# Patient Record
Sex: FEMALE | Race: BLACK OR AFRICAN AMERICAN | NOT HISPANIC OR LATINO | Employment: UNEMPLOYED | URBAN - METROPOLITAN AREA
[De-identification: names, ages, dates, MRNs, and addresses within clinical notes are randomized per-mention and may not be internally consistent; named-entity substitution may affect disease eponyms.]

---

## 2023-03-20 LAB
ALBUMIN (G/DL) IN SER/PLAS: 4 G/DL (ref 3.4–5)
ALBUMIN (MG/L) IN URINE: 143.1 MG/L
ALBUMIN/CREATININE (UG/MG) IN URINE: 74.5 UG/MG CRT (ref 0–30)
ANION GAP IN SER/PLAS: 13 MMOL/L (ref 10–20)
APPEARANCE, URINE: CLEAR
BILIRUBIN, URINE: NEGATIVE
BLOOD, URINE: NEGATIVE
CALCIDIOL (25 OH VITAMIN D3) (NG/ML) IN SER/PLAS: 50 NG/ML
CALCIUM (MG/DL) IN SER/PLAS: 9.7 MG/DL (ref 8.6–10.6)
CARBON DIOXIDE, TOTAL (MMOL/L) IN SER/PLAS: 27 MMOL/L (ref 21–32)
CHLORIDE (MMOL/L) IN SER/PLAS: 109 MMOL/L (ref 98–107)
COLOR, URINE: YELLOW
CREATININE (MG/DL) IN SER/PLAS: 1.86 MG/DL (ref 0.5–1.05)
CREATININE (MG/DL) IN URINE: 192 MG/DL (ref 20–320)
CREATININE (MG/DL) IN URINE: 192 MG/DL (ref 20–320)
ERYTHROCYTE DISTRIBUTION WIDTH (RATIO) BY AUTOMATED COUNT: NORMAL
ERYTHROCYTE MEAN CORPUSCULAR HEMOGLOBIN CONCENTRATION (G/DL) BY AUTOMATED: NORMAL
ERYTHROCYTE MEAN CORPUSCULAR VOLUME (FL) BY AUTOMATED COUNT: NORMAL
ERYTHROCYTES (10*6/UL) IN BLOOD BY AUTOMATED COUNT: NORMAL
FINE GRANULAR CASTS, URINE: ABNORMAL /LPF
GFR FEMALE: 27 ML/MIN/1.73M2
GLUCOSE (MG/DL) IN SER/PLAS: 75 MG/DL (ref 74–99)
GLUCOSE, URINE: NEGATIVE MG/DL
HEMATOCRIT (%) IN BLOOD BY AUTOMATED COUNT: NORMAL
HEMOGLOBIN (G/DL) IN BLOOD: NORMAL
HYALINE CASTS, URINE: ABNORMAL /LPF
KETONES, URINE: ABNORMAL MG/DL
LEUKOCYTE ESTERASE, URINE: NEGATIVE
LEUKOCYTES (10*3/UL) IN BLOOD BY AUTOMATED COUNT: NORMAL
MUCUS, URINE: ABNORMAL /LPF
NITRITE, URINE: NEGATIVE
NRBC (PER 100 WBCS) BY AUTOMATED COUNT: NORMAL
PARATHYRIN INTACT (PG/ML) IN SER/PLAS: 118 PG/ML (ref 18.5–88)
PH, URINE: 5 (ref 5–8)
PHOSPHATE (MG/DL) IN SER/PLAS: 3.9 MG/DL (ref 2.5–4.9)
PLATELETS (10*3/UL) IN BLOOD AUTOMATED COUNT: NORMAL
POTASSIUM (MMOL/L) IN SER/PLAS: 5.2 MMOL/L (ref 3.5–5.3)
PROTEIN (MG/DL) IN URINE: 28 MG/DL (ref 5–24)
PROTEIN, URINE: ABNORMAL MG/DL
PROTEIN/CREATININE (MG/MG) IN URINE: 0.15 MG/MG CREAT (ref 0–0.17)
RBC, URINE: 2 /HPF (ref 0–5)
SODIUM (MMOL/L) IN SER/PLAS: 144 MMOL/L (ref 136–145)
SPECIFIC GRAVITY, URINE: 1.02 (ref 1–1.03)
SQUAMOUS EPITHELIAL CELLS, URINE: 1 /HPF
UREA NITROGEN (MG/DL) IN SER/PLAS: 23 MG/DL (ref 6–23)
UROBILINOGEN, URINE: 2 MG/DL (ref 0–1.9)
WBC, URINE: 2 /HPF (ref 0–5)

## 2023-03-29 PROBLEM — F41.8 DEPRESSION WITH ANXIETY: Status: ACTIVE | Noted: 2023-03-29

## 2023-03-29 PROBLEM — N18.30 STAGE III CHRONIC KIDNEY DISEASE (MULTI): Status: ACTIVE | Noted: 2023-03-29

## 2023-03-29 PROBLEM — M17.12 PRIMARY OSTEOARTHRITIS OF LEFT KNEE: Status: ACTIVE | Noted: 2023-03-29

## 2023-03-29 PROBLEM — R07.9 CHEST PAIN: Status: ACTIVE | Noted: 2023-03-29

## 2023-03-29 PROBLEM — R51.9 PERSISTENT HEADACHES: Status: ACTIVE | Noted: 2023-03-29

## 2023-03-29 PROBLEM — I67.9 CEREBROVASCULAR DISEASE: Status: ACTIVE | Noted: 2023-03-29

## 2023-03-29 PROBLEM — D12.6 TUBULAR ADENOMA OF COLON: Status: ACTIVE | Noted: 2023-03-29

## 2023-03-29 PROBLEM — N25.81 SECONDARY RENAL HYPERPARATHYROIDISM (MULTI): Status: ACTIVE | Noted: 2023-03-29

## 2023-03-29 PROBLEM — M25.562 LEFT KNEE PAIN: Status: ACTIVE | Noted: 2023-03-29

## 2023-03-29 PROBLEM — G31.84 AMNESTIC MCI (MILD COGNITIVE IMPAIRMENT WITH MEMORY LOSS): Status: ACTIVE | Noted: 2023-03-29

## 2023-03-29 PROBLEM — R79.89 LOW VITAMIN B12 LEVEL: Status: ACTIVE | Noted: 2023-03-29

## 2023-03-29 PROBLEM — H25.011 CORTICAL AGE-RELATED CATARACT OF RIGHT EYE: Status: ACTIVE | Noted: 2023-03-29

## 2023-03-29 PROBLEM — H25.12 AGE-RELATED NUCLEAR CATARACT OF LEFT EYE: Status: ACTIVE | Noted: 2023-03-29

## 2023-03-29 PROBLEM — H25.012 CORTICAL AGE-RELATED CATARACT OF LEFT EYE: Status: ACTIVE | Noted: 2023-03-29

## 2023-03-29 PROBLEM — R41.3 MEMORY LOSS: Status: ACTIVE | Noted: 2023-03-29

## 2023-03-29 PROBLEM — Z96.1 PSEUDOPHAKIA OF RIGHT EYE: Status: ACTIVE | Noted: 2023-03-29

## 2023-03-29 PROBLEM — R26.81 GAIT INSTABILITY: Status: ACTIVE | Noted: 2023-03-29

## 2023-03-29 PROBLEM — G47.00 INSOMNIA: Status: ACTIVE | Noted: 2023-03-29

## 2023-03-29 PROBLEM — M81.0 OSTEOPOROSIS: Status: ACTIVE | Noted: 2023-03-29

## 2023-03-29 PROBLEM — M21.619 BUNION: Status: ACTIVE | Noted: 2023-03-29

## 2023-03-29 PROBLEM — R79.82 ELEVATED C-REACTIVE PROTEIN (CRP): Status: ACTIVE | Noted: 2023-03-29

## 2023-03-29 PROBLEM — F41.1 GAD (GENERALIZED ANXIETY DISORDER): Status: ACTIVE | Noted: 2023-03-29

## 2023-03-29 PROBLEM — R80.9 PROTEINURIA: Status: ACTIVE | Noted: 2023-03-29

## 2023-03-29 PROBLEM — M18.11 ARTHRITIS OF CARPOMETACARPAL (CMC) JOINT OF RIGHT THUMB: Status: ACTIVE | Noted: 2023-03-29

## 2023-03-29 PROBLEM — B02.9 HERPES ZOSTER: Status: ACTIVE | Noted: 2023-03-29

## 2023-03-29 PROBLEM — E55.9 VITAMIN D DEFICIENCY: Status: ACTIVE | Noted: 2023-03-29

## 2023-03-29 PROBLEM — R76.8 POSITIVE ANA (ANTINUCLEAR ANTIBODY): Status: ACTIVE | Noted: 2023-03-29

## 2023-03-29 PROBLEM — H91.90 HEARING LOSS: Status: ACTIVE | Noted: 2023-03-29

## 2023-03-29 PROBLEM — I10 HYPERTENSION: Status: ACTIVE | Noted: 2023-03-29

## 2023-03-29 PROBLEM — K59.09 CHRONIC CONSTIPATION: Status: ACTIVE | Noted: 2023-03-29

## 2023-03-29 PROBLEM — M54.50 LOW BACK PAIN: Status: ACTIVE | Noted: 2023-03-29

## 2023-03-29 RX ORDER — AMLODIPINE BESYLATE 5 MG/1
1 TABLET ORAL DAILY
COMMUNITY
Start: 2017-02-09

## 2023-03-29 RX ORDER — BUPROPION HYDROCHLORIDE 300 MG/1
1 TABLET ORAL DAILY
COMMUNITY
Start: 2013-12-03 | End: 2023-06-16 | Stop reason: SDUPTHER

## 2023-03-29 RX ORDER — LISINOPRIL 20 MG/1
1 TABLET ORAL DAILY
COMMUNITY
Start: 2016-04-07 | End: 2024-01-30 | Stop reason: DRUGHIGH

## 2023-03-30 ENCOUNTER — OFFICE VISIT (OUTPATIENT)
Dept: PRIMARY CARE | Facility: CLINIC | Age: 83
End: 2023-03-30
Payer: MEDICARE

## 2023-03-30 VITALS
SYSTOLIC BLOOD PRESSURE: 146 MMHG | DIASTOLIC BLOOD PRESSURE: 88 MMHG | HEART RATE: 82 BPM | WEIGHT: 136.8 LBS | BODY MASS INDEX: 23.48 KG/M2
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
## 2023-03-30 DIAGNOSIS — R26.81 GAIT INSTABILITY: Primary | ICD-10-CM

## 2023-03-30 DIAGNOSIS — S49.91XA SHOULDER INJURY, RIGHT, INITIAL ENCOUNTER: ICD-10-CM

## 2023-03-30 PROBLEM — H25.12 AGE-RELATED NUCLEAR CATARACT OF LEFT EYE: Status: RESOLVED | Noted: 2023-03-29 | Resolved: 2023-03-30

## 2023-03-30 PROBLEM — R41.3 MEMORY LOSS: Status: RESOLVED | Noted: 2023-03-29 | Resolved: 2023-03-30

## 2023-03-30 PROBLEM — M25.562 LEFT KNEE PAIN: Status: RESOLVED | Noted: 2023-03-29 | Resolved: 2023-03-30

## 2023-03-30 PROBLEM — M54.50 LOW BACK PAIN: Status: RESOLVED | Noted: 2023-03-29 | Resolved: 2023-03-30

## 2023-03-30 PROBLEM — B02.9 HERPES ZOSTER: Status: RESOLVED | Noted: 2023-03-29 | Resolved: 2023-03-30

## 2023-03-30 PROBLEM — R07.9 CHEST PAIN: Status: RESOLVED | Noted: 2023-03-29 | Resolved: 2023-03-30

## 2023-03-30 PROCEDURE — 99214 OFFICE O/P EST MOD 30 MIN: CPT | Performed by: INTERNAL MEDICINE

## 2023-03-30 PROCEDURE — 1159F MED LIST DOCD IN RCRD: CPT | Performed by: INTERNAL MEDICINE

## 2023-03-30 PROCEDURE — 1036F TOBACCO NON-USER: CPT | Performed by: INTERNAL MEDICINE

## 2023-03-30 PROCEDURE — 3079F DIAST BP 80-89 MM HG: CPT | Performed by: INTERNAL MEDICINE

## 2023-03-30 PROCEDURE — 3077F SYST BP >= 140 MM HG: CPT | Performed by: INTERNAL MEDICINE

## 2023-03-30 PROCEDURE — 1160F RVW MEDS BY RX/DR IN RCRD: CPT | Performed by: INTERNAL MEDICINE

## 2023-03-30 ASSESSMENT — ENCOUNTER SYMPTOMS
MYALGIAS: 1
NECK PAIN: 1
WEAKNESS: 0
CHEST TIGHTNESS: 0
BACK PAIN: 1
HEADACHES: 0
PALPITATIONS: 0
NECK STIFFNESS: 1
NUMBNESS: 0
COUGH: 0
DIZZINESS: 0
ACTIVITY CHANGE: 0
ARTHRALGIAS: 1

## 2023-03-30 NOTE — PATIENT INSTRUCTIONS
At this time, we will start with x-rays of the shoulder and the collarbone.  I would recommend heat to the upper back as well as ice to the shoulder and anterior chest.  If you have any questions or worsening symptoms, please contact us.  Otherwise, we will be in touch once x-ray results are available.

## 2023-03-30 NOTE — PROGRESS NOTES
Kirstin Howe comes in today forright shoulder pain after a recent fall.      Ms. Howe comes in today after a fall that she took about 2 days ago.  She was in the shower.  She was trying to sit down on her shower chair, when she fell, falling over the edge of the tub and onto the ground.  She ended up laying on her back but finds that her right shoulder and anterior chest wall are quite painful.  This does not extend into her rib cage, just along the clavicle.  Range of motion is decreased.  She does have some upper back spasm associated with this.  She has not had any numbness nor tingling nor swelling in her right upper extremity.  There is no bruising present.  She did not hit her head nor have a syncopal spell.  She did not injure any other parts of her body.    Fall  Pertinent negatives include no headaches or numbness.       Review of Systems   Constitutional:  Negative for activity change.   Respiratory:  Negative for cough and chest tightness.    Cardiovascular:  Negative for chest pain and palpitations.   Musculoskeletal:  Positive for arthralgias, back pain, myalgias, neck pain and neck stiffness.   Neurological:  Negative for dizziness, syncope, weakness, numbness and headaches.       Objective   Physical Exam  Constitutional:       Appearance: Normal appearance.   Musculoskeletal:      Comments: No bruising or external injury noted, but pain on palpation over the acromioclavicular joint.  Range of motion limited at right shoulder to approximately 45 degrees passive abduction.  Moderate trapezius spasm noted on the right as well.   strength symmetrical.  Reflexes symmetrical.   Neurological:      Mental Status: She is alert.         Assessment/Plan   Accidental fall with right shoulder pain: Reasonable to proceed with x-rays of the shoulder and clavicle.  Recommend ice and Tylenol to the region.  Follow-up on x-ray results.  If x-rays normal, could consider simply symptomatic care.  If persistent  symptoms, would recommend physical therapy.  She voices understanding.  Does not appear to have had syncopal spell.  No indication for head CT.  Contact us if symptoms acutely change or worsen.  Problem List Items Addressed This Visit          Other    Gait instability - Primary     Other Visit Diagnoses       Accidental fall, initial encounter        Relevant Orders    XR shoulder right 2+ views    XR clavicle right    Shoulder injury, right, initial encounter        Relevant Orders    XR shoulder right 2+ views    XR clavicle right

## 2023-05-25 ENCOUNTER — OFFICE VISIT (OUTPATIENT)
Dept: PRIMARY CARE | Facility: CLINIC | Age: 83
End: 2023-05-25
Payer: MEDICARE

## 2023-05-25 VITALS
HEART RATE: 86 BPM | BODY MASS INDEX: 24.2 KG/M2 | SYSTOLIC BLOOD PRESSURE: 96 MMHG | WEIGHT: 141 LBS | DIASTOLIC BLOOD PRESSURE: 59 MMHG | OXYGEN SATURATION: 98 %

## 2023-05-25 DIAGNOSIS — R07.9 CHEST PAIN, UNSPECIFIED TYPE: Primary | ICD-10-CM

## 2023-05-25 PROCEDURE — 3074F SYST BP LT 130 MM HG: CPT | Performed by: INTERNAL MEDICINE

## 2023-05-25 PROCEDURE — 3078F DIAST BP <80 MM HG: CPT | Performed by: INTERNAL MEDICINE

## 2023-05-25 PROCEDURE — 1157F ADVNC CARE PLAN IN RCRD: CPT | Performed by: INTERNAL MEDICINE

## 2023-05-25 PROCEDURE — 99214 OFFICE O/P EST MOD 30 MIN: CPT | Performed by: INTERNAL MEDICINE

## 2023-05-25 PROCEDURE — 1036F TOBACCO NON-USER: CPT | Performed by: INTERNAL MEDICINE

## 2023-05-25 PROCEDURE — 1159F MED LIST DOCD IN RCRD: CPT | Performed by: INTERNAL MEDICINE

## 2023-05-25 PROCEDURE — 93000 ELECTROCARDIOGRAM COMPLETE: CPT | Performed by: INTERNAL MEDICINE

## 2023-05-25 PROCEDURE — 1160F RVW MEDS BY RX/DR IN RCRD: CPT | Performed by: INTERNAL MEDICINE

## 2023-05-28 PROBLEM — A60.00 HERPES SIMPLEX INFECTION OF GENITOURINARY SYSTEM: Status: RESOLVED | Noted: 2018-08-28 | Resolved: 2023-05-28

## 2023-05-28 PROBLEM — Z96.652 STATUS POST LEFT KNEE REPLACEMENT: Status: RESOLVED | Noted: 2022-09-02 | Resolved: 2023-05-28

## 2023-05-28 PROBLEM — S06.0XAA CONCUSSION: Status: RESOLVED | Noted: 2021-04-08 | Resolved: 2023-05-28

## 2023-05-28 PROBLEM — M21.619 BUNION: Status: RESOLVED | Noted: 2023-03-29 | Resolved: 2023-05-28

## 2023-05-28 ASSESSMENT — ENCOUNTER SYMPTOMS
CONFUSION: 1
NAUSEA: 0
DIZZINESS: 0
ACTIVITY CHANGE: 0
HEADACHES: 0
FATIGUE: 0
CONSTIPATION: 0
PALPITATIONS: 0
DYSPHORIC MOOD: 0
CHEST TIGHTNESS: 1
WHEEZING: 0
COUGH: 0
DIARRHEA: 0
LIGHT-HEADEDNESS: 0
SHORTNESS OF BREATH: 0
ABDOMINAL PAIN: 0
NERVOUS/ANXIOUS: 1

## 2023-05-28 NOTE — PROGRESS NOTES
Kirstin Howe comes in today for vague chest discomfort.      Ms. Howe comes in today with a sensation of vague chest discomfort.  Her history is somewhat tangential and vague, difficult to follow.  It sounds as though 4 days ago, she started having a nonspecific burning in her chest.  This is localized on the left side of her chest.  She did not seek evaluation for this.  It has not intensified, does not change throughout the day associated with exertion.  It does not awaken her at night.  She recently had a non specific respiratory syndrome and presented to the emergency room for this.  She had evaluation at that time for chest pain as well with negative troponins and normal chest x-ray.  It sounds as though she was having some similar symptoms at that time, but states now that these current symptoms started only 4 days ago.  Again, history is somewhat vague and difficult to follow today.  She denies any shortness of breath or cough at this time.  She denies any dizziness nor diaphoresis.  She has not changed her activity levels.        Review of Systems   Constitutional:  Negative for activity change and fatigue.   Respiratory:  Positive for chest tightness. Negative for cough, shortness of breath and wheezing.    Cardiovascular:  Positive for chest pain. Negative for palpitations and leg swelling.   Gastrointestinal:  Negative for abdominal pain, constipation, diarrhea and nausea.   Neurological:  Negative for dizziness, light-headedness and headaches.   Psychiatric/Behavioral:  Positive for confusion. Negative for dysphoric mood. The patient is nervous/anxious.        Objective   Physical Exam  Constitutional:       Appearance: Normal appearance.   Cardiovascular:      Rate and Rhythm: Normal rate and regular rhythm.      Heart sounds: Normal heart sounds. No murmur heard.     No gallop.   Pulmonary:      Effort: Pulmonary effort is normal. No respiratory distress.      Breath sounds: Normal breath sounds. No  wheezing, rhonchi or rales.   Chest:      Chest wall: No tenderness.   Abdominal:      General: There is no distension.      Tenderness: There is no guarding.   Musculoskeletal:      Right lower leg: No edema.      Left lower leg: No edema.   Neurological:      Mental Status: She is alert. Mental status is at baseline.         Assessment/Plan   Non specific chest pain: EKG looks reassuring though with non specific T wave flattening, not significantly changed from previous studies.  It has been about 4 years since she has had a stress test, reasonable to repeat based on symptoms.  Chest Xray from 2 weeks ago reviewed, unclear whether having similar symptoms at that time.  Pt does at end of visit state that she has been under a great deal of stress as one of her close friends is quite ill and she has been helping care for her.  She then admits that much of her symptoms may be due to anxiety.  Again, reasonable to proceed with stress test for further evaluation and if she has any acute worsening of symptoms, she needs to immediately proceed to the emergency department.  She voices agreement.  Problem List Items Addressed This Visit    None  Visit Diagnoses       Chest pain, unspecified type    -  Primary    Relevant Orders    ECG 12 lead (Clinic Performed) (Completed)

## 2023-05-28 NOTE — PATIENT INSTRUCTIONS
Your EKG and recent chest x-ray look quite reassuring.  We can proceed with a stress test to further evaluate for any heart disease.  If you have any acute worsening of symptoms, please proceed directly to the emergency department and please feel free to call with any questions or concerns.

## 2023-06-16 DIAGNOSIS — F41.8 DEPRESSION WITH ANXIETY: Primary | ICD-10-CM

## 2023-06-16 RX ORDER — BUPROPION HYDROCHLORIDE 300 MG/1
300 TABLET ORAL DAILY
Qty: 90 TABLET | Refills: 1 | Status: SHIPPED | OUTPATIENT
Start: 2023-06-16 | End: 2024-01-22 | Stop reason: SDUPTHER

## 2023-08-15 ENCOUNTER — TELEPHONE (OUTPATIENT)
Dept: PRIMARY CARE | Facility: CLINIC | Age: 83
End: 2023-08-15

## 2023-09-13 ENCOUNTER — LAB (OUTPATIENT)
Dept: LAB | Facility: LAB | Age: 83
End: 2023-09-13
Payer: MEDICARE

## 2023-09-13 LAB
ALBUMIN (G/DL) IN SER/PLAS: 3.6 G/DL (ref 3.4–5)
ALBUMIN (MG/L) IN URINE: 95.2 MG/L
ALBUMIN/CREATININE (UG/MG) IN URINE: 48.3 UG/MG CRT (ref 0–30)
ANION GAP IN SER/PLAS: 14 MMOL/L (ref 10–20)
APPEARANCE, URINE: CLEAR
BILIRUBIN, URINE: NEGATIVE
BLOOD, URINE: NEGATIVE
CALCIDIOL (25 OH VITAMIN D3) (NG/ML) IN SER/PLAS: 32 NG/ML
CALCIUM (MG/DL) IN SER/PLAS: 8.9 MG/DL (ref 8.6–10.6)
CARBON DIOXIDE, TOTAL (MMOL/L) IN SER/PLAS: 23 MMOL/L (ref 21–32)
CHLORIDE (MMOL/L) IN SER/PLAS: 110 MMOL/L (ref 98–107)
COLOR, URINE: YELLOW
CREATININE (MG/DL) IN SER/PLAS: 1.71 MG/DL (ref 0.5–1.05)
CREATININE (MG/DL) IN URINE: 197 MG/DL (ref 20–320)
CREATININE (MG/DL) IN URINE: 197 MG/DL (ref 20–320)
ERYTHROCYTE DISTRIBUTION WIDTH (RATIO) BY AUTOMATED COUNT: 15.3 % (ref 11.5–14.5)
ERYTHROCYTE MEAN CORPUSCULAR HEMOGLOBIN CONCENTRATION (G/DL) BY AUTOMATED: 30.9 G/DL (ref 32–36)
ERYTHROCYTE MEAN CORPUSCULAR VOLUME (FL) BY AUTOMATED COUNT: 85 FL (ref 80–100)
ERYTHROCYTES (10*6/UL) IN BLOOD BY AUTOMATED COUNT: 4.6 X10E12/L (ref 4–5.2)
GFR FEMALE: 29 ML/MIN/1.73M2
GLUCOSE (MG/DL) IN SER/PLAS: 118 MG/DL (ref 74–99)
GLUCOSE, URINE: NEGATIVE MG/DL
HEMATOCRIT (%) IN BLOOD BY AUTOMATED COUNT: 39.1 % (ref 36–46)
HEMOGLOBIN (G/DL) IN BLOOD: 12.1 G/DL (ref 12–16)
KETONES, URINE: NEGATIVE MG/DL
LEUKOCYTE ESTERASE, URINE: ABNORMAL
LEUKOCYTES (10*3/UL) IN BLOOD BY AUTOMATED COUNT: 5.5 X10E9/L (ref 4.4–11.3)
MUCUS, URINE: NORMAL /LPF
NITRITE, URINE: NEGATIVE
NRBC (PER 100 WBCS) BY AUTOMATED COUNT: 0 /100 WBC (ref 0–0)
PARATHYRIN INTACT (PG/ML) IN SER/PLAS: 126.2 PG/ML (ref 18.5–88)
PH, URINE: 5 (ref 5–8)
PHOSPHATE (MG/DL) IN SER/PLAS: 3.6 MG/DL (ref 2.5–4.9)
PLATELETS (10*3/UL) IN BLOOD AUTOMATED COUNT: 335 X10E9/L (ref 150–450)
POTASSIUM (MMOL/L) IN SER/PLAS: 4.1 MMOL/L (ref 3.5–5.3)
PROTEIN (MG/DL) IN URINE: 19 MG/DL (ref 5–24)
PROTEIN, URINE: ABNORMAL MG/DL
PROTEIN/CREATININE (MG/MG) IN URINE: 0.1 MG/MG CREAT (ref 0–0.17)
RBC, URINE: 1 /HPF (ref 0–5)
SODIUM (MMOL/L) IN SER/PLAS: 143 MMOL/L (ref 136–145)
SPECIFIC GRAVITY, URINE: 1.02 (ref 1–1.03)
SQUAMOUS EPITHELIAL CELLS, URINE: <1 /HPF
UREA NITROGEN (MG/DL) IN SER/PLAS: 17 MG/DL (ref 6–23)
UROBILINOGEN, URINE: <2 MG/DL (ref 0–1.9)
WBC, URINE: 2 /HPF (ref 0–5)

## 2023-10-09 ENCOUNTER — LAB (OUTPATIENT)
Dept: LAB | Facility: LAB | Age: 83
End: 2023-10-09
Payer: MEDICARE

## 2023-10-09 DIAGNOSIS — N18.30 CHRONIC KIDNEY DISEASE, STAGE 3 UNSPECIFIED (MULTI): Primary | ICD-10-CM

## 2023-10-09 LAB
ALBUMIN SERPL BCP-MCNC: 3.9 G/DL (ref 3.4–5)
ANION GAP SERPL CALC-SCNC: 14 MMOL/L (ref 10–20)
BUN SERPL-MCNC: 18 MG/DL (ref 6–23)
CALCIUM SERPL-MCNC: 9.4 MG/DL (ref 8.6–10.6)
CHLORIDE SERPL-SCNC: 107 MMOL/L (ref 98–107)
CO2 SERPL-SCNC: 26 MMOL/L (ref 21–32)
CREAT SERPL-MCNC: 1.85 MG/DL (ref 0.5–1.05)
GFR SERPL CREATININE-BSD FRML MDRD: 27 ML/MIN/1.73M*2
GLUCOSE SERPL-MCNC: 121 MG/DL (ref 74–99)
PHOSPHATE SERPL-MCNC: 3.1 MG/DL (ref 2.5–4.9)
POTASSIUM SERPL-SCNC: 4.3 MMOL/L (ref 3.5–5.3)
SODIUM SERPL-SCNC: 143 MMOL/L (ref 136–145)

## 2023-10-09 PROCEDURE — 36415 COLL VENOUS BLD VENIPUNCTURE: CPT

## 2023-10-09 PROCEDURE — 80069 RENAL FUNCTION PANEL: CPT

## 2024-01-22 DIAGNOSIS — F41.8 DEPRESSION WITH ANXIETY: Primary | ICD-10-CM

## 2024-01-23 RX ORDER — BUPROPION HYDROCHLORIDE 300 MG/1
300 TABLET ORAL DAILY
Qty: 30 TABLET | Refills: 0 | Status: SHIPPED | OUTPATIENT
Start: 2024-01-23 | End: 2024-02-23 | Stop reason: SDUPTHER

## 2024-01-30 ENCOUNTER — LAB (OUTPATIENT)
Dept: LAB | Facility: LAB | Age: 84
End: 2024-01-30
Payer: MEDICARE

## 2024-01-30 ENCOUNTER — OFFICE VISIT (OUTPATIENT)
Dept: PRIMARY CARE | Facility: CLINIC | Age: 84
End: 2024-01-30
Payer: MEDICARE

## 2024-01-30 VITALS
HEART RATE: 95 BPM | SYSTOLIC BLOOD PRESSURE: 153 MMHG | BODY MASS INDEX: 25.06 KG/M2 | WEIGHT: 146.8 LBS | HEIGHT: 64 IN | DIASTOLIC BLOOD PRESSURE: 76 MMHG

## 2024-01-30 DIAGNOSIS — N18.4 CHRONIC RENAL DISEASE, STAGE IV (MULTI): ICD-10-CM

## 2024-01-30 DIAGNOSIS — N18.32 STAGE 3B CHRONIC KIDNEY DISEASE (MULTI): ICD-10-CM

## 2024-01-30 DIAGNOSIS — R79.89 LOW VITAMIN B12 LEVEL: ICD-10-CM

## 2024-01-30 DIAGNOSIS — Z23 NEED FOR INFLUENZA VACCINATION: Primary | ICD-10-CM

## 2024-01-30 DIAGNOSIS — E55.9 VITAMIN D DEFICIENCY: ICD-10-CM

## 2024-01-30 DIAGNOSIS — Z12.31 ENCOUNTER FOR SCREENING MAMMOGRAM FOR MALIGNANT NEOPLASM OF BREAST: ICD-10-CM

## 2024-01-30 DIAGNOSIS — I10 PRIMARY HYPERTENSION: ICD-10-CM

## 2024-01-30 DIAGNOSIS — N25.81 SECONDARY RENAL HYPERPARATHYROIDISM (MULTI): ICD-10-CM

## 2024-01-30 DIAGNOSIS — Z78.0 POSTMENOPAUSAL ESTROGEN DEFICIENCY: ICD-10-CM

## 2024-01-30 DIAGNOSIS — M81.0 AGE-RELATED OSTEOPOROSIS WITHOUT CURRENT PATHOLOGICAL FRACTURE: ICD-10-CM

## 2024-01-30 DIAGNOSIS — Z00.00 ROUTINE GENERAL MEDICAL EXAMINATION AT HEALTH CARE FACILITY: ICD-10-CM

## 2024-01-30 DIAGNOSIS — G31.84 AMNESTIC MCI (MILD COGNITIVE IMPAIRMENT WITH MEMORY LOSS): ICD-10-CM

## 2024-01-30 DIAGNOSIS — F41.1 GAD (GENERALIZED ANXIETY DISORDER): ICD-10-CM

## 2024-01-30 LAB
APPEARANCE UR: CLEAR
BILIRUB UR STRIP.AUTO-MCNC: NEGATIVE MG/DL
COLOR UR: YELLOW
CREAT UR-MCNC: 174.9 MG/DL (ref 20–320)
GLUCOSE UR STRIP.AUTO-MCNC: NEGATIVE MG/DL
KETONES UR STRIP.AUTO-MCNC: NEGATIVE MG/DL
LEUKOCYTE ESTERASE UR QL STRIP.AUTO: ABNORMAL
MICROALBUMIN UR-MCNC: 215.4 MG/L
MICROALBUMIN/CREAT UR: 123.2 UG/MG CREAT
MUCOUS THREADS #/AREA URNS AUTO: NORMAL /LPF
NITRITE UR QL STRIP.AUTO: NEGATIVE
PH UR STRIP.AUTO: 5 [PH]
PROT UR STRIP.AUTO-MCNC: ABNORMAL MG/DL
RBC # UR STRIP.AUTO: NEGATIVE /UL
RBC #/AREA URNS AUTO: NORMAL /HPF
SP GR UR STRIP.AUTO: 1.02
UROBILINOGEN UR STRIP.AUTO-MCNC: <2 MG/DL
WBC #/AREA URNS AUTO: NORMAL /HPF

## 2024-01-30 PROCEDURE — 82607 VITAMIN B-12: CPT

## 2024-01-30 PROCEDURE — 84443 ASSAY THYROID STIM HORMONE: CPT

## 2024-01-30 PROCEDURE — 1170F FXNL STATUS ASSESSED: CPT | Performed by: INTERNAL MEDICINE

## 2024-01-30 PROCEDURE — 83540 ASSAY OF IRON: CPT

## 2024-01-30 PROCEDURE — 83550 IRON BINDING TEST: CPT

## 2024-01-30 PROCEDURE — 1126F AMNT PAIN NOTED NONE PRSNT: CPT | Performed by: INTERNAL MEDICINE

## 2024-01-30 PROCEDURE — 80053 COMPREHEN METABOLIC PANEL: CPT

## 2024-01-30 PROCEDURE — 1036F TOBACCO NON-USER: CPT | Performed by: INTERNAL MEDICINE

## 2024-01-30 PROCEDURE — 82306 VITAMIN D 25 HYDROXY: CPT

## 2024-01-30 PROCEDURE — 80061 LIPID PANEL: CPT

## 2024-01-30 PROCEDURE — 81001 URINALYSIS AUTO W/SCOPE: CPT

## 2024-01-30 PROCEDURE — 99397 PER PM REEVAL EST PAT 65+ YR: CPT | Performed by: INTERNAL MEDICINE

## 2024-01-30 PROCEDURE — 85025 COMPLETE CBC W/AUTO DIFF WBC: CPT

## 2024-01-30 PROCEDURE — 36415 COLL VENOUS BLD VENIPUNCTURE: CPT

## 2024-01-30 PROCEDURE — 3078F DIAST BP <80 MM HG: CPT | Performed by: INTERNAL MEDICINE

## 2024-01-30 PROCEDURE — 84550 ASSAY OF BLOOD/URIC ACID: CPT

## 2024-01-30 PROCEDURE — G0008 ADMIN INFLUENZA VIRUS VAC: HCPCS | Performed by: INTERNAL MEDICINE

## 2024-01-30 PROCEDURE — 1160F RVW MEDS BY RX/DR IN RCRD: CPT | Performed by: INTERNAL MEDICINE

## 2024-01-30 PROCEDURE — 3077F SYST BP >= 140 MM HG: CPT | Performed by: INTERNAL MEDICINE

## 2024-01-30 PROCEDURE — 1159F MED LIST DOCD IN RCRD: CPT | Performed by: INTERNAL MEDICINE

## 2024-01-30 PROCEDURE — 1157F ADVNC CARE PLAN IN RCRD: CPT | Performed by: INTERNAL MEDICINE

## 2024-01-30 PROCEDURE — G0439 PPPS, SUBSEQ VISIT: HCPCS | Performed by: INTERNAL MEDICINE

## 2024-01-30 PROCEDURE — 82043 UR ALBUMIN QUANTITATIVE: CPT

## 2024-01-30 PROCEDURE — 82570 ASSAY OF URINE CREATININE: CPT

## 2024-01-30 PROCEDURE — 99214 OFFICE O/P EST MOD 30 MIN: CPT | Performed by: INTERNAL MEDICINE

## 2024-01-30 PROCEDURE — 90662 IIV NO PRSV INCREASED AG IM: CPT | Performed by: INTERNAL MEDICINE

## 2024-01-30 RX ORDER — LISINOPRIL 40 MG/1
40 TABLET ORAL DAILY
COMMUNITY
Start: 2023-12-28

## 2024-01-30 ASSESSMENT — ENCOUNTER SYMPTOMS
DIARRHEA: 0
BRUISES/BLEEDS EASILY: 0
EYE ITCHING: 0
SEIZURES: 0
CONSTIPATION: 0
MYALGIAS: 1
WEAKNESS: 0
VOICE CHANGE: 0
COLOR CHANGE: 0
APPETITE CHANGE: 0
CONFUSION: 0
SLEEP DISTURBANCE: 0
LOSS OF SENSATION IN FEET: 0
HYPERACTIVE: 0
HEMATURIA: 0
PALPITATIONS: 0
NAUSEA: 0
VOMITING: 0
FATIGUE: 0
ADENOPATHY: 0
COUGH: 0
DIFFICULTY URINATING: 0
DEPRESSION: 0
FREQUENCY: 1
CHILLS: 0
RHINORRHEA: 0
ABDOMINAL DISTENTION: 0
SINUS PAIN: 0
DIZZINESS: 0
NERVOUS/ANXIOUS: 0
NECK PAIN: 0
ACTIVITY CHANGE: 0
HEADACHES: 0
SINUS PRESSURE: 0
FEVER: 0
WHEEZING: 0
NUMBNESS: 0
NECK STIFFNESS: 0
ABDOMINAL PAIN: 0
CHEST TIGHTNESS: 0
ARTHRALGIAS: 1
SORE THROAT: 0
DYSURIA: 0
EYE DISCHARGE: 0
HALLUCINATIONS: 0
OCCASIONAL FEELINGS OF UNSTEADINESS: 0
SHORTNESS OF BREATH: 0
LIGHT-HEADEDNESS: 0

## 2024-01-30 ASSESSMENT — ACTIVITIES OF DAILY LIVING (ADL)
MANAGING_FINANCES: INDEPENDENT
TAKING_MEDICATION: INDEPENDENT
BATHING: INDEPENDENT
DOING_HOUSEWORK: INDEPENDENT
DRESSING: INDEPENDENT
GROCERY_SHOPPING: INDEPENDENT

## 2024-01-30 ASSESSMENT — PATIENT HEALTH QUESTIONNAIRE - PHQ9
1. LITTLE INTEREST OR PLEASURE IN DOING THINGS: NOT AT ALL
SUM OF ALL RESPONSES TO PHQ9 QUESTIONS 1 AND 2: 0
2. FEELING DOWN, DEPRESSED OR HOPELESS: NOT AT ALL

## 2024-01-30 NOTE — PATIENT INSTRUCTIONS
We will follow up on all comprehensive blood work once results are available and make any recommendations based on these results as may be indicated.  Please consider scheduling a mammogram and a bone density scan at your convenience.  Thank you for receiving your annual flu shot.  I have placed a referral to a neurology specialist and I encourage you to continue following with your other routine specialists.  Please take your medication bottles with you to your next office visit so that we can ensure accuracy with our list.  If you have any questions, please contact us.  Otherwise, we will plan on seeing you back for brief follow-up in 6 months.

## 2024-01-30 NOTE — PROGRESS NOTES
Subjective   Reason for Visit: Kirstin Howe is an 83 y.o. female patient comes in today for comprehensive physical and follow-up of medical conditions in conjunction with annual wellness visit    Ms. Howe comes in today for comprehensive physical and follow-up of medical conditions in conjunction with annual wellness visit, dictated in a separate note.  Please refer to that note for details on healthcare maintenance and screening studies.    Ms. Howe comes in today for comprehensive physical as well as follow-up of medical conditions.  She is status one of her closest friends passed away recently.  She also raises some concern about her memory, she apparently sent a text to her granddaughter that she does not recall sending.  She herself does not feel that she has had memory problems otherwise, though this is something that has been noted here in the chart and with a diagnosis of mild cognitive impairment.  She states that occasionally she will take wrong turns when driving but she never particularly gets lost and does not follow a navigation system.  She does have family members in town, a son and daughter, who help care for her.  She follows with nephrology regularly.  There was apparently a mixup in medications recently, she does not know the doses that she is on but she does voice awareness of recent dose adjustments from her nephrologist.  She has an appointment upcoming reportedly next month.  She continues on her bupropion as well.  She denies any headaches, dizziness, chest pain or palpitations, shortness of breath nor cough, nausea, vomiting, nor changes in bowel nor bladder habits.        Patient Care Team:  Hansa West MD as PCP - General  Hansa West MD as PCP - [employer] PCP     Review of Systems   Constitutional:  Negative for activity change, appetite change, chills, fatigue and fever.   HENT:  Negative for congestion, ear pain, postnasal drip, rhinorrhea, sinus pressure, sinus pain,  "sneezing, sore throat, tinnitus and voice change.    Eyes:  Negative for discharge, itching and visual disturbance.   Respiratory:  Negative for cough, chest tightness, shortness of breath and wheezing.    Cardiovascular:  Negative for chest pain, palpitations and leg swelling.   Gastrointestinal:  Negative for abdominal distention, abdominal pain, constipation, diarrhea, nausea and vomiting.   Endocrine: Negative for cold intolerance, heat intolerance and polyuria.   Genitourinary:  Positive for frequency. Negative for difficulty urinating, dysuria, hematuria, urgency, vaginal bleeding and vaginal discharge.   Musculoskeletal:  Positive for arthralgias and myalgias. Negative for gait problem, neck pain and neck stiffness.   Skin:  Negative for color change, pallor and rash.   Allergic/Immunologic: Negative for environmental allergies, food allergies and immunocompromised state.   Neurological:  Negative for dizziness, seizures, syncope, weakness, light-headedness, numbness and headaches.   Hematological:  Negative for adenopathy. Does not bruise/bleed easily.   Psychiatric/Behavioral:  Negative for behavioral problems, confusion, hallucinations, self-injury and sleep disturbance. The patient is not nervous/anxious and is not hyperactive.        Objective   Vitals:  /76   Pulse 95   Ht 1.626 m (5' 4\")   Wt 66.6 kg (146 lb 12.8 oz)   BMI 25.20 kg/m²       Physical Exam  Constitutional:       General: She is not in acute distress.     Appearance: Normal appearance. She is well-developed. She is not ill-appearing or diaphoretic.   HENT:      Head: Normocephalic.      Right Ear: External ear normal. There is impacted cerumen.      Left Ear: External ear normal. There is impacted cerumen.      Nose: Nose normal.      Mouth/Throat:      Mouth: Mucous membranes are moist.      Pharynx: Oropharynx is clear. No oropharyngeal exudate or posterior oropharyngeal erythema.   Eyes:      General: Lids are normal. No " scleral icterus.     Extraocular Movements: Extraocular movements intact.      Conjunctiva/sclera: Conjunctivae normal.      Pupils: Pupils are equal, round, and reactive to light.   Neck:      Vascular: No carotid bruit.   Cardiovascular:      Rate and Rhythm: Normal rate and regular rhythm.      Pulses: Normal pulses.      Heart sounds: No murmur heard.     No gallop.   Pulmonary:      Effort: Pulmonary effort is normal. No respiratory distress.      Breath sounds: No wheezing, rhonchi or rales.   Chest:   Breasts:     Right: No mass.      Left: No mass.   Abdominal:      General: Bowel sounds are normal. There is no distension.      Palpations: Abdomen is soft. There is no mass.      Tenderness: There is no abdominal tenderness. There is no guarding or rebound.   Genitourinary:     Comments: Patient declines  Musculoskeletal:         General: No swelling or signs of injury. Normal range of motion.      Cervical back: Normal range of motion and neck supple. No tenderness.      Right lower leg: No edema.      Left lower leg: No edema.   Lymphadenopathy:      Cervical: No cervical adenopathy.      Upper Body:      Right upper body: No supraclavicular or axillary adenopathy.      Left upper body: No supraclavicular or axillary adenopathy.   Skin:     General: Skin is warm and dry.      Coloration: Skin is not pale.      Findings: No bruising or rash.   Neurological:      General: No focal deficit present.      Mental Status: She is alert and oriented to person, place, and time.      Cranial Nerves: No cranial nerve deficit.      Motor: No weakness.      Gait: Gait normal.      Deep Tendon Reflexes: Reflexes normal.   Psychiatric:         Mood and Affect: Mood normal.         Behavior: Behavior normal.         Judgment: Judgment normal.         Assessment/Plan   Full age-appropriate comprehensive physical exam and health care maintenance performed and discussed today.  Routine safety and preventative measures  discussed including self breast exam, seatbelt use, no drinking and driving, no texting and driving, abstinence or cessation of tobacco use, routine dental and vision exams, healthy diet and regular exercise.    We will update comprehensive labs and follow-up on results once available.  Due for mammogram and DEXA.  Both requisitions placed.  No indication for colon cancer screening at advanced age.  EKG up-to-date followed by cardiology.  Flu shot provided today.  Have counseled regarding COVID booster and RSV vaccine.  Has completed initial pneumonia vaccine series.  Have counseled regarding shingles vaccine recommendations.    In addition to the above-mentioned healthcare maintenance and screening studies, the following were discussed and assessed in detail today:  1.  Hypertension: Elevated today.  Unclear what medication she is taking, hopefully she has started on the higher dose of lisinopril in addition to her amlodipine.  Has follow-up with nephrology upcoming shortly and I have asked that she take her blood pressure medicines into that appointment with her.  2.  Osteoporosis: Due for DEXA and updated vitamin D level.  Would consider treatment if progressive.  3.  Secondary renal hyperparathyroidism: Follows closely with nephrology.  4.  Generalized anxiety disorder: Has been stable on bupropion for combination depression and anxiety.  Contact us if refills needed.  5.  Mild cognitive impairment: She does not recall seeing neurology a few years ago, but it does seem that it has been over 3 years so a new referral has been placed.  Update comprehensive labs as well.  She is specifically requesting a urinalysis to ensure that this is not UTI related.  6.  Stage IV kidney disease: Again, follows closely with nephrology.    We should plan on seeing her every 6 months for follow-up.  She is to contact us with any questions prior to that time.  Problem List Items Addressed This Visit    None  Visit Diagnoses        Routine general medical examination at health care facility    -  Primary

## 2024-01-31 LAB
25(OH)D3 SERPL-MCNC: 46 NG/ML (ref 30–100)
ALBUMIN SERPL BCP-MCNC: 3.8 G/DL (ref 3.4–5)
ALP SERPL-CCNC: 88 U/L (ref 33–136)
ALT SERPL W P-5'-P-CCNC: 9 U/L (ref 7–45)
ANION GAP SERPL CALC-SCNC: 13 MMOL/L (ref 10–20)
AST SERPL W P-5'-P-CCNC: 15 U/L (ref 9–39)
BASOPHILS # BLD AUTO: 0.04 X10*3/UL (ref 0–0.1)
BASOPHILS NFR BLD AUTO: 0.8 %
BILIRUB SERPL-MCNC: 0.4 MG/DL (ref 0–1.2)
BUN SERPL-MCNC: 16 MG/DL (ref 6–23)
CALCIUM SERPL-MCNC: 9.9 MG/DL (ref 8.6–10.6)
CHLORIDE SERPL-SCNC: 107 MMOL/L (ref 98–107)
CHOLEST SERPL-MCNC: 178 MG/DL (ref 0–199)
CHOLESTEROL/HDL RATIO: 3
CO2 SERPL-SCNC: 28 MMOL/L (ref 21–32)
CREAT SERPL-MCNC: 1.72 MG/DL (ref 0.5–1.05)
EGFRCR SERPLBLD CKD-EPI 2021: 29 ML/MIN/1.73M*2
EOSINOPHIL # BLD AUTO: 0.11 X10*3/UL (ref 0–0.4)
EOSINOPHIL NFR BLD AUTO: 2.1 %
ERYTHROCYTE [DISTWIDTH] IN BLOOD BY AUTOMATED COUNT: 14.2 % (ref 11.5–14.5)
GLUCOSE SERPL-MCNC: 74 MG/DL (ref 74–99)
HCT VFR BLD AUTO: 38.7 % (ref 36–46)
HDLC SERPL-MCNC: 59.5 MG/DL
HGB BLD-MCNC: 12.2 G/DL (ref 12–16)
IMM GRANULOCYTES # BLD AUTO: 0.01 X10*3/UL (ref 0–0.5)
IMM GRANULOCYTES NFR BLD AUTO: 0.2 % (ref 0–0.9)
IRON SATN MFR SERPL: 22 % (ref 25–45)
IRON SERPL-MCNC: 66 UG/DL (ref 35–150)
LDLC SERPL CALC-MCNC: 105 MG/DL
LYMPHOCYTES # BLD AUTO: 1.19 X10*3/UL (ref 0.8–3)
LYMPHOCYTES NFR BLD AUTO: 23.2 %
MCH RBC QN AUTO: 26.4 PG (ref 26–34)
MCHC RBC AUTO-ENTMCNC: 31.5 G/DL (ref 32–36)
MCV RBC AUTO: 84 FL (ref 80–100)
MONOCYTES # BLD AUTO: 0.41 X10*3/UL (ref 0.05–0.8)
MONOCYTES NFR BLD AUTO: 8 %
NEUTROPHILS # BLD AUTO: 3.37 X10*3/UL (ref 1.6–5.5)
NEUTROPHILS NFR BLD AUTO: 65.7 %
NON HDL CHOLESTEROL: 119 MG/DL (ref 0–149)
NRBC BLD-RTO: 0 /100 WBCS (ref 0–0)
PLATELET # BLD AUTO: 355 X10*3/UL (ref 150–450)
POTASSIUM SERPL-SCNC: 4.7 MMOL/L (ref 3.5–5.3)
PROT SERPL-MCNC: 6.1 G/DL (ref 6.4–8.2)
RBC # BLD AUTO: 4.62 X10*6/UL (ref 4–5.2)
SODIUM SERPL-SCNC: 143 MMOL/L (ref 136–145)
TIBC SERPL-MCNC: 302 UG/DL (ref 240–445)
TRIGL SERPL-MCNC: 66 MG/DL (ref 0–149)
TSH SERPL-ACNC: 1.8 MIU/L (ref 0.44–3.98)
UIBC SERPL-MCNC: 236 UG/DL (ref 110–370)
URATE SERPL-MCNC: 6.2 MG/DL (ref 2.3–6.7)
VIT B12 SERPL-MCNC: 295 PG/ML (ref 211–911)
VLDL: 13 MG/DL (ref 0–40)
WBC # BLD AUTO: 5.1 X10*3/UL (ref 4.4–11.3)

## 2024-02-05 ENCOUNTER — APPOINTMENT (OUTPATIENT)
Dept: LAB | Facility: LAB | Age: 84
End: 2024-02-05
Payer: MEDICARE

## 2024-02-05 ENCOUNTER — OFFICE VISIT (OUTPATIENT)
Dept: NEPHROLOGY | Facility: CLINIC | Age: 84
End: 2024-02-05
Payer: MEDICARE

## 2024-02-05 VITALS
SYSTOLIC BLOOD PRESSURE: 128 MMHG | HEIGHT: 64 IN | BODY MASS INDEX: 25.44 KG/M2 | DIASTOLIC BLOOD PRESSURE: 75 MMHG | HEART RATE: 80 BPM | WEIGHT: 149 LBS

## 2024-02-05 DIAGNOSIS — N25.81 SECONDARY HYPERPARATHYROIDISM OF RENAL ORIGIN (MULTI): Primary | ICD-10-CM

## 2024-02-05 DIAGNOSIS — N25.81 SECONDARY RENAL HYPERPARATHYROIDISM (MULTI): Primary | ICD-10-CM

## 2024-02-05 DIAGNOSIS — I10 ESSENTIAL (PRIMARY) HYPERTENSION: ICD-10-CM

## 2024-02-05 DIAGNOSIS — N18.4 CHRONIC KIDNEY DISEASE, STAGE 4 (SEVERE) (MULTI): ICD-10-CM

## 2024-02-05 DIAGNOSIS — N18.4 CHRONIC RENAL DISEASE, STAGE IV (MULTI): ICD-10-CM

## 2024-02-05 DIAGNOSIS — I10 PRIMARY HYPERTENSION: ICD-10-CM

## 2024-02-05 LAB
APPEARANCE UR: CLEAR
BILIRUB UR STRIP.AUTO-MCNC: NEGATIVE MG/DL
COLOR UR: YELLOW
CREAT UR-MCNC: 171.5 MG/DL (ref 20–320)
CREAT UR-MCNC: 171.5 MG/DL (ref 20–320)
ERYTHROCYTE [DISTWIDTH] IN BLOOD BY AUTOMATED COUNT: 14.6 % (ref 11.5–14.5)
GLUCOSE UR STRIP.AUTO-MCNC: NEGATIVE MG/DL
HCT VFR BLD AUTO: 40.6 % (ref 36–46)
HGB BLD-MCNC: 12.6 G/DL (ref 12–16)
KETONES UR STRIP.AUTO-MCNC: NEGATIVE MG/DL
LEUKOCYTE ESTERASE UR QL STRIP.AUTO: ABNORMAL
MCH RBC QN AUTO: 27 PG (ref 26–34)
MCHC RBC AUTO-ENTMCNC: 31 G/DL (ref 32–36)
MCV RBC AUTO: 87 FL (ref 80–100)
MICROALBUMIN UR-MCNC: 221.9 MG/L
MICROALBUMIN/CREAT UR: 129.4 UG/MG CREAT
MUCOUS THREADS #/AREA URNS AUTO: NORMAL /LPF
NITRITE UR QL STRIP.AUTO: NEGATIVE
NRBC BLD-RTO: 0 /100 WBCS (ref 0–0)
PH UR STRIP.AUTO: 5 [PH]
PLATELET # BLD AUTO: 241 X10*3/UL (ref 150–450)
PROT UR STRIP.AUTO-MCNC: ABNORMAL MG/DL
PROT UR-ACNC: 39 MG/DL (ref 5–24)
PROT/CREAT UR: 0.23 MG/MG CREAT (ref 0–0.17)
RBC # BLD AUTO: 4.66 X10*6/UL (ref 4–5.2)
RBC # UR STRIP.AUTO: NEGATIVE /UL
RBC #/AREA URNS AUTO: NORMAL /HPF
SP GR UR STRIP.AUTO: 1.02
SQUAMOUS #/AREA URNS AUTO: NORMAL /HPF
UROBILINOGEN UR STRIP.AUTO-MCNC: 2 MG/DL
WBC # BLD AUTO: 4.7 X10*3/UL (ref 4.4–11.3)
WBC #/AREA URNS AUTO: NORMAL /HPF

## 2024-02-05 PROCEDURE — 1160F RVW MEDS BY RX/DR IN RCRD: CPT | Performed by: INTERNAL MEDICINE

## 2024-02-05 PROCEDURE — 82570 ASSAY OF URINE CREATININE: CPT

## 2024-02-05 PROCEDURE — 3074F SYST BP LT 130 MM HG: CPT | Performed by: INTERNAL MEDICINE

## 2024-02-05 PROCEDURE — 3078F DIAST BP <80 MM HG: CPT | Performed by: INTERNAL MEDICINE

## 2024-02-05 PROCEDURE — 84156 ASSAY OF PROTEIN URINE: CPT

## 2024-02-05 PROCEDURE — 99213 OFFICE O/P EST LOW 20 MIN: CPT | Performed by: INTERNAL MEDICINE

## 2024-02-05 PROCEDURE — 85027 COMPLETE CBC AUTOMATED: CPT

## 2024-02-05 PROCEDURE — 1157F ADVNC CARE PLAN IN RCRD: CPT | Performed by: INTERNAL MEDICINE

## 2024-02-05 PROCEDURE — 1036F TOBACCO NON-USER: CPT | Performed by: INTERNAL MEDICINE

## 2024-02-05 PROCEDURE — 36415 COLL VENOUS BLD VENIPUNCTURE: CPT

## 2024-02-05 PROCEDURE — 1159F MED LIST DOCD IN RCRD: CPT | Performed by: INTERNAL MEDICINE

## 2024-02-05 PROCEDURE — 81001 URINALYSIS AUTO W/SCOPE: CPT

## 2024-02-05 PROCEDURE — 1126F AMNT PAIN NOTED NONE PRSNT: CPT | Performed by: INTERNAL MEDICINE

## 2024-02-05 PROCEDURE — 82043 UR ALBUMIN QUANTITATIVE: CPT

## 2024-02-05 NOTE — ASSESSMENT & PLAN NOTE
Well controlled today.   -Recommend monitoring BP at home closely. Pt does not check regularly  -continue current regimen

## 2024-02-05 NOTE — ASSESSMENT & PLAN NOTE
CKD 3b/4 stable with mild proteinuria  -Overall renal function remains stable  -Pt would benefit from sglt2i. Referral for [facility] pharmacy to discuss further

## 2024-02-05 NOTE — PROGRESS NOTES
"Subjective   Patient ID: Kirstin Howe is a 83 y.o. female who presents for Follow-up (Follow up visit ).  HPI  Pt doing well today. No changes since last visit. Does not check her BP at home regularly.       Review of Systems      /75 (BP Location: Right arm, Patient Position: Sitting, BP Cuff Size: Adult)   Pulse 80   Ht 1.626 m (5' 4\")   Wt 67.6 kg (149 lb)   BMI 25.58 kg/m²     Objective   Physical Exam  Constitutional:       Appearance: Normal appearance.   Musculoskeletal:         General: No swelling.   Skin:     General: Skin is warm and dry.   Neurological:      General: No focal deficit present.      Mental Status: She is alert and oriented to person, place, and time.   Psychiatric:         Mood and Affect: Mood normal.         Behavior: Behavior normal.         Assessment/Plan   Problem List Items Addressed This Visit          Cardiac and Vasculature    Hypertension     Well controlled today.   -Recommend monitoring BP at home closely. Pt does not check regularly  -continue current regimen            Endocrine/Metabolic    Secondary renal hyperparathyroidism (CMS/HCC) - Primary     PTH remains mildly elevated (126)  -Ca, phos wnl, Vit D 46  -continue to monitor             Genitourinary and Reproductive    Chronic renal disease, stage IV (CMS/HCC)     CKD 3b/4 stable with mild proteinuria  -Overall renal function remains stable  -Pt would benefit from sglt2i. Referral for [facility] pharmacy to discuss further                 Follow up in 3-4 months with labs prior.   "

## 2024-02-06 NOTE — PROGRESS NOTES
I saw and evaluated the patient. I personally obtained the key and critical portions of the history and physical exam or was physically present for key and critical portions performed by the resident/fellow. I reviewed the resident/fellow's documentation and discussed the patient with the resident/fellow. I agree with the resident/fellow's medical decision making as documented in the note.    Carolina Gómez MD

## 2024-02-06 NOTE — PROGRESS NOTES
Subjective   Patient ID: Kirtsin Howe is a 83 y.o. female who presents for Chronic Kidney Disease (SGLT2 discussion)    Referring Provider: Carolina MONTAÑO  HPI: CKD stage 4/a2. last EGFR 27 sCr 1.85    HTN: well controlled and monitored  /75  Medications  Lisinopril 40mg every day  Amlodipine 5mg every day    Glucose controlled and monitored  A1C 5.7    HLD    On statin? No    Medications reviewed for appropriate dosing in setting of CKD  Recommended changes:  -No adjustments necessary at this time.        Objective     There were no vitals taken for this visit.     Labs  Lab Results   Component Value Date    BILITOT 0.4 01/30/2024    CALCIUM 9.9 01/30/2024    CO2 28 01/30/2024     01/30/2024    CREATININE 1.72 (H) 01/30/2024    GLUCOSE 74 01/30/2024    ALKPHOS 88 01/30/2024    K 4.7 01/30/2024    PROT 6.1 (L) 01/30/2024     01/30/2024    AST 15 01/30/2024    ALT 9 01/30/2024    BUN 16 01/30/2024    ANIONGAP 13 01/30/2024    MG 2.02 05/27/2021    PHOS 3.1 10/09/2023    ALBUMIN 3.8 01/30/2024    GFRF 29 (A) 09/13/2023     Lab Results   Component Value Date    TRIG 66 01/30/2024    CHOL 178 01/30/2024    LDLCALC 105 (H) 01/30/2024    HDL 59.5 01/30/2024     Lab Results   Component Value Date    HGBA1C 5.7 (A) 01/06/2022       Current Outpatient Medications on File Prior to Visit   Medication Sig Dispense Refill    amLODIPine (Norvasc) 5 mg tablet Take 1 tablet (5 mg) by mouth once daily.      buPROPion XL (Wellbutrin XL) 300 mg 24 hr tablet Take 1 tablet (300 mg) by mouth once daily. 30 tablet 0    lisinopril 40 mg tablet Take 1 tablet (40 mg) by mouth once daily.       No current facility-administered medications on file prior to visit.        Assessment/Plan   PATIENT EDUCATION/DISCUSSION:  - Counseled patient on MOA, expectations, side effects, duration of therapy, contraindications, administration, and monitoring parameters  - Answered all patient questions and concerns  - $304 for  first fill $275 deductible farxiga 5mg  _______________________________________________________________________  PLAN  1. Pt will discuss with family and call back at which point we will send script    Teodoro Mo PharmD BCPS    Continue all meds under the continuation of care with the referring provider and clinical pharmacy team.

## 2024-02-14 ENCOUNTER — HOSPITAL ENCOUNTER (OUTPATIENT)
Dept: RADIOLOGY | Facility: HOSPITAL | Age: 84
Discharge: HOME | End: 2024-02-14
Payer: MEDICARE

## 2024-02-14 DIAGNOSIS — Z12.31 ENCOUNTER FOR SCREENING MAMMOGRAM FOR MALIGNANT NEOPLASM OF BREAST: ICD-10-CM

## 2024-02-14 PROCEDURE — 77067 SCR MAMMO BI INCL CAD: CPT

## 2024-02-14 PROCEDURE — 77063 BREAST TOMOSYNTHESIS BI: CPT | Performed by: RADIOLOGY

## 2024-02-14 PROCEDURE — 77067 SCR MAMMO BI INCL CAD: CPT | Performed by: RADIOLOGY

## 2024-02-15 ENCOUNTER — TELEMEDICINE (OUTPATIENT)
Dept: PHARMACY | Facility: HOSPITAL | Age: 84
End: 2024-02-15
Payer: MEDICARE

## 2024-02-15 DIAGNOSIS — I10 PRIMARY HYPERTENSION: ICD-10-CM

## 2024-02-15 DIAGNOSIS — N25.81 SECONDARY RENAL HYPERPARATHYROIDISM (MULTI): ICD-10-CM

## 2024-02-15 DIAGNOSIS — N18.4 CHRONIC RENAL DISEASE, STAGE IV (MULTI): ICD-10-CM

## 2024-02-23 DIAGNOSIS — F41.8 DEPRESSION WITH ANXIETY: Primary | ICD-10-CM

## 2024-02-23 RX ORDER — BUPROPION HYDROCHLORIDE 300 MG/1
300 TABLET ORAL DAILY
Qty: 90 TABLET | Refills: 3 | Status: SHIPPED | OUTPATIENT
Start: 2024-02-23

## 2024-05-09 ENCOUNTER — APPOINTMENT (OUTPATIENT)
Dept: NEUROLOGY | Facility: HOSPITAL | Age: 84
End: 2024-05-09
Payer: MEDICARE

## 2024-05-22 ENCOUNTER — APPOINTMENT (OUTPATIENT)
Dept: PRIMARY CARE | Facility: CLINIC | Age: 84
End: 2024-05-22
Payer: MEDICARE

## 2024-06-01 ENCOUNTER — APPOINTMENT (OUTPATIENT)
Dept: RADIOLOGY | Facility: HOSPITAL | Age: 84
End: 2024-06-01
Payer: MEDICARE

## 2024-06-01 ENCOUNTER — HOSPITAL ENCOUNTER (EMERGENCY)
Facility: HOSPITAL | Age: 84
Discharge: HOME | End: 2024-06-02
Attending: EMERGENCY MEDICINE
Payer: MEDICARE

## 2024-06-01 DIAGNOSIS — R06.02 SOB (SHORTNESS OF BREATH): ICD-10-CM

## 2024-06-01 DIAGNOSIS — R05.1 ACUTE COUGH: Primary | ICD-10-CM

## 2024-06-01 DIAGNOSIS — L03.115 CELLULITIS OF RIGHT FOOT: ICD-10-CM

## 2024-06-01 LAB
ALBUMIN SERPL BCP-MCNC: 3.7 G/DL (ref 3.4–5)
ALP SERPL-CCNC: 74 U/L (ref 33–136)
ALT SERPL W P-5'-P-CCNC: <3 U/L (ref 7–45)
ANION GAP SERPL CALC-SCNC: 14 MMOL/L (ref 10–20)
AST SERPL W P-5'-P-CCNC: 12 U/L (ref 9–39)
BASOPHILS # BLD AUTO: 0.02 X10*3/UL (ref 0–0.1)
BASOPHILS NFR BLD AUTO: 0.2 %
BILIRUB SERPL-MCNC: 0.3 MG/DL (ref 0–1.2)
BNP SERPL-MCNC: 42 PG/ML (ref 0–99)
BUN SERPL-MCNC: 21 MG/DL (ref 6–23)
CALCIUM SERPL-MCNC: 9.2 MG/DL (ref 8.6–10.6)
CARDIAC TROPONIN I PNL SERPL HS: 10 NG/L (ref 0–34)
CHLORIDE SERPL-SCNC: 108 MMOL/L (ref 98–107)
CO2 SERPL-SCNC: 24 MMOL/L (ref 21–32)
CREAT SERPL-MCNC: 1.89 MG/DL (ref 0.5–1.05)
D DIMER PPP FEU-MCNC: 942 NG/ML FEU
EGFRCR SERPLBLD CKD-EPI 2021: 26 ML/MIN/1.73M*2
EOSINOPHIL # BLD AUTO: 0.07 X10*3/UL (ref 0–0.4)
EOSINOPHIL NFR BLD AUTO: 0.8 %
ERYTHROCYTE [DISTWIDTH] IN BLOOD BY AUTOMATED COUNT: 14.3 % (ref 11.5–14.5)
GLUCOSE SERPL-MCNC: 98 MG/DL (ref 74–99)
HCT VFR BLD AUTO: 36.6 % (ref 36–46)
HGB BLD-MCNC: 12 G/DL (ref 12–16)
IMM GRANULOCYTES # BLD AUTO: 0.07 X10*3/UL (ref 0–0.5)
IMM GRANULOCYTES NFR BLD AUTO: 0.8 % (ref 0–0.9)
LYMPHOCYTES # BLD AUTO: 1.46 X10*3/UL (ref 0.8–3)
LYMPHOCYTES NFR BLD AUTO: 17.3 %
MCH RBC QN AUTO: 26.3 PG (ref 26–34)
MCHC RBC AUTO-ENTMCNC: 32.8 G/DL (ref 32–36)
MCV RBC AUTO: 80 FL (ref 80–100)
MONOCYTES # BLD AUTO: 0.68 X10*3/UL (ref 0.05–0.8)
MONOCYTES NFR BLD AUTO: 8.1 %
NEUTROPHILS # BLD AUTO: 6.13 X10*3/UL (ref 1.6–5.5)
NEUTROPHILS NFR BLD AUTO: 72.8 %
NRBC BLD-RTO: 0 /100 WBCS (ref 0–0)
PLATELET # BLD AUTO: 409 X10*3/UL (ref 150–450)
POTASSIUM SERPL-SCNC: 4.5 MMOL/L (ref 3.5–5.3)
PROT SERPL-MCNC: 6.5 G/DL (ref 6.4–8.2)
RBC # BLD AUTO: 4.57 X10*6/UL (ref 4–5.2)
SODIUM SERPL-SCNC: 141 MMOL/L (ref 136–145)
WBC # BLD AUTO: 8.4 X10*3/UL (ref 4.4–11.3)

## 2024-06-01 PROCEDURE — 99285 EMERGENCY DEPT VISIT HI MDM: CPT | Performed by: PHYSICIAN ASSISTANT

## 2024-06-01 PROCEDURE — 93971 EXTREMITY STUDY: CPT | Performed by: RADIOLOGY

## 2024-06-01 PROCEDURE — 93971 EXTREMITY STUDY: CPT

## 2024-06-01 PROCEDURE — 80053 COMPREHEN METABOLIC PANEL: CPT | Performed by: PHYSICIAN ASSISTANT

## 2024-06-01 PROCEDURE — 36415 COLL VENOUS BLD VENIPUNCTURE: CPT | Performed by: PHYSICIAN ASSISTANT

## 2024-06-01 PROCEDURE — 73610 X-RAY EXAM OF ANKLE: CPT | Mod: RIGHT SIDE | Performed by: RADIOLOGY

## 2024-06-01 PROCEDURE — 84484 ASSAY OF TROPONIN QUANT: CPT | Performed by: PHYSICIAN ASSISTANT

## 2024-06-01 PROCEDURE — 2500000002 HC RX 250 W HCPCS SELF ADMINISTERED DRUGS (ALT 637 FOR MEDICARE OP, ALT 636 FOR OP/ED): Performed by: STUDENT IN AN ORGANIZED HEALTH CARE EDUCATION/TRAINING PROGRAM

## 2024-06-01 PROCEDURE — 99284 EMERGENCY DEPT VISIT MOD MDM: CPT | Mod: 25

## 2024-06-01 PROCEDURE — 85025 COMPLETE CBC W/AUTO DIFF WBC: CPT | Performed by: PHYSICIAN ASSISTANT

## 2024-06-01 PROCEDURE — 85379 FIBRIN DEGRADATION QUANT: CPT | Performed by: STUDENT IN AN ORGANIZED HEALTH CARE EDUCATION/TRAINING PROGRAM

## 2024-06-01 PROCEDURE — 83880 ASSAY OF NATRIURETIC PEPTIDE: CPT | Performed by: PHYSICIAN ASSISTANT

## 2024-06-01 PROCEDURE — 71046 X-RAY EXAM CHEST 2 VIEWS: CPT

## 2024-06-01 PROCEDURE — 71046 X-RAY EXAM CHEST 2 VIEWS: CPT | Performed by: STUDENT IN AN ORGANIZED HEALTH CARE EDUCATION/TRAINING PROGRAM

## 2024-06-01 PROCEDURE — 73610 X-RAY EXAM OF ANKLE: CPT | Mod: RT

## 2024-06-01 PROCEDURE — 36415 COLL VENOUS BLD VENIPUNCTURE: CPT | Performed by: STUDENT IN AN ORGANIZED HEALTH CARE EDUCATION/TRAINING PROGRAM

## 2024-06-01 RX ORDER — DOXYCYCLINE HYCLATE 100 MG
100 TABLET ORAL ONCE
Status: DISCONTINUED | OUTPATIENT
Start: 2024-06-01 | End: 2024-06-02 | Stop reason: HOSPADM

## 2024-06-01 RX ORDER — SULFAMETHOXAZOLE AND TRIMETHOPRIM 800; 160 MG/1; MG/1
1 TABLET ORAL ONCE
Status: DISCONTINUED | OUTPATIENT
Start: 2024-06-01 | End: 2024-06-01

## 2024-06-01 RX ORDER — DOXYCYCLINE 100 MG/1
100 TABLET ORAL 2 TIMES DAILY
Qty: 14 TABLET | Refills: 0 | Status: SHIPPED | OUTPATIENT
Start: 2024-06-01 | End: 2024-06-02

## 2024-06-01 RX ADMIN — SULFAMETHOXAZOLE AND TRIMETHOPRIM 1 TABLET: 800; 160 TABLET ORAL at 23:02

## 2024-06-01 ASSESSMENT — COLUMBIA-SUICIDE SEVERITY RATING SCALE - C-SSRS
1. IN THE PAST MONTH, HAVE YOU WISHED YOU WERE DEAD OR WISHED YOU COULD GO TO SLEEP AND NOT WAKE UP?: NO
2. HAVE YOU ACTUALLY HAD ANY THOUGHTS OF KILLING YOURSELF?: NO

## 2024-06-01 ASSESSMENT — LIFESTYLE VARIABLES
TOTAL SCORE: 0
HAVE YOU EVER FELT YOU SHOULD CUT DOWN ON YOUR DRINKING: NO
EVER HAD A DRINK FIRST THING IN THE MORNING TO STEADY YOUR NERVES TO GET RID OF A HANGOVER: NO
EVER FELT BAD OR GUILTY ABOUT YOUR DRINKING: NO
HAVE PEOPLE ANNOYED YOU BY CRITICIZING YOUR DRINKING: NO

## 2024-06-01 ASSESSMENT — PAIN SCALES - GENERAL
PAINLEVEL_OUTOF10: 0 - NO PAIN
PAINLEVEL_OUTOF10: 0 - NO PAIN

## 2024-06-01 ASSESSMENT — PAIN - FUNCTIONAL ASSESSMENT: PAIN_FUNCTIONAL_ASSESSMENT: 0-10

## 2024-06-01 NOTE — ED PROVIDER NOTES
HPI   Chief Complaint   Patient presents with    Leg Swelling         Patient is an 83-year-old female with history of hyperparathyroidism, constipation, CVA, anxiety, hypertension, stage III CKD, tubular adenoma of the colon who presents today for evaluation of cough and dyspnea.  Patient states that she has recently been battling a sinus infection, states she is on Augmentin for it, states that despite this she still has a lot of green mucus that she is blowing out and also coughing up.  Patient states has been feeling a little bit short of breath, states that it feels like there is mucus that gets stuck in her throat and in her chest.  Patient states that she has a little bit of dyspnea with exertion.  She does not note any lower extremity swelling but states that they told her at urgent care that her feet look a little swollen.  Patient has no prior history of heart failure.  She does endorse some chest tightness but again reiterates that it feels like there is just mucus stuck in her chest.  Denies rhinorrhea, denies fevers or chills abdominal pain vomiting or diarrhea.  Patient was seen at urgent care earlier today and because of her age and the leg swelling, they wanted her to be evaluated.   No history of malignancy, no pleuritic chest pain, no hemoptysis, no unilateral leg pain or swelling, no history of blood clots.  No hormone use.                          [place] Coma Scale Score: 15                     Patient History   Past Medical History:   Diagnosis Date    Age-related nuclear cataract, right eye 03/31/2016    Age-related nuclear cataract of right eye    Anxiety disorder, unspecified 03/31/2014    Anxiety    Bunion of unspecified foot 11/26/2014    Bunion    Concussion 04/08/2021    Last Assessment & Plan: Formatting of this note might be different from the original. -concern for possible concussion on admission, but head/neck/throat pain more likely due to infectious etiology as noted above     Contusion of left forearm, initial encounter 08/16/2016    Contusion of left forearm    Contusion of left knee, initial encounter 08/09/2016    Contusion of knee, left    Cortical age-related cataract, right eye 03/31/2016    Cortical age-related cataract of right eye    Depression     Dermatochalasis of unspecified eye, unspecified eyelid 11/10/2017    Dermatochalasis    Dislocation of right shoulder joint     Essential (primary) hypertension 11/23/2022    Hypertension    Fracture of orbital floor, right side, initial encounter for open fracture (Multi) 06/27/2017    Open fracture of right orbital floor, initial encounter    Generalized anxiety disorder 11/26/2014    PIA (generalized anxiety disorder)    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    Herpes zoster 03/29/2023    Hypertensive urgency 03/25/2016    Hypertensive urgency    Insomnia, unspecified 11/26/2014    Insomnia    Maxillary fracture, unspecified side, initial encounter for closed fracture (Multi) 06/21/2017    Fracture of maxillary sinus, closed, initial encounter    Primary osteoarthritis, 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    Primary osteoarthritis of left wrist    Status post left knee replacement 09/02/2022     Past Surgical History:   Procedure Laterality Date    CATARACT EXTRACTION  06/07/2017    Cataract Surgery    MR HEAD ANGIO WO IV CONTRAST  4/9/2021    MR HEAD ANGIO WO IV CONTRAST 4/9/2021    TOTAL ABDOMINAL HYSTERECTOMY W/ BILATERAL SALPINGOOPHORECTOMY       Family History   Problem Relation Name Age of Onset    Dementia Mother      Breast cancer Mother  82    Hypertension Mother      Stomach cancer Father      Pancreatic cancer Brother      Breast cancer Daughter  58     Social History     Tobacco Use    Smoking status: Former     Types: Cigarettes    Smokeless tobacco: Never   Vaping Use    Vaping status: Never Used   Substance Use Topics    Alcohol use: Not Currently    Drug use: Not on file       Physical Exam   ED Triage  Vitals [06/01/24 1613]   Temperature Heart Rate Respirations BP   36.9 °C (98.4 °F) 68 18 155/83      Pulse Ox Temp Source Heart Rate Source Patient Position   99 % Temporal -- --      BP Location FiO2 (%)     -- --       Physical Exam  Vitals and nursing note reviewed.   Constitutional:       General: She is not in acute distress.     Appearance: Normal appearance. She is not toxic-appearing.   HENT:      Head: Normocephalic and atraumatic.      Nose: Nose normal.   Eyes:      Extraocular Movements: Extraocular movements intact.   Cardiovascular:      Rate and Rhythm: Normal rate and regular rhythm.   Pulmonary:      Effort: Pulmonary effort is normal.      Breath sounds: Normal breath sounds. No wheezing, rhonchi or rales.   Abdominal:      Palpations: Abdomen is soft.      Tenderness: There is no abdominal tenderness.   Musculoskeletal:         General: No swelling or tenderness. Normal range of motion.      Cervical back: Normal range of motion and neck supple.   Skin:     General: Skin is warm and dry.   Neurological:      General: No focal deficit present.      Mental Status: She is alert.   Psychiatric:         Mood and Affect: Mood normal.         Thought Content: Thought content normal.       XR chest 2 views    (Results Pending)     Labs Reviewed   CBC WITH AUTO DIFFERENTIAL   COMPREHENSIVE METABOLIC PANEL   TROPONIN I, HIGH SENSITIVITY   B-TYPE NATRIURETIC PEPTIDE         ED Course & MDM        Medical Decision Making  MDM: Patient is an 83-year-old female presents today for evaluation of cough chest tightness and shortness of breath, EKG was obtained and nonischemic, CBC CMP troponin proBNP and chest x-ray were obtained as well.  Patient will be signed out to incoming provider Rob Corley pending labs and imaging. Staffed with Dr Pepe.        Procedure  Procedures     Erin Villarreal PA-C  06/01/24 1112

## 2024-06-01 NOTE — ED TRIAGE NOTES
Patient presents to the Emergency department with a chief complaint of bilateral lower extremity edema. Patient states she has been having issue ambulating stairs and mild exertional dyspnea.

## 2024-06-02 VITALS
OXYGEN SATURATION: 95 % | HEIGHT: 64 IN | BODY MASS INDEX: 23.9 KG/M2 | RESPIRATION RATE: 16 BRPM | TEMPERATURE: 97.7 F | SYSTOLIC BLOOD PRESSURE: 135 MMHG | WEIGHT: 140 LBS | HEART RATE: 87 BPM | DIASTOLIC BLOOD PRESSURE: 79 MMHG

## 2024-06-02 RX ORDER — DOXYCYCLINE 100 MG/1
100 CAPSULE ORAL 2 TIMES DAILY
Qty: 14 CAPSULE | Refills: 0 | Status: SHIPPED | OUTPATIENT
Start: 2024-06-02 | End: 2024-06-09

## 2024-06-02 NOTE — DISCHARGE INSTRUCTIONS
You were seen for shortness of breath and leg swelling.  You had a chest x-ray that showed no pneumonia, your blood work showed no evidence of heart failure.  The ultrasound of your leg showed no blood clot but the ultrasound of your foot was somewhat concerning for an infection for which we did prescribe doxycycline.  I do recommend you take this twice daily for the next week.  Please also follow-up with your primary care provider within 1 to 2 weeks to ensure the pain and swelling is going down in that foot.

## 2024-06-02 NOTE — PROGRESS NOTES
Emergency Medicine Transition of Care Note.    I assumed care of Kirstin Howe at signout.  Please see the previous ED provider note for all HPI, PE and MDM prior to my arrival. This is in addition to the primary record.    In brief Kirstin Howe is an 83 y.o. female presenting for   Chief Complaint   Patient presents with    Leg Swelling     At the time of signout we were awaiting: Labs, imaging, disposition.    Blood work showed no evidence of heart failure or acute coronary syndrome.  Chest x-ray showed no consolidation or effusion.  On my exam, patient does have focal swelling of the right foot with overlying erythema and warmth to palpation concerning for infection versus DVT.  We did proceed with a D-dimer that was elevated for which bilateral DVT study was obtained and negative.  My point-of-care ultrasound of the right foot did show cobblestoning concerning for cellulitis for which patient received single dose of TMP-SMX here in the ED but will be plan to be discharged with home-going doxycycline given her age and history of CKD.  Wet read of the right ankle shows questionable age-indeterminate avulsion fracture of the right medial malleolus however on my exam there is no point tenderness here.  Patient was signed out in stable condition awaiting final read of venous duplex as well as x-ray of the right ankle.          Procedure  Procedures    Raz Corley, DO

## 2024-06-02 NOTE — PROGRESS NOTES
Emergency Medicine Transition of Care Note.    I received Kirstin Howe in signout from previous provider. Please see the previous ED provider note for all HPI, PE and MDM up to the time of sign-out. This is in addition to the primary record.    ED Course as of 06/01/24 2356   Sat Jun 01, 2024 1924 XR chest 2 views  I independently interpreted:   No consolidation or effusion [ROBERT]   1939 Creatinine(!): 1.89  Baseline 1.7-1.85 [ROBERT]   2039 D-Dimer, Quantitative VTE Exclusion(!): 942  Duplex ordered [ROBERT]   2208 Vascular US lower extremity venous duplex right  I independently interpreted:   No DVT [ROBERT]   2310 Patient was signed out to me at 11 PM.  Patient is an 83-year-old female who is presenting today with lower extremity swelling.  As well as that URI symptoms and sinusitis.  Patient was signed out to me pending final reads of DVT ultrasound as well as x-ray of the ankle.  Plan is to treat with Doxy and discharged home, if everything remains negative. [SILVESTRE]   2351 DVT ultrasound negative [SILVESTRE]      ED Course User Index  [SILVESTRE] Cheli Givens MD  [ROBERT] Raz Corley, DO         Diagnoses as of 06/01/24 2356   Acute cough   SOB (shortness of breath)   Cellulitis of right foot     Medical Decision Making    Final diagnoses:   [R05.1] Acute cough   [R06.02] SOB (shortness of breath)   [L03.115] Cellulitis of right foot     At the time of sign out, vital signs were as follows:  Vitals:    06/01/24 2308   BP: 157/81   Pulse:    Resp: 18   Temp:    SpO2: 94%     In brief Kirstin Howe is an 83 y.o. female presenting for lower extremity edema and sinusitis symptoms.  Patient was signed out to me pending final reads.  Final reads are negative.  Patient will be discharged with prescription for Doxy .      Dispo: discharge    Pt seen and discussed with ED attending    Cheli Givens MD  Emergency Medicine PGY-2

## 2024-06-11 ENCOUNTER — OFFICE VISIT (OUTPATIENT)
Dept: PRIMARY CARE | Facility: CLINIC | Age: 84
End: 2024-06-11
Payer: MEDICARE

## 2024-06-11 VITALS
WEIGHT: 140.4 LBS | BODY MASS INDEX: 24.1 KG/M2 | HEART RATE: 84 BPM | DIASTOLIC BLOOD PRESSURE: 83 MMHG | SYSTOLIC BLOOD PRESSURE: 136 MMHG

## 2024-06-11 DIAGNOSIS — G31.84 AMNESTIC MCI (MILD COGNITIVE IMPAIRMENT WITH MEMORY LOSS): ICD-10-CM

## 2024-06-11 DIAGNOSIS — F41.8 DEPRESSION WITH ANXIETY: ICD-10-CM

## 2024-06-11 DIAGNOSIS — N18.32 STAGE 3B CHRONIC KIDNEY DISEASE (MULTI): ICD-10-CM

## 2024-06-11 DIAGNOSIS — M79.89 RIGHT LEG SWELLING: ICD-10-CM

## 2024-06-11 DIAGNOSIS — J01.90 ACUTE SINUSITIS, RECURRENCE NOT SPECIFIED, UNSPECIFIED LOCATION: Primary | ICD-10-CM

## 2024-06-11 PROBLEM — Z86.59 HISTORY OF DEPRESSION: Status: RESOLVED | Noted: 2024-06-11 | Resolved: 2024-06-11

## 2024-06-11 PROBLEM — M19.032 OSTEOARTHRITIS OF LEFT WRIST: Status: ACTIVE | Noted: 2024-06-11

## 2024-06-11 PROBLEM — Z86.69 HISTORY OF CATARACT: Status: RESOLVED | Noted: 2024-06-11 | Resolved: 2024-06-11

## 2024-06-11 PROCEDURE — 1157F ADVNC CARE PLAN IN RCRD: CPT | Performed by: INTERNAL MEDICINE

## 2024-06-11 PROCEDURE — 3079F DIAST BP 80-89 MM HG: CPT | Performed by: INTERNAL MEDICINE

## 2024-06-11 PROCEDURE — 1159F MED LIST DOCD IN RCRD: CPT | Performed by: INTERNAL MEDICINE

## 2024-06-11 PROCEDURE — 1160F RVW MEDS BY RX/DR IN RCRD: CPT | Performed by: INTERNAL MEDICINE

## 2024-06-11 PROCEDURE — 99214 OFFICE O/P EST MOD 30 MIN: CPT | Performed by: INTERNAL MEDICINE

## 2024-06-11 PROCEDURE — 3075F SYST BP GE 130 - 139MM HG: CPT | Performed by: INTERNAL MEDICINE

## 2024-06-11 PROCEDURE — 1036F TOBACCO NON-USER: CPT | Performed by: INTERNAL MEDICINE

## 2024-06-11 ASSESSMENT — ENCOUNTER SYMPTOMS
SORE THROAT: 0
WHEEZING: 0
SHORTNESS OF BREATH: 0
SINUS PAIN: 0
CHEST TIGHTNESS: 0
ACTIVITY CHANGE: 0
COUGH: 1
CHILLS: 0
RHINORRHEA: 1
FREQUENCY: 0
FEVER: 0
FATIGUE: 0
DYSURIA: 0
SINUS PRESSURE: 0
PALPITATIONS: 0

## 2024-06-11 NOTE — PATIENT INSTRUCTIONS
I am glad to hear that you are starting to improve from your recent illness.  If you have any recurrence of symptoms or any new symptoms arise, please contact us.  Otherwise, I believe you are due for a routine follow-up sometime next month.

## 2024-06-11 NOTE — PROGRESS NOTES
Kirstin Howe comes in today for an ER follow up.      Ms. Howe comes in today for an ER follow up.  Her history unfortunately is somewhat tangential.  It sounds that she initially presented to an outside urgent treatment center about 1 month ago with sinus congestion.  It is unclear what they treated her with at that time but she felt that she was not getting better.  She went back to urgent care again and was sent to the emergency room because of shortness of breath, chest discomfort, and lower extremity swelling.  She was worked up fully, no cardiac event was noted.  She did have an elevated D-dimer.  Lower extremity ultrasound was performed because of the swelling which was thankfully negative and the swelling has improved.  A CT PE protocol was not done likely because of her moderate renal insufficiency and not overly high suspicion for PE.  She was treated with doxycycline for 7 days, just having finished this about 2 days ago.  She definitely is improving but now states that she wakes up with phlegm in her throat and an occasional cough, but definitely better than this was previously.  Again, history is somewhat vague and tangential.  She appears well here in the office, no evidence of coughing or shortness of breath nor any respiratory distress.    She does mention some depression associated with some situational stressors in her family.  This is something she is trying to work through as well, obviously more of a chronic issue than the initial reason for her visit which was the sinus congestion follow-up.  She likely will be due for her routine medication follow-up next month.        Review of Systems   Constitutional:  Negative for activity change, chills, fatigue and fever.   HENT:  Positive for congestion, postnasal drip and rhinorrhea. Negative for ear pain, sinus pressure, sinus pain and sore throat.    Respiratory:  Positive for cough. Negative for chest tightness, shortness of breath and wheezing.     Cardiovascular:  Positive for leg swelling (improved). Negative for chest pain and palpitations.   Genitourinary:  Negative for dysuria and frequency.       Objective   Physical Exam  Constitutional:       General: She is not in acute distress.     Appearance: She is normal weight. She is not diaphoretic.   HENT:      Head: Normocephalic and atraumatic.      Right Ear: Tympanic membrane and ear canal normal.      Left Ear: Tympanic membrane and ear canal normal.      Nose: Nose normal. No congestion.      Mouth/Throat:      Mouth: Mucous membranes are moist.      Pharynx: Oropharynx is clear. No oropharyngeal exudate.   Eyes:      General: No scleral icterus.     Conjunctiva/sclera: Conjunctivae normal.      Pupils: Pupils are equal, round, and reactive to light.   Cardiovascular:      Rate and Rhythm: Normal rate and regular rhythm.      Pulses: Normal pulses.      Heart sounds: Normal heart sounds.   Pulmonary:      Effort: Pulmonary effort is normal. No respiratory distress.      Breath sounds: No wheezing, rhonchi or rales.   Musculoskeletal:      Right lower leg: No edema.      Left lower leg: No edema.   Neurological:      Mental Status: She is alert.         Assessment/Plan   1.  Acute sinusitis: Seemingly improving.  No evidence of bacterial infection residual at this time.  Contact us if symptoms worsen.  2.  Right leg swelling: No evidence of swelling at this time.  D-dimer was elevated, venous duplexes were negative.  Again, need to consider VQ scan or CT angio if clinically suspicion of PE is high, but at this time, essentially asymptomatic and with negative duplex, so lower suspicion.  If she has any recurrence or worsening of symptoms she needs to contact us or present to the emergency room immediately.  3.  Stage IIIb chronic kidney disease: Again, limits our ability to proceed with contrasted CT scan.  Encourage hydrating fluids.  4.  Depression with anxiety: Continues on bupropion.  May consider  titration of dosing if symptomatically worsening.  5.  MCI: Evident today based on tangential history, but seemingly stable.  We will reassess with her routine visits.    Happy to see her back in about 6 weeks for her routine follow-up.  If she has any persistence or worsening symptoms before that time, she is to contact us promptly.  Problem List Items Addressed This Visit    None

## 2024-06-24 ENCOUNTER — APPOINTMENT (OUTPATIENT)
Dept: NEPHROLOGY | Facility: CLINIC | Age: 84
End: 2024-06-24
Payer: MEDICARE

## 2024-06-24 VITALS
BODY MASS INDEX: 24.24 KG/M2 | HEART RATE: 79 BPM | SYSTOLIC BLOOD PRESSURE: 161 MMHG | DIASTOLIC BLOOD PRESSURE: 87 MMHG | WEIGHT: 142 LBS | HEIGHT: 64 IN

## 2024-06-24 DIAGNOSIS — N18.4 CHRONIC RENAL DISEASE, STAGE IV (MULTI): Primary | ICD-10-CM

## 2024-06-24 DIAGNOSIS — I10 PRIMARY HYPERTENSION: ICD-10-CM

## 2024-06-24 PROCEDURE — 1157F ADVNC CARE PLAN IN RCRD: CPT | Performed by: INTERNAL MEDICINE

## 2024-06-24 PROCEDURE — 1036F TOBACCO NON-USER: CPT | Performed by: INTERNAL MEDICINE

## 2024-06-24 PROCEDURE — 3079F DIAST BP 80-89 MM HG: CPT | Performed by: INTERNAL MEDICINE

## 2024-06-24 PROCEDURE — 99213 OFFICE O/P EST LOW 20 MIN: CPT | Performed by: INTERNAL MEDICINE

## 2024-06-24 PROCEDURE — 3077F SYST BP >= 140 MM HG: CPT | Performed by: INTERNAL MEDICINE

## 2024-06-24 PROCEDURE — 1159F MED LIST DOCD IN RCRD: CPT | Performed by: INTERNAL MEDICINE

## 2024-06-24 NOTE — ASSESSMENT & PLAN NOTE
Elevated in office today however well controlled on prior visits  Pt does not check BP at home so will have her start monitoring BP closely and discuss results with pharmacy  Discussed importance of low salt diet   Continue current regimen at this time

## 2024-06-24 NOTE — PROGRESS NOTES
Subjective   Patient ID: Kirstin Howe is a 83 y.o. female who presents for Follow-up (Secondary renal hyperparathyroidism.).  HPI  Pt with pmhx HTN, CKD IV who presents today for follow up.     Discussed starting sglt2i at last visit however patient did not want to start at that time. BP well controlled at home, compliant with medications.     Review of Systems      There were no vitals taken for this visit.    Objective   Physical Exam  Constitutional:       Appearance: Normal appearance.   Cardiovascular:      Rate and Rhythm: Normal rate and regular rhythm.   Pulmonary:      Effort: Pulmonary effort is normal.      Breath sounds: Normal breath sounds.   Skin:     General: Skin is warm and dry.   Neurological:      Mental Status: She is alert and oriented to person, place, and time.         Assessment/Plan   Problem List Items Addressed This Visit          Cardiac and Vasculature    Hypertension     Elevated in office today however well controlled on prior visits  Pt does not check BP at home so will have her start monitoring BP closely and discuss results with pharmacy  Discussed importance of low salt diet   Continue current regimen at this time          Relevant Orders    Referral to Clinical Pharmacy    Albumin , Urine Random    CBC    Creatinine, Urine Random    Protein, Urine Random    Renal Function Panel    Urinalysis with Reflex Microscopic    Parathyroid Hormone, Intact    Vitamin D 25-Hydroxy,Total (for eval of Vitamin D levels)       Genitourinary and Reproductive    Chronic renal disease, stage IV (Multi) - Primary     Stable renal function with moderate proteinuria  Continue Lisinopril   Agreeable to start SLGT2i so will have pharmacy reach out again to start  Recommend completing advanced directives. Will discuss further with family and recommend completing at next PCP appt         Relevant Orders    Referral to Clinical Pharmacy    Albumin , Urine Random    CBC    Creatinine, Urine Random     Protein, Urine Random    Renal Function Panel    Urinalysis with Reflex Microscopic    Parathyroid Hormone, Intact    Vitamin D 25-Hydroxy,Total (for eval of Vitamin D levels)     Follow up in 3 months.

## 2024-06-24 NOTE — ASSESSMENT & PLAN NOTE
Stable renal function with moderate proteinuria  Continue Lisinopril   Agreeable to start SLGT2i so will have pharmacy reach out again to start  Recommend completing advanced directives. Will discuss further with family and recommend completing at next PCP appt

## 2024-06-27 ENCOUNTER — HOSPITAL ENCOUNTER (OUTPATIENT)
Dept: RADIOLOGY | Facility: HOSPITAL | Age: 84
Discharge: HOME | End: 2024-06-27
Payer: MEDICARE

## 2024-06-27 DIAGNOSIS — Z78.0 POSTMENOPAUSAL ESTROGEN DEFICIENCY: ICD-10-CM

## 2024-06-27 PROCEDURE — 77080 DXA BONE DENSITY AXIAL: CPT | Performed by: RADIOLOGY

## 2024-06-27 PROCEDURE — 77080 DXA BONE DENSITY AXIAL: CPT

## 2024-06-28 ENCOUNTER — LAB (OUTPATIENT)
Dept: LAB | Facility: LAB | Age: 84
End: 2024-06-28
Payer: MEDICARE

## 2024-06-28 DIAGNOSIS — I10 PRIMARY HYPERTENSION: ICD-10-CM

## 2024-06-28 DIAGNOSIS — N18.4 CHRONIC RENAL DISEASE, STAGE IV (MULTI): ICD-10-CM

## 2024-06-28 LAB
25(OH)D3 SERPL-MCNC: 33 NG/ML (ref 30–100)
PTH-INTACT SERPL-MCNC: 95.1 PG/ML (ref 18.5–88)

## 2024-06-28 PROCEDURE — 83970 ASSAY OF PARATHORMONE: CPT

## 2024-06-28 PROCEDURE — 36415 COLL VENOUS BLD VENIPUNCTURE: CPT

## 2024-06-28 PROCEDURE — 82306 VITAMIN D 25 HYDROXY: CPT

## 2024-07-05 ENCOUNTER — APPOINTMENT (OUTPATIENT)
Dept: PHARMACY | Facility: HOSPITAL | Age: 84
End: 2024-07-05
Payer: MEDICARE

## 2024-07-12 ENCOUNTER — TELEMEDICINE (OUTPATIENT)
Dept: PHARMACY | Facility: HOSPITAL | Age: 84
End: 2024-07-12
Payer: MEDICARE

## 2024-07-12 DIAGNOSIS — N18.4 CHRONIC RENAL DISEASE, STAGE IV (MULTI): ICD-10-CM

## 2024-07-12 DIAGNOSIS — I10 PRIMARY HYPERTENSION: ICD-10-CM

## 2024-07-12 PROCEDURE — RXMED WILLOW AMBULATORY MEDICATION CHARGE

## 2024-07-12 RX ORDER — DAPAGLIFLOZIN 5 MG/1
5 TABLET, FILM COATED ORAL DAILY
Qty: 30 TABLET | Refills: 0 | Status: SHIPPED | OUTPATIENT
Start: 2024-07-12 | End: 2024-08-12

## 2024-07-15 ENCOUNTER — PHARMACY VISIT (OUTPATIENT)
Dept: PHARMACY | Facility: CLINIC | Age: 84
End: 2024-07-15
Payer: MEDICARE

## 2024-08-07 DIAGNOSIS — N18.4 CHRONIC RENAL DISEASE, STAGE IV (MULTI): ICD-10-CM

## 2024-08-07 RX ORDER — DAPAGLIFLOZIN 5 MG/1
5 TABLET, FILM COATED ORAL DAILY
Qty: 30 TABLET | Refills: 0 | OUTPATIENT
Start: 2024-08-07 | End: 2024-09-06

## 2024-08-09 ENCOUNTER — APPOINTMENT (OUTPATIENT)
Dept: PHARMACY | Facility: HOSPITAL | Age: 84
End: 2024-08-09
Payer: MEDICARE

## 2024-08-09 DIAGNOSIS — N18.30 STAGE 3 CHRONIC KIDNEY DISEASE, UNSPECIFIED WHETHER STAGE 3A OR 3B CKD (MULTI): Primary | ICD-10-CM

## 2024-08-09 PROCEDURE — RXMED WILLOW AMBULATORY MEDICATION CHARGE

## 2024-08-09 RX ORDER — DAPAGLIFLOZIN 10 MG/1
10 TABLET, FILM COATED ORAL DAILY
Qty: 90 TABLET | Refills: 3 | Status: SHIPPED | OUTPATIENT
Start: 2024-08-09 | End: 2025-08-04

## 2024-08-14 ENCOUNTER — PHARMACY VISIT (OUTPATIENT)
Dept: PHARMACY | Facility: CLINIC | Age: 84
End: 2024-08-14
Payer: MEDICARE

## 2024-09-13 ENCOUNTER — APPOINTMENT (OUTPATIENT)
Dept: PHARMACY | Facility: HOSPITAL | Age: 84
End: 2024-09-13
Payer: MEDICARE

## 2024-09-13 DIAGNOSIS — N18.30 STAGE 3 CHRONIC KIDNEY DISEASE, UNSPECIFIED WHETHER STAGE 3A OR 3B CKD (MULTI): Primary | ICD-10-CM

## 2024-09-13 NOTE — PROGRESS NOTES
Subjective   Patient ID: Kirstin Howe is a 83 y.o. female who presents for Chronic Kidney Disease (SGLT2 discussion)    Referring Provider: Carolina Santos    HPI  HPI: CKD stage 4/a2. last EGFR 27 sCr 1.85  Now 1 month on farxiga 10mg tolerating well. No issues with painful/difficult urination. No issues with dizziness/lightheadedness.   HTN:   /87 at last ov  Reports home bps are better controlled will get follow up in office reading with dr santos.  Medications  Lisinopril 40mg every day  Amlodipine 5mg every day    Glucose controlled and monitored  A1C 5.7    HLD    On statin? No    Medications reviewed for appropriate dosing in setting of CKD  Recommended changes:  -No adjustments necessary at this time.        Objective     There were no vitals taken for this visit.     Labs  Lab Results   Component Value Date    BILITOT 0.3 06/01/2024    CALCIUM 9.2 06/01/2024    CO2 24 06/01/2024     (H) 06/01/2024    CREATININE 1.89 (H) 06/01/2024    GLUCOSE 98 06/01/2024    ALKPHOS 74 06/01/2024    K 4.5 06/01/2024    PROT 6.5 06/01/2024     06/01/2024    AST 12 06/01/2024    ALT <3 (L) 06/01/2024    BUN 21 06/01/2024    ANIONGAP 14 06/01/2024    MG 2.02 05/27/2021    PHOS 3.1 10/09/2023    ALBUMIN 3.7 06/01/2024    GFRF 29 (A) 09/13/2023     Lab Results   Component Value Date    TRIG 66 01/30/2024    CHOL 178 01/30/2024    LDLCALC 105 (H) 01/30/2024    HDL 59.5 01/30/2024     Lab Results   Component Value Date    HGBA1C 5.7 (A) 01/06/2022       Current Outpatient Medications on File Prior to Visit   Medication Sig Dispense Refill    amLODIPine (Norvasc) 5 mg tablet Take 1 tablet (5 mg) by mouth once daily.      buPROPion XL (Wellbutrin XL) 300 mg 24 hr tablet Take 1 tablet (300 mg) by mouth once daily. 90 tablet 3    Farxiga 10 mg Take 1 tablet (10 mg) by mouth once daily. TAKE ONE TABLET BY MOUTH ONCE DAILY IN THE MORNING 90 tablet 3    lisinopril 40 mg tablet Take 1 tablet (40 mg) by mouth once  daily.       No current facility-administered medications on file prior to visit.        Assessment/Plan   PATIENT EDUCATION/DISCUSSION:  - Counseled patient on MOA, expectations, side effects, duration of therapy, contraindications, administration, and monitoring parameters  - Answered all patient questions and concerns  - deductible met farxiga  $0  _______________________________________________________________________  PLAN  1. CONTINUE farxiga to 10mg 90 days sent to [facility]. Follow up 11/8@330p    Teodoro Mo PharmD BCPS    Continue all meds under the continuation of care with the referring provider and clinical pharmacy team.

## 2024-09-26 ENCOUNTER — OFFICE VISIT (OUTPATIENT)
Dept: NEUROLOGY | Facility: HOSPITAL | Age: 84
End: 2024-09-26
Payer: MEDICARE

## 2024-09-26 VITALS
DIASTOLIC BLOOD PRESSURE: 85 MMHG | SYSTOLIC BLOOD PRESSURE: 154 MMHG | HEART RATE: 80 BPM | WEIGHT: 141 LBS | RESPIRATION RATE: 18 BRPM | BODY MASS INDEX: 24.98 KG/M2 | HEIGHT: 63 IN | TEMPERATURE: 97.3 F

## 2024-09-26 DIAGNOSIS — G31.84 MILD COGNITIVE IMPAIRMENT: Primary | ICD-10-CM

## 2024-09-26 PROCEDURE — 99417 PROLNG OP E/M EACH 15 MIN: CPT | Performed by: PSYCHIATRY & NEUROLOGY

## 2024-09-26 PROCEDURE — 3077F SYST BP >= 140 MM HG: CPT | Performed by: PSYCHIATRY & NEUROLOGY

## 2024-09-26 PROCEDURE — 1157F ADVNC CARE PLAN IN RCRD: CPT | Performed by: PSYCHIATRY & NEUROLOGY

## 2024-09-26 PROCEDURE — 99215 OFFICE O/P EST HI 40 MIN: CPT | Mod: GC | Performed by: PSYCHIATRY & NEUROLOGY

## 2024-09-26 PROCEDURE — 3079F DIAST BP 80-89 MM HG: CPT | Performed by: PSYCHIATRY & NEUROLOGY

## 2024-09-26 PROCEDURE — 1126F AMNT PAIN NOTED NONE PRSNT: CPT | Performed by: PSYCHIATRY & NEUROLOGY

## 2024-09-26 PROCEDURE — 99205 OFFICE O/P NEW HI 60 MIN: CPT | Performed by: PSYCHIATRY & NEUROLOGY

## 2024-09-26 PROCEDURE — 1159F MED LIST DOCD IN RCRD: CPT | Performed by: PSYCHIATRY & NEUROLOGY

## 2024-09-26 PROCEDURE — 1036F TOBACCO NON-USER: CPT | Performed by: PSYCHIATRY & NEUROLOGY

## 2024-09-26 RX ORDER — DONEPEZIL HYDROCHLORIDE 5 MG/1
5 TABLET, FILM COATED ORAL NIGHTLY
Qty: 30 TABLET | Refills: 4 | Status: SHIPPED | OUTPATIENT
Start: 2024-09-26 | End: 2025-02-23

## 2024-09-26 ASSESSMENT — PAIN SCALES - GENERAL: PAINLEVEL: 0-NO PAIN

## 2024-09-26 NOTE — PATIENT INSTRUCTIONS
Dear Ms. Howe,    It was a pleasure to see you in clinic.    We tested your memory today and there was a slight decrease in your memory compared to your last test in 2020. The amount of decrease in your memory over the last two years is within the expected level of decrease.     Your memory changes are most consistent with a mild cognitive impairment, since you are still independent in your daily tasks. There is also a chance this could be the early stages of a mild dementia. Therefore, we suggest starting a medication to slow down the progression of additional memory loss.     We recommend starting aricept (donepezil) 5 mg once a day. This medication will slow down the progression of your memory loss. If this medication causes vivid dreams, you can take it early in the morning. Other potential side effects are nausea (likely to improve after 2-3 weeks of taking) and low blood pressure/low heart rate/lightheadedness.     Following a DASH or mediterranean diet rich in fruits, vegetables, whole grains, fish, and olive oil can also help maintain your overall brain health.    We also recommend getting another MRI of your brain since your last MRI was several years ago. We will call you if there are any abnormal findings.     We will see you back in clinic in 4 months with Dr. Giles.
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
## 2024-10-06 ENCOUNTER — OFFICE VISIT (OUTPATIENT)
Dept: URGENT CARE | Age: 84
End: 2024-10-06
Payer: MEDICARE

## 2024-10-06 VITALS
RESPIRATION RATE: 16 BRPM | OXYGEN SATURATION: 96 % | HEART RATE: 68 BPM | WEIGHT: 138 LBS | BODY MASS INDEX: 24.45 KG/M2 | DIASTOLIC BLOOD PRESSURE: 84 MMHG | SYSTOLIC BLOOD PRESSURE: 194 MMHG | TEMPERATURE: 97.8 F

## 2024-10-06 DIAGNOSIS — T30.0 THERMAL BURN: Primary | ICD-10-CM

## 2024-10-06 DIAGNOSIS — R52 PAIN: ICD-10-CM

## 2024-10-06 PROCEDURE — 1125F AMNT PAIN NOTED PAIN PRSNT: CPT | Performed by: FAMILY MEDICINE

## 2024-10-06 PROCEDURE — 1157F ADVNC CARE PLAN IN RCRD: CPT | Performed by: FAMILY MEDICINE

## 2024-10-06 PROCEDURE — 99203 OFFICE O/P NEW LOW 30 MIN: CPT | Performed by: FAMILY MEDICINE

## 2024-10-06 PROCEDURE — 1160F RVW MEDS BY RX/DR IN RCRD: CPT | Performed by: FAMILY MEDICINE

## 2024-10-06 PROCEDURE — 3079F DIAST BP 80-89 MM HG: CPT | Performed by: FAMILY MEDICINE

## 2024-10-06 PROCEDURE — 3077F SYST BP >= 140 MM HG: CPT | Performed by: FAMILY MEDICINE

## 2024-10-06 PROCEDURE — 1159F MED LIST DOCD IN RCRD: CPT | Performed by: FAMILY MEDICINE

## 2024-10-06 RX ORDER — SILVER SULFADIAZINE 10 G/1000G
CREAM TOPICAL DAILY
Status: SHIPPED | OUTPATIENT
Start: 2024-10-06

## 2024-10-06 RX ORDER — ACETAMINOPHEN 500 MG
500 TABLET ORAL ONCE
Status: COMPLETED | OUTPATIENT
Start: 2024-10-06 | End: 2024-10-06

## 2024-10-06 RX ADMIN — Medication 500 MG: at 16:44

## 2024-10-06 ASSESSMENT — PAIN SCALES - GENERAL: PAINLEVEL: 8

## 2024-10-06 NOTE — PATIENT INSTRUCTIONS
Based on clinical patient history, clinical exam, promote burning, improved with topical Silvadene application.  She may return for reapplication on Tuesday I will continue the same daily.  Tylenol has been administered and advised to continue the same 3 times a day.  Topical ice may be helpful.  New or worsening symptoms return for evaluation or go to the ER.    With patients verbal permission and request I discussed patients current illness with her other 2 daughters, who were out of town.  Doctors describe burns based on how many layers of skin you burned:  Superficial burns only involve the top layer of your skin.  Partial thickness burns involve the top 2 layers of your skin and you might have blisters over the burn area. The burn may leave a scar.  Full thickness burns involve all the layers of your skin. They most often need treatment in the hospital and may need surgery to treat them.  How quickly your burn will heal depends on how deep the burn is and how large of an area it covers.  What care is needed at home?   Call your regular doctor to let them know you were in the ED. Make a follow-up appointment if you were told to.  Keep your burn covered with a clean dressing. Some dressings include a piece of gauze that prevents it from sticking to the wound. The doctor may want you to use a thin layer of a special cream to help your burn heal.  Change the dressing once a day as you were shown. Also, change it if the dressing is wet or dirty.  Always wash your hands before and after touching the burn or dressing.  Each time you change the dressing, look closely at the burn to be sure it is healing the right way. The wound may have a little yellowish or clear discharge, and this is normal.  Avoid picking the scab or scratching the site, as this may cause more irritation. This can also cause bleeding or infection.  Do not soak the burn in water or swim with an open wound.  Prop your burned area on pillows, keeping  it above the level of your heart. This may help lessen pain and swelling.  You may want to take medicines like ibuprofen, naproxen, or acetaminophen to help with pain. You might also have gotten a prescription for stronger pain medicines to take for a short time. If so, be sure to follow the instructions for taking them.  When do I need to get emergency help?   Return to the ED if:   You have very bad pain that is not helped by pain medicine.  Your burn has signs of infection, such as:  Wound starts to drain thick yellow or green discharge.  Wound area becomes swollen or redness around wound increases.  When do I need to call the doctor?   You have a fever of 100.4°F (38°C) or higher or chills.  You have new or worsening symptoms.
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
## 2024-10-07 ASSESSMENT — ENCOUNTER SYMPTOMS
MYALGIAS: 0
WHEEZING: 0
BACK PAIN: 1
CONFUSION: 0
BRUISES/BLEEDS EASILY: 0
WEAKNESS: 0

## 2024-10-10 ENCOUNTER — HOSPITAL ENCOUNTER (OUTPATIENT)
Facility: HOSPITAL | Age: 84
Setting detail: OBSERVATION
Discharge: HOME | End: 2024-10-12
Attending: EMERGENCY MEDICINE | Admitting: NURSE PRACTITIONER
Payer: MEDICARE

## 2024-10-10 ENCOUNTER — APPOINTMENT (OUTPATIENT)
Dept: RADIOLOGY | Facility: HOSPITAL | Age: 84
End: 2024-10-10
Payer: MEDICARE

## 2024-10-10 ENCOUNTER — CLINICAL SUPPORT (OUTPATIENT)
Dept: EMERGENCY MEDICINE | Facility: HOSPITAL | Age: 84
End: 2024-10-10
Payer: MEDICARE

## 2024-10-10 DIAGNOSIS — I16.0 HYPERTENSIVE URGENCY: ICD-10-CM

## 2024-10-10 DIAGNOSIS — T30.0 THERMAL BURN: ICD-10-CM

## 2024-10-10 DIAGNOSIS — G30.8 MILD ALZHEIMER'S DEMENTIA OF OTHER ONSET, WITHOUT BEHAVIORAL DISTURBANCE, PSYCHOTIC DISTURBANCE, MOOD DISTURBANCE, OR ANXIETY: ICD-10-CM

## 2024-10-10 DIAGNOSIS — F02.A0 MILD ALZHEIMER'S DEMENTIA OF OTHER ONSET, WITHOUT BEHAVIORAL DISTURBANCE, PSYCHOTIC DISTURBANCE, MOOD DISTURBANCE, OR ANXIETY: ICD-10-CM

## 2024-10-10 DIAGNOSIS — I10 PRIMARY HYPERTENSION: Primary | ICD-10-CM

## 2024-10-10 DIAGNOSIS — G44.209 ACUTE NON INTRACTABLE TENSION-TYPE HEADACHE: ICD-10-CM

## 2024-10-10 LAB
ALBUMIN SERPL BCP-MCNC: 4.1 G/DL (ref 3.4–5)
ALP SERPL-CCNC: 97 U/L (ref 33–136)
ALT SERPL W P-5'-P-CCNC: 9 U/L (ref 7–45)
ANION GAP SERPL CALC-SCNC: 14 MMOL/L (ref 10–20)
APPEARANCE UR: CLEAR
AST SERPL W P-5'-P-CCNC: 21 U/L (ref 9–39)
ATRIAL RATE: 72 BPM
BASOPHILS # BLD AUTO: 0.04 X10*3/UL (ref 0–0.1)
BASOPHILS NFR BLD AUTO: 0.6 %
BILIRUB SERPL-MCNC: 0.3 MG/DL (ref 0–1.2)
BILIRUB UR STRIP.AUTO-MCNC: NEGATIVE MG/DL
BUN SERPL-MCNC: 16 MG/DL (ref 6–23)
CALCIUM SERPL-MCNC: 9.3 MG/DL (ref 8.6–10.6)
CARDIAC TROPONIN I PNL SERPL HS: 16 NG/L (ref 0–34)
CHLORIDE SERPL-SCNC: 108 MMOL/L (ref 98–107)
CO2 SERPL-SCNC: 24 MMOL/L (ref 21–32)
COLOR UR: YELLOW
CREAT SERPL-MCNC: 1.72 MG/DL (ref 0.5–1.05)
EGFRCR SERPLBLD CKD-EPI 2021: 29 ML/MIN/1.73M*2
EOSINOPHIL # BLD AUTO: 0.2 X10*3/UL (ref 0–0.4)
EOSINOPHIL NFR BLD AUTO: 3.1 %
ERYTHROCYTE [DISTWIDTH] IN BLOOD BY AUTOMATED COUNT: 14.9 % (ref 11.5–14.5)
GLUCOSE BLD MANUAL STRIP-MCNC: 83 MG/DL (ref 74–99)
GLUCOSE BLD MANUAL STRIP-MCNC: 87 MG/DL (ref 74–99)
GLUCOSE SERPL-MCNC: 69 MG/DL (ref 74–99)
GLUCOSE UR STRIP.AUTO-MCNC: ABNORMAL MG/DL
HCT VFR BLD AUTO: 44.5 % (ref 36–46)
HGB BLD-MCNC: 13 G/DL (ref 12–16)
IMM GRANULOCYTES # BLD AUTO: 0.01 X10*3/UL (ref 0–0.5)
IMM GRANULOCYTES NFR BLD AUTO: 0.2 % (ref 0–0.9)
KETONES UR STRIP.AUTO-MCNC: ABNORMAL MG/DL
LEUKOCYTE ESTERASE UR QL STRIP.AUTO: ABNORMAL
LYMPHOCYTES # BLD AUTO: 2 X10*3/UL (ref 0.8–3)
LYMPHOCYTES NFR BLD AUTO: 30.5 %
MAGNESIUM SERPL-MCNC: 2.19 MG/DL (ref 1.6–2.4)
MCH RBC QN AUTO: 26.2 PG (ref 26–34)
MCHC RBC AUTO-ENTMCNC: 29.2 G/DL (ref 32–36)
MCV RBC AUTO: 90 FL (ref 80–100)
MONOCYTES # BLD AUTO: 0.55 X10*3/UL (ref 0.05–0.8)
MONOCYTES NFR BLD AUTO: 8.4 %
MUCOUS THREADS #/AREA URNS AUTO: NORMAL /LPF
NEUTROPHILS # BLD AUTO: 3.75 X10*3/UL (ref 1.6–5.5)
NEUTROPHILS NFR BLD AUTO: 57.2 %
NITRITE UR QL STRIP.AUTO: NEGATIVE
NRBC BLD-RTO: 0 /100 WBCS (ref 0–0)
P AXIS: 35 DEGREES
P OFFSET: 196 MS
P ONSET: 139 MS
PH UR STRIP.AUTO: 6 [PH]
PLATELET # BLD AUTO: 297 X10*3/UL (ref 150–450)
POTASSIUM SERPL-SCNC: 4.4 MMOL/L (ref 3.5–5.3)
PR INTERVAL: 160 MS
PROT SERPL-MCNC: 6.4 G/DL (ref 6.4–8.2)
PROT UR STRIP.AUTO-MCNC: NEGATIVE MG/DL
Q ONSET: 219 MS
QRS COUNT: 12 BEATS
QRS DURATION: 82 MS
QT INTERVAL: 412 MS
QTC CALCULATION(BAZETT): 451 MS
QTC FREDERICIA: 438 MS
R AXIS: 13 DEGREES
RBC # BLD AUTO: 4.96 X10*6/UL (ref 4–5.2)
RBC # UR STRIP.AUTO: NEGATIVE /UL
RBC #/AREA URNS AUTO: NORMAL /HPF
SODIUM SERPL-SCNC: 142 MMOL/L (ref 136–145)
SP GR UR STRIP.AUTO: 1.01
T AXIS: 13 DEGREES
T OFFSET: 425 MS
UROBILINOGEN UR STRIP.AUTO-MCNC: ABNORMAL MG/DL
VENTRICULAR RATE: 72 BPM
WBC # BLD AUTO: 6.6 X10*3/UL (ref 4.4–11.3)
WBC #/AREA URNS AUTO: NORMAL /HPF

## 2024-10-10 PROCEDURE — 96376 TX/PRO/DX INJ SAME DRUG ADON: CPT

## 2024-10-10 PROCEDURE — 85025 COMPLETE CBC W/AUTO DIFF WBC: CPT | Performed by: STUDENT IN AN ORGANIZED HEALTH CARE EDUCATION/TRAINING PROGRAM

## 2024-10-10 PROCEDURE — 81001 URINALYSIS AUTO W/SCOPE: CPT

## 2024-10-10 PROCEDURE — 70450 CT HEAD/BRAIN W/O DYE: CPT

## 2024-10-10 PROCEDURE — 93005 ELECTROCARDIOGRAM TRACING: CPT

## 2024-10-10 PROCEDURE — 82947 ASSAY GLUCOSE BLOOD QUANT: CPT | Mod: 59

## 2024-10-10 PROCEDURE — 83735 ASSAY OF MAGNESIUM: CPT | Performed by: EMERGENCY MEDICINE

## 2024-10-10 PROCEDURE — 71046 X-RAY EXAM CHEST 2 VIEWS: CPT | Mod: FOREIGN READ | Performed by: RADIOLOGY

## 2024-10-10 PROCEDURE — 99285 EMERGENCY DEPT VISIT HI MDM: CPT

## 2024-10-10 PROCEDURE — 74176 CT ABD & PELVIS W/O CONTRAST: CPT | Mod: FOREIGN READ | Performed by: RADIOLOGY

## 2024-10-10 PROCEDURE — 84484 ASSAY OF TROPONIN QUANT: CPT | Performed by: EMERGENCY MEDICINE

## 2024-10-10 PROCEDURE — 83036 HEMOGLOBIN GLYCOSYLATED A1C: CPT | Performed by: NURSE PRACTITIONER

## 2024-10-10 PROCEDURE — 71046 X-RAY EXAM CHEST 2 VIEWS: CPT

## 2024-10-10 PROCEDURE — 36415 COLL VENOUS BLD VENIPUNCTURE: CPT | Performed by: STUDENT IN AN ORGANIZED HEALTH CARE EDUCATION/TRAINING PROGRAM

## 2024-10-10 PROCEDURE — 99285 EMERGENCY DEPT VISIT HI MDM: CPT | Performed by: EMERGENCY MEDICINE

## 2024-10-10 PROCEDURE — 2500000001 HC RX 250 WO HCPCS SELF ADMINISTERED DRUGS (ALT 637 FOR MEDICARE OP)

## 2024-10-10 PROCEDURE — 2500000004 HC RX 250 GENERAL PHARMACY W/ HCPCS (ALT 636 FOR OP/ED)

## 2024-10-10 PROCEDURE — 80053 COMPREHEN METABOLIC PANEL: CPT | Performed by: STUDENT IN AN ORGANIZED HEALTH CARE EDUCATION/TRAINING PROGRAM

## 2024-10-10 PROCEDURE — 96365 THER/PROPH/DIAG IV INF INIT: CPT

## 2024-10-10 PROCEDURE — 2500000005 HC RX 250 GENERAL PHARMACY W/O HCPCS

## 2024-10-10 PROCEDURE — 96375 TX/PRO/DX INJ NEW DRUG ADDON: CPT

## 2024-10-10 PROCEDURE — 70450 CT HEAD/BRAIN W/O DYE: CPT | Performed by: RADIOLOGY

## 2024-10-10 PROCEDURE — 87086 URINE CULTURE/COLONY COUNT: CPT

## 2024-10-10 PROCEDURE — 74176 CT ABD & PELVIS W/O CONTRAST: CPT

## 2024-10-10 PROCEDURE — 93010 ELECTROCARDIOGRAM REPORT: CPT | Performed by: EMERGENCY MEDICINE

## 2024-10-10 RX ORDER — HYDRALAZINE HYDROCHLORIDE 20 MG/ML
5 INJECTION INTRAMUSCULAR; INTRAVENOUS ONCE
Status: COMPLETED | OUTPATIENT
Start: 2024-10-10 | End: 2024-10-10

## 2024-10-10 RX ORDER — AMLODIPINE BESYLATE 5 MG/1
5 TABLET ORAL ONCE
Status: COMPLETED | OUTPATIENT
Start: 2024-10-10 | End: 2024-10-10

## 2024-10-10 RX ORDER — SILVER SULFADIAZINE 10 G/1000G
CREAM TOPICAL ONCE
Status: COMPLETED | OUTPATIENT
Start: 2024-10-10 | End: 2024-10-10

## 2024-10-10 RX ORDER — DICYCLOMINE HYDROCHLORIDE 10 MG/1
20 CAPSULE ORAL ONCE
Status: COMPLETED | OUTPATIENT
Start: 2024-10-10 | End: 2024-10-10

## 2024-10-10 RX ORDER — HYDRALAZINE HYDROCHLORIDE 20 MG/ML
10 INJECTION INTRAMUSCULAR; INTRAVENOUS ONCE
Status: DISCONTINUED | OUTPATIENT
Start: 2024-10-10 | End: 2024-10-10

## 2024-10-10 RX ORDER — NICARDIPINE HYDROCHLORIDE 0.2 MG/ML
2.5-15 INJECTION INTRAVENOUS CONTINUOUS
Status: DISCONTINUED | OUTPATIENT
Start: 2024-10-10 | End: 2024-10-11

## 2024-10-10 RX ORDER — METOCLOPRAMIDE HYDROCHLORIDE 5 MG/ML
10 INJECTION INTRAMUSCULAR; INTRAVENOUS ONCE
Status: COMPLETED | OUTPATIENT
Start: 2024-10-10 | End: 2024-10-11

## 2024-10-10 RX ORDER — ONDANSETRON 4 MG/1
4 TABLET, ORALLY DISINTEGRATING ORAL ONCE
Status: COMPLETED | OUTPATIENT
Start: 2024-10-10 | End: 2024-10-10

## 2024-10-10 RX ORDER — LISINOPRIL 20 MG/1
40 TABLET ORAL ONCE
Status: COMPLETED | OUTPATIENT
Start: 2024-10-10 | End: 2024-10-10

## 2024-10-10 RX ORDER — ACETAMINOPHEN 325 MG/1
975 TABLET ORAL ONCE
Status: COMPLETED | OUTPATIENT
Start: 2024-10-10 | End: 2024-10-10

## 2024-10-10 ASSESSMENT — PAIN - FUNCTIONAL ASSESSMENT
PAIN_FUNCTIONAL_ASSESSMENT: 0-10
PAIN_FUNCTIONAL_ASSESSMENT: 0-10

## 2024-10-10 ASSESSMENT — COLUMBIA-SUICIDE SEVERITY RATING SCALE - C-SSRS
1. IN THE PAST MONTH, HAVE YOU WISHED YOU WERE DEAD OR WISHED YOU COULD GO TO SLEEP AND NOT WAKE UP?: NO
2. HAVE YOU ACTUALLY HAD ANY THOUGHTS OF KILLING YOURSELF?: NO
6. HAVE YOU EVER DONE ANYTHING, STARTED TO DO ANYTHING, OR PREPARED TO DO ANYTHING TO END YOUR LIFE?: NO

## 2024-10-10 ASSESSMENT — LIFESTYLE VARIABLES
HAVE YOU EVER FELT YOU SHOULD CUT DOWN ON YOUR DRINKING: NO
EVER HAD A DRINK FIRST THING IN THE MORNING TO STEADY YOUR NERVES TO GET RID OF A HANGOVER: NO
TOTAL SCORE: 0
HAVE PEOPLE ANNOYED YOU BY CRITICIZING YOUR DRINKING: NO
EVER FELT BAD OR GUILTY ABOUT YOUR DRINKING: NO

## 2024-10-10 ASSESSMENT — PAIN SCALES - GENERAL
PAINLEVEL_OUTOF10: 0 - NO PAIN
PAINLEVEL_OUTOF10: 0 - NO PAIN

## 2024-10-10 ASSESSMENT — PAIN DESCRIPTION - PROGRESSION: CLINICAL_PROGRESSION: RESOLVED
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
## 2024-10-10 NOTE — ED PROVIDER NOTES
CC: Burn, Hypertension, and Dizziness     HPI:  Patient is an 83-year-old female with a past medical history of hyperparathyroidism, constipation, CVA without deficits, anxiety, hypertension, CKD 4, and tubular adenoma of the colon not actively being treated who presented to the ED for burn, hypertension, and dizziness.  Patient noted that 2 days before ED presentation she was noted to be taking a shower and noted water to be significantly hot.  She notes that her hot water tank was exchanged 10 days ago.  Patient noted that she was seen at urgent care 4 days ago and her burn was treated with silver sulfadiazine and Tylenol.  She was noted have elevated blood pressure at 190/84 at that time.  Patient noted that she developed dizziness which she notes is a room spinning sensation as well as lightheadedness the day of ED presentation.  She feels generalized weakness.  Patient is also noting left lower quadrant abdominal pain and nausea.  Patient has noted compliance with her home antihypertensives including lisinopril and amlodipine.  Patient noted having a normal bowel movement today.  Denies any recent changes in bowel movements.  Denied fevers, chills, headache, vision, changes in hearing, trauma, falls, chest pain, difficulty breathing, dysuria, neck pain, and back pain.    Limitations to history: None  Independent historian(s): Patient  Records Reviewed: Recent available ED and inpatient notes reviewed in EMR.    PMHx/PSHx:  Per HPI.   - has a past medical history of Age-related nuclear cataract, right eye (03/31/2016), Anxiety disorder, unspecified (03/31/2014), Bunion of unspecified foot (11/26/2014), Concussion (04/08/2021), Contusion of left forearm, initial encounter (08/16/2016), Contusion of left knee, initial encounter (08/09/2016), Cortical age-related cataract, right eye (03/31/2016), Depression, Dermatochalasis of unspecified eye, unspecified eyelid (11/10/2017), Dislocation of right shoulder joint,  Essential (primary) hypertension (11/23/2022), Fracture of orbital floor, right side, initial encounter for open fracture (Multi) (06/27/2017), Generalized anxiety disorder (11/26/2014), Herpes simplex infection of genitourinary system (08/28/2018), Herpes zoster (03/29/2023), History of cataract (06/11/2024), History of depression (06/11/2024), Hypertensive urgency (03/25/2016), Insomnia, unspecified (11/26/2014), Maxillary fracture, unspecified side, initial encounter for closed fracture (Multi) (06/21/2017), Primary osteoarthritis, left wrist (02/09/2017), and Status post left knee replacement (09/02/2022).  - has a past surgical history that includes Cataract extraction (06/07/2017); Total abdominal hysterectomy w/ bilateral salpingoophorectomy; and MR angio head wo IV contrast (4/9/2021).    Medications:  Reviewed in EMR. See EMR for complete list of medications and doses.    Allergies:  Iodine    Social History:  - Tobacco:  reports that she has quit smoking. Her smoking use included cigarettes. She has never used smokeless tobacco.   - Alcohol:  reports that she does not currently use alcohol.   - Illicit Drugs:  has no history on file for drug use.     ROS:  Per HPI.       ???????????????????????????????????????????????????????????????  Triage Vitals:  T 36.4 °C (97.6 °F)  HR 67  BP (!) 208/115  RR 18  O2 97 %      Physical Exam  Vitals and nursing note reviewed.   Constitutional:       General: She is not in acute distress.  HENT:      Head: Normocephalic and atraumatic.      Nose: Nose normal.      Mouth/Throat:      Mouth: Mucous membranes are moist.   Eyes:      Conjunctiva/sclera: Conjunctivae normal.   Cardiovascular:      Rate and Rhythm: Normal rate and regular rhythm.      Pulses: Normal pulses.   Pulmonary:      Effort: Pulmonary effort is normal. No respiratory distress.      Breath sounds: Normal breath sounds.   Abdominal:      Comments: Left lower quadrant TTP.   Skin:     Comments:  Erythema of the left lower back.  No blistering or vesicles noted.   Neurological:      Mental Status: She is alert.      Sensory: No sensory deficit.      Motor: No weakness.      Gait: Gait normal.      Comments: A&O X3.  Cranial nerves II through XII intact.  Normal gait.  No ataxia or dysmetria.         ???????????????????????????????????????????????????????????????  Labs:   Labs Reviewed   COMPREHENSIVE METABOLIC PANEL   CBC WITH AUTO DIFFERENTIAL   TROPONIN I, HIGH SENSITIVITY   URINALYSIS WITH REFLEX CULTURE AND MICROSCOPIC    Narrative:     The following orders were created for panel order Urinalysis with Reflex Culture and Microscopic.  Procedure                               Abnormality         Status                     ---------                               -----------         ------                     Urinalysis with Reflex C...[[id number]]                                                 Extra Urine Gray Tube[[id number]]                                                         Please view results for these tests on the individual orders.   MAGNESIUM   URINALYSIS WITH REFLEX CULTURE AND MICROSCOPIC   EXTRA URINE GRAY TUBE        Imaging:   XR chest 2 views    (Results Pending)   CT head wo IV contrast    (Results Pending)   CT abdomen pelvis w IV contrast    (Results Pending)        EKG:  Rate is 72, sinus rhythm, normal axis, no interval prolongation, new T wave inversion in lead III, no st elevation or depression.  When compared to EKG on 6/1/24 review of EKG does not show any signs of STEMI, complete heart block, asystole, V-fib.    MDM:  Patient is an 83-year-old female with a past medical history of hyperparathyroidism, constipation, CVA without deficits, anxiety, hypertension, CKD 4, and tubular adenoma of the colon not actively being treated who presented to the ED for burn, hypertension, and dizziness.  Patient noted to be hypertensive at 201/115 remainder vitals WNL.  Physical exam findings  significant for erythema of the left lateral back concern for first-degree burn and left lower quadrant TTP.  In regards to the patient's burn, low clinical concern for SJS, SSS, TEN, and necrotizing infection.  In regards to the patient's left lower quadrant pain, low clinical concern for SBO, mesenteric ischemia, sepsis, and acute aortic process.  Patient's lightheadedness and dizziness may be secondary to her hypertension will be further worked up with  CT head and cardiac workup.  Low clinical concern for ACS with unremarkable troponin and no chest pain.  Patient is noted to have new T wave inversion.  Patient administered IV hydralazine, Bentyl, Tylenol, Zofran, and silver sulfadiazine.  Patient's home antihypertensives were ordered.  Metabolic panel was unremarkable including baseline renal function and normal hepatic labs.  Patient was noted have mild hypoglycemia.  Patient tolerated p.o. juice and had a normal glucose.  CBC did not show leukocytosis or anemia.  CXR did not display an acute cardiopulmonary process.  CT head did not display an acute intracranial process.  CTAP did not display an acute intra-abdominal process.  Patient was noted to have moderate stool burden.  Patient likely has constipation of the etiology of her left lower quadrant pain.  Patient was noted to be hypertensive to 200s/80s on reevaluation.  Patient administered an additional dose of 5 mg IV hydralazine.  Patient's hypertension was noted to increase to 220s/100s with a headache.  Patient initiated on Cardene drip.  Repeat CT head ordered.    ED Course:  ED Course as of 10/11/24 1236   Thu Oct 10, 2024   [id number] ATTENDING ATTESTATION  Patient with medical history including hypertension presenting to the emergency department with 2 complaints  Patient was in the shower 2 days ago recently had a new shower installed, the water became suddenly hot and she sustained what visually appears to be a very mild first-degree or possibly early  second-degree burn to her lower lateral back without evidence of blistering, no denudation of the skin no other injury, no evidence of zoster eruption and the timing seems to be related to the shower we will apply topical medication and assess response to therapy.  Patient endorses some dizziness, negative posterior circulation testing at the bedside, no headache or vision disturbance but was found to have critically high blood pressure states compliance with her antihypertensive medications at home no chest pain or urinary symptoms.  EKG with new T wave inversions in the far lateral leads compared with EKG from a couple of months ago, no chest pain.  Concern for accelerated hypertension precipitating the patient's symptoms no concern for primary posterior circulation insult, we will obtain CT of the head, electrolytes blood counts, troponin.  I anticipate the patient's disposition would be admission given the concern for accelerated hypertension, we have goal reduction in mean arterial pressure by 25%.  Patient does state that her daughter is currently sick in [place] and she would not like to stay in the hospital, but we will have a more detailed conversation with her once we fully elucidated any potential lab and imaging abnormality.    EKG reviewed independently by me and agree with interpretation above.    Marvin De Leon DO  [facility]   [RH]      ED Course User Index  [RH] Marvin De Leon DO         Diagnoses as of 10/11/24 1236   Primary hypertension   Acute non intractable tension-type headache       Social Determinants Limiting Care:  None identified    Disposition:  Signed out to Dr. Quiroga in stable condition pending CT head and reevaluation.    Delmar Triana MD   Emergency Medicine PGY-3  [facility]    Comment: Please note this report has been produced using speech recognition software and may contain  errors related to that system including errors in grammar, punctuation, and spelling as well as words and phrases that may be inappropriate.  If there are any questions or concerns please feel free to contact the dictating provider for clarification.    Procedures ? SmartLinks last updated 10/10/2024 4:56 PM        Delmar Triana MD  Resident  10/11/24 7510

## 2024-10-11 ENCOUNTER — APPOINTMENT (OUTPATIENT)
Dept: RADIOLOGY | Facility: HOSPITAL | Age: 84
End: 2024-10-11
Payer: MEDICARE

## 2024-10-11 PROBLEM — I10 PRIMARY HYPERTENSION: Status: ACTIVE | Noted: 2024-10-11

## 2024-10-11 PROBLEM — I16.0 HYPERTENSIVE URGENCY: Status: ACTIVE | Noted: 2024-10-11

## 2024-10-11 PROBLEM — I10 PRIMARY HYPERTENSION: Status: RESOLVED | Noted: 2024-10-11 | Resolved: 2024-10-11

## 2024-10-11 LAB
ALBUMIN SERPL BCP-MCNC: 3.7 G/DL (ref 3.4–5)
ANION GAP SERPL CALC-SCNC: 16 MMOL/L (ref 10–20)
BUN SERPL-MCNC: 17 MG/DL (ref 6–23)
CALCIUM SERPL-MCNC: 9.1 MG/DL (ref 8.6–10.6)
CHLORIDE SERPL-SCNC: 107 MMOL/L (ref 98–107)
CO2 SERPL-SCNC: 22 MMOL/L (ref 21–32)
CREAT SERPL-MCNC: 1.77 MG/DL (ref 0.5–1.05)
EGFRCR SERPLBLD CKD-EPI 2021: 28 ML/MIN/1.73M*2
ERYTHROCYTE [DISTWIDTH] IN BLOOD BY AUTOMATED COUNT: 14.8 % (ref 11.5–14.5)
EST. AVERAGE GLUCOSE BLD GHB EST-MCNC: 105 MG/DL
GLUCOSE BLD MANUAL STRIP-MCNC: 108 MG/DL (ref 74–99)
GLUCOSE BLD MANUAL STRIP-MCNC: 110 MG/DL (ref 74–99)
GLUCOSE BLD MANUAL STRIP-MCNC: 152 MG/DL (ref 74–99)
GLUCOSE BLD MANUAL STRIP-MCNC: 62 MG/DL (ref 74–99)
GLUCOSE BLD MANUAL STRIP-MCNC: 84 MG/DL (ref 74–99)
GLUCOSE SERPL-MCNC: 79 MG/DL (ref 74–99)
HBA1C MFR BLD: 5.3 %
HCT VFR BLD AUTO: 41.4 % (ref 36–46)
HGB BLD-MCNC: 12.8 G/DL (ref 12–16)
HOLD SPECIMEN: NORMAL
MAGNESIUM SERPL-MCNC: 2 MG/DL (ref 1.6–2.4)
MCH RBC QN AUTO: 26.4 PG (ref 26–34)
MCHC RBC AUTO-ENTMCNC: 30.9 G/DL (ref 32–36)
MCV RBC AUTO: 86 FL (ref 80–100)
NRBC BLD-RTO: 0 /100 WBCS (ref 0–0)
PHOSPHATE SERPL-MCNC: 3.7 MG/DL (ref 2.5–4.9)
PLATELET # BLD AUTO: 319 X10*3/UL (ref 150–450)
POTASSIUM SERPL-SCNC: 4.3 MMOL/L (ref 3.5–5.3)
RBC # BLD AUTO: 4.84 X10*6/UL (ref 4–5.2)
SODIUM SERPL-SCNC: 141 MMOL/L (ref 136–145)
WBC # BLD AUTO: 6.8 X10*3/UL (ref 4.4–11.3)

## 2024-10-11 PROCEDURE — 82947 ASSAY GLUCOSE BLOOD QUANT: CPT | Mod: 59

## 2024-10-11 PROCEDURE — G0378 HOSPITAL OBSERVATION PER HR: HCPCS

## 2024-10-11 PROCEDURE — 36415 COLL VENOUS BLD VENIPUNCTURE: CPT | Performed by: NURSE PRACTITIONER

## 2024-10-11 PROCEDURE — 80069 RENAL FUNCTION PANEL: CPT | Performed by: NURSE PRACTITIONER

## 2024-10-11 PROCEDURE — 2500000004 HC RX 250 GENERAL PHARMACY W/ HCPCS (ALT 636 FOR OP/ED): Performed by: STUDENT IN AN ORGANIZED HEALTH CARE EDUCATION/TRAINING PROGRAM

## 2024-10-11 PROCEDURE — 70551 MRI BRAIN STEM W/O DYE: CPT | Performed by: RADIOLOGY

## 2024-10-11 PROCEDURE — 2500000004 HC RX 250 GENERAL PHARMACY W/ HCPCS (ALT 636 FOR OP/ED)

## 2024-10-11 PROCEDURE — 2500000001 HC RX 250 WO HCPCS SELF ADMINISTERED DRUGS (ALT 637 FOR MEDICARE OP): Performed by: NURSE PRACTITIONER

## 2024-10-11 PROCEDURE — 96372 THER/PROPH/DIAG INJ SC/IM: CPT | Performed by: NURSE PRACTITIONER

## 2024-10-11 PROCEDURE — 96375 TX/PRO/DX INJ NEW DRUG ADDON: CPT

## 2024-10-11 PROCEDURE — 99222 1ST HOSP IP/OBS MODERATE 55: CPT | Performed by: NURSE PRACTITIONER

## 2024-10-11 PROCEDURE — 85027 COMPLETE CBC AUTOMATED: CPT | Performed by: NURSE PRACTITIONER

## 2024-10-11 PROCEDURE — 70551 MRI BRAIN STEM W/O DYE: CPT

## 2024-10-11 PROCEDURE — 96361 HYDRATE IV INFUSION ADD-ON: CPT

## 2024-10-11 PROCEDURE — 83735 ASSAY OF MAGNESIUM: CPT | Performed by: NURSE PRACTITIONER

## 2024-10-11 PROCEDURE — 2500000004 HC RX 250 GENERAL PHARMACY W/ HCPCS (ALT 636 FOR OP/ED): Performed by: NURSE PRACTITIONER

## 2024-10-11 RX ORDER — HEPARIN SODIUM 5000 [USP'U]/ML
5000 INJECTION, SOLUTION INTRAVENOUS; SUBCUTANEOUS EVERY 8 HOURS
Status: DISCONTINUED | OUTPATIENT
Start: 2024-10-11 | End: 2024-10-12 | Stop reason: HOSPADM

## 2024-10-11 RX ORDER — AMLODIPINE BESYLATE 5 MG/1
5 TABLET ORAL DAILY
Status: DISCONTINUED | OUTPATIENT
Start: 2024-10-11 | End: 2024-10-12

## 2024-10-11 RX ORDER — LISINOPRIL 20 MG/1
40 TABLET ORAL DAILY
Status: DISCONTINUED | OUTPATIENT
Start: 2024-10-11 | End: 2024-10-11

## 2024-10-11 RX ORDER — ACETAMINOPHEN 160 MG/5ML
650 SOLUTION ORAL EVERY 6 HOURS PRN
Status: DISCONTINUED | OUTPATIENT
Start: 2024-10-11 | End: 2024-10-11

## 2024-10-11 RX ORDER — AMLODIPINE BESYLATE 5 MG/1
5 TABLET ORAL DAILY
Status: DISCONTINUED | OUTPATIENT
Start: 2024-10-11 | End: 2024-10-11

## 2024-10-11 RX ORDER — SILVER SULFADIAZINE 10 G/1000G
CREAM TOPICAL DAILY
Status: DISCONTINUED | OUTPATIENT
Start: 2024-10-11 | End: 2024-10-12 | Stop reason: HOSPADM

## 2024-10-11 RX ORDER — HYDRALAZINE HYDROCHLORIDE 10 MG/1
10 TABLET, FILM COATED ORAL 3 TIMES DAILY PRN
Status: DISCONTINUED | OUTPATIENT
Start: 2024-10-11 | End: 2024-10-12 | Stop reason: HOSPADM

## 2024-10-11 RX ORDER — LORAZEPAM 2 MG/ML
0.5 INJECTION INTRAMUSCULAR ONCE
Status: COMPLETED | OUTPATIENT
Start: 2024-10-11 | End: 2024-10-11

## 2024-10-11 RX ORDER — LISINOPRIL 20 MG/1
40 TABLET ORAL DAILY
Status: DISCONTINUED | OUTPATIENT
Start: 2024-10-11 | End: 2024-10-12 | Stop reason: HOSPADM

## 2024-10-11 RX ORDER — DAPAGLIFLOZIN 10 MG/1
10 TABLET, FILM COATED ORAL DAILY
Status: DISCONTINUED | OUTPATIENT
Start: 2024-10-11 | End: 2024-10-12 | Stop reason: HOSPADM

## 2024-10-11 RX ORDER — DEXTROSE 50 % IN WATER (D50W) INTRAVENOUS SYRINGE
12.5
Status: DISCONTINUED | OUTPATIENT
Start: 2024-10-11 | End: 2024-10-12 | Stop reason: HOSPADM

## 2024-10-11 RX ORDER — AMOXICILLIN 250 MG
2 CAPSULE ORAL 2 TIMES DAILY
Status: DISCONTINUED | OUTPATIENT
Start: 2024-10-11 | End: 2024-10-12 | Stop reason: HOSPADM

## 2024-10-11 RX ORDER — BUPROPION HYDROCHLORIDE 150 MG/1
300 TABLET ORAL DAILY
Status: DISCONTINUED | OUTPATIENT
Start: 2024-10-11 | End: 2024-10-12 | Stop reason: HOSPADM

## 2024-10-11 RX ORDER — DONEPEZIL HYDROCHLORIDE 10 MG/1
5 TABLET, FILM COATED ORAL NIGHTLY
Status: DISCONTINUED | OUTPATIENT
Start: 2024-10-11 | End: 2024-10-12 | Stop reason: HOSPADM

## 2024-10-11 RX ORDER — ACETAMINOPHEN 500 MG
5 TABLET ORAL NIGHTLY PRN
Status: DISCONTINUED | OUTPATIENT
Start: 2024-10-11 | End: 2024-10-12 | Stop reason: HOSPADM

## 2024-10-11 RX ORDER — DEXTROSE 50 % IN WATER (D50W) INTRAVENOUS SYRINGE
25
Status: DISCONTINUED | OUTPATIENT
Start: 2024-10-11 | End: 2024-10-12 | Stop reason: HOSPADM

## 2024-10-11 RX ORDER — ACETAMINOPHEN 325 MG/1
650 TABLET ORAL EVERY 6 HOURS PRN
Status: DISCONTINUED | OUTPATIENT
Start: 2024-10-11 | End: 2024-10-12 | Stop reason: HOSPADM

## 2024-10-11 RX ORDER — ACETAMINOPHEN 160 MG/5ML
650 SOLUTION ORAL EVERY 6 HOURS PRN
Status: DISCONTINUED | OUTPATIENT
Start: 2024-10-11 | End: 2024-10-12 | Stop reason: HOSPADM

## 2024-10-11 RX ORDER — ACETAMINOPHEN 325 MG/1
650 TABLET ORAL EVERY 6 HOURS PRN
Status: DISCONTINUED | OUTPATIENT
Start: 2024-10-11 | End: 2024-10-11

## 2024-10-11 ASSESSMENT — ENCOUNTER SYMPTOMS
COUGH: 0
VOMITING: 0
SHORTNESS OF BREATH: 0
CONFUSION: 1
WEAKNESS: 0
ABDOMINAL PAIN: 0
DIZZINESS: 1
CHILLS: 0
HEADACHES: 1
FEVER: 0
WOUND: 1
NAUSEA: 0
BACK PAIN: 1
DIARRHEA: 0
DIFFICULTY URINATING: 0

## 2024-10-11 ASSESSMENT — PAIN SCALES - GENERAL: PAINLEVEL_OUTOF10: 0 - NO PAIN

## 2024-10-11 NOTE — SIGNIFICANT EVENT
Updated Assessment & Plan  Ms. Howe is an 84 y/o woman w/ PMH of hypertension, CKD 4, anxiety, and new diagnosis of dementia who presented with hypertensive urgency, 's/110's on arrival. BP now normalized with resumption of home PO meds - overall, suspect that worsening cognitive impairment has affected Ms. Howe's ability to take her medications independently.    Dispo pending PT/OT evaluation and further discussion with her children.    #Hypertensive urgency (resolved)  - Blood pressure on arrival to ED: 208/115; last /72  - s/p Cardene gtt, Norvasc 5 mg PO x1 dose, Hydralazine 5 mg IV push x3 doses, and Lisinopril 40 mg PO x1 dose  - Most likely elevated due to pain and noncompliance  - Home medications include: Lisinopril 40 mg PO every day and Norvasc 5 mg PO every day  -BP WNL (120-140's/80's) with resumption of home Amlodipine/Lisinopril    #dizziness  - orthostatic BP/P qshift x2 to r/o orthostatic hypotension  [ ] Follow-up PT/OT recs     #thermal burn on back  - continue Silver sulfadiazine cream daily  - tylenol 650 mg PO q6h PRN pain  -Wound Nurse consulted, agrees with silver sulfadiazine cream daily     #CKD stage IV (stable)  - Cr 1.72 GFR 29 on admit, Cr 1.89 GFR 26 on 6/1  - continue to monitor renal function   - RFP ordered for AM  - continue Farxiga 10 mg PO QD     #cognitive impairment  - last seen by neurology for evaluation on 9/26 and started on Aricept 5 mg PO every day  - per documentation from neuro: MoCA has decreased gradually over the years (26/30 in 2018, 21/30 in 2020, and 18/30 in 2024). Plan to repeat MRI brain noncontrast. Return to clinic in 4 months.  -MRI Brain (10/11) with moderate volume loss, no acute findings  - continue Aricept 5 mg PO every day     #anxiety  - continue home dose of Wellbutrin  mg PO every day     Dispo: Discussed with daughters Antonia and Kalyani ([phone]), would consider SNF placement if recommended but goal for Home with  HC    Mikaela Mejía MD  [facility] Hospitalist

## 2024-10-11 NOTE — PROGRESS NOTES
Emergency Department Transition of Care Note       Signout   I received Kirstin Howe in signout from Dr. Triana.  Please see the ED Provider Note for all HPI, PE and MDM up to the time of signout at 11 PM.  This is in addition to the primary record.    In brief Kirstin Howe is an 83 y.o. female presenting for hypertension and headache    At the time of signout we were awaiting:  Blood pressure control and disposition    ED Course & Medical Decision Making   Medical Decision Making:  Under my care, patient had an episode of an acute headache and her blood pressure at that time was noted to be systolic of 196.  Patient was started on Cardene by the previous team.  I provided patient with IV Reglan and patient was reevaluated with resolution of headache.  Patient's blood pressure lowered significantly with repeat systolics at 135 so patient was weaned off of Cardene and converted to her long-acting medications.  Review of patient's workup is otherwise largely unremarkable.  Engaged with the medicine team with plan for admission for observation and cardiac restratification as well as blood pressure control.  Patient admitted in stable condition for further management.    ED Course:  ED Course as of 10/11/24 0143   u Oct 10, 2024   [id number] ATTENDING ATTESTATION  Patient with medical history including hypertension presenting to the emergency department with 2 complaints  Patient was in the shower 2 days ago recently had a new shower installed, the water became suddenly hot and she sustained what visually appears to be a very mild first-degree or possibly early second-degree burn to her lower lateral back without evidence of blistering, no denudation of the skin no other injury, no evidence of zoster eruption and the timing seems to be related to the shower we will apply topical medication and assess response to therapy.  Patient endorses some dizziness, negative posterior circulation testing at the bedside, no headache or  vision disturbance but was found to have critically high blood pressure states compliance with her antihypertensive medications at home no chest pain or urinary symptoms.  EKG with new T wave inversions in the far lateral leads compared with EKG from a couple of months ago, no chest pain.  Concern for accelerated hypertension precipitating the patient's symptoms no concern for primary posterior circulation insult, we will obtain CT of the head, electrolytes blood counts, troponin.  I anticipate the patient's disposition would be admission given the concern for accelerated hypertension, we have goal reduction in mean arterial pressure by 25%.  Patient does state that her daughter is currently sick in [place] and she would not like to stay in the hospital, but we will have a more detailed conversation with her once we fully elucidated any potential lab and imaging abnormality.    EKG reviewed independently by me and agree with interpretation above.    Marvin De Leon DO  [facility]   [RH]      ED Course User Index  [RH] Marvin De Leon DO         Diagnoses as of 10/11/24 0143   Primary hypertension   Acute non intractable tension-type headache       Disposition   As a result of their workup, the patient will require admission to the hospital.  The patient was informed of her diagnosis.  The patient was given the opportunity to ask questions and I answered them. The patient agreed to be admitted to the hospital.    Procedures   Procedures    Patient seen and discussed with ED attending physician.    Loretta Quiroga DO  Emergency Medicine

## 2024-10-11 NOTE — H&P
History Of Present Illness  Kirstin Howe is a 83 y.o. 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 Pt presented to ED for further evaluation of dizziness, headache, burn or back, and dizziness. Pt reports that she was taking a shower at home when the water in her shower got too hot and caused a burn on her back. Pt reports that her hot water tank was exchanged ten days ago. Pt was seen at  Urgent care for treatment of thermal burn on 10/6/24 and prescribed Silver Sulfadiazine cream along with Tylenol to help with pain. Pt was instructed by provider at urgent care to present to ED for further evaluation for any new or worsening symptoms. Upon arrival to ED pt had BP of 208/115 and pt was placed on Cardene gtt which was discontinued after BP improved. Labs obtained on admit notable for hypoglycemia, hyperchloremia, and impaired renal function. No acute findings seen on imaging obtained while in the ED.  PT/OT consulted. Decision made to admit pt to medicine service for further treatment and management of hypertensive urgency, dizziness, and thermal burn.      Past Medical History  Past Medical History:   Diagnosis Date   • Age-related nuclear cataract, right eye 03/31/2016    Age-related nuclear cataract of right eye   • Anxiety disorder, unspecified 03/31/2014    Anxiety   • Bunion of unspecified foot 11/26/2014    Bunion   • Concussion 04/08/2021    Last Assessment & Plan: Formatting of this note might be different from the original. -concern for possible concussion on admission, but head/neck/throat pain more likely due to infectious etiology as noted above   • Contusion of left forearm, initial encounter 08/16/2016    Contusion of left forearm   • Contusion of left knee, initial encounter 08/09/2016    Contusion of knee, left   • Cortical age-related cataract, right eye 03/31/2016     Cortical age-related cataract of right eye   • Depression    • Dermatochalasis of unspecified eye, unspecified eyelid 11/10/2017    Dermatochalasis   • Dislocation of right shoulder joint    • Essential (primary) hypertension 11/23/2022    Hypertension   • Fracture of orbital floor, right side, initial encounter for open fracture (Multi) 06/27/2017    Open fracture of right orbital floor, initial encounter   • Generalized anxiety disorder 11/26/2014    PIA (generalized anxiety disorder)   • 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   • Herpes zoster 03/29/2023   • History of cataract 06/11/2024   • History of depression 06/11/2024   • Hypertensive urgency 03/25/2016    Hypertensive urgency   • Insomnia, unspecified 11/26/2014    Insomnia   • Maxillary fracture, unspecified side, initial encounter for closed fracture (Multi) 06/21/2017    Fracture of maxillary sinus, closed, initial encounter   • Primary osteoarthritis, 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    Primary osteoarthritis of left wrist   • Status post left knee replacement 09/02/2022       Surgical History  Past Surgical History:   Procedure Laterality Date   • CATARACT EXTRACTION  06/07/2017    Cataract Surgery   • MR HEAD ANGIO WO IV CONTRAST  4/9/2021    MR HEAD ANGIO WO IV CONTRAST 4/9/2021   • TOTAL ABDOMINAL HYSTERECTOMY W/ BILATERAL SALPINGOOPHORECTOMY          Social History  She reports that she has quit smoking. Her smoking use included cigarettes. She has never used smokeless tobacco. She reports that she does not currently use alcohol. No history on file for drug use.    Family History  Family History   Problem Relation Name Age of Onset   • Dementia Mother     • Breast cancer Mother  82   • Hypertension Mother     • Stomach cancer Father     • Pancreatic cancer Brother     • Breast cancer Daughter  58        Allergies  Iodine    Review of Systems   Constitutional:  Negative for chills and fever.   HENT:  Negative for congestion.   "  Respiratory:  Negative for cough and shortness of breath.    Cardiovascular:  Negative for chest pain.   Gastrointestinal:  Negative for abdominal pain, diarrhea, nausea and vomiting.   Genitourinary:  Negative for difficulty urinating.   Musculoskeletal:  Positive for back pain.   Skin:  Positive for wound.   Neurological:  Positive for dizziness and headaches. Negative for weakness.   Psychiatric/Behavioral:  Positive for confusion.       Physical Exam  Vitals reviewed.   Constitutional:       General: She is not in acute distress.  HENT:      Head: Normocephalic.      Nose: Nose normal.      Mouth/Throat:      Mouth: Mucous membranes are dry.   Eyes:      Extraocular Movements: Extraocular movements intact.   Cardiovascular:      Rate and Rhythm: Normal rate.      Pulses: Normal pulses.      Heart sounds: Normal heart sounds.   Pulmonary:      Effort: Pulmonary effort is normal. No respiratory distress.      Breath sounds: Normal breath sounds.   Abdominal:      General: Bowel sounds are normal.      Palpations: Abdomen is soft.   Musculoskeletal:         General: Normal range of motion.      Cervical back: Normal range of motion.   Skin:     General: Skin is warm.      Comments: Burn on back   Neurological:      Mental Status: She is alert and oriented to person, place, and time.      Comments: forgetful      Last Recorded Vitals  Blood pressure 135/72, pulse 80, temperature 36.4 °C (97.6 °F), temperature source Temporal, resp. rate 15, height 1.626 m (5' 4\"), weight 62.6 kg (138 lb), SpO2 97%.    Relevant Results  ECG 12 lead    Result Date: 10/10/2024  Normal sinus rhythm Nonspecific ST abnormality Abnormal ECG When compared with ECG of 01-JUN-2024 16:17, Nonspecific T wave abnormality, improved in Inferior leads See ED provider note for full interpretation and clinical correlation Confirmed by Millicent Figueredo ([id number]) on 10/10/2024 11:53:17 PM    CT abdomen pelvis wo IV contrast    Result Date: " 10/10/2024  STUDY: CT Abdomen and Pelvis without IV Contrast; 10/10/2024 7:56 PM. INDICATION: Left lower quadrant pain. COMPARISON: None. ACCESSION NUMBER(S): [id number] ORDERING CLINICIAN: MK SUE TECHNIQUE: CT of the abdomen and pelvis was performed.  Contiguous axial images were obtained at 3 mm slice thickness through the abdomen and pelvis. Coronal and sagittal reconstructions at 3 mm slice thickness were performed. No intravenous contrast was administered.  Automated mA/kV exposure control was utilized and patient examination was performed in strict accordance with principles of ALARA. FINDINGS: Please note that the evaluation of vessels, lymph nodes and organs is limited without intravenous contrast.  LOWER CHEST: No cardiomegaly.  Coronary calcification present.  No pericardial effusion.  Lung bases are clear.  Calcified granulomas demonstrated in the left upper lobe.  ABDOMEN:  LIVER: No hepatomegaly.  Smooth surface contour.  Normal attenuation.  BILE DUCTS: No intrahepatic or extrahepatic biliary ductal dilatation.  GALLBLADDER: The gallbladder is normal. STOMACH: No abnormalities identified.  PANCREAS: No masses or ductal dilatation.  SPLEEN: No splenomegaly or focal splenic lesion.  ADRENAL GLANDS: No thickening or nodules.  KIDNEYS AND URETERS: There is moderate atrophy of the right kidney.  No renal or ureteral calculi.  PELVIS:  BLADDER: No abnormalities identified.  REPRODUCTIVE ORGANS: Status post hysterectomy  BOWEL: No acute abnormalities identified.  There is diverticulosis of the sigmoid with no evidence of inflammation.  VESSELS: No abnormalities identified.  Abdominal aorta is normal in caliber.  PERITONEUM/RETROPERITONEUM/LYMPH NODES: No free fluid.  No pneumoperitoneum. No lymphadenopathy.  ABDOMINAL WALL: No abnormalities identified. SOFT TISSUES: No abnormalities identified.  BONES: No acute fracture or aggressive osseous lesion.    No focal acute pathology demonstrated in the  abdomen and pelvis. Signed by Marvin Mitchell MD    CT head wo IV contrast    Result Date: 10/10/2024  Interpreted By:  Melissa Valladares, STUDY: CT HEAD WO IV CONTRAST  10/10/2024 7:56 pm   INDICATION: Signs/Symptoms:Lightheaded, feeling weak   COMPARISON: CT head 06/03/2017   ACCESSION NUMBER(S): [id number]   ORDERING CLINICIAN: MARVIN SUE   TECHNIQUE: Serial, axial CT images of the brain were obtained without IV contrast. Coronal and sagittal reformatted images were performed.   FINDINGS: The ventricles, cisterns and sulci are prominent, consistent with diffuse volume loss.  There are areas of nonspecific white matter hypodensity, which are probably age-related or microvascular in nature. The gray-white matter differentiation is intact and there is no evidence of acute territorial infarct.  No mass effect or midline shift is seen.  There is no hemorrhage.  No extraaxial fluid collection. No air-fluid levels at the visualized paranasal sinuses. The mastoid air cells are clear. No depressed calvarial fracture.       1.  No acute intracranial hemorrhage or acute territorial infarct. 2.  Diffuse parenchymal volume loss. Chronic microvascular ischemic changes.     MACRO: None.   Signed by: Melissa Valladares 10/10/2024 8:11 PM Dictation workstation:   [id number]    XR chest 2 views    Result Date: 10/10/2024  STUDY: Chest Radiographs;  10/10/2024 5:30 PM INDICATION: Dyspnea. COMPARISON: None available. ACCESSION NUMBER(S): [id number] ORDERING CLINICIAN: MARVIN SUE TECHNIQUE:  Frontal and lateral chest. FINDINGS: CARDIOMEDIASTINAL SILHOUETTE: Cardiomediastinal silhouette is normal in size and configuration.  LUNGS: There are calcified granulomas in the lingula. There is no focal consolidation. There is no pleural effusion.  ABDOMEN: No remarkable upper abdominal findings.  BONES: No acute osseous changes.    No acute cardiopulmonary findings. Signed by Arnold Espino MD      Results for orders placed or  performed during the hospital encounter of 10/10/24 (from the past 24 hour(s))   Comprehensive metabolic panel   Result Value Ref Range    Glucose 69 (L) 74 - 99 mg/dL    Sodium 142 136 - 145 mmol/L    Potassium 4.4 3.5 - 5.3 mmol/L    Chloride 108 (H) 98 - 107 mmol/L    Bicarbonate 24 21 - 32 mmol/L    Anion Gap 14 10 - 20 mmol/L    Urea Nitrogen 16 6 - 23 mg/dL    Creatinine 1.72 (H) 0.50 - 1.05 mg/dL    eGFR 29 (L) >60 mL/min/1.73m*2    Calcium 9.3 8.6 - 10.6 mg/dL    Albumin 4.1 3.4 - 5.0 g/dL    Alkaline Phosphatase 97 33 - 136 U/L    Total Protein 6.4 6.4 - 8.2 g/dL    AST 21 9 - 39 U/L    Bilirubin, Total 0.3 0.0 - 1.2 mg/dL    ALT 9 7 - 45 U/L   CBC and Auto Differential   Result Value Ref Range    WBC 6.6 4.4 - 11.3 x10*3/uL    nRBC 0.0 0.0 - 0.0 /100 WBCs    RBC 4.96 4.00 - 5.20 x10*6/uL    Hemoglobin 13.0 12.0 - 16.0 g/dL    Hematocrit 44.5 36.0 - 46.0 %    MCV 90 80 - 100 fL    MCH 26.2 26.0 - 34.0 pg    MCHC 29.2 (L) 32.0 - 36.0 g/dL    RDW 14.9 (H) 11.5 - 14.5 %    Platelets 297 150 - 450 x10*3/uL    Neutrophils % 57.2 40.0 - 80.0 %    Immature Granulocytes %, Automated 0.2 0.0 - 0.9 %    Lymphocytes % 30.5 13.0 - 44.0 %    Monocytes % 8.4 2.0 - 10.0 %    Eosinophils % 3.1 0.0 - 6.0 %    Basophils % 0.6 0.0 - 2.0 %    Neutrophils Absolute 3.75 1.60 - 5.50 x10*3/uL    Immature Granulocytes Absolute, Automated 0.01 0.00 - 0.50 x10*3/uL    Lymphocytes Absolute 2.00 0.80 - 3.00 x10*3/uL    Monocytes Absolute 0.55 0.05 - 0.80 x10*3/uL    Eosinophils Absolute 0.20 0.00 - 0.40 x10*3/uL    Basophils Absolute 0.04 0.00 - 0.10 x10*3/uL   Troponin I, High Sensitivity   Result Value Ref Range    Troponin I, High Sensitivity (CMC) 16 0 - 34 ng/L   Magnesium   Result Value Ref Range    Magnesium 2.19 1.60 - 2.40 mg/dL   ECG 12 lead   Result Value Ref Range    Ventricular Rate 72 BPM    Atrial Rate 72 BPM    AL Interval 160 ms    QRS Duration 82 ms    QT Interval 412 ms    QTC Calculation(Bazett) 451 ms    P Axis 35  degrees    R Axis 13 degrees    T Axis 13 degrees    QRS Count 12 beats    Q Onset 219 ms    P Onset 139 ms    P Offset 196 ms    T Offset 425 ms    QTC Fredericia 438 ms   Urinalysis with Reflex Culture and Microscopic   Result Value Ref Range    Color, Urine Yellow Straw, Yellow    Appearance, Urine Clear Clear    Specific Gravity, Urine 1.015 1.005 - 1.035    pH, Urine 6.0 5.0, 5.5, 6.0, 6.5, 7.0, 7.5, 8.0    Protein, Urine NEGATIVE NEGATIVE, 10 (TRACE) mg/dL    Glucose, Urine 1000 (4+) (A) NEGATIVE mg/dL    Blood, Urine NEGATIVE NEGATIVE    Ketones, Urine 10 (1+) (A) NEGATIVE mg/dL    Bilirubin, Urine NEGATIVE NEGATIVE    Urobilinogen, Urine NORM NORM mg/dL    Nitrite, Urine NEGATIVE NEGATIVE    Leukocyte Esterase, Urine 75 Nichol/µL (A) NEGATIVE   Microscopic Only, Urine   Result Value Ref Range    WBC, Urine 1-5 1-5, NONE /HPF    RBC, Urine 1-2 NONE, 1-2, 3-5 /HPF    Mucus, Urine FEW Reference range not established. /LPF   POCT GLUCOSE   Result Value Ref Range    POCT Glucose 87 74 - 99 mg/dL   POCT GLUCOSE   Result Value Ref Range    POCT Glucose 83 74 - 99 mg/dL      ED Medication Administration from 10/10/2024 1605 to 10/11/2024 0146         Date/Time Order Dose Route Action Action by     10/10/2024 1701 EDT dicyclomine (Bentyl) capsule 20 mg 20 mg oral Given Fraga, S     10/10/2024 1701 EDT hydrALAZINE (Apresoline) injection 5 mg 5 mg intravenous Given Fraga, S     10/10/2024 1702 EDT acetaminophen (Tylenol) tablet 975 mg 975 mg oral Given Fraga, S     10/10/2024 1702 EDT ondansetron ODT (Zofran-ODT) disintegrating tablet 4 mg 4 mg oral Given Fraga, S     10/10/2024 1712 EDT hydrALAZINE (Apresoline) injection 10 mg 10 mg intravenous Not Given Fraga, S     10/10/2024 2102 EDT amLODIPine (Norvasc) tablet 5 mg 5 mg oral Given JUVENCIO Harmon     10/10/2024 2102 EDT lisinopril tablet 40 mg 40 mg oral Given JUVENCIO Harmon     10/10/2024 2102 EDT silver sulfADIAZINE (Silvadene) 1 % cream -- Topical Given JUVENCIO Harmon     10/10/2024  2231 EDT hydrALAZINE (Apresoline) injection 5 mg 5 mg [facility]     10/10/2024 2335 EDT hydrALAZINE (Apresoline) injection 5 mg 5 mg intravenous [facility]     10/10/2024 2344 EDT niCARdipine (Cardene) 40 mg in sodium chloride 200 mL (0.2 mg/mL) infusion (premix) 5 mg/hr intravenous [place], N     10/10/2024 2354 EDT niCARdipine (Cardene) 40 mg in sodium chloride 200 mL (0.2 mg/mL) infusion (premix) 2.5 mg/hr intravenous Rate/Dose Change [place], N     10/11/2024 0036 EDT niCARdipine (Cardene) 40 mg in sodium chloride 200 mL (0.2 mg/mL) infusion (premix) 0 mg/hr intravenous Stopped [place], N           Scheduled medications  amLODIPine, 5 mg, oral, Daily  buPROPion XL, 300 mg, oral, Daily  dapagliflozin propanediol, 10 mg, oral, Daily  donepezil, 5 mg, oral, Nightly  heparin (porcine), 5,000 Units, subcutaneous, q8h  lisinopril, 40 mg, oral, Daily  metoclopramide, 10 mg, intravenous, Once  sennosides-docusate sodium, 2 tablet, oral, BID  silver sulfADIAZINE, , Topical, Daily  silver sulfADIAZINE, , Topical, Daily      Continuous medications     PRN medications  PRN medications: acetaminophen **OR** acetaminophen, melatonin       Assessment/Plan     #hypertensive urgency (resolved)  - Blood pressure on arrival to ED: 208/115; last /72  - s/p Cardene gtt, Norvasc 5 mg PO x1 dose, Hydralazine 5 mg IV push x3 doses, and Lisinopril 40 mg PO x1 dose  - Most likely elevated due to pain and noncompliance  - Home medications include: Lisinopril 40 mg PO every day and Norvasc 5 mg PO every day  - resume home dose of Norvasc and Lisinopril PO every day  - Hydralazine 10 mg PO TID PRN SBP>170 or DBP>100  - continue to monitor BP  - Goal to decrease the mean arterial pressure by ~10-20% in 1st hour then gradually over next 23 hours until final pressure is reduced by approximately 25% compared with baseline.     #dizziness  - orthostatic BP/P qshift x2 to r/o orthostatic hypotension  - PT/OT  consulted    #thermal burn on back  - continue Silver sulfadiazine cream daily  - tylenol 650 mg PO q6h PRN pain  - wound nurse consulted    #CKD stage IV (stable)  - Cr 1.72 GFR 29 on admit, Cr 1.89 GFR 26 on 6/1  - continue to monitor renal function   - RFP ordered for AM  - continue Farxiga 10 mg PO QD    #cognitive impairment  - last seen by neurology for evaluation on 9/26 and started on Aricept 5 mg PO every day  - per documentation from neuro: MoCA has decreased gradually over the years (26/30 in 2018, 21/30 in 2020, and 18/30 in 2024). Plan to repeat MRI brain noncontrast. Return to clinic in 4 months.  - continue Aricept 5 mg PO every day    #anxiety  - continue home dose of Wellbutrin  mg PO every day    Dispo: admit to RNF. Plan per above.           I spent 60 minutes in the professional and overall care of this patient.      Renee Urbina, APRN-CNP

## 2024-10-11 NOTE — CONSULTS
Kirstin Howe  83 year old female  : 1940  MRN: [id number]     Reason for consult: Patient had a burn to her back while showering from a malfunctioning hot water tank . This occurred a week or two ago.     Wound history:   Pt presents with a c/o burns to her back from her shower, this happened a few days ago, pt was seen at , was treated and released, pt states she is still uncomfortable, unable to reach PCP.          Wound assessment:  Patient  on bed in ED. Awake, alert and oriented. States she had just purchased a new hot water heater. Since buying it, she has experienced 2 episodes of the water getting scalding hot. She was in the shower when the water became extremely hot. She went to an urgent care and was ordered silver Sulfadiazine.  She has been using  the silver Sulfadiazine since then.  Her back has a very light redness to her mid back. No open skin, no drainage.  Patient states pian is minimal. Wound team applied  the silver Sulfadiazine  to her lower back and left NABILA.   Recommendations:  - Mid, lower back,  Clean with soap and water. Apply Silver Sulfadiazine to red area on mid lower back and leave NABILA    Wound team plan:  Wound team will not continue to follow. Please reconsult if needed.     Suzanne Gerhardt RN, BSN, CWOCN  10/11/24  2:21 PM

## 2024-10-11 NOTE — PROGRESS NOTES
"Pharmacy Medication History Review    Kirstin Howe is a 83 y.o. female admitted for Hypertensive urgency. Pharmacy reviewed the patient's [id number] medications and allergies for accuracy.    Medications ADDED:  None  Medications CHANGED:  None  Medications REMOVED:   None     The list below reflects the updated PTA list.   Prior to Admission Medications   Prescriptions Last Dose Informant   Farxiga 10 mg 10/10/2024 Self   Sig: Take 1 tablet (10 mg) by mouth once daily. TAKE ONE TABLET BY MOUTH ONCE DAILY IN THE MORNING   amLODIPine (Norvasc) 5 mg tablet 10/10/2024 Self   Sig: Take 1 tablet (5 mg) by mouth once daily.   buPROPion XL (Wellbutrin XL) 300 mg 24 hr tablet 10/10/2024 Self   Sig: Take 1 tablet (300 mg) by mouth once daily.   donepezil (Aricept) 5 mg tablet 10/10/2024 Self   Sig: Take 1 tablet (5 mg) by mouth once daily at bedtime.   lisinopril 40 mg tablet 10/10/2024 Self   Sig: Take 1 tablet (40 mg) by mouth once daily.      Facility-Administered Medications Last Administration Doses Remaining   silver sulfADIAZINE (Silvadene) 1 % cream None recorded            The list below reflects the updated allergy list. Please review each documented allergy for additional clarification and justification.  Allergies  Reviewed by Renee Urbina APRN-MIKE on 10/11/2024        Severity Reactions Comments    Iodine Not Specified Unknown             Patient accepts M2B at discharge.     Sources:   Patient interview (moderate historian, daughter present)  [facility]  Care Everywhere  Medication fill history  Neurology Progress Note 9/26/24  Pharmacy Note 9/13/24  Primary Care Progress Note 6/11/24    Additional Comments:  None to note      Natasha Andrade [facility]  Transitions of Care Pharmacist  10/11/24     Secure Chat preferred   If no response call [phone] or [employer] \"Med Rec\"    "

## 2024-10-12 ENCOUNTER — PHARMACY VISIT (OUTPATIENT)
Dept: PHARMACY | Facility: CLINIC | Age: 84
End: 2024-10-12
Payer: MEDICARE

## 2024-10-12 VITALS
SYSTOLIC BLOOD PRESSURE: 171 MMHG | DIASTOLIC BLOOD PRESSURE: 81 MMHG | OXYGEN SATURATION: 97 % | RESPIRATION RATE: 6 BRPM | TEMPERATURE: 97.6 F | BODY MASS INDEX: 23.56 KG/M2 | HEIGHT: 64 IN | HEART RATE: 77 BPM | WEIGHT: 138 LBS

## 2024-10-12 LAB
BACTERIA UR CULT: NORMAL
GLUCOSE BLD MANUAL STRIP-MCNC: 75 MG/DL (ref 74–99)
GLUCOSE BLD MANUAL STRIP-MCNC: 80 MG/DL (ref 74–99)

## 2024-10-12 PROCEDURE — 99239 HOSP IP/OBS DSCHRG MGMT >30: CPT | Performed by: STUDENT IN AN ORGANIZED HEALTH CARE EDUCATION/TRAINING PROGRAM

## 2024-10-12 PROCEDURE — 2500000004 HC RX 250 GENERAL PHARMACY W/ HCPCS (ALT 636 FOR OP/ED): Performed by: NURSE PRACTITIONER

## 2024-10-12 PROCEDURE — 82947 ASSAY GLUCOSE BLOOD QUANT: CPT

## 2024-10-12 PROCEDURE — 2500000001 HC RX 250 WO HCPCS SELF ADMINISTERED DRUGS (ALT 637 FOR MEDICARE OP): Performed by: NURSE PRACTITIONER

## 2024-10-12 PROCEDURE — 96372 THER/PROPH/DIAG INJ SC/IM: CPT | Performed by: NURSE PRACTITIONER

## 2024-10-12 PROCEDURE — 36415 COLL VENOUS BLD VENIPUNCTURE: CPT | Performed by: STUDENT IN AN ORGANIZED HEALTH CARE EDUCATION/TRAINING PROGRAM

## 2024-10-12 PROCEDURE — G0378 HOSPITAL OBSERVATION PER HR: HCPCS

## 2024-10-12 PROCEDURE — RXMED WILLOW AMBULATORY MEDICATION CHARGE

## 2024-10-12 RX ORDER — LISINOPRIL 40 MG/1
40 TABLET ORAL DAILY
Qty: 30 TABLET | Refills: 1 | Status: SHIPPED | OUTPATIENT
Start: 2024-10-12

## 2024-10-12 RX ORDER — AMLODIPINE BESYLATE 5 MG/1
10 TABLET ORAL DAILY
Status: DISCONTINUED | OUTPATIENT
Start: 2024-10-13 | End: 2024-10-12 | Stop reason: HOSPADM

## 2024-10-12 RX ORDER — AMLODIPINE BESYLATE 5 MG/1
5 TABLET ORAL DAILY
Qty: 30 TABLET | Refills: 1 | Status: SHIPPED | OUTPATIENT
Start: 2024-10-12 | End: 2024-10-12

## 2024-10-12 RX ORDER — AMLODIPINE BESYLATE 5 MG/1
10 TABLET ORAL DAILY
Qty: 60 TABLET | Refills: 1 | Status: SHIPPED | OUTPATIENT
Start: 2024-10-12

## 2024-10-12 ASSESSMENT — PAIN - FUNCTIONAL ASSESSMENT: PAIN_FUNCTIONAL_ASSESSMENT: 0-10

## 2024-10-12 ASSESSMENT — PAIN SCALES - GENERAL: PAINLEVEL_OUTOF10: 0 - NO PAIN

## 2024-10-12 NOTE — DISCHARGE INSTRUCTIONS
Dear Ms. Howe,    It was a pleasure to take care of you! You were admitted with dizziness and found to have an extremely high blood pressure. Your blood pressure improved back into a more normal range with restarting your home blood pressure medicine. It is very important that you take your blood pressure medicines (Amlodipine and Lisinopril) every day, and that your family checks on you to make sure that you were able to take your medications okay.    Here are our recommendations for you after you go home:  - You should take Amlodipine 10mg (two tabs instead of one) and Lisinopril 40mg once a day every day  - You should check your blood pressure every day at a time when you are calm and still  - You should follow up with your Primary Care Doctor to continue making adjustments to your blood pressure regimen    You are at a time when it is okay for your family to help you more with supporting your memory and helping you to stay in your home safely! I have provided some recommendations above of how best to support your memory so that you can continue to age gracefully!    Please take care & We wish you all the best,  Your Team at

## 2024-10-12 NOTE — DISCHARGE SUMMARY
Discharge Diagnosis  Hypertensive urgency  Mild dementia    Issues Requiring Follow-Up  [ ] Hypertensive urgency: BP much improved (into 150's-160's/80's) on Amlodipine 5mg daily, Lisinopril 40mg daily. Increased Amlodipine to 10mg daily/Lisinopril 40mg daily for home-going.    [ ] Mild dementia: Referred to home care services (RN, PT, OT, HHA) and recommended that family supervise and closely monitor Ms. Howe's medication regimen    Discharge Meds     Medication List      CHANGE how you take these medications     amLODIPine 5 mg tablet; Commonly known as: Norvasc; Take 2 tablets (10   mg) by mouth once daily.; What changed: how much to take     CONTINUE taking these medications     buPROPion  mg 24 hr tablet; Commonly known as: Wellbutrin XL; Take   1 tablet (300 mg) by mouth once daily.   donepezil 5 mg tablet; Commonly known as: Aricept; Take 1 tablet (5 mg)   by mouth once daily at bedtime.   lisinopril 40 mg tablet; Take 1 tablet (40 mg) by mouth once daily.     STOP taking these medications     Farxiga 10 mg; Generic drug: dapagliflozin propanediol       Test Results Pending At Discharge  Pending Labs       No current pending labs.          Hospital Course   Ms. Howe is an 82 y/o woman w/ PMH of hypertension, CKD 4, anxiety, and new diagnosis of dementia who presented with dizziness and hypertensive urgency, 's/110's on arrival.     BP significantly improved with re-initiation of home regimen (Amlodipine, Lisinopril), Amlodipine dose increased to 10mg daily. Given rapid correction with home medication regimen, suspect that new diagnosis of mild dementia has led to some confusion and missed medication diagnoses at home. Discussed with Ms. Howe and her children (daughters Kalyani and Antonia), who all agree that they strongly prioritize Ms. Howe remaining in her home (lives independently) and will work to support her in her home.     Discussed with ED Care Transition team and pt appropriate for  ED to Home program,  Home Care referral placed with plan for start of care on 10/17.     Pertinent Physical Exam At Time of Discharge  Constitutional:       General: She is not in acute distress. Resting comfortably in hospital bed, in good spirits. Pleasant and conversational on interview.  HENT:      Head: Normocephalic.      Nose: Nose normal.      Mouth/Throat:      Mouth: Mucous membranes are moist.   Eyes:      Extraocular Movements: Extraocular movements intact.   Cardiovascular:      Rate and Rhythm: Normal rate.      Pulses: Normal pulses.      Heart sounds: Normal heart sounds.   Pulmonary:      Effort: Pulmonary effort is normal. No respiratory distress.      Breath sounds: Normal breath sounds.   Abdominal:      General: Bowel sounds are normal.      Palpations: Abdomen is soft.   Musculoskeletal:         General: Normal range of motion.      Cervical back: Normal range of motion.   Neurological:      Mental Status: Engaged and interactive; Mood and affect appropriate for situation and context. Moving all extremities symmetrically. Sensation intact throughout.    Outpatient Follow-Up  Future Appointments   Date Time Provider Department Center   10/28/2024  2:00 PM Carolina Gómez MD [id number] Academic   11/8/2024  3:30 PM Teodoro Mo PharmD [id number] Academic   3/7/2025  1:00 PM Miguel Giles MD [id number] Academic       Mikaela Mejía MD

## 2024-10-12 NOTE — PROGRESS NOTES
"Kirstin Howe is a 83 y.o. female on day 0 of admission presenting with Hypertensive urgency.     10/12/24 1448   Discharge Planning   Living Arrangements Alone   Support Systems Children;Friends/neighbors   Assistance Needed Home Care / ED to Home Program   Type of Residence Private residence  (Owns home)   Number of Stairs to Enter Residence 9  (Cement stairs, to enter front door)   Number of Stairs Within Residence 24  (3 sets of stairs inside; 4, 10, 10)   Do you have animals or pets at home? Yes  (2)   Type of Animals or Pets Cats - \"Sonja and Tammy\"   Who is requesting discharge planning? Provider   Home or Post Acute Services In home services  ( [facility], [facility] -  PCP: Dr. West)   Type of Home Care Services Home nursing visits;Home PT;Home OT  (+ ED to Home Program)   Expected Discharge Disposition Home   Does the patient need discharge transport arranged? Yes  (If family does not want to transport)   RoundTrip coordination needed? Yes   Has discharge transport been arranged? No   What day is the transport expected? 10/12/24     MATT met with daughterAntonia, and son, Oneil at bedside with patient. SW discussed ED to Home program, and home care details. SW printed handouts and provided for both; emailed them to Kalyani (daughter) as well.  SW corrected/added demographic information in patient's chart.  SW discuss transportation with family, if family would prefer, SW can schedule transport. Patient is medically ready at this time, ready to go home.    Home Care will contact patient within 3 days for start of care in-home.   [id number] [facility] [id number]     "

## 2024-10-13 ENCOUNTER — DOCUMENTATION (OUTPATIENT)
Dept: HOME HEALTH SERVICES | Facility: HOME HEALTH | Age: 84
End: 2024-10-13
Payer: MEDICARE

## 2024-10-13 ENCOUNTER — HOME HEALTH ADMISSION (OUTPATIENT)
Dept: HOME HEALTH SERVICES | Facility: HOME HEALTH | Age: 84
End: 2024-10-13
Payer: MEDICARE

## 2024-10-13 NOTE — HH CARE COORDINATION
Home Care received a Referral for Nursing, Physical Therapy, Occupational Therapy, and Home Health Aide. We have processed the referral for a Start of Care on 24-96 HOURS OK WITH REFERRING PROVIDER .     If you have any questions or concerns, please feel free to contact us at [phone]. Follow the prompts, enter your five digit zip code, and you will be directed to your care team on CENTL 3.

## 2024-10-14 ENCOUNTER — PATIENT OUTREACH (OUTPATIENT)
Dept: PRIMARY CARE | Facility: CLINIC | Age: 84
End: 2024-10-14
Payer: MEDICARE

## 2024-10-14 NOTE — PROGRESS NOTES
Discharge Facility: [facility]  Discharge Diagnosis: Hypertensive urgency, Mild dementia  Admission Date: 10/10/2024  Discharge Date: 10/12/2024    PCP Appointment Date: Message sent to office to schedule  Specialist Appointment Date: 10/28/2024 2:00 Dr. Carolina Gómez, Nephrology,  11/8/2024 3:30 PM Virtual Pharmacy  Hospital Encounter and Summary Linked: Yes  See discharge assessment below for further details    Medications  Medications reviewed with patient/caregiver?: Yes (Patient) (10/15/2024  9:41 AM)  Is the patient having any side effects they believe may be caused by any medication additions or changes?: No (10/15/2024  9:41 AM)  Does the patient have all medications ordered at discharge?: Yes (10/15/2024  9:41 AM)  Care Management Interventions: No intervention needed (10/15/2024  9:41 AM)  Prescription Comments: CHANGED: amlodipine increased to 10 mg daily (10/15/2024  9:41 AM)  Is the patient taking all medications as directed (includes completed medication regime)?: Yes (10/15/2024  9:41 AM)  Care Management Interventions: Provided patient education (10/15/2024  9:41 AM)  Medication Comments: Verbalized understanding of medication changes. (10/15/2024  9:41 AM)    Appointments  Does the patient have a primary care provider?: Yes (10/15/2024  9:41 AM)  Care Management Interventions: -- (Message to office to schedule appt.) (10/15/2024  9:41 AM)  Has the patient kept scheduled appointments due by today?: Not applicable (10/15/2024  9:41 AM)    Self Management  What is the home health agency?: [facility] (10/15/2024  9:41 AM)  Has home health visited the patient within 72 hours of discharge?: Call prior to 72 hours (Advised patient [facility] trying to reach her to schedule. Provided contact number for agency and advised the patient to call to schedule.) (10/15/2024  9:41 AM)  What Durable Medical Equipment (DME) was ordered?: N/A (10/15/2024  9:41 AM)    Patient Teaching  Does the patient have  access to their discharge instructions?: Yes (10/15/2024  9:41 AM)  Care Management Interventions: Reviewed instructions with patient (10/15/2024  9:41 AM)  What is the patient's perception of their health status since discharge?: Worsening (Stated does not feel well. Stated burn on back is reddned only, but painful. Denied fever. Has a new BP machine, but unable to get it to work at this time. No error code available. Stated she will ask her son to come by later to assist with taking BP.) (10/15/2024  9:41 AM)  Is the patient/caregiver able to teach back the hierarchy of who to call/visit for symptoms/problems? PCP, Specialist, Home Health nurse, Urgent Care, ED, 911: Yes (10/15/2024  9:41 AM)  Patient/Caregiver Education Comments: Patient verbalized understanding of discharge instructions. Provided contact information for nonurgent questions or concerns. (10/15/2024  9:41 AM)    Wrap Up  Wrap Up Additional Comments: 83yoF with PMHx of hypertension, CKD 4, anxiety, and new diagnosis of dementia who presented with dizziness and hypertensive urgency, 's/110's on arrival. BP significantly improved with re-initiation of home regimen (Amlodipine, Lisinopril), Amlodipine dose increased to 10mg daily. Suspect new dx of dementia may have contributed to missed dosages of home meds. Patient also recently treated in urgent care for a burn on her back she received while showering after her hot water tank was changed. Patient was treated with silvadene cream and tylenol for burn. Patient discharged to home with family support and [facility] to follow. Amlodipine increased to 10 mg daily. (10/15/2024  9:41 AM)

## 2024-10-17 ENCOUNTER — HOME CARE VISIT (OUTPATIENT)
Dept: HOME HEALTH SERVICES | Facility: HOME HEALTH | Age: 84
End: 2024-10-17

## 2024-10-21 ENCOUNTER — APPOINTMENT (OUTPATIENT)
Dept: PRIMARY CARE | Facility: CLINIC | Age: 84
End: 2024-10-21
Payer: MEDICARE

## 2024-10-21 VITALS — DIASTOLIC BLOOD PRESSURE: 84 MMHG | SYSTOLIC BLOOD PRESSURE: 156 MMHG

## 2024-10-21 DIAGNOSIS — G31.84 AMNESTIC MCI (MILD COGNITIVE IMPAIRMENT WITH MEMORY LOSS): ICD-10-CM

## 2024-10-21 DIAGNOSIS — I10 PRIMARY HYPERTENSION: Primary | ICD-10-CM

## 2024-10-21 PROCEDURE — 1157F ADVNC CARE PLAN IN RCRD: CPT | Performed by: INTERNAL MEDICINE

## 2024-10-21 PROCEDURE — 3077F SYST BP >= 140 MM HG: CPT | Performed by: INTERNAL MEDICINE

## 2024-10-21 PROCEDURE — 3079F DIAST BP 80-89 MM HG: CPT | Performed by: INTERNAL MEDICINE

## 2024-10-21 PROCEDURE — 99213 OFFICE O/P EST LOW 20 MIN: CPT | Performed by: INTERNAL MEDICINE

## 2024-10-23 ENCOUNTER — PATIENT OUTREACH (OUTPATIENT)
Dept: PRIMARY CARE | Facility: CLINIC | Age: 84
End: 2024-10-23
Payer: MEDICARE

## 2024-10-23 ENCOUNTER — HOME CARE VISIT (OUTPATIENT)
Dept: HOME HEALTH SERVICES | Facility: HOME HEALTH | Age: 84
End: 2024-10-23
Payer: MEDICARE

## 2024-10-23 VITALS
BODY MASS INDEX: 23.56 KG/M2 | TEMPERATURE: 98.4 F | SYSTOLIC BLOOD PRESSURE: 147 MMHG | DIASTOLIC BLOOD PRESSURE: 89 MMHG | RESPIRATION RATE: 18 BRPM | HEART RATE: 86 BPM | WEIGHT: 138 LBS | HEIGHT: 64 IN | OXYGEN SATURATION: 99 %

## 2024-10-23 PROCEDURE — G0299 HHS/HOSPICE OF RN EA 15 MIN: HCPCS | Mod: HHH

## 2024-10-23 PROCEDURE — 169592 NO-PAY CLAIM PROCEDURE

## 2024-10-23 ASSESSMENT — ENCOUNTER SYMPTOMS
CHANGE IN APPETITE: DECREASED
APPETITE LEVEL: FAIR
DENIES PAIN: 1

## 2024-10-23 ASSESSMENT — ACTIVITIES OF DAILY LIVING (ADL)
ENTERING_EXITING_HOME: MODERATE ASSIST
OASIS_M1830: 04

## 2024-10-23 NOTE — PROGRESS NOTES
Unable to reach patient for call back after recent hospitalization. LVM with call back number for patient to call if needed.

## 2024-10-24 ENCOUNTER — HOME CARE VISIT (OUTPATIENT)
Dept: HOME HEALTH SERVICES | Facility: HOME HEALTH | Age: 84
End: 2024-10-24
Payer: MEDICARE

## 2024-10-24 VITALS
RESPIRATION RATE: 18 BRPM | SYSTOLIC BLOOD PRESSURE: 159 MMHG | TEMPERATURE: 97.2 F | HEART RATE: 72 BPM | DIASTOLIC BLOOD PRESSURE: 100 MMHG | OXYGEN SATURATION: 99 %

## 2024-10-24 PROCEDURE — G0151 HHCP-SERV OF PT,EA 15 MIN: HCPCS | Mod: HHH

## 2024-10-24 SDOH — HEALTH STABILITY: PHYSICAL HEALTH: PHYSICAL EXERCISE: 10

## 2024-10-24 SDOH — HEALTH STABILITY: PHYSICAL HEALTH: EXERCISE ACTIVITY: BALANCE TRAINING

## 2024-10-24 SDOH — HEALTH STABILITY: PHYSICAL HEALTH: EXERCISE TYPE: SKILLED THERAPEUTIC EXERCISES

## 2024-10-24 SDOH — HEALTH STABILITY: PHYSICAL HEALTH: EXERCISE ACTIVITY: GAIT TRAINING

## 2024-10-24 SDOH — HEALTH STABILITY: PHYSICAL HEALTH: EXERCISE ACTIVITY: SKILLED THERAPEUTIC EXERCISES

## 2024-10-24 SDOH — HEALTH STABILITY: PHYSICAL HEALTH: RESISTANCE: AS TOLERATED

## 2024-10-24 SDOH — HEALTH STABILITY: PHYSICAL HEALTH: EXERCISE ACTIVITIES SETS: 2

## 2024-10-24 SDOH — HEALTH STABILITY: PHYSICAL HEALTH: PHYSICAL EXERCISE: SITTING, STANDING

## 2024-10-24 ASSESSMENT — GAIT ASSESSMENTS
STEP CONTINUITY: 1 - STEPS APPEAR CONTINUOUS
WALKING STANCE: 0 - HEELS APART
PATH: 2 - STRAIGHT WITHOUT WALKING AID
STEP SYMMETRY: 1 - RIGHT AND LEFT STEP LENGTH APPEAR EQUAL
PATH SCORE: 2
TRUNK SCORE: 1
TRUNK: 1 - NO SWAY BUT FLEXION OF KNEES OR BACK OR SPREADS ARMS WHILE WALKING
BALANCE AND GAIT SCORE: 18
GAIT SCORE: 10
INITIATION OF GAIT IMMEDIATELY AFTER GO: 1 - NO HESITANCY

## 2024-10-24 ASSESSMENT — BALANCE ASSESSMENTS
BALANCE SCORE: 8
TURNING 360 DEGREES STEPS: 0 - DISCONTINUOUS STEPS
SITTING DOWN: 1 - USES ARMS OR NOT SMOOTH MOTION
NUDGED: 0 - BEGINS TO FALL
ATTEMPTS TO ARISE: 2 - ABLE TO RISE, ONE ATTEMPT
NUDGED SCORE: 0
ARISES: 1 - ABLE, USES ARMS TO HELP
SITTING BALANCE: 1 - STEADY, SAFE
STANDING BALANCE: 1 - STEADY BUT WIDE STANCE AND USES CANE OR OTHER SUPPORT
IMMEDIATE STANDING BALANCE FIRST 5 SECONDS: 2 - STEADY WITHOUT WALKER OR OTHER SUPPORT
ARISING SCORE: 1
EYES CLOSED AT MAXIMUM POSITION NUDGED: 0 - UNSTEADY

## 2024-10-24 ASSESSMENT — ACTIVITIES OF DAILY LIVING (ADL)
CURRENT_FUNCTION: MODERATE ASSIST
PHYSICAL TRANSFERS ASSESSED: 1
AMBULATION ASSISTANCE ON FLAT SURFACES: 1
AMBULATION_DISTANCE/DURATION_TOLERATED: 50 FT
AMBULATION ASSISTANCE: 1
AMBULATION ASSISTANCE: STAND BY ASSIST
PHYSICAL_TRANSFERS_DEVICES: BODY SUPPORT

## 2024-10-24 ASSESSMENT — ENCOUNTER SYMPTOMS
PERSON REPORTING PAIN: PATIENT
ARTHRALGIAS: 1
MUSCLE WEAKNESS: 1
DENIES PAIN: 1

## 2024-10-24 NOTE — Clinical Note
Patient seen for home health PT evaluation today. Plan to progress with 1 x 1, 2 x 2, 1 x 1 week of therapy.

## 2024-10-25 NOTE — HOME HEALTH
GOALS: PATIENT WILL DEMONSTRATE IMPROVED STRENGTH, ENDURANCE, AND MOBILITY.          SUBJECTIVE: THE PATIENT REPORTS NO PAIN, BUT DIFFICULTY WITH AMBULATION.          LIVING CONDITION: PATIENT IS CURRENTLY LIVING IN A ONE-STORY BUILDING WITH STAIRS. PATIENT IS CURRENTLY LIVING ALONE.          OBJECTIVE: MANUAL MUSCLE TESTING WAS PERFORMED TO DETERMINE BOTH CORE AND LE STRENGTH. PATIENT DEMONSTRATED 2-/5 MUSCLE STRENGTH TO CORE AND ELIE LE'S. BALANCE TESTING WAS PERFORMED WHICH SHOWED PATIENT TO BE A HIGH FALL RISK. PATIENT WAS ABLE TO WALK 50 FEET WITH NO ASSISTIVE DEVICE, BUT DEMONSTRATED DIFFICULTY WITH TRANSFERS FROM STS AND GAIT ABNORMALITIES ALONG WITH BALANCE IMPAIRMENT.          THERAPEUTIC ACTIVITIES: MMT, TINETTI, MOBILITY, AND GAIT ASSESSMENT COMPLETED.          ASSESSMENT: DEMONSTRATED GROSSLY REDUCED STRENGTH AND ENDURANCE SECONDARY TO ASSOCIATED COMORBID CONDITIONS. PATIENT, PRESENTLY, IS A FALL RISK, CURRENTLY AMBULATING WITH NO AD. FALL PRECAUTIONS DISCUSSED.          PLAN: CONSIDER COMORBID FACTORS WHEN IMPLEMENTING POC. CONTINUE WITH GENERAL ENDURANCE AND STRENGTH TRAINING UE'S AND LE'S, GAIT TRAINING ACTIVITIES, BALANCE AND PROPRIOCEPTIVE TRAINING, AND FALL PREVENTION STRATEGIES AS PER PT POC. PROGRESS AS TOLERATED.

## 2024-10-28 ENCOUNTER — APPOINTMENT (OUTPATIENT)
Dept: NEPHROLOGY | Facility: CLINIC | Age: 84
End: 2024-10-28
Payer: MEDICARE

## 2024-10-28 VITALS
HEIGHT: 64 IN | DIASTOLIC BLOOD PRESSURE: 82 MMHG | BODY MASS INDEX: 22.88 KG/M2 | HEART RATE: 81 BPM | SYSTOLIC BLOOD PRESSURE: 121 MMHG | WEIGHT: 134 LBS

## 2024-10-28 DIAGNOSIS — N18.4 CHRONIC RENAL DISEASE, STAGE IV (MULTI): Primary | ICD-10-CM

## 2024-10-28 PROCEDURE — 3074F SYST BP LT 130 MM HG: CPT | Performed by: INTERNAL MEDICINE

## 2024-10-28 PROCEDURE — 1126F AMNT PAIN NOTED NONE PRSNT: CPT | Performed by: INTERNAL MEDICINE

## 2024-10-28 PROCEDURE — 1160F RVW MEDS BY RX/DR IN RCRD: CPT | Performed by: INTERNAL MEDICINE

## 2024-10-28 PROCEDURE — 1036F TOBACCO NON-USER: CPT | Performed by: INTERNAL MEDICINE

## 2024-10-28 PROCEDURE — 1157F ADVNC CARE PLAN IN RCRD: CPT | Performed by: INTERNAL MEDICINE

## 2024-10-28 PROCEDURE — 1159F MED LIST DOCD IN RCRD: CPT | Performed by: INTERNAL MEDICINE

## 2024-10-28 PROCEDURE — 3079F DIAST BP 80-89 MM HG: CPT | Performed by: INTERNAL MEDICINE

## 2024-10-28 PROCEDURE — 99213 OFFICE O/P EST LOW 20 MIN: CPT | Performed by: INTERNAL MEDICINE

## 2024-10-28 RX ORDER — NEBULIZER AND COMPRESSOR
1 EACH MISCELLANEOUS ONCE
Qty: 1 EACH | Refills: 0 | Status: SHIPPED | OUTPATIENT
Start: 2024-10-28 | End: 2024-10-28

## 2024-10-28 ASSESSMENT — PAIN SCALES - GENERAL: PAINLEVEL_OUTOF10: 0-NO PAIN

## 2024-10-29 ENCOUNTER — HOME CARE VISIT (OUTPATIENT)
Dept: HOME HEALTH SERVICES | Facility: HOME HEALTH | Age: 84
End: 2024-10-29
Payer: MEDICARE

## 2024-10-29 PROCEDURE — G0152 HHCP-SERV OF OT,EA 15 MIN: HCPCS | Mod: HHH

## 2024-10-30 ENCOUNTER — HOME CARE VISIT (OUTPATIENT)
Dept: HOME HEALTH SERVICES | Facility: HOME HEALTH | Age: 84
End: 2024-10-30
Payer: MEDICARE

## 2024-10-30 VITALS
DIASTOLIC BLOOD PRESSURE: 72 MMHG | RESPIRATION RATE: 18 BRPM | HEART RATE: 76 BPM | TEMPERATURE: 98.1 F | OXYGEN SATURATION: 98 % | SYSTOLIC BLOOD PRESSURE: 120 MMHG

## 2024-10-30 VITALS — DIASTOLIC BLOOD PRESSURE: 72 MMHG | RESPIRATION RATE: 18 BRPM | SYSTOLIC BLOOD PRESSURE: 120 MMHG | HEART RATE: 91 BPM

## 2024-10-30 VITALS — DIASTOLIC BLOOD PRESSURE: 95 MMHG | SYSTOLIC BLOOD PRESSURE: 135 MMHG

## 2024-10-30 PROCEDURE — G0299 HHS/HOSPICE OF RN EA 15 MIN: HCPCS | Mod: HHH

## 2024-10-30 PROCEDURE — G0151 HHCP-SERV OF PT,EA 15 MIN: HCPCS | Mod: HHH

## 2024-10-30 SDOH — ECONOMIC STABILITY: HOUSING INSECURITY
HOME SAFETY: PATIENT HAS FLIGHT OF STAIRS LEADING UP TO FRONT DOOR, FLIGHT OF STAIRS UPON ENTERING HOME TO REACH MAIN FLOOR, FLIGHT OF STAIRS DOWN TO BASEMENT AND FLIGHT OF STAIRS TO REACH SECOND FLOOR.

## 2024-10-30 ASSESSMENT — ACTIVITIES OF DAILY LIVING (ADL)
PREPARING MEALS: NEEDS ASSISTANCE
CURRENT_FUNCTION: INDEPENDENT
TOILETING: 1
DRESSING_LB_CURRENT_FUNCTION: SUPERVISION
BATHING ASSESSED: 1
TOILETING: INDEPENDENT
PHYSICAL TRANSFERS ASSESSED: 1
BATHING_CURRENT_FUNCTION: SUPERVISION

## 2024-10-30 ASSESSMENT — ENCOUNTER SYMPTOMS
PERSON REPORTING PAIN: PATIENT
DENIES PAIN: 1

## 2024-10-31 SDOH — HEALTH STABILITY: PHYSICAL HEALTH: EXERCISE ACTIVITIES SETS: 2

## 2024-10-31 SDOH — HEALTH STABILITY: PHYSICAL HEALTH: PHYSICAL EXERCISE: 14

## 2024-10-31 SDOH — HEALTH STABILITY: PHYSICAL HEALTH: EXERCISE ACTIVITY: STAIR TRAINING

## 2024-10-31 SDOH — HEALTH STABILITY: PHYSICAL HEALTH: RESISTANCE: AS TOLERATED

## 2024-10-31 SDOH — HEALTH STABILITY: PHYSICAL HEALTH: EXERCISE ACTIVITY: GAIT TRAINING

## 2024-10-31 SDOH — HEALTH STABILITY: PHYSICAL HEALTH: EXERCISE TYPE: SKILLED THERAPEUTIC EXERCISES

## 2024-10-31 SDOH — HEALTH STABILITY: PHYSICAL HEALTH

## 2024-10-31 SDOH — HEALTH STABILITY: PHYSICAL HEALTH: PHYSICAL EXERCISE: SITTING, STANDINGQ

## 2024-10-31 SDOH — HEALTH STABILITY: PHYSICAL HEALTH: EXERCISE ACTIVITY: SKILLED THERAPEUTIC EXERCISES

## 2024-10-31 SDOH — HEALTH STABILITY: PHYSICAL HEALTH: PHYSICAL EXERCISE: 10

## 2024-10-31 ASSESSMENT — ACTIVITIES OF DAILY LIVING (ADL)
AMBULATION ASSISTANCE ON FLAT SURFACES: 1
AMBULATION_DISTANCE/DURATION_TOLERATED: 50 FT

## 2024-10-31 ASSESSMENT — ENCOUNTER SYMPTOMS
PERSON REPORTING PAIN: PATIENT
DENIES PAIN: 1

## 2024-11-01 ENCOUNTER — HOME CARE VISIT (OUTPATIENT)
Dept: HOME HEALTH SERVICES | Facility: HOME HEALTH | Age: 84
End: 2024-11-01
Payer: MEDICARE

## 2024-11-01 VITALS
HEART RATE: 79 BPM | RESPIRATION RATE: 18 BRPM | SYSTOLIC BLOOD PRESSURE: 139 MMHG | OXYGEN SATURATION: 100 % | DIASTOLIC BLOOD PRESSURE: 77 MMHG | TEMPERATURE: 98.7 F

## 2024-11-01 VITALS
RESPIRATION RATE: 18 BRPM | SYSTOLIC BLOOD PRESSURE: 139 MMHG | OXYGEN SATURATION: 100 % | DIASTOLIC BLOOD PRESSURE: 77 MMHG | HEART RATE: 79 BPM

## 2024-11-01 PROCEDURE — G0299 HHS/HOSPICE OF RN EA 15 MIN: HCPCS | Mod: HHH

## 2024-11-01 PROCEDURE — G0151 HHCP-SERV OF PT,EA 15 MIN: HCPCS | Mod: HHH

## 2024-11-01 SDOH — HEALTH STABILITY: PHYSICAL HEALTH

## 2024-11-01 SDOH — HEALTH STABILITY: PHYSICAL HEALTH: EXERCISE TYPE: SKILLED THERAPEUTIC EXERCISES

## 2024-11-01 SDOH — HEALTH STABILITY: PHYSICAL HEALTH: EXERCISE ACTIVITIES SETS: 2

## 2024-11-01 SDOH — HEALTH STABILITY: PHYSICAL HEALTH: PHYSICAL EXERCISE: 20

## 2024-11-01 SDOH — HEALTH STABILITY: PHYSICAL HEALTH: EXERCISE ACTIVITY: GAIT TRAINING

## 2024-11-01 SDOH — HEALTH STABILITY: PHYSICAL HEALTH: EXERCISE ACTIVITY: STAIR TRAINING

## 2024-11-01 SDOH — HEALTH STABILITY: PHYSICAL HEALTH: PHYSICAL EXERCISE: SITTING, STANDING

## 2024-11-01 SDOH — HEALTH STABILITY: PHYSICAL HEALTH: RESISTANCE: AS TOLERATED

## 2024-11-01 SDOH — HEALTH STABILITY: PHYSICAL HEALTH: PHYSICAL EXERCISE: 10

## 2024-11-01 SDOH — HEALTH STABILITY: PHYSICAL HEALTH: EXERCISE ACTIVITY: SKILLED THERAPEUTIC EXERCISES

## 2024-11-01 ASSESSMENT — ENCOUNTER SYMPTOMS
DENIES PAIN: 1
PERSON REPORTING PAIN: PATIENT

## 2024-11-01 ASSESSMENT — ACTIVITIES OF DAILY LIVING (ADL)
AMBULATION_DISTANCE/DURATION_TOLERATED: 50 FT
AMBULATION ASSISTANCE ON FLAT SURFACES: 1

## 2024-11-02 ASSESSMENT — ENCOUNTER SYMPTOMS
APPETITE LEVEL: GOOD
MUSCLE WEAKNESS: 1
CHANGE IN APPETITE: UNCHANGED
DENIES PAIN: 1
CHANGE IN APPETITE: UNCHANGED
DENIES PAIN: 1
APPETITE LEVEL: GOOD

## 2024-11-04 ENCOUNTER — HOME CARE VISIT (OUTPATIENT)
Dept: HOME HEALTH SERVICES | Facility: HOME HEALTH | Age: 84
End: 2024-11-04
Payer: MEDICARE

## 2024-11-04 VITALS — HEART RATE: 83 BPM | SYSTOLIC BLOOD PRESSURE: 133 MMHG | DIASTOLIC BLOOD PRESSURE: 80 MMHG

## 2024-11-04 PROCEDURE — G0152 HHCP-SERV OF OT,EA 15 MIN: HCPCS | Mod: HHH

## 2024-11-04 SDOH — ECONOMIC STABILITY: HOUSING INSECURITY: HOME SAFETY: MULTIPLE SETS OF STAIRS IN HOME

## 2024-11-04 ASSESSMENT — ENCOUNTER SYMPTOMS
PERSON REPORTING PAIN: PATIENT
DENIES PAIN: 1

## 2024-11-06 ENCOUNTER — HOME CARE VISIT (OUTPATIENT)
Dept: HOME HEALTH SERVICES | Facility: HOME HEALTH | Age: 84
End: 2024-11-06
Payer: MEDICARE

## 2024-11-06 PROCEDURE — G0151 HHCP-SERV OF PT,EA 15 MIN: HCPCS | Mod: HHH

## 2024-11-06 SDOH — HEALTH STABILITY: PHYSICAL HEALTH

## 2024-11-06 SDOH — HEALTH STABILITY: PHYSICAL HEALTH: EXERCISE TYPE: SKILLED THERAPEUTIC EXERCISES

## 2024-11-06 SDOH — HEALTH STABILITY: PHYSICAL HEALTH: EXERCISE ACTIVITY: GAIT TRAINING

## 2024-11-06 SDOH — HEALTH STABILITY: PHYSICAL HEALTH: RESISTANCE: AS TOLERATED

## 2024-11-06 SDOH — HEALTH STABILITY: PHYSICAL HEALTH: PHYSICAL EXERCISE: SITTING, STANDING

## 2024-11-06 SDOH — HEALTH STABILITY: PHYSICAL HEALTH: EXERCISE ACTIVITIES SETS: 2

## 2024-11-06 SDOH — HEALTH STABILITY: PHYSICAL HEALTH: PHYSICAL EXERCISE: 10

## 2024-11-06 SDOH — HEALTH STABILITY: PHYSICAL HEALTH: PHYSICAL EXERCISE: 14

## 2024-11-06 SDOH — HEALTH STABILITY: PHYSICAL HEALTH: EXERCISE ACTIVITY: SKILLED THERAPEUTIC EXERCISES

## 2024-11-06 SDOH — HEALTH STABILITY: PHYSICAL HEALTH: EXERCISE ACTIVITY: STAIR TRAINING

## 2024-11-06 ASSESSMENT — ENCOUNTER SYMPTOMS
DENIES PAIN: 1
PERSON REPORTING PAIN: PATIENT

## 2024-11-06 ASSESSMENT — ACTIVITIES OF DAILY LIVING (ADL)
AMBULATION ASSISTANCE ON FLAT SURFACES: 1
AMBULATION_DISTANCE/DURATION_TOLERATED: 60 FT

## 2024-11-07 ENCOUNTER — HOME CARE VISIT (OUTPATIENT)
Dept: HOME HEALTH SERVICES | Facility: HOME HEALTH | Age: 84
End: 2024-11-07
Payer: MEDICARE

## 2024-11-07 PROCEDURE — G0321 HH/HSPC MSW PHONE VISIT: HCPCS | Mod: HHH

## 2024-11-07 PROCEDURE — G0152 HHCP-SERV OF OT,EA 15 MIN: HCPCS | Mod: HHH

## 2024-11-08 ENCOUNTER — APPOINTMENT (OUTPATIENT)
Dept: PHARMACY | Facility: HOSPITAL | Age: 84
End: 2024-11-08
Payer: MEDICARE

## 2024-11-08 ENCOUNTER — HOME CARE VISIT (OUTPATIENT)
Dept: HOME HEALTH SERVICES | Facility: HOME HEALTH | Age: 84
End: 2024-11-08
Payer: MEDICARE

## 2024-11-08 VITALS — DIASTOLIC BLOOD PRESSURE: 66 MMHG | SYSTOLIC BLOOD PRESSURE: 118 MMHG

## 2024-11-08 VITALS
TEMPERATURE: 97.6 F | RESPIRATION RATE: 18 BRPM | OXYGEN SATURATION: 98 % | DIASTOLIC BLOOD PRESSURE: 86 MMHG | HEART RATE: 74 BPM | SYSTOLIC BLOOD PRESSURE: 120 MMHG

## 2024-11-08 PROCEDURE — G0157 HHC PT ASSISTANT EA 15: HCPCS | Mod: CQ,HHH

## 2024-11-08 PROCEDURE — G0300 HHS/HOSPICE OF LPN EA 15 MIN: HCPCS | Mod: HHH

## 2024-11-08 SDOH — HEALTH STABILITY: PHYSICAL HEALTH: EXERCISE COMMENTS: PATIENT DEMONSTRATED INDEPENDENCE IN HEP. PATIENT HAS NO QUESTIONS AT THIS TIME.

## 2024-11-08 SDOH — ECONOMIC STABILITY: HOUSING INSECURITY: HOME SAFETY: PATIENT HAS MULTIPLE FLIGHTS OF STEPS IN TOWNHOUSE

## 2024-11-08 ASSESSMENT — ACTIVITIES OF DAILY LIVING (ADL)
AMBULATION ASSISTANCE ON FLAT SURFACES: 1
AMBULATION_DISTANCE/DURATION_TOLERATED: 100 FEET X 2

## 2024-11-08 ASSESSMENT — PAIN SCALES - PAIN ASSESSMENT IN ADVANCED DEMENTIA (PAINAD)
NEGVOCALIZATION: 0 - NONE.
TOTALSCORE: 0
BREATHING: 0
CONSOLABILITY: 0 - NO NEED TO CONSOLE.
BODYLANGUAGE: 0
NEGVOCALIZATION: 0
FACIALEXPRESSION: 0 - SMILING OR INEXPRESSIVE.
BODYLANGUAGE: 0 - RELAXED.
FACIALEXPRESSION: 0
CONSOLABILITY: 0

## 2024-11-08 ASSESSMENT — ENCOUNTER SYMPTOMS
PERSON REPORTING PAIN: PATIENT
DENIES PAIN: 1
DENIES PAIN: 1
APPETITE LEVEL: GOOD
LAST BOWEL MOVEMENT: 67151
PERSON REPORTING PAIN: PATIENT
DENIES PAIN: 1

## 2024-11-12 DIAGNOSIS — I10 PRIMARY HYPERTENSION: ICD-10-CM

## 2024-11-12 DIAGNOSIS — N18.4 CHRONIC RENAL DISEASE, STAGE IV (MULTI): ICD-10-CM

## 2024-11-12 DIAGNOSIS — N18.30 STAGE 3 CHRONIC KIDNEY DISEASE, UNSPECIFIED WHETHER STAGE 3A OR 3B CKD (MULTI): Primary | ICD-10-CM

## 2024-11-13 ENCOUNTER — PATIENT OUTREACH (OUTPATIENT)
Dept: PRIMARY CARE | Facility: CLINIC | Age: 84
End: 2024-11-13
Payer: MEDICARE

## 2024-11-14 ENCOUNTER — HOME CARE VISIT (OUTPATIENT)
Dept: HOME HEALTH SERVICES | Facility: HOME HEALTH | Age: 84
End: 2024-11-14
Payer: MEDICARE

## 2024-11-14 ENCOUNTER — APPOINTMENT (OUTPATIENT)
Dept: HOME HEALTH SERVICES | Facility: HOME HEALTH | Age: 84
End: 2024-11-14
Payer: MEDICARE

## 2024-11-14 PROCEDURE — G0158 HHC OT ASSISTANT EA 15: HCPCS | Mod: CO,HHH

## 2024-11-14 PROCEDURE — G0151 HHCP-SERV OF PT,EA 15 MIN: HCPCS | Mod: HHH

## 2024-11-15 ENCOUNTER — HOME CARE VISIT (OUTPATIENT)
Dept: HOME HEALTH SERVICES | Facility: HOME HEALTH | Age: 84
End: 2024-11-15
Payer: MEDICARE

## 2024-11-15 VITALS
OXYGEN SATURATION: 98 % | TEMPERATURE: 97.7 F | DIASTOLIC BLOOD PRESSURE: 72 MMHG | HEART RATE: 72 BPM | RESPIRATION RATE: 18 BRPM | SYSTOLIC BLOOD PRESSURE: 108 MMHG

## 2024-11-15 VITALS
RESPIRATION RATE: 17 BRPM | HEART RATE: 76 BPM | SYSTOLIC BLOOD PRESSURE: 149 MMHG | DIASTOLIC BLOOD PRESSURE: 86 MMHG | OXYGEN SATURATION: 99 % | TEMPERATURE: 97.6 F

## 2024-11-15 PROCEDURE — G0300 HHS/HOSPICE OF LPN EA 15 MIN: HCPCS | Mod: HHH

## 2024-11-15 SDOH — HEALTH STABILITY: PHYSICAL HEALTH: EXERCISE TYPE: HEP

## 2024-11-15 ASSESSMENT — BALANCE ASSESSMENTS
STANDING BALANCE: 2 - NARROW STANCE WITHOUT SUPPORT
NUDGED SCORE: 1
EYES CLOSED AT MAXIMUM POSITION NUDGED: 0 - UNSTEADY
SITTING BALANCE: 1 - STEADY, SAFE
BALANCE SCORE: 14
ATTEMPTS TO ARISE: 2 - ABLE TO RISE, ONE ATTEMPT
TURNING 360 DEGREES STEPS: 1 - CONTINUOUS STEPS
ARISES: 2 - ABLE WITHOUT USING ARMS
IMMEDIATE STANDING BALANCE FIRST 5 SECONDS: 2 - STEADY WITHOUT WALKER OR OTHER SUPPORT
ARISING SCORE: 2
SITTING DOWN: 2 - SAFE, SMOOTH MOTION
NUDGED: 1 - STAGGERS, GRABS, CATCHES SELF

## 2024-11-15 ASSESSMENT — ENCOUNTER SYMPTOMS
LIMITED RANGE OF MOTION: 1
ARTHRALGIAS: 1
LAST BOWEL MOVEMENT: 67158
DENIES PAIN: 1
PERSON REPORTING PAIN: PATIENT
CHANGE IN APPETITE: UNCHANGED
DESCRIPTION OF MEMORY LOSS: SHORT TERM
MUSCLE WEAKNESS: 1
DENIES PAIN: 1
PERSON REPORTING PAIN: PATIENT
APPETITE LEVEL: GOOD

## 2024-11-15 ASSESSMENT — PAIN SCALES - PAIN ASSESSMENT IN ADVANCED DEMENTIA (PAINAD)
CONSOLABILITY: 0 - NO NEED TO CONSOLE.
CONSOLABILITY: 0
TOTALSCORE: 0
BODYLANGUAGE: 0 - RELAXED.
FACIALEXPRESSION: 0 - SMILING OR INEXPRESSIVE.
NEGVOCALIZATION: 0
NEGVOCALIZATION: 0 - NONE.
BODYLANGUAGE: 0
BREATHING: 0
FACIALEXPRESSION: 0

## 2024-11-15 ASSESSMENT — GAIT ASSESSMENTS
BALANCE AND GAIT SCORE: 26
PATH SCORE: 2
TRUNK SCORE: 2
TRUNK: 2 - NO SWAY, NO FLEXION, NO USE OF ARMS, NO WALKING AID
STEP CONTINUITY: 1 - STEPS APPEAR CONTINUOUS
GAIT SCORE: 12
INITIATION OF GAIT IMMEDIATELY AFTER GO: 1 - NO HESITANCY
PATH: 2 - STRAIGHT WITHOUT WALKING AID
STEP SYMMETRY: 1 - RIGHT AND LEFT STEP LENGTH APPEAR EQUAL
WALKING STANCE: 1 - HEELS ALMOST TOUCHING WHILE WALKING

## 2024-11-15 ASSESSMENT — ACTIVITIES OF DAILY LIVING (ADL)
AMBULATION_DISTANCE/DURATION_TOLERATED: 60 FT
AMBULATION ASSISTANCE: STAND BY ASSIST
AMBULATION ASSISTANCE ON FLAT SURFACES: 1
CURRENT_FUNCTION: STAND BY ASSIST

## 2024-11-18 ENCOUNTER — APPOINTMENT (OUTPATIENT)
Dept: PRIMARY CARE | Facility: CLINIC | Age: 84
End: 2024-11-18
Payer: MEDICARE

## 2024-11-18 VITALS
WEIGHT: 134 LBS | HEART RATE: 88 BPM | SYSTOLIC BLOOD PRESSURE: 155 MMHG | BODY MASS INDEX: 23 KG/M2 | DIASTOLIC BLOOD PRESSURE: 92 MMHG

## 2024-11-18 DIAGNOSIS — I10 PRIMARY HYPERTENSION: Primary | ICD-10-CM

## 2024-11-18 DIAGNOSIS — G31.84 AMNESTIC MCI (MILD COGNITIVE IMPAIRMENT WITH MEMORY LOSS): ICD-10-CM

## 2024-11-18 DIAGNOSIS — N18.4 CHRONIC RENAL DISEASE, STAGE IV (MULTI): ICD-10-CM

## 2024-11-18 PROBLEM — N18.30 STAGE III CHRONIC KIDNEY DISEASE (MULTI): Status: RESOLVED | Noted: 2023-03-29 | Resolved: 2024-11-18

## 2024-11-18 PROCEDURE — 1160F RVW MEDS BY RX/DR IN RCRD: CPT | Performed by: INTERNAL MEDICINE

## 2024-11-18 PROCEDURE — 1159F MED LIST DOCD IN RCRD: CPT | Performed by: INTERNAL MEDICINE

## 2024-11-18 PROCEDURE — 99214 OFFICE O/P EST MOD 30 MIN: CPT | Performed by: INTERNAL MEDICINE

## 2024-11-18 PROCEDURE — 1036F TOBACCO NON-USER: CPT | Performed by: INTERNAL MEDICINE

## 2024-11-18 PROCEDURE — 3077F SYST BP >= 140 MM HG: CPT | Performed by: INTERNAL MEDICINE

## 2024-11-18 PROCEDURE — 1157F ADVNC CARE PLAN IN RCRD: CPT | Performed by: INTERNAL MEDICINE

## 2024-11-18 PROCEDURE — 3080F DIAST BP >= 90 MM HG: CPT | Performed by: INTERNAL MEDICINE

## 2024-11-18 ASSESSMENT — ENCOUNTER SYMPTOMS
CONFUSION: 1
PALPITATIONS: 0
ACTIVITY CHANGE: 0
DIZZINESS: 0
HEADACHES: 0
NERVOUS/ANXIOUS: 0
HYPERACTIVE: 0
FATIGUE: 0

## 2024-11-18 NOTE — PROGRESS NOTES
Subjective   Patient ID: Kirstin Howe is a 83 y.o. female who presents for comprehensive follow up.    Ms. Howe comes in today for apparently comprehensive follow-up.  Her history is quite tangential.  She was actually seen about 1 month ago after a hospitalization for follow-up.  She does not know her medications that she is currently taking but she does recall that her Farxiga was discontinued.  Blood pressure at her nephrology appointment recently was excellent, it is slightly elevated here, but again unclear how consistently she is taking her medications.  She did see neurology a few months ago for cognitive decline was started on donepezil.  Again, unclear how regularly she is taking this.  Her dispense report does not show that she has been taking any of these medicines consistently.  She did have home health come to the house, she states that they have discharged her at this stage as they felt all was okay.  She does have family living in town.  Unfortunately she is still driving, although she comes with a neighbor today which is reassuring.  Again she does not have a list of her medications.  It would be very helpful for her family to assist her with medication management possibly using a pillbox for more consistency.  She follows with nephrology and neurology regularly.        Review of Systems   Constitutional:  Negative for activity change and fatigue.   Cardiovascular:  Negative for chest pain and palpitations.   Neurological:  Negative for dizziness and headaches.   Psychiatric/Behavioral:  Positive for confusion. The patient is not nervous/anxious and is not hyperactive.        Objective   Physical Exam  Cardiovascular:      Rate and Rhythm: Normal rate and regular rhythm.      Pulses: Normal pulses.      Heart sounds: Normal heart sounds. No murmur heard.     No gallop.   Pulmonary:      Effort: Pulmonary effort is normal. No respiratory distress.   Neurological:      Mental Status: She is alert.    Psychiatric:         Mood and Affect: Mood is not anxious or depressed.         Speech: Speech is tangential. Speech is not rapid and pressured.         Behavior: Behavior is not agitated, slowed, aggressive or withdrawn. Behavior is cooperative.         Thought Content: Thought content is not paranoid.         Cognition and Memory: She exhibits impaired recent memory.         Assessment/Plan        1.  Hypertension: Elevated today but this was running quite well at recent nephrology appointment.  I suspect that blood pressure variation is more related to inconsistency of taking medications and I have again stressed the importance of keeping a list of medicines and being consistent with this ideally with family help and support.  She does have home health assisting as well, unclear if they have finished or not.  2.  Chronic kidney disease stage IV: Following with nephrology.  Farxiga apparently discontinued recently.  Orders in place for repeat labs through nephrology.  3.  Cognitive decline: Again, donepezil ordered through neurology.  Unclear whether she has continued to take this.  Encourage family involvement.  Discouraged driving, neighbor with her today.  Encouraged continued follow-up with home health and neurology.    Challenging situation.  Will again be seeing her back in about 2 months for her wellness visit.  She is to call with any questions.    Hansa West MD 11/18/24 9:43 AM

## 2024-11-18 NOTE — PATIENT INSTRUCTIONS
I encourage you to be consistent with taking your medications and I would asked that your family, home health aide, or neighbors assist you with setting up your medications for the week just to ensure that these are being taken regularly.  Please keep follow-up with your kidney specialist as well as your neurologist.  Please reach out if you have any questions.  Otherwise, we will see you back at the start of the year for your wellness visit.

## 2024-11-19 ENCOUNTER — HOME CARE VISIT (OUTPATIENT)
Dept: HOME HEALTH SERVICES | Facility: HOME HEALTH | Age: 84
End: 2024-11-19
Payer: MEDICARE

## 2024-11-19 VITALS
OXYGEN SATURATION: 99 % | SYSTOLIC BLOOD PRESSURE: 133 MMHG | TEMPERATURE: 98.7 F | DIASTOLIC BLOOD PRESSURE: 74 MMHG | HEART RATE: 80 BPM | RESPIRATION RATE: 18 BRPM

## 2024-11-19 VITALS — HEART RATE: 86 BPM | DIASTOLIC BLOOD PRESSURE: 69 MMHG | SYSTOLIC BLOOD PRESSURE: 122 MMHG

## 2024-11-19 PROCEDURE — G0152 HHCP-SERV OF OT,EA 15 MIN: HCPCS | Mod: HHH

## 2024-11-19 PROCEDURE — G0299 HHS/HOSPICE OF RN EA 15 MIN: HCPCS | Mod: HHH

## 2024-11-19 SDOH — ECONOMIC STABILITY: HOUSING INSECURITY: HOME SAFETY: MULTIPLE FLIGHTS OF STEPS

## 2024-11-19 ASSESSMENT — ENCOUNTER SYMPTOMS
DENIES PAIN: 1
PERSON REPORTING PAIN: PATIENT

## 2024-11-20 PROCEDURE — RXMED WILLOW AMBULATORY MEDICATION CHARGE

## 2024-11-21 ENCOUNTER — PHARMACY VISIT (OUTPATIENT)
Dept: PHARMACY | Facility: CLINIC | Age: 84
End: 2024-11-21
Payer: MEDICARE

## 2024-11-22 ENCOUNTER — APPOINTMENT (OUTPATIENT)
Dept: PHARMACY | Facility: HOSPITAL | Age: 84
End: 2024-11-22
Payer: MEDICARE

## 2024-11-24 ENCOUNTER — HOSPITAL ENCOUNTER (OUTPATIENT)
Dept: RADIOLOGY | Facility: CLINIC | Age: 84
Discharge: HOME | End: 2024-11-24
Payer: MEDICARE

## 2024-11-24 ENCOUNTER — OFFICE VISIT (OUTPATIENT)
Dept: URGENT CARE | Age: 84
End: 2024-11-24
Payer: MEDICARE

## 2024-11-24 VITALS
WEIGHT: 135 LBS | SYSTOLIC BLOOD PRESSURE: 144 MMHG | HEART RATE: 75 BPM | DIASTOLIC BLOOD PRESSURE: 83 MMHG | TEMPERATURE: 97.6 F | OXYGEN SATURATION: 97 % | BODY MASS INDEX: 23.17 KG/M2

## 2024-11-24 DIAGNOSIS — S99.929A INJURY OF FOOT, UNSPECIFIED LATERALITY, INITIAL ENCOUNTER: ICD-10-CM

## 2024-11-24 DIAGNOSIS — S99.929A INJURY OF FOOT, UNSPECIFIED LATERALITY, INITIAL ENCOUNTER: Primary | ICD-10-CM

## 2024-11-24 PROCEDURE — 73630 X-RAY EXAM OF FOOT: CPT | Mod: 50

## 2024-11-24 PROCEDURE — 73630 X-RAY EXAM OF FOOT: CPT | Mod: BILATERAL PROCEDURE | Performed by: RADIOLOGY

## 2024-11-24 ASSESSMENT — ENCOUNTER SYMPTOMS
CARDIOVASCULAR NEGATIVE: 1
CONSTITUTIONAL NEGATIVE: 1
ALLERGIC/IMMUNOLOGIC NEGATIVE: 1
ENDOCRINE NEGATIVE: 1
WEAKNESS: 0
COLOR CHANGE: 0
EYES NEGATIVE: 1
HEMATOLOGIC/LYMPHATIC NEGATIVE: 1
RESPIRATORY NEGATIVE: 1
GASTROINTESTINAL NEGATIVE: 1
NUMBNESS: 0
WOUND: 0
DENIES PAIN: 1
ARTHRALGIAS: 1
PSYCHIATRIC NEGATIVE: 1

## 2024-11-24 ASSESSMENT — ACTIVITIES OF DAILY LIVING (ADL)
HOME_HEALTH_OASIS: 00
OASIS_M1830: 00

## 2024-11-24 NOTE — PROGRESS NOTES
Subjective   Patient ID: Kirstin Howe is a 83 y.o. female. They present today with a chief complaint of Injury (Day 3- Right and  Left Injury Pt stated that she slipped and fell, ).    History of Present Illness    History provided by:  Patient and relative   used: No    Injury    This is a 83 yr old female here for bilateral foot pain x 2 days. Pt slipped in her socks while walking and twisted both feet, left > right. No fall to the floor. Swelling of the left foot. Wt bearing, but with pain. No parasthesias or weakness. No skin erythema or open wound.    Past Medical History  Allergies as of 11/24/2024 - Reviewed 11/24/2024   Allergen Reaction Noted    Iodine Unknown 03/29/2023       (Not in a hospital admission)       Past Medical History:   Diagnosis Date    Age-related nuclear cataract, right eye 03/31/2016    Age-related nuclear cataract of right eye    Anxiety disorder, unspecified 03/31/2014    Anxiety    Bunion of unspecified foot 11/26/2014    Bunion    Concussion 04/08/2021    Last Assessment & Plan: Formatting of this note might be different from the original. -concern for possible concussion on admission, but head/neck/throat pain more likely due to infectious etiology as noted above    Contusion of left forearm, initial encounter 08/16/2016    Contusion of left forearm    Contusion of left knee, initial encounter 08/09/2016    Contusion of knee, left    Cortical age-related cataract, right eye 03/31/2016    Cortical age-related cataract of right eye    Depression     Dermatochalasis of unspecified eye, unspecified eyelid 11/10/2017    Dermatochalasis    Dislocation of right shoulder joint     Essential (primary) hypertension 11/23/2022    Hypertension    Fracture of orbital floor, right side, initial encounter for open fracture (Multi) 06/27/2017    Open fracture of right orbital floor, initial encounter    Generalized anxiety disorder 11/26/2014    PIA (generalized anxiety  disorder)    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    Herpes zoster 03/29/2023    History of cataract 06/11/2024    History of depression 06/11/2024    Hypertensive urgency 03/25/2016    Hypertensive urgency    Insomnia, unspecified 11/26/2014    Insomnia    Maxillary fracture, unspecified side, initial encounter for closed fracture (Multi) 06/21/2017    Fracture of maxillary sinus, closed, initial encounter    Primary osteoarthritis, 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 Her smoking use included cigarettes. She has never used smokeless tobacco. She reports that she does not currently use alcohol. She reports that she does not use drugs.    Review of Systems  Review of Systems   Constitutional: Negative.    HENT: Negative.     Eyes: Negative.    Respiratory: Negative.     Cardiovascular: Negative.    Gastrointestinal: Negative.    Endocrine: Negative.    Genitourinary: Negative.    Musculoskeletal:  Positive for arthralgias.   Skin:  Negative for color change and wound.   Allergic/Immunologic: Negative.    Neurological:  Negative for weakness and numbness.   Hematological: Negative.    Psychiatric/Behavioral: Negative.     All other systems reviewed and are negative.      Objective    Vitals:    11/24/24 1218   BP: 144/83   Pulse: 75   Temp: 36.4 °C (97.6 °F)   SpO2: 97%   Weight: 61.2 kg (135 lb)     No LMP recorded. Patient is postmenopausal.    Physical Exam  Vitals and nursing note reviewed.   Constitutional:       Appearance: Normal appearance.   HENT:      Head:  Normocephalic and atraumatic.   Cardiovascular:      Rate and Rhythm: Normal rate and regular rhythm.   Pulmonary:      Effort: Pulmonary effort is normal.      Breath sounds: Normal breath sounds.   Musculoskeletal:      Comments: Bilateral feet-dorsal pain with palpation, soft tissue swelling of left foot, no overlying skin erythema or open wound, distal n-v intact. No ankle pain, achilles tendon intact bilaterally   Skin:     General: Skin is warm and dry.      Findings: No erythema, lesion or rash.   Neurological:      General: No focal deficit present.      Mental Status: She is alert and oriented to person, place, and time.   Psychiatric:         Mood and Affect: Mood normal.         Behavior: Behavior normal.       Procedures    Point of Care Test & Imaging Results from this visit  No results found for this visit on 11/24/24.   XR foot 3+ views bilateral    Result Date: 11/24/2024  Interpreted By:  Schoenberger, Joseph, STUDY: XR FOOT 3+ VIEWS BILATERAL; ;  11/24/2024 2:18 pm   INDICATION: Signs/Symptoms:bilateral feet pain after twisting injury.   ,S99.929A Unspecified injury of unspecified foot, initial encounter   COMPARISON: None.   ACCESSION NUMBER(S): [id number]   ORDERING CLINICIAN: NIDA MALAVE   FINDINGS: Left foot: Bones demineralized. No fracture or dislocation identified. Relatively mild degenerative changes are seen.   Right foot: There is considerable degenerative change in the 1st metatarsophalangeal articulation. The other joints are better maintained. There is flattening of the plantar arch. There is no fracture or dislocation.       No acute findings     MACRO: None   Signed by: Joseph Schoenberger 11/24/2024 2:26 PM Dictation workstation:   [id number]     Diagnostic study results (if any) were reviewed by Nida Malave PA-C.    Assessment/Plan   Allergies, medications, history, and pertinent labs/EKGs/Imaging reviewed by Nida Malave PA-C.     Orders and  Diagnoses  Diagnoses and all orders for this visit:  Injury of foot, unspecified laterality, initial encounter  -     XR foot 3+ views bilateral; Future    Plan:  Xrays done at [facility], no xray here at [facility]  Tylenol as directed for pain and swelling  Ice and elevate feet 2-3 times a day for pain or swelling  Follow up per xray results  ER visit anytime 24/7 for acute worsening or changing condition    Patient disposition: Home    Electronically signed by Nida Green PA-C  5:23 PM

## 2024-12-16 DIAGNOSIS — I16.0 HYPERTENSIVE URGENCY: ICD-10-CM

## 2024-12-19 RX ORDER — LISINOPRIL 40 MG/1
40 TABLET ORAL DAILY
Qty: 30 TABLET | Refills: 0 | Status: SHIPPED | OUTPATIENT
Start: 2024-12-19

## 2024-12-19 RX ORDER — AMLODIPINE BESYLATE 5 MG/1
10 TABLET ORAL DAILY
Qty: 60 TABLET | Refills: 0 | Status: SHIPPED | OUTPATIENT
Start: 2024-12-19

## 2024-12-23 PROCEDURE — RXMED WILLOW AMBULATORY MEDICATION CHARGE

## 2024-12-30 ENCOUNTER — PHARMACY VISIT (OUTPATIENT)
Dept: PHARMACY | Facility: CLINIC | Age: 84
End: 2024-12-30
Payer: MEDICARE

## 2025-01-10 ENCOUNTER — PATIENT OUTREACH (OUTPATIENT)
Dept: PRIMARY CARE | Facility: CLINIC | Age: 85
End: 2025-01-10
Payer: MEDICARE

## 2025-01-10 NOTE — PROGRESS NOTES
Unable to reach patient for wrap up of Transitional Care Management (TCM) program. LVM with patient to return call for questions or concerns. The patient has met the target of no readmission for (90) days post hospital discharge and is graduated from the TCM program at this time.

## 2025-01-27 ENCOUNTER — APPOINTMENT (OUTPATIENT)
Dept: RADIOLOGY | Facility: HOSPITAL | Age: 85
End: 2025-01-27
Payer: MEDICARE

## 2025-01-27 DIAGNOSIS — I16.0 HYPERTENSIVE URGENCY: ICD-10-CM

## 2025-01-27 PROCEDURE — RXMED WILLOW AMBULATORY MEDICATION CHARGE

## 2025-01-27 RX ORDER — LISINOPRIL 40 MG/1
40 TABLET ORAL DAILY
Qty: 30 TABLET | Refills: 0 | Status: SHIPPED | OUTPATIENT
Start: 2025-01-27

## 2025-01-27 RX ORDER — AMLODIPINE BESYLATE 5 MG/1
10 TABLET ORAL DAILY
Qty: 60 TABLET | Refills: 0 | Status: SHIPPED | OUTPATIENT
Start: 2025-01-27

## 2025-01-29 ENCOUNTER — PHARMACY VISIT (OUTPATIENT)
Dept: PHARMACY | Facility: CLINIC | Age: 85
End: 2025-01-29
Payer: MEDICARE

## 2025-01-31 ENCOUNTER — APPOINTMENT (OUTPATIENT)
Dept: PRIMARY CARE | Facility: CLINIC | Age: 85
End: 2025-01-31
Payer: MEDICARE

## 2025-02-06 ENCOUNTER — HOSPITAL ENCOUNTER (OUTPATIENT)
Dept: RADIOLOGY | Facility: HOSPITAL | Age: 85
Discharge: HOME | End: 2025-02-06
Payer: MEDICARE

## 2025-02-06 DIAGNOSIS — G31.84 MILD COGNITIVE IMPAIRMENT: ICD-10-CM

## 2025-02-06 PROCEDURE — 70551 MRI BRAIN STEM W/O DYE: CPT

## 2025-02-16 ENCOUNTER — APPOINTMENT (OUTPATIENT)
Dept: RADIOLOGY | Facility: HOSPITAL | Age: 85
End: 2025-02-16
Payer: MEDICARE

## 2025-02-16 ENCOUNTER — CLINICAL SUPPORT (OUTPATIENT)
Dept: EMERGENCY MEDICINE | Facility: HOSPITAL | Age: 85
End: 2025-02-16
Payer: MEDICARE

## 2025-02-16 ENCOUNTER — HOSPITAL ENCOUNTER (OUTPATIENT)
Facility: HOSPITAL | Age: 85
Setting detail: OBSERVATION
Discharge: NURSING, REHAB OR LONG-TERM CARE | End: 2025-02-20
Attending: EMERGENCY MEDICINE | Admitting: NURSE PRACTITIONER
Payer: MEDICARE

## 2025-02-16 DIAGNOSIS — K59.00 CONSTIPATION, UNSPECIFIED CONSTIPATION TYPE: ICD-10-CM

## 2025-02-16 DIAGNOSIS — E53.8 B12 DEFICIENCY: ICD-10-CM

## 2025-02-16 DIAGNOSIS — G47.00 INSOMNIA, UNSPECIFIED TYPE: ICD-10-CM

## 2025-02-16 DIAGNOSIS — R40.4 TRANSIENT ALTERATION OF AWARENESS: Primary | ICD-10-CM

## 2025-02-16 DIAGNOSIS — R53.1 GENERALIZED WEAKNESS: ICD-10-CM

## 2025-02-16 PROBLEM — R41.82 AMS (ALTERED MENTAL STATUS): Status: ACTIVE | Noted: 2025-02-16

## 2025-02-16 LAB
ALBUMIN SERPL BCP-MCNC: 4.1 G/DL (ref 3.4–5)
ALP SERPL-CCNC: 85 U/L (ref 33–136)
ALT SERPL W P-5'-P-CCNC: 5 U/L (ref 7–45)
ANION GAP BLDV CALCULATED.4IONS-SCNC: 7 MMOL/L (ref 10–25)
ANION GAP SERPL CALC-SCNC: 13 MMOL/L (ref 10–20)
APPEARANCE UR: CLEAR
APTT PPP: 26 SECONDS (ref 27–38)
AST SERPL W P-5'-P-CCNC: 17 U/L (ref 9–39)
ATRIAL RATE: 74 BPM
BASE EXCESS BLDV CALC-SCNC: 2.3 MMOL/L (ref -2–3)
BASOPHILS # BLD AUTO: 0.03 X10*3/UL (ref 0–0.1)
BASOPHILS NFR BLD AUTO: 0.5 %
BILIRUB SERPL-MCNC: 0.4 MG/DL (ref 0–1.2)
BILIRUB UR STRIP.AUTO-MCNC: NEGATIVE MG/DL
BODY TEMPERATURE: 37 DEGREES CELSIUS
BUN SERPL-MCNC: 14 MG/DL (ref 6–23)
CA-I BLDV-SCNC: 1.25 MMOL/L (ref 1.1–1.33)
CALCIUM SERPL-MCNC: 9.5 MG/DL (ref 8.6–10.6)
CARDIAC TROPONIN I PNL SERPL HS: 10 NG/L (ref 0–34)
CARDIAC TROPONIN I PNL SERPL HS: 12 NG/L (ref 0–34)
CHLORIDE BLDV-SCNC: 108 MMOL/L (ref 98–107)
CHLORIDE SERPL-SCNC: 106 MMOL/L (ref 98–107)
CO2 SERPL-SCNC: 26 MMOL/L (ref 21–32)
COLOR UR: YELLOW
CREAT SERPL-MCNC: 1.42 MG/DL (ref 0.5–1.05)
EGFRCR SERPLBLD CKD-EPI 2021: 36 ML/MIN/1.73M*2
EOSINOPHIL # BLD AUTO: 0.02 X10*3/UL (ref 0–0.4)
EOSINOPHIL NFR BLD AUTO: 0.4 %
ERYTHROCYTE [DISTWIDTH] IN BLOOD BY AUTOMATED COUNT: 15.7 % (ref 11.5–14.5)
FLUAV RNA RESP QL NAA+PROBE: NOT DETECTED
FLUBV RNA RESP QL NAA+PROBE: NOT DETECTED
GLUCOSE BLD MANUAL STRIP-MCNC: 93 MG/DL (ref 74–99)
GLUCOSE BLDV-MCNC: 104 MG/DL (ref 74–99)
GLUCOSE SERPL-MCNC: 100 MG/DL (ref 74–99)
GLUCOSE UR STRIP.AUTO-MCNC: NEGATIVE MG/DL
HCO3 BLDV-SCNC: 28.3 MMOL/L (ref 22–26)
HCT VFR BLD AUTO: 36.7 % (ref 36–46)
HCT VFR BLD EST: 37 % (ref 36–46)
HGB BLD-MCNC: 12 G/DL (ref 12–16)
HGB BLDV-MCNC: 12.4 G/DL (ref 12–16)
IMM GRANULOCYTES # BLD AUTO: 0.02 X10*3/UL (ref 0–0.5)
IMM GRANULOCYTES NFR BLD AUTO: 0.4 % (ref 0–0.9)
INR PPP: 1 (ref 0.9–1.1)
KETONES UR STRIP.AUTO-MCNC: NEGATIVE MG/DL
LACTATE BLDV-SCNC: 1.5 MMOL/L (ref 0.4–2)
LEUKOCYTE ESTERASE UR QL STRIP.AUTO: NEGATIVE
LYMPHOCYTES # BLD AUTO: 0.69 X10*3/UL (ref 0.8–3)
LYMPHOCYTES NFR BLD AUTO: 12.6 %
MAGNESIUM SERPL-MCNC: 2 MG/DL (ref 1.6–2.4)
MCH RBC QN AUTO: 26.8 PG (ref 26–34)
MCHC RBC AUTO-ENTMCNC: 32.7 G/DL (ref 32–36)
MCV RBC AUTO: 82 FL (ref 80–100)
MONOCYTES # BLD AUTO: 0.24 X10*3/UL (ref 0.05–0.8)
MONOCYTES NFR BLD AUTO: 4.4 %
NEUTROPHILS # BLD AUTO: 4.49 X10*3/UL (ref 1.6–5.5)
NEUTROPHILS NFR BLD AUTO: 81.7 %
NITRITE UR QL STRIP.AUTO: NEGATIVE
NRBC BLD-RTO: 0 /100 WBCS (ref 0–0)
OXYHGB MFR BLDV: 34.4 % (ref 45–75)
P AXIS: 42 DEGREES
P OFFSET: 199 MS
P ONSET: 142 MS
PCO2 BLDV: 49 MM HG (ref 41–51)
PH BLDV: 7.37 PH (ref 7.33–7.43)
PH UR STRIP.AUTO: 6.5 [PH]
PLATELET # BLD AUTO: 289 X10*3/UL (ref 150–450)
PO2 BLDV: 27 MM HG (ref 35–45)
POTASSIUM BLDV-SCNC: 5.3 MMOL/L (ref 3.5–5.3)
POTASSIUM SERPL-SCNC: 5 MMOL/L (ref 3.5–5.3)
PR INTERVAL: 154 MS
PROT SERPL-MCNC: 6.7 G/DL (ref 6.4–8.2)
PROT UR STRIP.AUTO-MCNC: NORMAL MG/DL
PROTHROMBIN TIME: 11.8 SECONDS (ref 9.8–12.8)
Q ONSET: 219 MS
QRS COUNT: 12 BEATS
QRS DURATION: 84 MS
QT INTERVAL: 416 MS
QTC CALCULATION(BAZETT): 461 MS
QTC FREDERICIA: 446 MS
R AXIS: 37 DEGREES
RBC # BLD AUTO: 4.48 X10*6/UL (ref 4–5.2)
RBC # UR STRIP.AUTO: NEGATIVE MG/DL
RBC #/AREA URNS AUTO: NORMAL /HPF
SAO2 % BLDV: 35 % (ref 45–75)
SARS-COV-2 RNA RESP QL NAA+PROBE: NOT DETECTED
SODIUM BLDV-SCNC: 138 MMOL/L (ref 136–145)
SODIUM SERPL-SCNC: 140 MMOL/L (ref 136–145)
SP GR UR STRIP.AUTO: 1.01
T AXIS: 26 DEGREES
T OFFSET: 427 MS
TSH SERPL-ACNC: 1.99 MIU/L (ref 0.44–3.98)
UROBILINOGEN UR STRIP.AUTO-MCNC: NORMAL MG/DL
VENTRICULAR RATE: 74 BPM
WBC # BLD AUTO: 5.5 X10*3/UL (ref 4.4–11.3)
WBC #/AREA URNS AUTO: NORMAL /HPF

## 2025-02-16 PROCEDURE — 70544 MR ANGIOGRAPHY HEAD W/O DYE: CPT | Performed by: STUDENT IN AN ORGANIZED HEALTH CARE EDUCATION/TRAINING PROGRAM

## 2025-02-16 PROCEDURE — 87636 SARSCOV2 & INF A&B AMP PRB: CPT

## 2025-02-16 PROCEDURE — 71045 X-RAY EXAM CHEST 1 VIEW: CPT

## 2025-02-16 PROCEDURE — 82947 ASSAY GLUCOSE BLOOD QUANT: CPT | Mod: 59

## 2025-02-16 PROCEDURE — 70547 MR ANGIOGRAPHY NECK W/O DYE: CPT | Performed by: STUDENT IN AN ORGANIZED HEALTH CARE EDUCATION/TRAINING PROGRAM

## 2025-02-16 PROCEDURE — 70547 MR ANGIOGRAPHY NECK W/O DYE: CPT

## 2025-02-16 PROCEDURE — G0378 HOSPITAL OBSERVATION PER HR: HCPCS

## 2025-02-16 PROCEDURE — 84484 ASSAY OF TROPONIN QUANT: CPT

## 2025-02-16 PROCEDURE — 99285 EMERGENCY DEPT VISIT HI MDM: CPT | Mod: 25 | Performed by: EMERGENCY MEDICINE

## 2025-02-16 PROCEDURE — 85025 COMPLETE CBC W/AUTO DIFF WBC: CPT

## 2025-02-16 PROCEDURE — 93005 ELECTROCARDIOGRAM TRACING: CPT

## 2025-02-16 PROCEDURE — 36415 COLL VENOUS BLD VENIPUNCTURE: CPT | Performed by: EMERGENCY MEDICINE

## 2025-02-16 PROCEDURE — 83735 ASSAY OF MAGNESIUM: CPT

## 2025-02-16 PROCEDURE — 70450 CT HEAD/BRAIN W/O DYE: CPT

## 2025-02-16 PROCEDURE — 85610 PROTHROMBIN TIME: CPT | Performed by: EMERGENCY MEDICINE

## 2025-02-16 PROCEDURE — 84443 ASSAY THYROID STIM HORMONE: CPT

## 2025-02-16 PROCEDURE — 70551 MRI BRAIN STEM W/O DYE: CPT

## 2025-02-16 PROCEDURE — 82435 ASSAY OF BLOOD CHLORIDE: CPT

## 2025-02-16 PROCEDURE — 71045 X-RAY EXAM CHEST 1 VIEW: CPT | Performed by: STUDENT IN AN ORGANIZED HEALTH CARE EDUCATION/TRAINING PROGRAM

## 2025-02-16 PROCEDURE — 2500000001 HC RX 250 WO HCPCS SELF ADMINISTERED DRUGS (ALT 637 FOR MEDICARE OP): Performed by: NURSE PRACTITIONER

## 2025-02-16 PROCEDURE — 70450 CT HEAD/BRAIN W/O DYE: CPT | Performed by: RADIOLOGY

## 2025-02-16 PROCEDURE — 81001 URINALYSIS AUTO W/SCOPE: CPT

## 2025-02-16 PROCEDURE — 2500000004 HC RX 250 GENERAL PHARMACY W/ HCPCS (ALT 636 FOR OP/ED)

## 2025-02-16 PROCEDURE — 99285 EMERGENCY DEPT VISIT HI MDM: CPT | Performed by: EMERGENCY MEDICINE

## 2025-02-16 PROCEDURE — 93010 ELECTROCARDIOGRAM REPORT: CPT | Performed by: EMERGENCY MEDICINE

## 2025-02-16 PROCEDURE — 2500000001 HC RX 250 WO HCPCS SELF ADMINISTERED DRUGS (ALT 637 FOR MEDICARE OP)

## 2025-02-16 PROCEDURE — 96374 THER/PROPH/DIAG INJ IV PUSH: CPT

## 2025-02-16 PROCEDURE — 99222 1ST HOSP IP/OBS MODERATE 55: CPT | Performed by: NURSE PRACTITIONER

## 2025-02-16 PROCEDURE — 70551 MRI BRAIN STEM W/O DYE: CPT | Performed by: STUDENT IN AN ORGANIZED HEALTH CARE EDUCATION/TRAINING PROGRAM

## 2025-02-16 PROCEDURE — 82947 ASSAY GLUCOSE BLOOD QUANT: CPT

## 2025-02-16 PROCEDURE — 70544 MR ANGIOGRAPHY HEAD W/O DYE: CPT

## 2025-02-16 RX ORDER — BUPROPION HYDROCHLORIDE 300 MG/1
300 TABLET ORAL DAILY
Status: DISCONTINUED | OUTPATIENT
Start: 2025-02-17 | End: 2025-02-20 | Stop reason: HOSPADM

## 2025-02-16 RX ORDER — AMLODIPINE BESYLATE 5 MG/1
5 TABLET ORAL DAILY
Status: DISCONTINUED | OUTPATIENT
Start: 2025-02-16 | End: 2025-02-20 | Stop reason: HOSPADM

## 2025-02-16 RX ORDER — HYDRALAZINE HYDROCHLORIDE 20 MG/ML
5 INJECTION INTRAMUSCULAR; INTRAVENOUS ONCE
Status: COMPLETED | OUTPATIENT
Start: 2025-02-16 | End: 2025-02-16

## 2025-02-16 RX ORDER — HEPARIN SODIUM 5000 [USP'U]/ML
5000 INJECTION, SOLUTION INTRAVENOUS; SUBCUTANEOUS EVERY 8 HOURS
Status: DISCONTINUED | OUTPATIENT
Start: 2025-02-17 | End: 2025-02-20 | Stop reason: HOSPADM

## 2025-02-16 RX ORDER — LISINOPRIL 40 MG/1
40 TABLET ORAL DAILY
Status: DISCONTINUED | OUTPATIENT
Start: 2025-02-16 | End: 2025-02-20 | Stop reason: HOSPADM

## 2025-02-16 RX ORDER — ACETAMINOPHEN 500 MG
5 TABLET ORAL NIGHTLY PRN
Status: DISCONTINUED | OUTPATIENT
Start: 2025-02-16 | End: 2025-02-20 | Stop reason: HOSPADM

## 2025-02-16 RX ORDER — ACETAMINOPHEN 325 MG/1
650 TABLET ORAL EVERY 6 HOURS PRN
Status: DISCONTINUED | OUTPATIENT
Start: 2025-02-16 | End: 2025-02-20 | Stop reason: HOSPADM

## 2025-02-16 RX ORDER — AMOXICILLIN 250 MG
1 CAPSULE ORAL 2 TIMES DAILY
Status: DISCONTINUED | OUTPATIENT
Start: 2025-02-17 | End: 2025-02-20 | Stop reason: HOSPADM

## 2025-02-16 RX ORDER — DONEPEZIL HYDROCHLORIDE 5 MG/1
5 TABLET, FILM COATED ORAL NIGHTLY
Status: DISCONTINUED | OUTPATIENT
Start: 2025-02-16 | End: 2025-02-20 | Stop reason: HOSPADM

## 2025-02-16 RX ADMIN — HYDRALAZINE HYDROCHLORIDE 5 MG: 20 INJECTION INTRAMUSCULAR; INTRAVENOUS at 22:13

## 2025-02-16 RX ADMIN — AMLODIPINE BESYLATE 5 MG: 5 TABLET ORAL at 17:38

## 2025-02-16 RX ADMIN — LISINOPRIL 40 MG: 20 TABLET ORAL at 17:38

## 2025-02-16 RX ADMIN — DONEPEZIL HYDROCHLORIDE 5 MG: 5 TABLET ORAL at 23:45

## 2025-02-16 ASSESSMENT — PAIN - FUNCTIONAL ASSESSMENT: PAIN_FUNCTIONAL_ASSESSMENT: 0-10

## 2025-02-16 ASSESSMENT — PAIN SCALES - GENERAL: PAINLEVEL_OUTOF10: 0 - NO PAIN

## 2025-02-16 ASSESSMENT — COLUMBIA-SUICIDE SEVERITY RATING SCALE - C-SSRS
6. HAVE YOU EVER DONE ANYTHING, STARTED TO DO ANYTHING, OR PREPARED TO DO ANYTHING TO END YOUR LIFE?: NO
2. HAVE YOU ACTUALLY HAD ANY THOUGHTS OF KILLING YOURSELF?: NO
1. IN THE PAST MONTH, HAVE YOU WISHED YOU WERE DEAD OR WISHED YOU COULD GO TO SLEEP AND NOT WAKE UP?: NO

## 2025-02-16 NOTE — ED PROVIDER NOTES
History of Present Illness     History provided by: Patient and Family Member  Limitations to History: None  External Records Reviewed with Brief Summary: None    HPI:  Kirstin Howe is a 84 y.o. female with past medical history of HTN, CKD, and prior CVA presenting the ED for family concern of altered mental status and slurred speech. Patient's daughter states that she was talking to the patient on the phone around 9 AM this morning and said the patient sounded normal. Then when she was talking to the patient at 11 AM, daughter noted the patient seemed more confused and was talking about relatives that were not in the room and slurring her speech. Daughter also reports that when she went to go check on the patient, the patient had unsteady gait and needed assistance with walking which is unusual for the patient. At baseline, the patient lives at home by herself. Daughter notes that patient did have a MRI outpatient to assess for dementia but they were not aware of the results yet. Patient is not complaining of any pain anywhere. She denies any headache, dizziness, vision changes, neck pain, back pain, chest pain, shortness of breath, nausea, vomiting, abdominal pain, diarrhea, or any other symptoms at this time. Patient denies any trauma, falls, or injuries. No passing out. No sick contacts or recent travels.      Physical Exam   Triage vitals:  T 36.6 °C (97.9 °F)  HR 80  BP (!) 187/89  RR 16  O2 97 % None (Room air)    General: Awake, alert, oriented, elderly female, resting calmly, in no acute distress  Eyes: Gaze conjugate. EOMI. No scleral icterus or injection. PERRLA.   HENT: Normo-cephalic, atraumatic. No stridor. Mucous membranes moist   Neck: supple, full ROM intact, no midline tenderness, no step-offs or deformities, no meningeal signs.   CV: Regular rate, regular rhythm. Radial pulses 2+ bilaterally. DP pulses 2+ bilaterally. No murmurs.  Resp: Breathing non-labored, speaking in full sentences.  Clear  to auscultation bilaterally. No wheezing, rales, rhonchi.   GI: Soft, non-distended, non-tender. No rebound or guarding. No peritoneal signs.   MSK/Extremities: No gross bony deformities. Moving all extremities spontaneously. No edema. Pulses are intact.   Skin: Warm. Appropriate color. Intact.   Neuro: Alert and oriented x3. Decreased sensation on left side of forehead, left arm and left lower extremity. Five out of five motor strength in bilateral upper and lower extremities. Finger to nose normal bilaterally. Mild left-sided facial droop with testing of cranial nerve seven. No pronator drift.  Psych: Appropriate mood and affect      Medical Decision Making & ED Course   Medical Decision Makin y.o. female with history of hypertension, CKD, and prior CVA now presenting to the ED for episode of altered mental status and slurred speech. On evaluation, patient is hypertensive but otherwise hemodynamically stable. She is afebrile. The patient is in no respiratory distress, satting well on room air. On physical exam, there was noted left-sided sensory deficits and abnormal cranial nerve seven testing. Given patient's symptoms and reported last known well, a BAT was called and the patient was taken to CT for CT head. Patient is VAN negative and has an iodine allergy so CT angio was not performed. The CT head did not reveal any acute intracranial findings. Neurology stroke team evaluated the patient in the ED and called off the BAT as they were not concerned for ischemic stroke. Therefore will workup metabolic or infectious reasons for the patient's presentation. Differential diagnosis is broad and includes but not limited to dehydration, electrolyte abnormalities, urinary tract infection, infection, thyroid abnormalities, cardiac causes, dementia, or deconditioning. Will obtain broad workup with labs including CBC, CMP, TSH, troponin. Will also obtain UA to assess for any possible infection as well as chest  x-ray.    Please see ED course remainder of patient's care.      ED Course:  ED Course as of 02/18/25 0007   Sun Feb 16, 2025 1656 CT brain attack head wo IV contrast  IMPRESSION:  No acute findings or intracranial mass.      Supratentorial white matter hypodensities most likely represent  chronic small vessel ischemic change.   [AC]   1656 XR chest 1 view  IMPRESSION:  1.  No evidence of acute cardiopulmonary process.   [AC]   1734 CBC and Auto Differential(!)  No leukocytosis. No anemia. [AC]   1737 Comprehensive metabolic panel(!)  BUN within normal limits. Creatinine is mildly decreased at 1.42 from four months ago when it was 1.77.  Electrolytes otherwise within normal limits. [AC]   1738 Thyroid Stimulating Hormone: 1.99  WNL.  [AC]   1738 BP(!): 198/89  Patient forgot to take her home hypertension medications this morning. Will give her her home dose his medications and reassess her blood pressure. She is not reporting any pain or other symptoms currently. [AC]   1830 Urinalysis with Reflex Culture and Microscopic  UA was unremarkable. [AC]   1830 I discussed the patient's lab results with the patient and the family. Her labs were largely unremarkable and did not indicate a possible cause for episode of AMS and slurred speech. I discussed that this could be secondary to dementia that waxes and wanes. We did offer to obtain a MRI to further assess the brain and admission for further evaluation. Patient and family were amenable to this. Patient is disposition pending MRI, but likely to be admitted. [AC]   2205 MR brain wo IV contrast [JL]      ED Course User Index  [AC] Abiel Fernandez DO  [JL] Sandra Joshua MD         Diagnoses as of 02/18/25 0007   Transient alteration of awareness   Generalized weakness       EKG Independent Interpretation:  EKG obtained at 15:31 independently interpreted by me. It demonstrates normal sinus rhythm with rate of 74. Normal axis. CT interval 154, QRS 84, . No ST  elevations. When compared to EKG obtained on 10/10/24, no significant changes were seen.      The patient was discussed with the following consultants/services: Neurology regarding concern for stroke. A bat was activated due to patient's sensory deficits and dysphagia per family. CT was obtained that did not reveal any acute intracranial findings. It was cancelled at this time. Will re-engage is needed.   ----  Scoring Tools Utilized:      Scored for sensory deficits and dysphagia.       Independent Result Review and Interpretation: Relevant laboratory and radiographic results were reviewed and independently interpreted by myself.  As necessary, they are commented on in the ED Course.    Chronic conditions affecting the patient's care: As documented above in MDM    Care Considerations: As documented above in MDM      Social Determinants of Health which Significantly Impact Care: Problems related to living alone The following actions were taken to address these social determinants: discussed with patient's daughter concerns of patient living alone given her    Disposition   Patient was signed out to Dr. Hogan at 19:00 pending completion of their work-up.  Please see the next provider's transition of care note for the remainder of the patient's care.     Procedures   Procedures        Abiel Fernandez DO  Emergency Medicine PGY1     Abiel Fernandez DO  Resident  02/18/25 0007       Sandra Joshua MD  02/18/25 1148

## 2025-02-16 NOTE — ED TRIAGE NOTES
"Pt brought to ED by family with c/o confusion and slurred speech. Family reports difficulty walking, stating her balance is off, pt was \"fine on yesterday\" but symptoms were noted at 1030/1100 today. BS 93. Daughter states mother has been seeing her kids that are not around, thinking they are here.  Pt awake and alert, oriented x4 in triage with clear and appropriate speech. [place]  Stroke scale negative in triage  "

## 2025-02-17 LAB
ALBUMIN SERPL BCP-MCNC: 3.5 G/DL (ref 3.4–5)
ANION GAP SERPL CALC-SCNC: 13 MMOL/L (ref 10–20)
BUN SERPL-MCNC: 11 MG/DL (ref 6–23)
CALCIUM SERPL-MCNC: 9.1 MG/DL (ref 8.6–10.6)
CHLORIDE SERPL-SCNC: 106 MMOL/L (ref 98–107)
CO2 SERPL-SCNC: 26 MMOL/L (ref 21–32)
CREAT SERPL-MCNC: 1.24 MG/DL (ref 0.5–1.05)
EGFRCR SERPLBLD CKD-EPI 2021: 43 ML/MIN/1.73M*2
ERYTHROCYTE [DISTWIDTH] IN BLOOD BY AUTOMATED COUNT: 15.5 % (ref 11.5–14.5)
FOLATE SERPL-MCNC: 13.1 NG/ML
GLUCOSE SERPL-MCNC: 92 MG/DL (ref 74–99)
HCT VFR BLD AUTO: 36.8 % (ref 36–46)
HGB BLD-MCNC: 12.1 G/DL (ref 12–16)
MAGNESIUM SERPL-MCNC: 1.87 MG/DL (ref 1.6–2.4)
MCH RBC QN AUTO: 26.8 PG (ref 26–34)
MCHC RBC AUTO-ENTMCNC: 32.9 G/DL (ref 32–36)
MCV RBC AUTO: 82 FL (ref 80–100)
NRBC BLD-RTO: 0 /100 WBCS (ref 0–0)
PHOSPHATE SERPL-MCNC: 3.1 MG/DL (ref 2.5–4.9)
PLATELET # BLD AUTO: 295 X10*3/UL (ref 150–450)
POTASSIUM SERPL-SCNC: 4 MMOL/L (ref 3.5–5.3)
RBC # BLD AUTO: 4.51 X10*6/UL (ref 4–5.2)
SODIUM SERPL-SCNC: 141 MMOL/L (ref 136–145)
VIT B12 SERPL-MCNC: 208 PG/ML (ref 211–911)
WBC # BLD AUTO: 6.4 X10*3/UL (ref 4.4–11.3)

## 2025-02-17 PROCEDURE — 83735 ASSAY OF MAGNESIUM: CPT | Performed by: NURSE PRACTITIONER

## 2025-02-17 PROCEDURE — G0378 HOSPITAL OBSERVATION PER HR: HCPCS

## 2025-02-17 PROCEDURE — 36415 COLL VENOUS BLD VENIPUNCTURE: CPT | Performed by: NURSE PRACTITIONER

## 2025-02-17 PROCEDURE — 85027 COMPLETE CBC AUTOMATED: CPT | Performed by: NURSE PRACTITIONER

## 2025-02-17 PROCEDURE — 97530 THERAPEUTIC ACTIVITIES: CPT | Mod: GP | Performed by: PHYSICAL THERAPIST

## 2025-02-17 PROCEDURE — 96372 THER/PROPH/DIAG INJ SC/IM: CPT | Performed by: NURSE PRACTITIONER

## 2025-02-17 PROCEDURE — 2500000001 HC RX 250 WO HCPCS SELF ADMINISTERED DRUGS (ALT 637 FOR MEDICARE OP): Performed by: STUDENT IN AN ORGANIZED HEALTH CARE EDUCATION/TRAINING PROGRAM

## 2025-02-17 PROCEDURE — 2500000001 HC RX 250 WO HCPCS SELF ADMINISTERED DRUGS (ALT 637 FOR MEDICARE OP): Performed by: NURSE PRACTITIONER

## 2025-02-17 PROCEDURE — 82947 ASSAY GLUCOSE BLOOD QUANT: CPT | Performed by: NURSE PRACTITIONER

## 2025-02-17 PROCEDURE — 82746 ASSAY OF FOLIC ACID SERUM: CPT | Performed by: STUDENT IN AN ORGANIZED HEALTH CARE EDUCATION/TRAINING PROGRAM

## 2025-02-17 PROCEDURE — 2500000004 HC RX 250 GENERAL PHARMACY W/ HCPCS (ALT 636 FOR OP/ED): Performed by: NURSE PRACTITIONER

## 2025-02-17 PROCEDURE — 82607 VITAMIN B-12: CPT | Performed by: NURSE PRACTITIONER

## 2025-02-17 PROCEDURE — 99232 SBSQ HOSP IP/OBS MODERATE 35: CPT | Performed by: STUDENT IN AN ORGANIZED HEALTH CARE EDUCATION/TRAINING PROGRAM

## 2025-02-17 PROCEDURE — 97161 PT EVAL LOW COMPLEX 20 MIN: CPT | Mod: GP | Performed by: PHYSICAL THERAPIST

## 2025-02-17 RX ORDER — LANOLIN ALCOHOL/MO/W.PET/CERES
1000 CREAM (GRAM) TOPICAL DAILY
Status: DISCONTINUED | OUTPATIENT
Start: 2025-02-17 | End: 2025-02-20 | Stop reason: HOSPADM

## 2025-02-17 RX ADMIN — DONEPEZIL HYDROCHLORIDE 5 MG: 5 TABLET ORAL at 21:15

## 2025-02-17 RX ADMIN — SENNOSIDES AND DOCUSATE SODIUM 1 TABLET: 50; 8.6 TABLET ORAL at 08:49

## 2025-02-17 RX ADMIN — HEPARIN SODIUM 5000 UNITS: 5000 INJECTION, SOLUTION INTRAVENOUS; SUBCUTANEOUS at 06:10

## 2025-02-17 RX ADMIN — AMLODIPINE BESYLATE 5 MG: 5 TABLET ORAL at 08:50

## 2025-02-17 RX ADMIN — LISINOPRIL 40 MG: 20 TABLET ORAL at 08:49

## 2025-02-17 RX ADMIN — BUPROPION HYDROCHLORIDE 300 MG: 300 TABLET, EXTENDED RELEASE ORAL at 08:49

## 2025-02-17 RX ADMIN — HEPARIN SODIUM 5000 UNITS: 5000 INJECTION, SOLUTION INTRAVENOUS; SUBCUTANEOUS at 21:16

## 2025-02-17 RX ADMIN — ACETAMINOPHEN 650 MG: 325 TABLET ORAL at 04:24

## 2025-02-17 RX ADMIN — HEPARIN SODIUM 5000 UNITS: 5000 INJECTION, SOLUTION INTRAVENOUS; SUBCUTANEOUS at 14:00

## 2025-02-17 RX ADMIN — CYANOCOBALAMIN TAB 1000 MCG 1000 MCG: 1000 TAB at 18:11

## 2025-02-17 ASSESSMENT — ENCOUNTER SYMPTOMS
FEVER: 0
TROUBLE SWALLOWING: 0
SHORTNESS OF BREATH: 0
WEAKNESS: 1
DIFFICULTY URINATING: 0
COUGH: 0
HEADACHES: 0
ABDOMINAL PAIN: 0
CHILLS: 0
DIZZINESS: 0
CONFUSION: 1

## 2025-02-17 ASSESSMENT — PAIN SCALES - GENERAL
PAINLEVEL_OUTOF10: 0 - NO PAIN
PAINLEVEL_OUTOF10: 0 - NO PAIN
PAINLEVEL_OUTOF10: 6
PAINLEVEL_OUTOF10: 0 - NO PAIN
PAINLEVEL_OUTOF10: 0 - NO PAIN

## 2025-02-17 ASSESSMENT — COGNITIVE AND FUNCTIONAL STATUS - GENERAL
MOBILITY SCORE: 24
CLIMB 3 TO 5 STEPS WITH RAILING: TOTAL
MOBILITY SCORE: 16
MOVING TO AND FROM BED TO CHAIR: A LITTLE
DAILY ACTIVITIY SCORE: 24
MOVING FROM LYING ON BACK TO SITTING ON SIDE OF FLAT BED WITH BEDRAILS: A LITTLE
WALKING IN HOSPITAL ROOM: A LITTLE
STANDING UP FROM CHAIR USING ARMS: A LITTLE
TURNING FROM BACK TO SIDE WHILE IN FLAT BAD: A LITTLE

## 2025-02-17 ASSESSMENT — LIFESTYLE VARIABLES
EVER HAD A DRINK FIRST THING IN THE MORNING TO STEADY YOUR NERVES TO GET RID OF A HANGOVER: NO
EVER FELT BAD OR GUILTY ABOUT YOUR DRINKING: NO
HAVE YOU EVER FELT YOU SHOULD CUT DOWN ON YOUR DRINKING: NO
HAVE PEOPLE ANNOYED YOU BY CRITICIZING YOUR DRINKING: NO
TOTAL SCORE: 0

## 2025-02-17 ASSESSMENT — ACTIVITIES OF DAILY LIVING (ADL): ADL_ASSISTANCE: INDEPENDENT

## 2025-02-17 ASSESSMENT — PAIN DESCRIPTION - LOCATION: LOCATION: HEAD

## 2025-02-17 ASSESSMENT — PAIN - FUNCTIONAL ASSESSMENT: PAIN_FUNCTIONAL_ASSESSMENT: 0-10

## 2025-02-17 NOTE — PROGRESS NOTES
Assessment/Plan   Kirstin Howe is a 84 y.o. 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 Pt presented to ED as a BAT for reported dysarthria and gait instability. NIH was 1, subjective sensory deficit. Family also reports pt with hallucinations. CTH w/o contrast showed no acute abnormalities. MRI and MRA ruled out stroke, findings concerning for dementia.  Labs showed corrine. Pt with hypertensive urgency, resmed on home bp meds, reports longstanding uncontrolled hypertension. After admission, pt's mentation has improved.  Pt reporting URI infection that is now just improved, possible contributing cause. Awaiting pt/ot, monitoring for stability..    #acute on chronic moderate cognitive impairment - acute change resolved.   #dysathria, difficulty walking likely related acute metabolic encephalopathy/delirium. Now resolved.   - AMS and hallucinations noted in setting of recent URI sx reported by patient. She reported her uri sx are now improved. Pt also with uncontroleld bp, possible htn encephalopathy.   - ua negative. Covid, flu neg. Acs ruled out. Tsh wnl.   - MRI brain done on admit: Unchanged disproportionate hippocampal volume loss bilaterally which may reflect Alzheimer's dementia.   - followed by neuro, she was dx [facility] omd cognitive impairment, last MoCA 18/30 in 2024, has been progressing, started on donepezil to slow progression.   - BAT cancelled. Symptoms have now improved. NIH was 1 on admit. MRI ruled out stroke  - c/w home dose of Aricept 5 mg PO at bedtime    #B12 def  - start b12 supplementation  - check folate     #Generalized weakness  - PT/OT consulted, pending     #uncontrolled HTN (improving)  - likely 2/2 medication noncompliance  - s/p Hydralazine 5 mg IV x1 dose, Norvasc 5 mg PO every day, and Lisinopril 40 mg PO every day  - c/w home dose of Norvasc 5 mg and  "Lisinopril 40 mg PO every day  - continue to monitor BP     #CKD stage IV (stable) - per record  - Cr 1.42 GFR 36 on admit--> Cr 1.2.  - this is lower than baseline, her baseline appears to be 1.7 to 1.8 per record and has not been this low.    - continue to monitor renal function  - renally dose meds as needed     #anxiety  - c/w home dose of Wellbutrin  mg PO every day    Pending clinical stability and therapy eval     Scheduled outpatient appointments in system:   Future Appointments   Date Time Provider Department Center   3/7/2025  1:00 PM Miguel Giles MD [id number] [facility]   3/11/2025 11:45 AM Hansa West MD [id number] [facility]   5/19/2025  2:00 PM Carolina Gómez MD [id number] [facility]     ---------------------------------------------------------------------------------------------------  Subjective   No events. Pt reports she was experience uri sx. She reports she was feeling off but does not remember what happened except she was told she was confused. Currently she is feeling fell, no issues speaching, is appropriate in his meidcal history she provided. She denies headache, sob, cp, abd pain, luts.      ---------------------------------------------------------------------------------------------------  Objective   Last Recorded Vitals  Blood pressure 147/77, pulse 68, temperature 36.6 °C (97.9 °F), temperature source Temporal, resp. rate 16, height 1.575 m (5' 2\"), weight 54.4 kg (120 lb), SpO2 100%.  Intake/Output last 3 Shifts:  No intake/output data recorded.    Physical Exam  Vitals and nursing note reviewed.   Constitutional:       General: She is not in acute distress.     Appearance: Normal appearance. She is ill-appearing. She is not toxic-appearing.   HENT:      Head: Normocephalic and atraumatic.      Mouth/Throat:      Mouth: Mucous membranes are moist.   Eyes:      General: No scleral icterus.     Extraocular Movements: Extraocular movements intact.      Conjunctiva/sclera: " Conjunctivae normal.   Cardiovascular:      Rate and Rhythm: Normal rate and regular rhythm.      Heart sounds: S1 normal and S2 normal. No murmur heard.  Pulmonary:      Effort: Pulmonary effort is normal. No respiratory distress.      Breath sounds: No wheezing, rhonchi or rales.   Abdominal:      General: Bowel sounds are normal. There is no distension.      Palpations: Abdomen is soft.      Tenderness: There is no abdominal tenderness. There is no guarding or rebound.   Musculoskeletal:         General: No swelling or deformity.      Cervical back: Neck supple.   Skin:     General: Skin is warm and dry.      Findings: No rash.   Neurological:      General: No focal deficit present.      Mental Status: She is alert. Mental status is at baseline.      Sensory: No sensory deficit.      Motor: No weakness.      Comments: [place] to person, place.    Psychiatric:         Mood and Affect: Mood normal.         Behavior: Behavior normal.      Comments: Denies further hallucinations         Relevant Results  Lab Results   Component Value Date    WBC 6.4 02/17/2025    HGB 12.1 02/17/2025    HCT 36.8 02/17/2025    MCV 82 02/17/2025     02/17/2025      Lab Results   Component Value Date    GLUCOSE 92 02/17/2025    CALCIUM 9.1 02/17/2025     02/17/2025    K 4.0 02/17/2025    CO2 26 02/17/2025     02/17/2025    BUN 11 02/17/2025    CREATININE 1.24 (H) 02/17/2025     Scheduled medications  amLODIPine, 5 mg, oral, Daily  buPROPion XL, 300 mg, oral, Daily  donepezil, 5 mg, oral, Nightly  heparin (porcine), 5,000 Units, subcutaneous, q8h  lisinopril, 40 mg, oral, Daily  sennosides-docusate sodium, 1 tablet, oral, BID      Continuous medications     PRN medications  PRN medications: acetaminophen, melatonin    Jose Millan MD

## 2025-02-17 NOTE — PROGRESS NOTES
Physical Therapy    Physical Therapy Evaluation & Treatment    Patient Name: Kirstin Howe  MRN: [id number]  Department: [facility]  Room: [place] [address]/[id number]  Today's Date: 2/17/2025   Time Calculation  Start Time: 1331  Stop Time: 1359  Time Calculation (min): 28 min    Assessment/Plan   PT Assessment  PT Assessment Results: Decreased strength, Decreased endurance, Impaired balance, Decreased mobility, Decreased safety awareness  Rehab Prognosis: Good  Barriers to Discharge Home: Caregiver assistance, Physical needs  Caregiver Assistance: Patient lives alone and/or does not have reliable caregiver assistance  Physical Needs: Stair navigation into home limited by function/safety, Ambulating household distances limited by function/safety, Stair navigation to access bed limited by function/safety, Stair navigation to access bath limited by function/safety, High falls risk due to function or environment, 24hr mobility assistance needed  Strengths: Attitude of self, Support of Caregivers  Barriers to Participation: Comorbidities  End of Session Communication: Bedside nurse  Assessment Comment: Pt presents with decreased strength, endurance, balance, mobility and decreased safety awareness. Pt lives alone and was independent with all mobility. Pt would benefit from continued PT to increase safety and independence ith all mobility  End of Session Patient Position: Bed, 2 rail up, Alarm off, caregiver present   IP OR SWING BED PT PLAN  Inpatient or Swing Bed: Inpatient  PT Plan  Treatment/Interventions: Bed mobility, Transfer training, Gait training, Stair training, Balance training, Strengthening, Endurance training, Therapeutic exercise, Therapeutic activity, Home exercise program  PT Plan: Ongoing PT  PT Frequency: 3 times per week  PT Discharge Recommendations: Moderate intensity level of continued care (If pt going home, pt would benefit from Low Intensity PT and needs 24/7 for safety)  Equipment Recommended upon Discharge:  Wheeled walker  PT Recommended Transfer Status: Assist x1, Assistive device  PT - OK to Discharge: Yes      Subjective     General Visit Information:  General  Reason for Referral: Presenting for altered mental status with slurred speech and left-sided sensation deficits.  A BAT was called however head CT was negative.  Past Medical History Relevant to Rehab: PMH of cognitive impairment, hyperparathyroidism, constipation, CVA without deficits, anxiety, HTN, CKD 4, and tubular adenoma of the colon (no active treatment).  Family/Caregiver Present: Yes (Dtr present and was supportive, communicated with Dtr and Son during session)  Prior to Session Communication: Bedside nurse  Patient Position Received: Bed, 2 rail up, Alarm off, caregiver present  Preferred Learning Style: verbal, visual  General Comment: Pt supine in bed upon arrival, pleasant and willing to participate in PT session. Mild confusion noted occasionally.  Home Living:  Home Living  Type of Home: House (town home)  Lives With: Alone  Home Adaptive Equipment: Cane  Home Layout: Multi-level, Bed/bath upstairs, 1/2 bath on main level  Alternate Level Stairs-Number of Steps: 3 steps to kitchen/family room and 10 steps down to family room and laundry and 13 to second floor bed//bath  Home Access: Stairs to enter with rails  Entrance Stairs-Number of Steps: 6 (1 handrail)  Bathroom Shower/Tub: Tub/shower unit  Prior Level of Function:  Prior Function Per Pt/Caregiver Report  Level of [place]: Independent with ADLs and functional transfers  Receives Help From: Family (Dtr completes cleaning)  ADL Assistance: Independent  Ambulatory Assistance: Independent  Hand Dominance: Right  Prior Function Comments: -ve drives, denies falls, 2 cats  Precautions:  Precautions  Medical Precautions: Fall precautions      Objective   Pain:  Pain Assessment  Pain Assessment: 0-10  0-10 (Numeric) Pain Score: 0 - No pain  Cognition:  Cognition  Arousal/Alertness: Appropriate  responses to stimuli  Orientation Level: Oriented X4    General Assessments:         Sensation  Light Touch: No apparent deficits    Strength  Strength Comments: BLE's grossly 4/5         Static Sitting Balance  Static Sitting-Level of Assistance: Close supervision  Dynamic Sitting Balance  Dynamic Sitting-Level of Assistance: Close supervision    Static Standing Balance  Static Standing-Level of Assistance: Contact guard  Dynamic Standing Balance  Dynamic Standing-Level of Assistance: Minimum assistance  Functional Assessments:  Bed Mobility  Bed Mobility: Yes  Bed Mobility 1  Bed Mobility 1: Supine to sitting, Sitting to supine  Level of Assistance 1: Contact guard, Minimal verbal cues  Bed Mobility Comments 1: HOB elevated    Transfers  Transfer: Yes  Transfer 1  Transfer From 1: Sit to, Stand to  Transfer to 1: Sit, Stand  Technique 1: Sit to stand, Stand to sit  Transfer Device 1: Walker  Transfer Level of Assistance 1: Contact guard, Minimal verbal cues (x3 from EOOB)  Trials/Comments 1: cues for safe hand placement, sequencing and walker safety    Ambulation/Gait Training  Ambulation/Gait Training Performed: Yes  Ambulation/Gait Training 1  Surface 1: Level tile  Device 1: Rolling walker, No device  Assistance 1: Contact guard, Minimum assistance  Quality of Gait 1: Decreased step length, Inconsistent stride length, Diminished heel strike  Comments/Distance (ft) 1: 20 ft with FWW and 12 ft with no AD, cues for safety and safe walker placement with ambulation, More distance limited by fatigue (LOB x2 with ambulation and required Bro for balance)    Stairs  Stairs: No    Treatments:       Therapeutic Activity  Therapeutic Activity Performed: Yes  Therapeutic Activity 1: Pt/familiy educated on PT POC, D/C planning, use of walker and safety with mobility      Bed Mobility  Bed Mobility: Yes  Bed Mobility 1  Bed Mobility 1: Supine to sitting, Sitting to supine  Level of Assistance 1: Contact guard, Minimal verbal  cues  Bed Mobility Comments 1: HOB elevated    Ambulation/Gait Training  Ambulation/Gait Training Performed: Yes  Ambulation/Gait Training 1  Surface 1: Level tile  Device 1: Rolling walker, No device  Assistance 1: Contact guard, Minimum assistance  Quality of Gait 1: Decreased step length, Inconsistent stride length, Diminished heel strike  Comments/Distance (ft) 1: 20 ft with FWW and 12 ft with no AD, cues for safety and safe walker placement with ambulation, More distance limited by fatigue (LOB x2 with ambulation and required Bro for balance)  Transfers  Transfer: Yes  Transfer 1  Transfer From 1: Sit to, Stand to  Transfer to 1: Sit, Stand  Technique 1: Sit to stand, Stand to sit  Transfer Device 1: Walker  Transfer Level of Assistance 1: Contact guard, Minimal verbal cues (x3 from EOOB)  Trials/Comments 1: cues for safe hand placement, sequencing and walker safety    Stairs  Stairs: No  Outcome Measures:  [facility] Basic Mobility  Turning from your back to your side while in a flat bed without using bedrails: A little  Moving from lying on your back to sitting on the side of a flat bed without using bedrails: A little  Moving to and from bed to chair (including a wheelchair): A little  Standing up from a chair using your arms (e.g. wheelchair or bedside chair): A little  To walk in hospital room: A little  Climbing 3-5 steps with railing: Total  Basic Mobility - Total Score: 16    [place]  Sitting Balance: Steady, safe  Arises: Able, uses arms to help  Attempts to Arise: Able to arise, one attempt  Immediate Standing Balance (First 5 Seconds): Unsteady (Swaggers, moves feet, trunk sway)  Standing Balance: Steady but wide stance, uses cane or other support  Nudged: Staggers, grabs, catches self  Eyes Closed: Steady  Turned 360 Degrees: Steadiness: Unsteady (Grabs, staggers)  Turned 360 Degrees: Continuity of Steps: Discontinuous steps  Sitting Down: Uses arms or not a smooth motion  Balance Score: 8  Initiation  of Gait: No hesitancy  Step Height: R Swing Foot: Right foot complete clears floor  Step Length: R Swing Foot: Passes left stance foot  Step Height: L Swing Foot: Left foot complete clears floor  Step Length: L Swing Foot: Passes right stance foot  Step Symmetry: Right and left step length not equal (Estimate)  Step Continuity: Stopping or discontinuity between steps  Path: Mild/moderate deviation or uses walking aid  Trunk: No sway but flexion of knees or back or spreads arms out while walking  Walking Time: Heels almost touching while walking  Gait Score: 8  Total Score: 16    Encounter Problems       Encounter Problems (Active)       Balance       Pt will score >/=24/28 on [place]etti to demonstrate decreased falls risk (Progressing)       Start:  02/17/25    Expected End:  03/03/25               Mobility       Pt will be Tere ambulation 100 ft with LRAD (Progressing)       Start:  02/17/25    Expected End:  03/03/25            Pt will be supervision to ascend/descend 13 steps with 1 handrail (Progressing)       Start:  02/17/25    Expected End:  03/03/25               PT Transfers       Pt will be Tere with sit to stand and bed to chair transfers with LRAD (Progressing)       Start:  02/17/25    Expected End:  03/03/25            Pt will be Tere with bed mobility (Progressing)       Start:  02/17/25    Expected End:  03/03/25                   Education Documentation  Precautions, taught by Ambika Uriostegui PT at 2/17/2025  2:28 PM.  Learner: Patient  Readiness: Acceptance  Method: Explanation, Demonstration  Response: Verbalizes Understanding, Needs Reinforcement    Mobility Training, taught by Ambika Uriostegui PT at 2/17/2025  2:28 PM.  Learner: Patient  Readiness: Acceptance  Method: Explanation, Demonstration  Response: Verbalizes Understanding, Needs Reinforcement    Education Comments  No comments found.

## 2025-02-17 NOTE — PROGRESS NOTES
Emergency Department Transition of Care Note       Signout   I received Kirstin Howe in signout from Dr. Fernandez.  Please see the ED Provider Note for all HPI, PE and MDM up to the time of signout at 1900.  This is in addition to the primary record.    In brief Kirstin Howe is an 84 y.o. female with a history of HTN, CKD3, depression/anxiety, osteoarthritis presenting for altered mental status with slurred speech and left-sided sensory changes  A BAT was called after initial patient evaluation. Head CT was negative for any acute processes. Neurology team had evaluated the patient in the ED and cancelled the BAT as they were not concerned for stroke.  Infectious workup has also been negative with normal UA, no leukocytosis, normal CXR and negative swabs for COVID and influenza. Labs were also notable for a normal troponin, normal TSH, no anemia, and stable kidney function with normal electrolytes. The patient was hypertensive to 190s systolic and her home amlodipine and lisinopril were administered, as she had not taken them today. MRI brain and MRA head and neck were pending to evaluate for any  possible stroke.    At the time of signout we were awaiting:  MRI brain/MRA head and neck    ED Course & Medical Decision Making   Medical Decision Making:  Under my care, MRI brain and MRA head and neck did not reveal any acute findings. No acute infarct, acute intracranial hemorrhage, or mass effect. No major vessel cut off or significant stenosis in the head or neck. Per radiology read, there was no significant interval change in disproportionate dilation of the  supratentorial ventricles which can be seen in the setting of normal pressure hydrocephalus or central atrophy. There was unchanged disproportionate hippocampal volume loss bilaterally  which may reflect Alzheimer's dementia. Patient and her daughter were informed of the results. The patient continued to experience intermittent sensory changes on the left side  and daughter at bedside reported that the patient's mental status was not back to her previous level.  The patient remained disoriented to time, and discharge was not appropriate given that the patient normally lives alone.  Patient and daughter were amenable to admission to the hospital for PT/OT evaluation for discharge planning and possible placement. The patient was discussed with Admissions Coordinators and accepted for admission to the Medicine service. She remains stable at this time.     ED Course:  ED Course as of 02/16/25 2239   Sun Feb 16, 2025 1656 CT brain attack head wo IV contrast  IMPRESSION:  No acute findings or intracranial mass.      Supratentorial white matter hypodensities most likely represent  chronic small vessel ischemic change.   [AC]   1656 XR chest 1 view  IMPRESSION:  1.  No evidence of acute cardiopulmonary process.   [AC]   1734 CBC and Auto Differential(!)  No leukocytosis. No anemia. [AC]   1737 Comprehensive metabolic panel(!)  BUN within normal limits. Creatinine is mildly decreased at 1.42 from four months ago when it was 1.77.  Electrolytes otherwise within normal limits. [AC]   1738 Thyroid Stimulating Hormone: 1.99  WNL.  [AC]   1738 BP(!): 198/89  Patient forgot to take her home hypertension medications this morning. Will give her her home dose his medications and reassess her blood pressure. She is not reporting any pain or other symptoms currently. [AC]   2205 MR brain wo IV contrast [JL]      ED Course User Index  [AC] Abiel Fernandez DO  [JL] Sandra Joshua MD         Diagnoses as of 02/16/25 2239   Transient alteration of awareness   Generalized weakness       Disposition   As a result of their workup, the patient will require admission to the hospital.  The patient was informed of her diagnosis.  The patient was given the opportunity to ask questions and I answered them. The patient agreed to be admitted to the hospital.    Patient seen and discussed with ED  attending physician.    Melly Hogan MD  Emergency Medicine

## 2025-02-17 NOTE — H&P
History Of Present Illness  Kirstin Howe is a 84 y.o. 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 Pt presented to ED as a BAT for reported dysarthria and gait instability. Per report of pt's daughter, pt was in her normal state of health yesterday. Pt's daughter noticed that around 0830 pt had difficulty walking and her balance was off. She reports that her mother has also been hallucinating and thinking that she sees her kids who are not here. Pt was seen by neurology upon arrival to ED. Per documentation from neurology: NIH 1 (subjective L-sided sensory complaints). CTH w/o contrast showed no acute abnormalities. Given mild symptoms, suspect iso hypertension. No c/f ischemic stroke given reassuring exam and normal CTH. Discussed with ED who agreed with cancellation of BAT. MRI brain and MRA head/neck negative for stroke but imaging did show findings suggestive of Alzheimer's dementia. Labs obtained as part of ED workup reviewed and notable for renal insufficiency. BP on arrival to ED was 187/89 with highest documented BP of 198/89. BP improved after pt received home dose of Norvasc and Lisinopril in addition to 5 mg of Hydralazine IV x1 dose. Last /72. Per chart review, pt was last seen by PCP on 11/18/24 and her BP was elevated during this visit. Pt was told to consistently take her antihypertensive medications and ask family for assistance with medications. Pt currently lives alone. PT/OT consulted. Pt admitted to medicine for PT/OT evaluation and possible SNF placement.    ED interventions: Norvasc 5 mg x1 dose, Lisinopril 40 mg x1 dose, and Hydralazine 5 mg IV x1 dose     Past Medical History  Past Medical History:   Diagnosis Date    Age-related nuclear cataract, right eye 03/31/2016    Age-related nuclear cataract of right eye    Anxiety disorder, unspecified 03/31/2014     Anxiety    Bunion of unspecified foot 11/26/2014    Bunion    Concussion 04/08/2021    Last Assessment & Plan: Formatting of this note might be different from the original. -concern for possible concussion on admission, but head/neck/throat pain more likely due to infectious etiology as noted above    Contusion of left forearm, initial encounter 08/16/2016    Contusion of left forearm    Contusion of left knee, initial encounter 08/09/2016    Contusion of knee, left    Cortical age-related cataract, right eye 03/31/2016    Cortical age-related cataract of right eye    Depression     Dermatochalasis of unspecified eye, unspecified eyelid 11/10/2017    Dermatochalasis    Dislocation of right shoulder joint     Essential (primary) hypertension 11/23/2022    Hypertension    Fracture of orbital floor, right side, initial encounter for open fracture (Multi) 06/27/2017    Open fracture of right orbital floor, initial encounter    Generalized anxiety disorder 11/26/2014    PIA (generalized anxiety disorder)    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    Herpes zoster 03/29/2023    History of cataract 06/11/2024    History of depression 06/11/2024    Hypertensive urgency 03/25/2016    Hypertensive urgency    Insomnia, unspecified 11/26/2014    Insomnia    Maxillary fracture, unspecified side, initial encounter for closed fracture (Multi) 06/21/2017    Fracture of maxillary sinus, closed, initial encounter    Primary osteoarthritis, 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    Primary osteoarthritis of left wrist    Stage III chronic kidney disease (Multi) 03/29/2023    Status post left knee replacement 09/02/2022       Surgical History  Past Surgical History:   Procedure Laterality Date    CATARACT EXTRACTION  06/07/2017    Cataract Surgery    MR HEAD ANGIO WO IV CONTRAST  4/9/2021    MR HEAD ANGIO WO IV CONTRAST 4/9/2021    TOTAL ABDOMINAL HYSTERECTOMY W/ BILATERAL SALPINGOOPHORECTOMY          Social History  She reports  "that she has quit smoking. Her smoking use included cigarettes. She has never used smokeless tobacco. She reports that she does not currently use alcohol. She reports that she does not use drugs.    Family History  Family History   Problem Relation Name Age of Onset    Dementia Mother      Breast cancer Mother  82    Hypertension Mother      Stomach cancer Father      Pancreatic cancer Brother      Breast cancer Daughter  58        Allergies  Iodine    Review of Systems   Constitutional:  Negative for chills and fever.   HENT:  Negative for trouble swallowing.    Respiratory:  Negative for cough and shortness of breath.    Gastrointestinal:  Negative for abdominal pain.   Genitourinary:  Negative for difficulty urinating.   Musculoskeletal:  Positive for gait problem.   Neurological:  Positive for weakness. Negative for dizziness and headaches.   Psychiatric/Behavioral:  Positive for confusion.         Physical Exam  Constitutional:       General: She is not in acute distress.  HENT:      Head: Normocephalic.      Mouth/Throat:      Mouth: Mucous membranes are dry.   Cardiovascular:      Rate and Rhythm: Normal rate.      Pulses: Normal pulses.      Heart sounds: Normal heart sounds.   Pulmonary:      Effort: Pulmonary effort is normal. No respiratory distress.      Breath sounds: Normal breath sounds.   Abdominal:      Palpations: Abdomen is soft.   Musculoskeletal:         General: Normal range of motion.   Skin:     General: Skin is warm and dry.   Neurological:      Mental Status: She is alert.      Comments: Oriented to self and place          Last Recorded Vitals  Blood pressure 162/72, pulse 70, temperature 36.6 °C (97.9 °F), temperature source Temporal, resp. rate 18, height 1.575 m (5' 2\"), weight 54.4 kg (120 lb), SpO2 98%.    Relevant Results  MR brain wo IV contrast    Result Date: 2/16/2025  Interpreted By:  Diego Beach  and Mely Valladares STUDY: MR BRAIN WO IV CONTRAST; MR ANGIO HEAD WO IV " CONTRAST; MR ANGIO NECK WO IV CONTRAST;  2/16/2025 8:32 pm   INDICATION: Signs/Symptoms:speech difficulties, left sided sensory changes. Stroke protocol.   Per EMR patient is a 84-year-old female who presented the emergency department for dysarthria and gait instability. CT head showed no acute abnormality.   COMPARISON: Same day CT head MRI brain 02/06/2025 and 10/11/2024   ACCESSION NUMBER(S): [id number]; [id number]; [id number]   ORDERING CLINICIAN: LAURA LUGO   TECHNIQUE: Axial T2, FLAIR, DWI, gradient echo T2 and  sagittal and coronal T1 weighted images of brain were acquired.   Time-of-flight MRA of the head  and neck was performed. The images were reviewed as source images and maximum intensity projections.   The estimate of the degree of stenosis included in this report is based on the NASCET method for calculating stenosis, using the internal carotid artery distal to the stenosis as the reference point.   FINDINGS: MRI BRAIN:   No diffusion restriction abnormality to suggest acute infarct. No mass effect or midline shift. No acute intracranial hemorrhage. Previously described susceptibility artifact on gradient echo sequences in nonspecific distribution again favored to represent chronic cerebral microhemorrhages.   Mild-to-moderate nonspecific patchy and confluent T2/FLAIR hyperintense signal within the periventricular and subcortical white matter, likely sequela of chronic microangiopathy unchanged from prior.   Dilatation of the supratentorial ventricles, most pronounced in the temporal horns to disproportionate medial temporal lobe atrophy. No extra-axial fluid collection. Mineralization is noted of the anterior interhemispheric falx cerebri.   Opacification of the right-greater-than-left mastoid air cells, similar to prior. The visualized paranasal sinuses are essentially clear.   Status post right lens replacement.       MRA OF HEAD:   Anterior circulation:    There is expected flow signal in  bilateral intracranial internal carotid arteries, bilateral carotid terminals, bilateral proximal anterior and middle cerebral arteries.   Posterior circulation:  Minimal narrowing of the bilateral P2 PCA segment. Bilateral intracranial vertebral arteries, vertebrobasilar junction, basilar artery and proximal posterior cerebral arteries demonstrate expected flow signal.   MRA OF NECK:   The source images are mildly degraded by artifact.   Right carotid vessels:  There is expected flow signal in the visualized portion of the common carotid artery.  There is mild attenuation of flow signal at the carotid bifurcation which may be secondary to flow related artifact. The internal carotid artery in the neck demonstrates expected flow signal.  There is 0% stenosis per NASCET criteria.   Left carotid vessels:   There is expected flow signal in the visualized portion of the common carotid artery.  There is mild attenuation of flow signal at the carotid bifurcation which may be secondary to flow related artifact. The internal carotid artery in the neck demonstrates expected flow signal.  There is 0% stenosis per NASCET criteria.   Vertebral vessels:   The visualized segments of the cervical vertebral arteries demonstrate expected flow signal.       1. No acute infarct, acute intracranial hemorrhage, or mass effect. No major vessel cut off or significant stenosis in the head or neck. 2. No significant interval change in disproportionate dilation of the supratentorial ventricles which can be seen in the setting of normal pressure hydrocephalus or central atrophy. 3. Unchanged disproportionate hippocampal volume loss bilaterally which may reflect Alzheimer's dementia. 4. Similar mild-to-moderate nonspecific white matter changes which can be seen in the setting of chronic microangiopathy.   I personally reviewed the images/study and I agree with Jacquelyn Boone DO's (radiology resident) findings as stated. This study was  interpreted at [facility], [place], [place].   MACRO: None   Signed by: Diego Beach 2/16/2025 10:03 PM Dictation workstation:   [id number]    MR angio head wo IV contrast    Result Date: 2/16/2025  Interpreted By:  Diego Beach,  Emily Valladares STUDY: MR BRAIN WO IV CONTRAST; MR ANGIO HEAD WO IV CONTRAST; MR ANGIO NECK WO IV CONTRAST;  2/16/2025 8:32 pm   INDICATION: Signs/Symptoms:speech difficulties, left sided sensory changes. Stroke protocol.   Per EMR patient is a 84-year-old female who presented the emergency department for dysarthria and gait instability. CT head showed no acute abnormality.   COMPARISON: Same day CT head MRI brain 02/06/2025 and 10/11/2024   ACCESSION NUMBER(S): [id number]; [id number]; [id number]   ORDERING CLINICIAN: LAURA LUGO   TECHNIQUE: Axial T2, FLAIR, DWI, gradient echo T2 and  sagittal and coronal T1 weighted images of brain were acquired.   Time-of-flight MRA of the head  and neck was performed. The images were reviewed as source images and maximum intensity projections.   The estimate of the degree of stenosis included in this report is based on the NASCET method for calculating stenosis, using the internal carotid artery distal to the stenosis as the reference point.   FINDINGS: MRI BRAIN:   No diffusion restriction abnormality to suggest acute infarct. No mass effect or midline shift. No acute intracranial hemorrhage. Previously described susceptibility artifact on gradient echo sequences in nonspecific distribution again favored to represent chronic cerebral microhemorrhages.   Mild-to-moderate nonspecific patchy and confluent T2/FLAIR hyperintense signal within the periventricular and subcortical white matter, likely sequela of chronic microangiopathy unchanged from prior.   Dilatation of the supratentorial ventricles, most pronounced in the temporal horns to disproportionate medial temporal lobe atrophy. No  extra-axial fluid collection. Mineralization is noted of the anterior interhemispheric falx cerebri.   Opacification of the right-greater-than-left mastoid air cells, similar to prior. The visualized paranasal sinuses are essentially clear.   Status post right lens replacement.       MRA OF HEAD:   Anterior circulation:    There is expected flow signal in bilateral intracranial internal carotid arteries, bilateral carotid terminals, bilateral proximal anterior and middle cerebral arteries.   Posterior circulation:  Minimal narrowing of the bilateral P2 PCA segment. Bilateral intracranial vertebral arteries, vertebrobasilar junction, basilar artery and proximal posterior cerebral arteries demonstrate expected flow signal.   MRA OF NECK:   The source images are mildly degraded by artifact.   Right carotid vessels:  There is expected flow signal in the visualized portion of the common carotid artery.  There is mild attenuation of flow signal at the carotid bifurcation which may be secondary to flow related artifact. The internal carotid artery in the neck demonstrates expected flow signal.  There is 0% stenosis per NASCET criteria.   Left carotid vessels:   There is expected flow signal in the visualized portion of the common carotid artery.  There is mild attenuation of flow signal at the carotid bifurcation which may be secondary to flow related artifact. The internal carotid artery in the neck demonstrates expected flow signal.  There is 0% stenosis per NASCET criteria.   Vertebral vessels:   The visualized segments of the cervical vertebral arteries demonstrate expected flow signal.       1. No acute infarct, acute intracranial hemorrhage, or mass effect. No major vessel cut off or significant stenosis in the head or neck. 2. No significant interval change in disproportionate dilation of the supratentorial ventricles which can be seen in the setting of normal pressure hydrocephalus or central atrophy. 3. Unchanged  disproportionate hippocampal volume loss bilaterally which may reflect Alzheimer's dementia. 4. Similar mild-to-moderate nonspecific white matter changes which can be seen in the setting of chronic microangiopathy.   I personally reviewed the images/study and I agree with Jacquelyn Boone DO's (radiology resident) findings as stated. This study was interpreted at [facility], [place], [place].   MACRO: None   Signed by: Diego Beach 2/16/2025 10:03 PM Dictation workstation:   [id number]    MR angio neck wo IV contrast    Result Date: 2/16/2025  Interpreted By:  Diego Beach  and Mely Valladares STUDY: MR BRAIN WO IV CONTRAST; MR ANGIO HEAD WO IV CONTRAST; MR ANGIO NECK WO IV CONTRAST;  2/16/2025 8:32 pm   INDICATION: Signs/Symptoms:speech difficulties, left sided sensory changes. Stroke protocol.   Per EMR patient is a 84-year-old female who presented the emergency department for dysarthria and gait instability. CT head showed no acute abnormality.   COMPARISON: Same day CT head MRI brain 02/06/2025 and 10/11/2024   ACCESSION NUMBER(S): [id number]; [id number]; [id number]   ORDERING CLINICIAN: LAURA LUGO   TECHNIQUE: Axial T2, FLAIR, DWI, gradient echo T2 and  sagittal and coronal T1 weighted images of brain were acquired.   Time-of-flight MRA of the head  and neck was performed. The images were reviewed as source images and maximum intensity projections.   The estimate of the degree of stenosis included in this report is based on the NASCET method for calculating stenosis, using the internal carotid artery distal to the stenosis as the reference point.   FINDINGS: MRI BRAIN:   No diffusion restriction abnormality to suggest acute infarct. No mass effect or midline shift. No acute intracranial hemorrhage. Previously described susceptibility artifact on gradient echo sequences in nonspecific distribution again favored to represent chronic cerebral  microhemorrhages.   Mild-to-moderate nonspecific patchy and confluent T2/FLAIR hyperintense signal within the periventricular and subcortical white matter, likely sequela of chronic microangiopathy unchanged from prior.   Dilatation of the supratentorial ventricles, most pronounced in the temporal horns to disproportionate medial temporal lobe atrophy. No extra-axial fluid collection. Mineralization is noted of the anterior interhemispheric falx cerebri.   Opacification of the right-greater-than-left mastoid air cells, similar to prior. The visualized paranasal sinuses are essentially clear.   Status post right lens replacement.       MRA OF HEAD:   Anterior circulation:    There is expected flow signal in bilateral intracranial internal carotid arteries, bilateral carotid terminals, bilateral proximal anterior and middle cerebral arteries.   Posterior circulation:  Minimal narrowing of the bilateral P2 PCA segment. Bilateral intracranial vertebral arteries, vertebrobasilar junction, basilar artery and proximal posterior cerebral arteries demonstrate expected flow signal.   MRA OF NECK:   The source images are mildly degraded by artifact.   Right carotid vessels:  There is expected flow signal in the visualized portion of the common carotid artery.  There is mild attenuation of flow signal at the carotid bifurcation which may be secondary to flow related artifact. The internal carotid artery in the neck demonstrates expected flow signal.  There is 0% stenosis per NASCET criteria.   Left carotid vessels:   There is expected flow signal in the visualized portion of the common carotid artery.  There is mild attenuation of flow signal at the carotid bifurcation which may be secondary to flow related artifact. The internal carotid artery in the neck demonstrates expected flow signal.  There is 0% stenosis per NASCET criteria.   Vertebral vessels:   The visualized segments of the cervical vertebral arteries demonstrate  expected flow signal.       1. No acute infarct, acute intracranial hemorrhage, or mass effect. No major vessel cut off or significant stenosis in the head or neck. 2. No significant interval change in disproportionate dilation of the supratentorial ventricles which can be seen in the setting of normal pressure hydrocephalus or central atrophy. 3. Unchanged disproportionate hippocampal volume loss bilaterally which may reflect Alzheimer's dementia. 4. Similar mild-to-moderate nonspecific white matter changes which can be seen in the setting of chronic microangiopathy.   I personally reviewed the images/study and I agree with Jacquelyn Boone DO's (radiology resident) findings as stated. This study was interpreted at [place], [place].   MACRO: None   Signed by: Diego Beach 2/16/2025 10:03 PM Dictation workstation:   [id number]    XR chest 1 view    Result Date: 2/16/2025  Interpreted By:  Mike Suarez and Ohs Zachary STUDY: XR CHEST 1 VIEW;  2/16/2025 4:33 pm   INDICATION: Signs/Symptoms:ams, weakness.     COMPARISON: Chest radiograph 10/10/2024, CT abdomen 10/10/2024.   ACCESSION NUMBER(S): [id number]   ORDERING CLINICIAN: LAURA LUGO   FINDINGS: AP radiograph of the chest was provided.   MEDICAL DEVICES: None.   CARDIOMEDIASTINAL SILHOUETTE: Cardiomediastinal silhouette is normal in size and configuration.   LUNGS: No pneumothorax, pleural effusion or focal consolidation. Left midlung calcified granuloma.   ABDOMEN: No remarkable upper abdominal findings.   BONES: No acute osseous changes.       1.  No evidence of acute cardiopulmonary process.   I personally reviewed the images/study and I agree with the findings as stated by Dr. Kojo Willett. This study was interpreted at [place], [place].   MACRO: None   Signed by: Mike Suarez 2/16/2025 5:35 PM Dictation workstation:   [id number]    CT brain attack  head wo IV contrast    Result Date: 2/16/2025  Interpreted By:  Jeramy Matthews, STUDY: CT BRAIN ATTACK HEAD WO IV CONTRAST;  2/16/2025 4:20 pm   INDICATION: Signs/Symptoms:Stroke Evaluation.     COMPARISON: None.   ACCESSION NUMBER(S): [id number]   ORDERING CLINICIAN: LAURA LUGO   TECHNIQUE: Noncontrast axial CT scan of head was performed. Angled reformats in brain and bone windows were generated. The images were reviewed in bone, brain, blood and soft tissue windows.   FINDINGS: No acute hemorrhage, intracranial mass, or evidence of large evolving cortical infarction. Osseous structures intact.   Supratentorial white matter hypodensities most likely represent chronic small vessel ischemic change.   Ventricles are dilated commensurate with overall degree of brain parenchymal volume loss that is present. Brain parenchymal atrophy is mild.           No acute findings or intracranial mass.   Supratentorial white matter hypodensities most likely represent chronic small vessel ischemic change.   MACRO: Dr. Kojo Willett discussed the significance and urgency of this critical finding by telephone with  Kai Candelario on 2/16/2025 at 4:15 pm.  (**-RCF-**) Findings:  See findings.     Signed by: Jeramy Matthews 2/16/2025 4:26 PM Dictation workstation:   [id number]    Results for orders placed or performed during the hospital encounter of 02/16/25 (from the past 24 hours)   POCT GLUCOSE   Result Value Ref Range    POCT Glucose 93 74 - 99 mg/dL   Sars-CoV-2 and Influenza A/B PCR   Result Value Ref Range    Flu A Result Not Detected Not Detected    Flu B Result Not Detected Not Detected    Coronavirus 2019, PCR Not Detected Not Detected   CBC and Auto Differential   Result Value Ref Range    WBC 5.5 4.4 - 11.3 x10*3/uL    nRBC 0.0 0.0 - 0.0 /100 WBCs    RBC 4.48 4.00 - 5.20 x10*6/uL    Hemoglobin 12.0 12.0 - 16.0 g/dL    Hematocrit 36.7 36.0 - 46.0 %    MCV 82 80 - 100 fL    MCH 26.8 26.0 - 34.0 pg    MCHC 32.7 32.0 - 36.0  g/dL    RDW 15.7 (H) 11.5 - 14.5 %    Platelets 289 150 - 450 x10*3/uL    Neutrophils % 81.7 40.0 - 80.0 %    Immature Granulocytes %, Automated 0.4 0.0 - 0.9 %    Lymphocytes % 12.6 13.0 - 44.0 %    Monocytes % 4.4 2.0 - 10.0 %    Eosinophils % 0.4 0.0 - 6.0 %    Basophils % 0.5 0.0 - 2.0 %    Neutrophils Absolute 4.49 1.60 - 5.50 x10*3/uL    Immature Granulocytes Absolute, Automated 0.02 0.00 - 0.50 x10*3/uL    Lymphocytes Absolute 0.69 (L) 0.80 - 3.00 x10*3/uL    Monocytes Absolute 0.24 0.05 - 0.80 x10*3/uL    Eosinophils Absolute 0.02 0.00 - 0.40 x10*3/uL    Basophils Absolute 0.03 0.00 - 0.10 x10*3/uL   Comprehensive metabolic panel   Result Value Ref Range    Glucose 100 (H) 74 - 99 mg/dL    Sodium 140 136 - 145 mmol/L    Potassium 5.0 3.5 - 5.3 mmol/L    Chloride 106 98 - 107 mmol/L    Bicarbonate 26 21 - 32 mmol/L    Anion Gap 13 10 - 20 mmol/L    Urea Nitrogen 14 6 - 23 mg/dL    Creatinine 1.42 (H) 0.50 - 1.05 mg/dL    eGFR 36 (L) >60 mL/min/1.73m*2    Calcium 9.5 8.6 - 10.6 mg/dL    Albumin 4.1 3.4 - 5.0 g/dL    Alkaline Phosphatase 85 33 - 136 U/L    Total Protein 6.7 6.4 - 8.2 g/dL    AST 17 9 - 39 U/L    Bilirubin, Total 0.4 0.0 - 1.2 mg/dL    ALT 5 (L) 7 - 45 U/L   TSH with reflex to Free T4 if abnormal   Result Value Ref Range    Thyroid Stimulating Hormone 1.99 0.44 - 3.98 mIU/L   Magnesium   Result Value Ref Range    Magnesium 2.00 1.60 - 2.40 mg/dL   Troponin I, High Sensitivity, Initial   Result Value Ref Range    Troponin I, High Sensitivity (CMC) 12 0 - 34 ng/L   Coagulation Screen   Result Value Ref Range    Protime 11.8 9.8 - 12.8 seconds    INR 1.0 0.9 - 1.1    aPTT 26 (L) 27 - 38 seconds   Troponin I, High Sensitivity   Result Value Ref Range    Troponin I, High Sensitivity (CMC) 10 0 - 34 ng/L   Urinalysis with Reflex Culture and Microscopic   Result Value Ref Range    Color, Urine Yellow Straw, Yellow    Appearance, Urine Clear Clear    Specific Gravity, Urine 1.015 1.005 - 1.035    pH,  Urine 6.5 5.0, 5.5, 6.0, 6.5, 7.0, 7.5, 8.0    Protein, Urine 10 (TRACE) NEGATIVE, 10 (TRACE) mg/dL    Glucose, Urine NEGATIVE NEGATIVE mg/dL    Blood, Urine NEGATIVE NEGATIVE mg/dL    Ketones, Urine NEGATIVE NEGATIVE mg/dL    Bilirubin, Urine NEGATIVE NEGATIVE mg/dL    Urobilinogen, Urine NORM NORM mg/dL    Nitrite, Urine NEGATIVE NEGATIVE    Leukocyte Esterase, Urine NEGATIVE NEGATIVE   Urinalysis Microscopic   Result Value Ref Range    WBC, Urine 1-5 1-5, NONE /HPF    RBC, Urine 1-2 NONE, 1-2, 3-5 /HPF      Scheduled medications  amLODIPine, 5 mg, oral, Daily  [START ON 2/17/2025] buPROPion XL, 300 mg, oral, Daily  donepezil, 5 mg, oral, Nightly  [START ON 2/17/2025] heparin (porcine), 5,000 Units, subcutaneous, q8h  lisinopril, 40 mg, oral, Daily  [START ON 2/17/2025] sennosides-docusate sodium, 1 tablet, oral, BID      Continuous medications     PRN medications  PRN medications: acetaminophen, melatonin         Assessment/Plan   Kirstin Howe is a 84 year old female with a PMH of cognitive impairment, hyperparathyroidism, constipation, CVA without deficits, anxiety, HTN, CKD 4, and tubular adenoma of the colon (no active treatment). Pt presented to ED as a BAT for reported dysarthria and gait instability.Per documentation from neurology: NIH 1 (subjective L-sided sensory complaints). CTH w/o contrast showed no acute abnormalities. Given mild symptoms, suspect iso hypertension. No c/f ischemic stroke given reassuring exam and normal CTH. Discussed with ED who agreed with cancellation of BAT. MRI brain and MRA head/neck negative for stroke but imaging did show findings suggestive of Alzheimer's dementia. Labs obtained as part of ED workup reviewed and notable for renal insufficiency. BP on arrival to ED was 187/89 with highest documented BP of 198/89. BP improved after pt received home dose of Norvasc and Lisinopril in addition to 5 mg of Hydralazine IV x1 dose. Last /72. Per chart review, pt was last seen  by PCP on 11/18/24 and her BP was elevated during this visit. Pt was told to consistently take her antihypertensive medications and ask family for assistance with medications. Pt currently lives alone. PT/OT consulted. Pt admitted to medicine for PT/OT evaluation and possible SNF placement.    #AMS  #MCI  - MRI brain done on admit:Unchanged disproportionate hippocampal volume loss bilaterally which may reflect Alzheimer's dementia.   - last seen by neurology (Dr. Magana) on 9/26/24; next appt with neuro (Dr. Giles) on 3/7/24  - Per documentation done by neuro on 9/26/24: MoCA has decreased gradually over the years (26/30 in 2018, 21/30 in 2020, and 18/30 in 2024). TSH and B12 in January 2024 were wnl. [place] in ADLs/iADLs is consistent with MCI. Would recommend starting donepezil daily to slow progression of cognitive decline. While neurological exam is nonfocal would also recommend obtaining repeat MRI brain wo contrast given that prior MRI brain was done several years ago at an OSH so images are not available for review.  - pt seen by neurology while and ED and BAT cancelled dysarthria attributed to elevated BP on admit, NIH score 1 on admit  - falls precautions  - c/w home dose of Aricept 5 mg PO at bedtime    #Generalized weakness  - PT/OT consulted    #uncontrolled HTN (improving)  - likely 2/2 medication noncompliance  - -189 while in ED  - s/p Hydralazine 5 mg IV x1 dose, Norvasc 5 mg PO every day, and Lisinopril 40 mg PO every day  - c/w home dose of Norvasc 5 mg and Lisinopril 40 mg PO every day  - continue to monitor BP    #CKD stage IV (stable)  - Cr 1.42 GFR 36 on admit--> Cr 1.77 GFR 28 on 10/11/24  - continue to monitor renal function  - RFP ordered for AM  - renally dose meds as needed    #anxiety  - c/w home dose of Wellbutrin  mg PO every day    Dispo: pending PT/OT recs and continued improvement with BP. Estimated LOS: 2 days. Admit to [facility].          I spent 60 minutes in the  professional and overall care of this patient.      Renee Urbina, APRN-CNP

## 2025-02-17 NOTE — SIGNIFICANT EVENT
CANCELLED BRAIN ATTACK/STROKE ALERT DOCUMENTATION:        Kirstin Howe is a 84 y.o. with a history of HTN, CKD3, depression/anxiety, osteoarthritis, who presents to [facility] on 2025 as a BAT for reported dysarthria and gait instability.      Last Known Well Time: 830,   Time BAT was called: 1608  Resident arrival time: 1610  CT/CTA reviewed time: As it was performed  TNK exclusion: Likely not a stroke    Initial /89, BG 93. NIH 1 (subjective L-sided sensory complaints). Obtained CTH w/o that showed no acute abnormalities. Given mild symptoms, suspect iso hypertension. No c/f ischemic stroke given reassuring exam and normal CTH. Discussed with ED who agreed with cancellation of BAT.      ------------------------------------------------------------------------------------------------------------------------------------------     Physical Exam:    Vitals:    25 1818   BP: 170/84   Pulse: 74   Resp: 15   Temp:    SpO2: 99%       NIHSS:     1a  Level of consciousness: 0=alert; keenly responsive   1b. LOC questions:  0=Performs both tasks correctly   1c. LOC commands: 0=Performs both tasks correctly   2.  Best Gaze: 0=normal   3. Visual: 0=No visual loss   4. Facial Palsy: 0=Normal symmetric movement   5a. Motor left arm: 0=No drift, limb holds 90 (or 45) degrees for full 10 seconds   5b.  Motor right arm: 0=No drift, limb holds 90 (or 45) degrees for full 10 seconds   6a. motor left le=No drift, limb holds 90 (or 45) degrees for full 10 seconds   6b  Motor right le=No drift, limb holds 90 (or 45) degrees for full 10 seconds   7. Limb Ataxia: 0=Absent   8.  Sensory: 1=Mild to moderate sensory loss; patient feels pinprick is less sharp or is dull on the affected side; there is a loss of superficial pain with pinprick but patient is aware She is being touched   9. Best Language:  0=No aphasia, normal   10. Dysarthria: 0=Normal   11. Extinction and Inattention: 0=No abnormality          Total:    1        ------------------------------------------------------------------------------------------------------------------------------------------     Medical Decision Making:   Not a stroke     IV Thrombolysis IV Thrombolysis Checklist  IV Thrombolysis Given: No; Thrombolysis contraindication reason: Working diagnosis is NOT a suspected ischemic stroke and Neurologic signs are mild and judged to be non-disabling      Assessment   Kirstin Howe is a 84 y.o. with a history of HTN, CKD3, depression/anxiety, osteoarthritis, who presents to [facility] on 2/16/2025 as a BAT for reported dysarthria and gait instability. Initial /89, BG 93. NIH 1 (subjective L-sided sensory complaints). Obtained CTH w/o that showed no acute abnormalities. Given mild symptoms, suspect iso hypertension. No c/f ischemic stroke given reassuring exam and normal CTH. Discussed with ED who agreed with cancellation of BAT.     Recommendations   - Discussed with ED who agree with cancellation of BAT  - [place] [phone] with any further questions       Kai Candelario MD  PGY-3, Neurology  Stroke neurology: [id number]

## 2025-02-17 NOTE — HOSPITAL COURSE
Kirstin Howe is a 84 y.o. 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 Pt presented to ED as a BAT for reported dysarthria and gait instability. NIH was 1, subjective sensory deficit. Family also reports pt with hallucinations. CTH w/o contrast showed no acute abnormalities. MRI and MRA ruled out stroke, findings concerning for dementia.  Labs showed corrine. Pt with hypertensive urgency, resmed on home bp meds, reports longstanding uncontrolled hypertension. After admission, pt's mentation has improved.  Pt reporting URI infection that is now just improved, possible contributing cause. Awaiting pt/ot, monitoring for stability.

## 2025-02-17 NOTE — PROGRESS NOTES
Kirstin Howe is a 84 y.o. female on day 0 of admission presenting with AMS (altered mental status).    PT requested to escalate; has since seen patient and recommended for SNF. SW has spoken with this family during prior encounters; will assist with SNF placement if desired at this time. MATT outreached [facility] ED to Home team to confirm connection on 10/28/24 encounter, where this SW placed referrals.    0548 PM:  SNF referral placed for facilities closest to family; choice list sent to son Oneil [phone] who lives in [facility] and daughter Kalyani [phone] via text ([place] of [place]).   SW called Kalyani and Oneil, no answer, LVM and awaiting return calls.    MATT team following for safe dispo plan.    NICOLÁS DE

## 2025-02-17 NOTE — PROGRESS NOTES
"Pharmacy Medication History Review    Kirstin Howe is a 84 y.o. female admitted for AMS (altered mental status). Pharmacy reviewed the patient's hghoc-[id number] medications and allergies for accuracy.    The list below reflects the updated PTA list.   Prior to Admission Medications   Prescriptions Last Dose Informant Patient Reported? Taking?   amLODIPine (Norvasc) 5 mg tablet 2/16/2025 at  5:38 PM Self No Yes   Sig: Take 2 tablets (10 mg) by mouth once daily.   buPROPion XL (Wellbutrin XL) 300 mg 24 hr tablet 2/16/2025 Self No Yes   Sig: Take 1 tablet (300 mg) by mouth once daily.   donepezil (Aricept) 5 mg tablet 2/16/2025 at 11:45 PM Self No Yes   Sig: Take 1 tablet (5 mg) by mouth once daily at bedtime.   lisinopril 40 mg tablet 2/16/2025 at  5:38 PM Self No Yes   Sig: Take 1 tablet (40 mg) by mouth once daily.      Facility-Administered Medications: None        The list below reflects the updated allergy list. Please review each documented allergy for additional clarification and justification.  Allergies  Reviewed by Renee Urbina, APRN-CNP on 2/16/2025        Severity Reactions Comments    Iodine Not Specified Unknown             Patient declines M2B at discharge.     Sources:   Patient Interview - good historian, able to independently list some medications, and confirm the rest with name prompting  Admission MedRec Grid  OARRS - none   EPIC medication dispense report    Medications ADDED:  None  Medications CHANGED:  None  Medications REMOVED/MARKED NOT TAKING:   None      Additional Comments:  Confirms Farxiga is discontinued    Glenna Ellis, PharmD  Transitions of Care Pharmacist  02/17/25     Secure Chat preferred   If no response call [phone] or Vocera \"Med Rec\"    "

## 2025-02-18 ENCOUNTER — APPOINTMENT (OUTPATIENT)
Dept: PRIMARY CARE | Facility: CLINIC | Age: 85
End: 2025-02-18
Payer: MEDICARE

## 2025-02-18 LAB
ALBUMIN SERPL BCP-MCNC: 3.5 G/DL (ref 3.4–5)
ANION GAP SERPL CALC-SCNC: 15 MMOL/L (ref 10–20)
BUN SERPL-MCNC: 14 MG/DL (ref 6–23)
CALCIUM SERPL-MCNC: 9.2 MG/DL (ref 8.6–10.6)
CHLORIDE SERPL-SCNC: 106 MMOL/L (ref 98–107)
CO2 SERPL-SCNC: 25 MMOL/L (ref 21–32)
CREAT SERPL-MCNC: 1.63 MG/DL (ref 0.5–1.05)
EGFRCR SERPLBLD CKD-EPI 2021: 31 ML/MIN/1.73M*2
ERYTHROCYTE [DISTWIDTH] IN BLOOD BY AUTOMATED COUNT: 16 % (ref 11.5–14.5)
GLUCOSE SERPL-MCNC: 88 MG/DL (ref 74–99)
HCT VFR BLD AUTO: 39.2 % (ref 36–46)
HGB BLD-MCNC: 12 G/DL (ref 12–16)
MAGNESIUM SERPL-MCNC: 1.98 MG/DL (ref 1.6–2.4)
MCH RBC QN AUTO: 26.6 PG (ref 26–34)
MCHC RBC AUTO-ENTMCNC: 30.6 G/DL (ref 32–36)
MCV RBC AUTO: 87 FL (ref 80–100)
NRBC BLD-RTO: 0 /100 WBCS (ref 0–0)
PHOSPHATE SERPL-MCNC: 4.2 MG/DL (ref 2.5–4.9)
PLATELET # BLD AUTO: 331 X10*3/UL (ref 150–450)
POTASSIUM SERPL-SCNC: 4.3 MMOL/L (ref 3.5–5.3)
RBC # BLD AUTO: 4.51 X10*6/UL (ref 4–5.2)
SODIUM SERPL-SCNC: 142 MMOL/L (ref 136–145)
WBC # BLD AUTO: 5.3 X10*3/UL (ref 4.4–11.3)

## 2025-02-18 PROCEDURE — 85027 COMPLETE CBC AUTOMATED: CPT | Performed by: STUDENT IN AN ORGANIZED HEALTH CARE EDUCATION/TRAINING PROGRAM

## 2025-02-18 PROCEDURE — 99233 SBSQ HOSP IP/OBS HIGH 50: CPT | Performed by: STUDENT IN AN ORGANIZED HEALTH CARE EDUCATION/TRAINING PROGRAM

## 2025-02-18 PROCEDURE — 96372 THER/PROPH/DIAG INJ SC/IM: CPT | Performed by: NURSE PRACTITIONER

## 2025-02-18 PROCEDURE — 80069 RENAL FUNCTION PANEL: CPT | Performed by: STUDENT IN AN ORGANIZED HEALTH CARE EDUCATION/TRAINING PROGRAM

## 2025-02-18 PROCEDURE — 2500000001 HC RX 250 WO HCPCS SELF ADMINISTERED DRUGS (ALT 637 FOR MEDICARE OP): Performed by: NURSE PRACTITIONER

## 2025-02-18 PROCEDURE — 2500000004 HC RX 250 GENERAL PHARMACY W/ HCPCS (ALT 636 FOR OP/ED): Performed by: NURSE PRACTITIONER

## 2025-02-18 PROCEDURE — 83735 ASSAY OF MAGNESIUM: CPT | Performed by: STUDENT IN AN ORGANIZED HEALTH CARE EDUCATION/TRAINING PROGRAM

## 2025-02-18 PROCEDURE — 36415 COLL VENOUS BLD VENIPUNCTURE: CPT | Performed by: STUDENT IN AN ORGANIZED HEALTH CARE EDUCATION/TRAINING PROGRAM

## 2025-02-18 PROCEDURE — 2500000001 HC RX 250 WO HCPCS SELF ADMINISTERED DRUGS (ALT 637 FOR MEDICARE OP): Performed by: STUDENT IN AN ORGANIZED HEALTH CARE EDUCATION/TRAINING PROGRAM

## 2025-02-18 PROCEDURE — G0378 HOSPITAL OBSERVATION PER HR: HCPCS

## 2025-02-18 RX ADMIN — HEPARIN SODIUM 5000 UNITS: 5000 INJECTION, SOLUTION INTRAVENOUS; SUBCUTANEOUS at 06:47

## 2025-02-18 RX ADMIN — LISINOPRIL 40 MG: 20 TABLET ORAL at 09:30

## 2025-02-18 RX ADMIN — DONEPEZIL HYDROCHLORIDE 5 MG: 5 TABLET ORAL at 20:51

## 2025-02-18 RX ADMIN — BUPROPION HYDROCHLORIDE 300 MG: 300 TABLET, EXTENDED RELEASE ORAL at 09:31

## 2025-02-18 RX ADMIN — HEPARIN SODIUM 5000 UNITS: 5000 INJECTION, SOLUTION INTRAVENOUS; SUBCUTANEOUS at 18:07

## 2025-02-18 RX ADMIN — AMLODIPINE BESYLATE 5 MG: 5 TABLET ORAL at 09:30

## 2025-02-18 RX ADMIN — CYANOCOBALAMIN TAB 1000 MCG 1000 MCG: 1000 TAB at 09:30

## 2025-02-18 ASSESSMENT — PAIN SCALES - GENERAL
PAINLEVEL_OUTOF10: 0 - NO PAIN
PAINLEVEL_OUTOF10: 0 - NO PAIN

## 2025-02-18 ASSESSMENT — PAIN SCALES - WONG BAKER: WONGBAKER_NUMERICALRESPONSE: NO HURT

## 2025-02-18 ASSESSMENT — ACTIVITIES OF DAILY LIVING (ADL): LACK_OF_TRANSPORTATION: NO

## 2025-02-18 NOTE — CARE PLAN
The patient's goals for the shift include  she is confused and does not understand why she is here    The clinical goals for the shift include  remain safe     Over the shift, the patient did not make progress toward the following goals. Barriers to progression include disorientation and unable to state why she is here

## 2025-02-18 NOTE — PROGRESS NOTES
02/18/25 1445   Discharge Planning   Living Arrangements Alone   Support Systems Children;Family members   Assistance Needed needs assistance with some ADL's   Type of Residence Private residence   Home or Post Acute Services Post acute facilities (Rehab/SNF/etc)   Expected Discharge Disposition SNF   Does the patient need discharge transport arranged? Yes   RoundTrip coordination needed? Yes   Financial Resource Strain   How hard is it for you to pay for the very basics like food, housing, medical care, and heating? Not very   Housing Stability   In the last 12 months, was there a time when you were not able to pay the mortgage or rent on time? N   In the past 12 months, how many times have you moved where you were living? 0   At any time in the past 12 months, were you homeless or living in a shelter (including now)? N   Transportation Needs   In the past 12 months, has lack of transportation kept you from medical appointments or from getting medications? no   In the past 12 months, has lack of transportation kept you from meetings, work, or from getting things needed for daily living? No       Transitional Care Coordination Progress Note:  Patient discussed during interdisciplinary rounds.   Team members present: MD and TCC  Plan per Medical/Surgical team: Spoke with daughter Kalyani and provided the accepting SNF facilities. Kalyani will speak with her siblings to make a decision.  Payor: [employer]  Discharge disposition: SNF  Potential Barriers: none  ADOD: 1-3 days      Alexia Gamez RN, BSN  Transitional Care Coordinator

## 2025-02-18 NOTE — PROGRESS NOTES
INTERNAL MEDICINE PROGRESS NOTE         SUBJECTIVE     Pt was seen and examined at beside. No acute events overnight.  Pt A+Ox 4.     OBJECTIVE      Visit Vitals  /65   Pulse 84   Temp 36.6 °C (97.9 °F)   Resp 15      No intake or output data in the 24 hours ending 02/18/25 1333    Physical Exam  Constitutional:       Appearance: Normal appearance.   HENT:      Head: Normocephalic and atraumatic.   Cardiovascular:      Rate and Rhythm: Normal rate and regular rhythm.      Heart sounds: No murmur heard.  Pulmonary:      Effort: Pulmonary effort is normal. No respiratory distress.      Breath sounds: Normal breath sounds and air entry. No stridor. No wheezing or rhonchi.   Abdominal:      General: Abdomen is flat. Bowel sounds are normal. There is no distension.      Palpations: Abdomen is soft. There is no mass.      Tenderness: There is no abdominal tenderness.   Musculoskeletal:         General: No swelling. Normal range of motion.      Right lower leg: No edema.      Left lower leg: No edema.   Skin:     General: Skin is warm and dry.      Capillary Refill: Capillary refill takes less than 2 seconds.      Findings: No lesion or rash.   Neurological:      General: No focal deficit present.      Mental Status: She is alert and oriented to person, place, and time. Mental status is at baseline.      Cranial Nerves: No cranial nerve deficit.   Psychiatric:         Mood and Affect: Mood normal.         Behavior: Behavior normal.         Thought Content: Thought content normal.         Judgment: Judgment normal.              Current Meds   amLODIPine, 5 mg, oral, Daily  buPROPion XL, 300 mg, oral, Daily  cyanocobalamin, 1,000 mcg,  oral, Daily  donepezil, 5 mg, oral, Nightly  heparin (porcine), 5,000 Units, subcutaneous, q8h  lisinopril, 40 mg, oral, Daily  sennosides-docusate sodium, 1 tablet, oral, BID       PRN medications: acetaminophen, melatonin     LABS and IMAGING     WBC   Date Value Ref Range Status   02/18/2025 5.3 4.4 - 11.3 x10*3/uL Final   02/17/2025 6.4 4.4 - 11.3 x10*3/uL Final   02/16/2025 5.5 4.4 - 11.3 x10*3/uL Final     Hemoglobin   Date Value Ref Range Status   02/18/2025 12.0 12.0 - 16.0 g/dL Final   02/17/2025 12.1 12.0 - 16.0 g/dL Final   02/16/2025 12.0 12.0 - 16.0 g/dL Final     Hematocrit   Date Value Ref Range Status   02/18/2025 39.2 36.0 - 46.0 % Final   02/17/2025 36.8 36.0 - 46.0 % Final   02/16/2025 36.7 36.0 - 46.0 % Final     Bicarbonate   Date Value Ref Range Status   02/18/2025 25 21 - 32 mmol/L Final   02/17/2025 26 21 - 32 mmol/L Final   02/16/2025 26 21 - 32 mmol/L Final     Creatinine   Date Value Ref Range Status   02/18/2025 1.63 (H) 0.50 - 1.05 mg/dL Final   02/17/2025 1.24 (H) 0.50 - 1.05 mg/dL Final   02/16/2025 1.42 (H) 0.50 - 1.05 mg/dL Final     Calcium   Date Value Ref Range Status   02/18/2025 9.2 8.6 - 10.6 mg/dL Final   02/17/2025 9.1 8.6 - 10.6 mg/dL Final   02/16/2025 9.5 8.6 - 10.6 mg/dL Final     INR   Date Value Ref Range Status   02/16/2025 1.0 0.9 - 1.1 Final       ECG 12 lead  Normal sinus rhythm  Left ventricular hypertrophy with repolarization abnormality ( Sokolow-Villalobos )  Abnormal ECG  When compared with ECG of 10-OCT-2024 16:14,  No significant change was found    See ED provider note for full interpretation and clinical correlation  Confirmed by Anyi Alarcon ([id number]) on 2/16/2025 11:48:43 PM  MR brain wo IV contrast, MR angio head wo IV contrast, MR angio neck wo IV contrast  Narrative: Interpreted By:  Diego Beach and Stephens Katherine   STUDY:  MR BRAIN WO IV CONTRAST; MR ANGIO HEAD WO IV CONTRAST; MR ANGIO NECK  WO IV CONTRAST;  2/16/2025 8:32 pm       INDICATION:  Signs/Symptoms:speech difficulties, left sided sensory changes.  Stroke protocol.      Per EMR patient is a 84-year-old female who presented the emergency  department for dysarthria and gait instability. CT head showed no  acute abnormality.      COMPARISON:  Same day CT head  MRI brain 02/06/2025 and 10/11/2024      ACCESSION NUMBER(S):  [id number]; [id number]; [id number]      ORDERING CLINICIAN:  LAURA LUGO      TECHNIQUE:  Axial T2, FLAIR, DWI, gradient echo T2 and  sagittal and coronal T1  weighted images of brain were acquired.      Time-of-flight MRA of the head  and neck was performed. The images  were reviewed as source images and maximum intensity projections.      The estimate of the degree of stenosis included in this report is  based on the NASCET method for calculating stenosis, using the  internal carotid artery distal to the stenosis as the reference point.      FINDINGS:  MRI BRAIN:      No diffusion restriction abnormality to suggest acute infarct. No  mass effect or midline shift. No acute intracranial hemorrhage.  Previously described susceptibility artifact on gradient echo  sequences in nonspecific distribution again favored to represent  chronic cerebral microhemorrhages.      Mild-to-moderate nonspecific patchy and confluent T2/FLAIR  hyperintense signal within the periventricular and subcortical white  matter, likely sequela of chronic microangiopathy unchanged from  prior.      Dilatation of the supratentorial ventricles, most pronounced in the  temporal horns to disproportionate medial temporal lobe atrophy. No  extra-axial fluid collection. Mineralization is noted of the anterior  interhemispheric falx cerebri.      Opacification of the right-greater-than-left mastoid air cells,  similar to prior. The visualized paranasal sinuses are essentially  clear.      Status post right lens replacement.              MRA OF HEAD:      Anterior circulation:    There is expected  flow signal in bilateral  intracranial internal carotid arteries, bilateral carotid terminals,  bilateral proximal anterior and middle cerebral arteries.      Posterior circulation:  Minimal narrowing of the bilateral P2 PCA  segment. Bilateral intracranial vertebral arteries, vertebrobasilar  junction, basilar artery and proximal posterior cerebral arteries  demonstrate expected flow signal.      MRA OF NECK:      The source images are mildly degraded by artifact.      Right carotid vessels:  There is expected flow signal in the  visualized portion of the common carotid artery.  There is mild  attenuation of flow signal at the carotid bifurcation which may be  secondary to flow related artifact. The internal carotid artery in  the neck demonstrates expected flow signal.  There is 0% stenosis  per NASCET criteria.      Left carotid vessels:   There is expected flow signal in the  visualized portion of the common carotid artery.  There is mild  attenuation of flow signal at the carotid bifurcation which may be  secondary to flow related artifact. The internal carotid artery in  the neck demonstrates expected flow signal.  There is 0% stenosis  per NASCET criteria.      Vertebral vessels:   The visualized segments of the cervical  vertebral arteries demonstrate expected flow signal.      Impression: 1. No acute infarct, acute intracranial hemorrhage, or mass effect.  No major vessel cut off or significant stenosis in the head or neck.  2. No significant interval change in disproportionate dilation of the  supratentorial ventricles which can be seen in the setting of normal  pressure hydrocephalus or central atrophy.  3. Unchanged disproportionate hippocampal volume loss bilaterally  which may reflect Alzheimer's dementia.  4. Similar mild-to-moderate nonspecific white matter changes which  can be seen in the setting of chronic microangiopathy.      I personally reviewed the images/study and I agree with  RHONDA Mcnamaras (radiology resident) findings as stated. This study  was interpreted at [place], [place].      MACRO:  None      Signed by: Diego Beach 2/16/2025 10:03 PM  Dictation workstation:   [id number]  XR chest 1 view  Narrative: Interpreted By:  Mike Suarez and Ohs Zachary   STUDY:  XR CHEST 1 VIEW;  2/16/2025 4:33 pm      INDICATION:  Signs/Symptoms:ams, weakness.          COMPARISON:  Chest radiograph 10/10/2024, CT abdomen 10/10/2024.      ACCESSION NUMBER(S):  [id number]      ORDERING CLINICIAN:  LAURA LUGO      FINDINGS:  AP radiograph of the chest was provided.      MEDICAL DEVICES:  None.      CARDIOMEDIASTINAL SILHOUETTE:  Cardiomediastinal silhouette is normal in size and configuration.      LUNGS:  No pneumothorax, pleural effusion or focal consolidation. Left  midlung calcified granuloma.      ABDOMEN:  No remarkable upper abdominal findings.      BONES:  No acute osseous changes.      Impression: 1.  No evidence of acute cardiopulmonary process.      I personally reviewed the images/study and I agree with the findings  as stated by Dr. Kojo Willett. This study was interpreted at  [place], [place].      MACRO:  None      Signed by: Mike Suarez 2/16/2025 5:35 PM  Dictation workstation:   [id number]  CT brain attack head wo IV contrast  Narrative: Interpreted By:  Jeramy Matthews,   STUDY:  CT BRAIN ATTACK HEAD WO IV CONTRAST;  2/16/2025 4:20 pm      INDICATION:  Signs/Symptoms:Stroke Evaluation.          COMPARISON:  None.      ACCESSION NUMBER(S):  [id number]      ORDERING CLINICIAN:  LAURA LUGO      TECHNIQUE:  Noncontrast axial CT scan of head was performed. Angled reformats in  brain and bone windows were generated. The images were reviewed in  bone, brain, blood and soft tissue windows.      FINDINGS:  No acute hemorrhage, intracranial mass, or evidence of large  evolving  cortical infarction. Osseous structures intact.      Supratentorial white matter hypodensities most likely represent  chronic small vessel ischemic change.      Ventricles are dilated commensurate with overall degree of brain  parenchymal volume loss that is present. Brain parenchymal atrophy is  mild.              Impression: No acute findings or intracranial mass.      Supratentorial white matter hypodensities most likely represent  chronic small vessel ischemic change.      MACRO:  Dr. Kojo Willett discussed the significance and urgency of this  critical finding by telephone with  Kai Candelario on 2/16/2025 at  4:15 pm.  (**-RCF-**) Findings:  See findings.          Signed by: Jeramy Matthews 2/16/2025 4:26 PM  Dictation workstation:   [id number]       Labs and images were reviewed by me.       PROBLEM LISTS      Problem List Items Addressed This Visit       * (Principal) AMS (altered mental status) - Primary     Other Visit Diagnoses       Generalized weakness                  ASSESSMENT / PLANS    Kirstin Howe is a 84 y.o. 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 Pt presented to ED as a BAT for reported dysarthria and gait instability. NIH was 1, subjective sensory deficit. Family also reports pt with hallucinations. CTH w/o contrast showed no acute abnormalities. MRI and MRA ruled out stroke, findings concerning for dementia.  Labs showed corrine. Pt with hypertensive urgency, resmed on home bp meds, reports longstanding uncontrolled hypertension. After admission, pt's mentation has improved.  Pt reporting URI infection that is now just improved, possible contributing cause. Awaiting pt/ot, monitoring for stability..    #acute on chronic moderate cognitive impairment - acute change resolved.   #dysathria, difficulty walking likely related acute metabolic  encephalopathy/delirium. Now resolved.   :: AMS and hallucinations noted in setting of recent URI sx reported by patient. She reported her uri sx are now improved. Pt also with uncontroleld bp, possible htn encephalopathy.   ::ua negative. Covid, flu neg. Acs ruled out. Tsh wnl.   ::MRI brain done on admit: Unchanged disproportionate hippocampal volume loss bilaterally which may reflect Alzheimer's dementia.   ::BAT called on admission, was cancelled given resolution of sx's; NIH was 1 on admit. MRI ruled out stroke  - followed by neuro, she was dx [facility] mod cognitive impairment, last MoCA 18/30 in 2024, has been progressing, started on donepezil to slow progression. Will schedule follow-up with neuro OP.   - c/w home dose of Aricept 5 mg PO at bedtime     #B12 deficiency  ::B12 208   - start b12 supplementation  - folate wnl      #Generalized weakness  - PT/OT consulted, recommended SNF, pending choices     #uncontrolled HTN (improving)  :: likely 2/2 medication noncompliance  - s/p Hydralazine 5 mg IV x1 dose, Norvasc 5 mg PO every day, and Lisinopril 40 mg PO every day  - c/w home dose of Norvasc 5 mg and Lisinopril 40 mg PO every day  - continue to monitor BP     #CKD stage IV (stable) - per record  :: Cr 1.42 GFR 36 on admit--> Cr 1.2.  :: baseline appears to be 1.7 to 1.8 per record  -continue to monitor renal function  - renally dose meds as needed     #anxiety  - c/w home dose of Wellbutrin  mg PO every day    The total time spent on the floor rendering services for this patient  and coordinating the patient's care (obtaining/reviewing additional history, performing medically appropriate exam and/or evaluation, reviewing the electronic chart, labs, imaging, independently interpreting the results and communicating and discussing with medical teams, counseling and educating the patient, discussing with family members, ordering medications, tests and/or procedures, coordinating care, and documentation) was  65 minutes.        Blanca Alba MD

## 2025-02-18 NOTE — NURSING NOTE
1900:  Patient admitted to [facility] from  ER. She is unable to state why she is here and was trying to call her daughter. . Sujata  [phone]. Per nursing report from the ER , her family wishes to gt dementia evaluation and possible admission to a Assisted living or memory unit.  Kirstin, was unable to answer some of the admission questions.

## 2025-02-19 ENCOUNTER — APPOINTMENT (OUTPATIENT)
Dept: NEUROLOGY | Facility: HOSPITAL | Age: 85
End: 2025-02-19
Payer: MEDICARE

## 2025-02-19 LAB
ALBUMIN SERPL BCP-MCNC: 3.4 G/DL (ref 3.4–5)
ANION GAP SERPL CALC-SCNC: 11 MMOL/L (ref 10–20)
BUN SERPL-MCNC: 18 MG/DL (ref 6–23)
CALCIUM SERPL-MCNC: 8.9 MG/DL (ref 8.6–10.6)
CHLORIDE SERPL-SCNC: 108 MMOL/L (ref 98–107)
CO2 SERPL-SCNC: 27 MMOL/L (ref 21–32)
CREAT SERPL-MCNC: 1.85 MG/DL (ref 0.5–1.05)
EGFRCR SERPLBLD CKD-EPI 2021: 26 ML/MIN/1.73M*2
ERYTHROCYTE [DISTWIDTH] IN BLOOD BY AUTOMATED COUNT: 15.7 % (ref 11.5–14.5)
GLUCOSE SERPL-MCNC: 89 MG/DL (ref 74–99)
HCT VFR BLD AUTO: 37.2 % (ref 36–46)
HGB BLD-MCNC: 11.9 G/DL (ref 12–16)
MAGNESIUM SERPL-MCNC: 2.09 MG/DL (ref 1.6–2.4)
MCH RBC QN AUTO: 27.4 PG (ref 26–34)
MCHC RBC AUTO-ENTMCNC: 32 G/DL (ref 32–36)
MCV RBC AUTO: 86 FL (ref 80–100)
NRBC BLD-RTO: 0 /100 WBCS (ref 0–0)
PHOSPHATE SERPL-MCNC: 3.9 MG/DL (ref 2.5–4.9)
PLATELET # BLD AUTO: 308 X10*3/UL (ref 150–450)
POTASSIUM SERPL-SCNC: 4 MMOL/L (ref 3.5–5.3)
RBC # BLD AUTO: 4.34 X10*6/UL (ref 4–5.2)
SODIUM SERPL-SCNC: 142 MMOL/L (ref 136–145)
WBC # BLD AUTO: 4.8 X10*3/UL (ref 4.4–11.3)

## 2025-02-19 PROCEDURE — 85027 COMPLETE CBC AUTOMATED: CPT | Performed by: STUDENT IN AN ORGANIZED HEALTH CARE EDUCATION/TRAINING PROGRAM

## 2025-02-19 PROCEDURE — 96372 THER/PROPH/DIAG INJ SC/IM: CPT | Performed by: NURSE PRACTITIONER

## 2025-02-19 PROCEDURE — 96361 HYDRATE IV INFUSION ADD-ON: CPT

## 2025-02-19 PROCEDURE — 97165 OT EVAL LOW COMPLEX 30 MIN: CPT | Mod: GO

## 2025-02-19 PROCEDURE — 2500000004 HC RX 250 GENERAL PHARMACY W/ HCPCS (ALT 636 FOR OP/ED): Performed by: NURSE PRACTITIONER

## 2025-02-19 PROCEDURE — 99232 SBSQ HOSP IP/OBS MODERATE 35: CPT | Performed by: STUDENT IN AN ORGANIZED HEALTH CARE EDUCATION/TRAINING PROGRAM

## 2025-02-19 PROCEDURE — 80069 RENAL FUNCTION PANEL: CPT | Performed by: STUDENT IN AN ORGANIZED HEALTH CARE EDUCATION/TRAINING PROGRAM

## 2025-02-19 PROCEDURE — 2500000004 HC RX 250 GENERAL PHARMACY W/ HCPCS (ALT 636 FOR OP/ED): Performed by: STUDENT IN AN ORGANIZED HEALTH CARE EDUCATION/TRAINING PROGRAM

## 2025-02-19 PROCEDURE — 97530 THERAPEUTIC ACTIVITIES: CPT | Mod: GP,CQ

## 2025-02-19 PROCEDURE — 2500000001 HC RX 250 WO HCPCS SELF ADMINISTERED DRUGS (ALT 637 FOR MEDICARE OP): Performed by: NURSE PRACTITIONER

## 2025-02-19 PROCEDURE — G0378 HOSPITAL OBSERVATION PER HR: HCPCS

## 2025-02-19 PROCEDURE — 2500000001 HC RX 250 WO HCPCS SELF ADMINISTERED DRUGS (ALT 637 FOR MEDICARE OP): Performed by: STUDENT IN AN ORGANIZED HEALTH CARE EDUCATION/TRAINING PROGRAM

## 2025-02-19 PROCEDURE — 83735 ASSAY OF MAGNESIUM: CPT | Performed by: STUDENT IN AN ORGANIZED HEALTH CARE EDUCATION/TRAINING PROGRAM

## 2025-02-19 PROCEDURE — 97116 GAIT TRAINING THERAPY: CPT | Mod: GP,CQ

## 2025-02-19 PROCEDURE — 36415 COLL VENOUS BLD VENIPUNCTURE: CPT | Performed by: STUDENT IN AN ORGANIZED HEALTH CARE EDUCATION/TRAINING PROGRAM

## 2025-02-19 PROCEDURE — 97530 THERAPEUTIC ACTIVITIES: CPT | Mod: GO

## 2025-02-19 RX ORDER — AMOXICILLIN 250 MG
1 CAPSULE ORAL 2 TIMES DAILY
Start: 2025-02-19

## 2025-02-19 RX ORDER — LANOLIN ALCOHOL/MO/W.PET/CERES
1000 CREAM (GRAM) TOPICAL DAILY
Start: 2025-02-20

## 2025-02-19 RX ORDER — ACETAMINOPHEN 500 MG
5 TABLET ORAL NIGHTLY PRN
Start: 2025-02-19

## 2025-02-19 RX ADMIN — SODIUM CHLORIDE 500 ML: 9 INJECTION, SOLUTION INTRAVENOUS at 14:23

## 2025-02-19 RX ADMIN — HEPARIN SODIUM 5000 UNITS: 5000 INJECTION, SOLUTION INTRAVENOUS; SUBCUTANEOUS at 22:22

## 2025-02-19 RX ADMIN — HEPARIN SODIUM 5000 UNITS: 5000 INJECTION, SOLUTION INTRAVENOUS; SUBCUTANEOUS at 00:49

## 2025-02-19 RX ADMIN — SENNOSIDES AND DOCUSATE SODIUM 1 TABLET: 50; 8.6 TABLET ORAL at 09:30

## 2025-02-19 RX ADMIN — HEPARIN SODIUM 5000 UNITS: 5000 INJECTION, SOLUTION INTRAVENOUS; SUBCUTANEOUS at 14:30

## 2025-02-19 RX ADMIN — CYANOCOBALAMIN TAB 1000 MCG 1000 MCG: 1000 TAB at 09:15

## 2025-02-19 RX ADMIN — SENNOSIDES AND DOCUSATE SODIUM 1 TABLET: 50; 8.6 TABLET ORAL at 20:37

## 2025-02-19 RX ADMIN — BUPROPION HYDROCHLORIDE 300 MG: 300 TABLET, EXTENDED RELEASE ORAL at 09:15

## 2025-02-19 RX ADMIN — DONEPEZIL HYDROCHLORIDE 5 MG: 5 TABLET ORAL at 20:37

## 2025-02-19 RX ADMIN — AMLODIPINE BESYLATE 5 MG: 5 TABLET ORAL at 09:15

## 2025-02-19 ASSESSMENT — COGNITIVE AND FUNCTIONAL STATUS - GENERAL
TOILETING: A LITTLE
MOVING FROM LYING ON BACK TO SITTING ON SIDE OF FLAT BED WITH BEDRAILS: A LITTLE
WALKING IN HOSPITAL ROOM: A LITTLE
DRESSING REGULAR UPPER BODY CLOTHING: A LITTLE
TURNING FROM BACK TO SIDE WHILE IN FLAT BAD: A LITTLE
MOVING TO AND FROM BED TO CHAIR: A LITTLE
WALKING IN HOSPITAL ROOM: A LITTLE
CLIMB 3 TO 5 STEPS WITH RAILING: A LITTLE
DRESSING REGULAR LOWER BODY CLOTHING: A LITTLE
MOVING TO AND FROM BED TO CHAIR: A LITTLE
CLIMB 3 TO 5 STEPS WITH RAILING: TOTAL
DRESSING REGULAR LOWER BODY CLOTHING: A LITTLE
PERSONAL GROOMING: A LITTLE
PERSONAL GROOMING: A LITTLE
TURNING FROM BACK TO SIDE WHILE IN FLAT BAD: A LITTLE
MOBILITY SCORE: 18
STANDING UP FROM CHAIR USING ARMS: A LITTLE
HELP NEEDED FOR BATHING: A LITTLE
EATING MEALS: A LITTLE
DAILY ACTIVITIY SCORE: 18
DAILY ACTIVITIY SCORE: 18
DRESSING REGULAR UPPER BODY CLOTHING: A LITTLE
TOILETING: A LITTLE
EATING MEALS: A LITTLE
STANDING UP FROM CHAIR USING ARMS: A LITTLE
MOVING FROM LYING ON BACK TO SITTING ON SIDE OF FLAT BED WITH BEDRAILS: A LITTLE
MOBILITY SCORE: 16
HELP NEEDED FOR BATHING: A LITTLE

## 2025-02-19 ASSESSMENT — PAIN SCALES - GENERAL
PAINLEVEL_OUTOF10: 0 - NO PAIN

## 2025-02-19 ASSESSMENT — ACTIVITIES OF DAILY LIVING (ADL)
HOME_MANAGEMENT_TIME_ENTRY: 17
ADL_ASSISTANCE: INDEPENDENT

## 2025-02-19 ASSESSMENT — PAIN - FUNCTIONAL ASSESSMENT
PAIN_FUNCTIONAL_ASSESSMENT: 0-10

## 2025-02-19 NOTE — PROGRESS NOTES
INTERNAL MEDICINE PROGRESS NOTE         SUBJECTIVE     Pt was seen and examined at beside. No acute events overnight.  Pt A+Ox 4.     OBJECTIVE      Visit Vitals  /90   Pulse 84   Temp 36.5 °C (97.7 °F)   Resp 15        Intake/Output Summary (Last 24 hours) at 2/19/2025 1438  Last data filed at 2/19/2025 1207  Gross per 24 hour   Intake 20 ml   Output --   Net 20 ml       Physical Exam  Constitutional:       Appearance: Normal appearance.   HENT:      Head: Normocephalic and atraumatic.   Cardiovascular:      Rate and Rhythm: Normal rate and regular rhythm.      Heart sounds: No murmur heard.  Pulmonary:      Effort: Pulmonary effort is normal. No respiratory distress.      Breath sounds: Normal breath sounds and air entry. No stridor. No wheezing or rhonchi.   Abdominal:      General: Abdomen is flat. Bowel sounds are normal. There is no distension.      Palpations: Abdomen is soft. There is no mass.      Tenderness: There is no abdominal tenderness.   Musculoskeletal:         General: No swelling. Normal range of motion.      Right lower leg: No edema.      Left lower leg: No edema.   Skin:     General: Skin is warm and dry.      Capillary Refill: Capillary refill takes less than 2 seconds.      Findings: No lesion or rash.   Neurological:      General: No focal deficit present.      Mental Status: She is alert and oriented to person, place, and time. Mental status is at baseline.      Cranial Nerves: No cranial nerve deficit.   Psychiatric:         Mood and Affect: Mood normal.         Behavior: Behavior normal.         Thought Content: Thought content normal.         Judgment: Judgment normal.              Current Meds    amLODIPine, 5 mg, oral, Daily  buPROPion XL, 300 mg, oral, Daily  cyanocobalamin, 1,000 mcg, oral, Daily  donepezil, 5 mg, oral, Nightly  heparin (porcine), 5,000 Units, subcutaneous, q8h  [Held by provider] lisinopril, 40 mg, oral, Daily  sennosides-docusate sodium, 1 tablet, oral, BID  sodium chloride, 500 mL, intravenous, Once       PRN medications: acetaminophen, melatonin     LABS and IMAGING     WBC   Date Value Ref Range Status   02/19/2025 4.8 4.4 - 11.3 x10*3/uL Final   02/18/2025 5.3 4.4 - 11.3 x10*3/uL Final   02/17/2025 6.4 4.4 - 11.3 x10*3/uL Final     Hemoglobin   Date Value Ref Range Status   02/19/2025 11.9 (L) 12.0 - 16.0 g/dL Final   02/18/2025 12.0 12.0 - 16.0 g/dL Final   02/17/2025 12.1 12.0 - 16.0 g/dL Final     Hematocrit   Date Value Ref Range Status   02/19/2025 37.2 36.0 - 46.0 % Final   02/18/2025 39.2 36.0 - 46.0 % Final   02/17/2025 36.8 36.0 - 46.0 % Final     Bicarbonate   Date Value Ref Range Status   02/19/2025 27 21 - 32 mmol/L Final   02/18/2025 25 21 - 32 mmol/L Final   02/17/2025 26 21 - 32 mmol/L Final     Creatinine   Date Value Ref Range Status   02/19/2025 1.85 (H) 0.50 - 1.05 mg/dL Final   02/18/2025 1.63 (H) 0.50 - 1.05 mg/dL Final   02/17/2025 1.24 (H) 0.50 - 1.05 mg/dL Final     Calcium   Date Value Ref Range Status   02/19/2025 8.9 8.6 - 10.6 mg/dL Final   02/18/2025 9.2 8.6 - 10.6 mg/dL Final   02/17/2025 9.1 8.6 - 10.6 mg/dL Final     INR   Date Value Ref Range Status   02/16/2025 1.0 0.9 - 1.1 Final       ECG 12 lead  Normal sinus rhythm  Left ventricular hypertrophy with repolarization abnormality ( Sokolow-Villalobos )  Abnormal ECG  When compared with ECG of 10-OCT-2024 16:14,  No significant change was found    See ED provider note for full interpretation and clinical correlation  Confirmed by Anyi Alarcon ([id number]) on 2/16/2025 11:48:43 PM  MR brain wo IV contrast, MR angio head wo IV contrast, MR angio neck wo IV contrast  Narrative: Interpreted By:  Yong  Emily Cortez   STUDY:  MR BRAIN WO IV CONTRAST; MR ANGIO HEAD WO IV CONTRAST; MR ANGIO NECK  WO IV CONTRAST;  2/16/2025 8:32 pm      INDICATION:  Signs/Symptoms:speech difficulties, left sided sensory changes.  Stroke protocol.      Per EMR patient is a 84-year-old female who presented the emergency  department for dysarthria and gait instability. CT head showed no  acute abnormality.      COMPARISON:  Same day CT head  MRI brain 02/06/2025 and 10/11/2024      ACCESSION NUMBER(S):  [id number]; [id number]; [id number]      ORDERING CLINICIAN:  LAURA LUGO      TECHNIQUE:  Axial T2, FLAIR, DWI, gradient echo T2 and  sagittal and coronal T1  weighted images of brain were acquired.      Time-of-flight MRA of the head  and neck was performed. The images  were reviewed as source images and maximum intensity projections.      The estimate of the degree of stenosis included in this report is  based on the NASCET method for calculating stenosis, using the  internal carotid artery distal to the stenosis as the reference point.      FINDINGS:  MRI BRAIN:      No diffusion restriction abnormality to suggest acute infarct. No  mass effect or midline shift. No acute intracranial hemorrhage.  Previously described susceptibility artifact on gradient echo  sequences in nonspecific distribution again favored to represent  chronic cerebral microhemorrhages.      Mild-to-moderate nonspecific patchy and confluent T2/FLAIR  hyperintense signal within the periventricular and subcortical white  matter, likely sequela of chronic microangiopathy unchanged from  prior.      Dilatation of the supratentorial ventricles, most pronounced in the  temporal horns to disproportionate medial temporal lobe atrophy. No  extra-axial fluid collection. Mineralization is noted of the anterior  interhemispheric falx cerebri.      Opacification of the right-greater-than-left mastoid air cells,  similar to prior. The visualized  paranasal sinuses are essentially  clear.      Status post right lens replacement.              MRA OF HEAD:      Anterior circulation:    There is expected flow signal in bilateral  intracranial internal carotid arteries, bilateral carotid terminals,  bilateral proximal anterior and middle cerebral arteries.      Posterior circulation:  Minimal narrowing of the bilateral P2 PCA  segment. Bilateral intracranial vertebral arteries, vertebrobasilar  junction, basilar artery and proximal posterior cerebral arteries  demonstrate expected flow signal.      MRA OF NECK:      The source images are mildly degraded by artifact.      Right carotid vessels:  There is expected flow signal in the  visualized portion of the common carotid artery.  There is mild  attenuation of flow signal at the carotid bifurcation which may be  secondary to flow related artifact. The internal carotid artery in  the neck demonstrates expected flow signal.  There is 0% stenosis  per NASCET criteria.      Left carotid vessels:   There is expected flow signal in the  visualized portion of the common carotid artery.  There is mild  attenuation of flow signal at the carotid bifurcation which may be  secondary to flow related artifact. The internal carotid artery in  the neck demonstrates expected flow signal.  There is 0% stenosis  per NASCET criteria.      Vertebral vessels:   The visualized segments of the cervical  vertebral arteries demonstrate expected flow signal.      Impression: 1. No acute infarct, acute intracranial hemorrhage, or mass effect.  No major vessel cut off or significant stenosis in the head or neck.  2. No significant interval change in disproportionate dilation of the  supratentorial ventricles which can be seen in the setting of normal  pressure hydrocephalus or central atrophy.  3. Unchanged disproportionate hippocampal volume loss bilaterally  which may reflect Alzheimer's dementia.  4. Similar mild-to-moderate nonspecific  white matter changes which  can be seen in the setting of chronic microangiopathy.      I personally reviewed the images/study and I agree with Jacquelyn Boone DO's (radiology resident) findings as stated. This study  was interpreted at [place], [place].      MACRO:  None      Signed by: Diego Beach 2/16/2025 10:03 PM  Dictation workstation:   [id number]  XR chest 1 view  Narrative: Interpreted By:  Mike Suarez and Ohs Zachary   STUDY:  XR CHEST 1 VIEW;  2/16/2025 4:33 pm      INDICATION:  Signs/Symptoms:ams, weakness.          COMPARISON:  Chest radiograph 10/10/2024, CT abdomen 10/10/2024.      ACCESSION NUMBER(S):  [id number]      ORDERING CLINICIAN:  LAURA LUGO      FINDINGS:  AP radiograph of the chest was provided.      MEDICAL DEVICES:  None.      CARDIOMEDIASTINAL SILHOUETTE:  Cardiomediastinal silhouette is normal in size and configuration.      LUNGS:  No pneumothorax, pleural effusion or focal consolidation. Left  midlung calcified granuloma.      ABDOMEN:  No remarkable upper abdominal findings.      BONES:  No acute osseous changes.      Impression: 1.  No evidence of acute cardiopulmonary process.      I personally reviewed the images/study and I agree with the findings  as stated by Dr. Kojo Willett. This study was interpreted at  [facility], [place], [place].      MACRO:  None      Signed by: Mike Suarez 2/16/2025 5:35 PM  Dictation workstation:   [id number]  CT brain attack head wo IV contrast  Narrative: Interpreted By:  Jeramy Matthews,   STUDY:  CT BRAIN ATTACK HEAD WO IV CONTRAST;  2/16/2025 4:20 pm      INDICATION:  Signs/Symptoms:Stroke Evaluation.          COMPARISON:  None.      ACCESSION NUMBER(S):  [id number]      ORDERING CLINICIAN:  LAURA LUGO      TECHNIQUE:  Noncontrast axial CT scan of head was performed. Angled reformats in  brain and bone windows were generated. The images  were reviewed in  bone, brain, blood and soft tissue windows.      FINDINGS:  No acute hemorrhage, intracranial mass, or evidence of large evolving  cortical infarction. Osseous structures intact.      Supratentorial white matter hypodensities most likely represent  chronic small vessel ischemic change.      Ventricles are dilated commensurate with overall degree of brain  parenchymal volume loss that is present. Brain parenchymal atrophy is  mild.              Impression: No acute findings or intracranial mass.      Supratentorial white matter hypodensities most likely represent  chronic small vessel ischemic change.      MACRO:  Dr. Kojo Willett discussed the significance and urgency of this  critical finding by telephone with  Kai Candelario on 2/16/2025 at  4:15 pm.  (**-RCF-**) Findings:  See findings.          Signed by: Jeramy Matthews 2/16/2025 4:26 PM  Dictation workstation:   [id number]       Labs and images were reviewed by me.       PROBLEM LISTS      Problem List Items Addressed This Visit       * (Principal) AMS (altered mental status) - Primary     Other Visit Diagnoses       Generalized weakness                  ASSESSMENT / PLANS    Kirstin Howe is a 84 y.o. 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 Pt presented to ED as a BAT for reported dysarthria and gait instability. NIH was 1, subjective sensory deficit. Family also reports pt with hallucinations. CTH w/o contrast showed no acute abnormalities. MRI and MRA ruled out stroke, findings concerning for dementia.  Labs showed corrine. Pt with hypertensive urgency, resmed on home bp meds, reports longstanding uncontrolled hypertension. After admission, pt's mentation has improved.  Pt reporting URI infection that is now just improved, possible contributing cause. Pt recommended for SNF. Pt now A+Ox4. Pending precert.  Medically ready for discharge.     #acute on chronic moderate cognitive impairment - acute change resolved.   #dysathria, difficulty walking likely related acute metabolic encephalopathy/delirium. Now resolved.   :: AMS and hallucinations noted in setting of recent URI sx reported by patient. She reported her uri sx are now improved. Pt also with uncontroleld bp, possible htn encephalopathy.   ::ua negative. Covid, flu neg. Acs ruled out. Tsh wnl.   ::MRI brain done on admit: Unchanged disproportionate hippocampal volume loss bilaterally which may reflect Alzheimer's dementia.   ::BAT called on admission, was cancelled given resolution of sx's; NIH was 1 on admit. MRI ruled out stroke  ::A+Ox4   - followed by neuro, she was dx [facility] mod cognitive impairment, last MoCA 18/30 in 2024, has been progressing, started on donepezil to slow progression. Will schedule follow-up with neuro OP.   - c/w home dose of Aricept 5 mg PO at bedtime     #B12 deficiency  ::B12 208   - start b12 supplementation  - folate wnl      #Generalized weakness  - PT/OT consulted, recommended SNF, pending choices     #uncontrolled HTN (improving)  :: likely 2/2 medication noncompliance  - s/p Hydralazine 5 mg IV x1 dose, Norvasc 5 mg PO every day, and Lisinopril 40 mg PO every day  - c/w home dose of Norvasc 5 mg and hold home Lisinopril 40 mg PO every day  - continue to monitor BP     #CKD stage IV (stable) - per record  :: Cr 1.42 GFR 36 on admit--> Cr 1.2.  :: baseline appears to be 1.7 to 1.8 per record  -continue to monitor renal function  - renally dose meds as needed  - hold home lisinopril   -500 ml bolus      #anxiety  - c/w home dose of Wellbutrin  mg PO every day    #Dispo   - Pending precert for SNF . Medically ready for discharge.     The total time spent on the floor rendering services for this patient  and coordinating the patient's care (obtaining/reviewing additional history, performing medically appropriate exam and/or  evaluation, reviewing the electronic chart, labs, imaging, independently interpreting the results and communicating and discussing with medical teams, counseling and educating the patient, discussing with family members, ordering medications, tests and/or procedures, coordinating care, and documentation) was 45 minutes.        Blanca Alba MD

## 2025-02-19 NOTE — CARE PLAN
The patient's goals for the shift include Patient will remain safe and free from injury and falls during shift    The clinical goals for the shift include Patient will remain HDS during shift    Other goals include   Problem: Discharge Planning  Goal: Discharge to home or other facility with appropriate resources  2/19/2025 1437 by Karyn Ramirez RN  Outcome: Progressing  2/19/2025 1436 by Karyn Ramirez RN  Outcome: Progressing     Problem: Nutrition  Goal: Nutrient intake appropriate for maintaining nutritional needs  2/19/2025 1437 by Karyn Ramirez RN  Outcome: Progressing  2/19/2025 1436 by Karyn Ramirez RN  Outcome: Progressing

## 2025-02-19 NOTE — CARE PLAN
The patient's goals for the shift include      The clinical goals for the shift include pt will be injury free this shift

## 2025-02-19 NOTE — PROGRESS NOTES
Occupational Therapy    Evaluation and Treatment    Patient Name: Kirstin Howe  MRN: [id number]  Today's Date: 2/19/2025  [facility]  Time Calculation  Start Time: 1053  Stop Time: 1130  Time Calculation (min): 37 min    Assessment  IP OT Assessment  OT Assessment: Pt  admitted to hospital with concern for gait instability and hallucinations noted by family, Per EMR. Pt negative for CVA. Pt presents with decreased balance as well as cognitive deficits. Pt lives alone, reportedly with multiple steps in her home, and is not safet to return home at this time. Recommend high intensity OT upon D/C.  Barriers to Discharge Home: Caregiver assistance  Evaluation/Treatment Tolerance: Patient tolerated treatment well  End of Session Communication: Bedside nurse  End of Session Patient Position: Up in chair, Alarm on  Plan:  Inpatient Plan  OT Frequency: 3 times per week  OT Discharge Recommendations: High intensity level of continued care  Equipment Recommended upon Discharge:  (Shower seat, life alert button)  OT - OK to Discharge:  (OT Eval completed.)  OT Assessment  OT Assessment Results: Decreased ADL status, Decreased safe judgment during ADL, Decreased cognition, Decreased functional mobility, Decreased IADLs, Decreased trunk control for functional activities  Barriers to Discharge: Inaccessible home environment, Decreased caregiver support  Evaluation/Treatment Tolerance: Patient tolerated treatment well  Strengths: Physical health  Barriers to Participation: Comorbidities    Subjective   Current Problem:  1. Transient alteration of awareness        2. Generalized weakness          General:  Reason for Referral: Presenting for altered mental status with slurred speech and left-sided sensation deficits.  A BAT was called however head CT was negative.  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  Co-Treatment: PT  Co-Treatment Reason: Partial Co-Tx with PT to expedite D/C.  Prior to Session Communication: Bedside nurse  Patient Position Received:  (Pt received  ambulating in hallway with PT.)  Family/Caregiver Present: No  General Comment: Pt received ambulating with a RW  in the hallway with PT. Pleasant and cooperative, agreeable to therapy. MOCA administered this date, score = 11/26. See below for further details.   Precautions:  Medical Precautions: Fall precautions  Vital Signs:    Pain:  Pain Assessment  Pain Assessment: 0-10  0-10 (Numeric) Pain Score: 0 - No pain  Lines/Tubes/Drains:         Objective   Cognition:  Overall Cognitive Status: Impaired (MOCA Score this date = 11/30. Normal is greater than or equal to a score of 26/30. Per EMR, pt's previous MOCA score in 2024 was 18/30.)  Arousal/Alertness: Appropriate responses to stimuli  Orientation Level:  (Oriented to self, month, \"\"; stated year was 2016, stated day of the week was \"Thursday\". Pt repetative with questions at times during session.)  Memory: Exceptions to WFL  Insight: Mild  Impulsive: Moderately  Processing Speed:  (WFL for rote tasks, required increased time for more complex tasks.)   Pt follows simple one step commands 90% or greater, requires additional cues for multi-step/higher level commands.         Home Living:  Type of Home: House (town home)  Lives With: Alone  Home Adaptive Equipment: Cane  Home Layout: Multi-level, Bed/bath upstairs, 1/2 bath on main level (4 steps within the home to access kitchen and living room area. Pt reports full flight down to access an additional family room and laundry, full flight from kitchen level to access bed/full bathroom level.)  Home Access: Stairs to enter with rails  Entrance Stairs-Number of Steps: Will need to confirm as pt provided several answers during session. Pt initially stated 6 steps to enter, then stated 6 steps to a landing " and then additional steps. + railing  Bathroom Shower/Tub: Tub/shower unit   Prior Function:  Level of Yell: Independent with ADLs and functional transfers  Receives Help From: Family (Dtr assists with cleaning.)  ADL Assistance: Independent  Ambulatory Assistance: Independent  Vocational: Retired (Former teacher)  Prior Function Comments: Pt reports she is active, drives, walks across the street to [place] sometimes.       ADL:  UE Dressing Assistance:  (Anticipate SBA after setup while seated.)  LE Dressing Assistance:  (SBA to doff/don B/L socks. CGA for management of gown around self 2' mild LOB during task.)  Toileting Assistance with Device:  (Anticipate Close SBA/CGA)  Activity Tolerance:  Endurance: Endurance does not limit participation in activity  Balance:  Dynamic Sitting Balance  Dynamic Sitting-Level of Assistance: Close supervision  Dynamic Standing Balance  Dynamic Standing-Level of Assistance: Contact guard (+ slight LOB when performing clothing management in standing.)  Static Sitting Balance  Static Sitting-Level of Assistance: Distant supervision  Static Standing Balance  Static Standing-Level of Assistance:  (Close SBA)  Bed Mobility/Transfers: Bed Mobility/Transfers: Bed Mobility  Bed Mobility: No  Functional Mobility  Functional Mobility Performed: Yes  Functional Mobility 1  Surface 1: Level tile  Device 1: Rolling walker  Assistance 1:  (CGA/Min assist)  Quality of Functional Mobility 1: Narrow base of support  Comments 1: Pt required cues for safe management of RW and for maintaining a safe distance of self to RW. Pt also required cues for safety when turning to sit in chair as well as safe hand placement. Pt with decreased awareness to position of self to chair as evidenced by requiring cues to avoid sitting onto arm of chair. in stand to sit.   and Transfers  Transfer: Yes  Transfer 1  Technique 1: Sit to stand, Stand to sit  Transfer Device 1: Walker  Transfer Level of  Assistance 1: Close supervision  Trials/Comments 1: Cues for safety in task as well as for safe hand placement in prep for transitions. Pt noted to be impulsive at times, required cues to slow pace in order to maximize safety.  IADL's:      Vision: Vision - Basic Assessment  Current Vision: No visual deficits   and    Sensation:  Light Touch:  (No deficits noted B/L UE's. Pt c/o mild tingling LE's knees to ankles.)  Strength:  Strength Comments: Grossly 4+ to 5/5 throughout B/L UE's.  Perception:  Inattention/Neglect: Appears intact  Coordination:  Movements are Fluid and Coordinated: Yes   Hand Function:  Hand Function  Gross Grasp: Functional  Extremities:   RUE   RUE :  (AROM WFL throughout), LUE   LUE:  (AROM WFL throughout.),  , and        Outcome Measures: [facility] Daily Activity  Putting on and taking off regular lower body clothing: A little  Bathing (including washing, rinsing, drying): A little  Putting on and taking off regular upper body clothing: A little  Toileting, which includes using toilet, bedpan or urinal: A little  Taking care of personal grooming such as brushing teeth: A little  Eating Meals: A little  Daily Activity - Total Score: 18         ,     OT Adult Other Outcome Measures  4AT: 7    Education Documentation  Body Mechanics, taught by Krista Delarosa OT at 2/19/2025  1:34 PM.  Learner: Patient  Readiness: Acceptance  Method: Explanation  Response: Needs Reinforcement    Precautions, taught by Krista Delarosa OT at 2/19/2025  1:34 PM.  Learner: Patient  Readiness: Acceptance  Method: Explanation  Response: Needs Reinforcement    ADL Training, taught by Krista Delarosa OT at 2/19/2025  1:34 PM.  Learner: Patient  Readiness: Acceptance  Method: Explanation  Response: Needs Reinforcement    Body Mechanics, taught by Krista Delarosa OT at 2/19/2025  1:00 PM.  Learner: Patient  Readiness: Acceptance  Method: Explanation  Response: Needs Reinforcement    Precautions, taught by Krista Delarosa OT at  2/19/2025  1:00 PM.  Learner: Patient  Readiness: Acceptance  Method: Explanation  Response: Needs Reinforcement    ADL Training, taught by Krista Delarosa OT at 2/19/2025  1:00 PM.  Learner: Patient  Readiness: Acceptance  Method: Explanation  Response: Needs Reinforcement    Education Comments  No comments found.        Goals:   Encounter Problems       Encounter Problems (Active)       ADLs       Patient will perform UB and LB bathing  with set-up, supervision level of assistance and extended tub bench. (Progressing)       Start:  02/19/25    Expected End:  03/05/25            Patient with complete upper body dressing with set-up, supervision level of assistance donning and doffing all UE clothes with no adaptive equipment while edge of bed  (Progressing)       Start:  02/19/25    Expected End:  03/05/25            Patient with complete lower body dressing with set-up, supervision level of assistance donning and doffing all LE clothes  with no adaptive equipment while edge of bed and in standing to hike pants.   (Progressing)       Start:  02/19/25    Expected End:  03/05/25            Patient will complete daily grooming tasks brushing teeth and washing face/hair with supervision level of assistance and PRN adaptive equipment while standing at sink. (Progressing)       Start:  02/19/25    Expected End:  03/05/25            Patient will complete toileting including hygiene clothing management/hygiene with supervision level of assistance and raised toilet seat. (Progressing)       Start:  02/19/25    Expected End:  03/05/25               BALANCE       Pt will maintain dynamic standing balance during ADL task with supervision level of assistance in order to demonstrate decreased risk of falling and improved postural control. (Progressing)       Start:  02/19/25    Expected End:  03/05/25               COGNITION/SAFETY       Patient will demonstrated orientation x 3 with verbal cues and visual cues. (Progressing)        Start:  02/19/25    Expected End:  03/05/25       ORIENTATION            MOBILITY       Patient will perform Functional mobility  Household distances/Community Distances with supervision level of assistance and least restrictive device in order to improve safety and functional mobility. (Progressing)       Start:  02/19/25    Expected End:  03/05/25               TRANSFERS       Patient will perform bed mobility independent level of assistance and using safe technique in order to improve safety and independence with mobility (Progressing)       Start:  02/19/25    Expected End:  03/05/25            Patient will complete functional transfer to all surfaces with least restrictive device with supervision level of assistance. (Progressing)       Start:  02/19/25    Expected End:  03/05/25                   Treatment Completed on Evaluation  Cognitive Skill Development:       Activities of Daily Living:       Therapy/Activity:     Therapeutic Activity  Therapeutic Activity Performed: Yes  Therapeutic Activity 1: OT. [place] Cognitive Assessment (MOCA) administered this date, as pt lives alone at baseline. MOCA Score = 11/26. Normal is 26 or greater out of 30. Per EMR, pt's previous MOCA score in 2024 was 18/30. Pt demonstrated difficulty completing visuospatial/executive tasks, memory tasks ( immediate STM and delayed recall), attention based tasks. Will continue to assess, however, above issues raise concern for pt returning safely home alone.          02/19/25 at 2:47 PM   Krista Delarosa OT   Rehab Office: [phone]

## 2025-02-19 NOTE — PROGRESS NOTES
"Physical Therapy    Physical Therapy Treatment    Patient Name: Kirstin Howe  MRN: [id number]  Department: [place] [address]  Room: 6071/6071-  Today's Date: 2/19/2025  Time Calculation  Start Time: 1045  Stop Time: 1108  Time Calculation (min): 23 min         Assessment/Plan   PT Assessment  End of Session Communication: Bedside nurse  Assessment Comment: .  End of Session Patient Position: Bed, 2 rail up, Alarm off, caregiver present     PT Plan  Treatment/Interventions: Bed mobility, Transfer training, Gait training, Stair training, Balance training, Strengthening, Endurance training, Therapeutic exercise, Therapeutic activity, Home exercise program  PT Plan: Ongoing PT  PT Frequency: 3 times per week  PT Discharge Recommendations: Moderate intensity level of continued care (If pt going home, pt would benefit from Low Intensity PT and needs 24/7 for safety)  Equipment Recommended upon Discharge: Wheeled walker  PT Recommended Transfer Status: Assist x1, Assistive device  PT - OK to Discharge: Yes      General Visit Information:   PT  Visit  PT Received On: 02/19/25  General  Prior to Session Communication: Bedside nurse  Patient Position Received: Bed, 2 rail up, Alarm off, caregiver present  General Comment: Pt supine in bed upon arrival, pleasant and willing to participate in PT. pt demonstrates improved ambulation tolerance however is still unsteady on feet and at risk for fall. pt reports having more than 20 stairs ( unable to recall full number, pt reports \" about 9 to enter, about 4 to get to living room, and probably 13 to get upstairs\" within home she would have to negotiate multiple time daily. pt ambulates 50' using wheeled walker and completes transfers with SBA requiring cues for safety    Subjective   Precautions:  Precautions  Medical Precautions: Fall precautions            Objective   Pain:  Pain Assessment  0-10 (Numeric) Pain Score: 0 - No pain  Cognition:  Cognition  Orientation Level: Disoriented to " situation       Treatments:       Bed Mobility 1  Bed Mobility 1: Supine to sitting, Sitting to supine  Level of Assistance 1: Close supervision  Bed Mobility Comments 1: cues for set up, HOB elevated    Ambulation/Gait Training 1  Surface 1: Level tile  Device 1: Rolling walker, No device  Assistance 1: Contact guard, Minimum assistance  Quality of Gait 1: Decreased step length, Inconsistent stride length, Diminished heel strike  Comments/Distance (ft) 1: 1x20, 1x50', cues for walker safety when negotiating turns  Transfer 1  Technique 1: Sit to stand, Stand to sit  Transfer Device 1: Walker  Transfer Level of Assistance 1:  (SBA)  Trials/Comments 1: cues for safe proximity and safe hand palcement         Outcome Measures:  [facility] Basic Mobility  Turning from your back to your side while in a flat bed without using bedrails: A little  Moving from lying on your back to sitting on the side of a flat bed without using bedrails: A little  Moving to and from bed to chair (including a wheelchair): A little  Standing up from a chair using your arms (e.g. wheelchair or bedside chair): A little  To walk in hospital room: A little  Climbing 3-5 steps with railing: Total  Basic Mobility - Total Score: 16    Tinetti  Sitting Balance: Steady, safe  Arises: Able, uses arms to help  Attempts to Arise: Able to arise, one attempt  Immediate Standing Balance (First 5 Seconds): Unsteady (Swaggers, moves feet, trunk sway)  Standing Balance: Steady but wide stance, uses cane or other support  Nudged: Begins to fall  Eyes Closed: Unsteady  Turned 360 Degrees: Steadiness: Steady  Turned 360 Degrees: Continuity of Steps: Discontinuous steps  Sitting Down: Uses arms or not a smooth motion  Balance Score: 7  Initiation of Gait: No hesitancy  Step Height: R Swing Foot: Right foot complete clears floor  Step Length: R Swing Foot: Passes left stance foot  Step Height: L Swing Foot: Left foot complete clears floor  Step Length: L Swing Foot:  Passes right stance foot  Step Symmetry: Right and left step appear equal  Step Continuity: Stopping or discontinuity between steps  Path: Mild/moderate deviation or uses walking aid  Trunk: Marked sway or uses walking aid  Walking Time: Heels almost touching while walking  Gait Score: 8  Total Score: 15    Education Documentation  Mobility Training, taught by Delmar Sun PTA at 2/19/2025 11:23 AM.  Learner: Patient  Readiness: Acceptance  Method: Explanation  Response: Verbalizes Understanding    Education Comments  No comments found.        OP EDUCATION:       Encounter Problems       Encounter Problems (Active)       Balance       Pt will score >/=24/28 on Tinetti to demonstrate decreased falls risk (Progressing)       Start:  02/17/25    Expected End:  03/03/25               Mobility       Pt will be Tere ambulation 100 ft with LRAD (Progressing)       Start:  02/17/25    Expected End:  03/03/25            Pt will be supervision to ascend/descend 13 steps with 1 handrail (Progressing)       Start:  02/17/25    Expected End:  03/03/25               PT Transfers       Pt will be Tere with sit to stand and bed to chair transfers with LRAD (Progressing)       Start:  02/17/25    Expected End:  03/03/25            Pt will be Tere with bed mobility (Progressing)       Start:  02/17/25    Expected End:  03/03/25               Pain - Adult

## 2025-02-19 NOTE — PROGRESS NOTES
2/19/25 1138 Transitional Care Coordinator Notes:     Transitional Care Coordination Progress Note:  Patient discussed during interdisciplinary rounds.   Team members present: MD and TCC  Plan per Medical/Surgical team: Spoke with daughter Kalyani and facility of choice is [facility]. The direct submit team will initiate precert. Will continue to follow for updates.    6214 Updates: precert approved to [facility]. Transport is set for 11:00 am tomorrow. The DSC team is working on the 1500 form. [place] sent to facility and family is aware of discharge.    Payor: [employer] Medicare  Discharge disposition: SNF  Potential Barriers: none  ADOD:  1-2 days                    Assessment/Plan   Assessment & Plan  AMS (altered mental status)               Alexia Gamez RN

## 2025-02-19 NOTE — DISCHARGE INSTRUCTIONS
Brian Howe     Thanks for coming to  for your care! As you know, you came in with with some instability, hallucinations and trouble with speech. While you were here we did imaging of your brain which showed some concerns for possible Alzheimer's dementia. Your acute confusion did resolve while you were here, it was thought this was due to a URI that you had. Physical therapy recommended you for rehab.  These are some important things to remember when you are discharged:   We are sending you home with these medications: vitamin B12 suppelments, senna for bowel movements and melatonin for sleep   We have ordered these labs to be drawn for you: none   Consultations - you were referred to see the following specialists: neurology for your dementia. You should receive a call from central scheduling in the next few days if you do not receive a call within 3-5 business days please call [phone] to schedule your appointment.     Best of luck,     Your  Medicine Team

## 2025-02-20 ENCOUNTER — OFFICE VISIT (OUTPATIENT)
Dept: NEUROLOGY | Facility: HOSPITAL | Age: 85
End: 2025-02-20
Payer: MEDICARE

## 2025-02-20 VITALS
RESPIRATION RATE: 18 BRPM | OXYGEN SATURATION: 95 % | TEMPERATURE: 97.9 F | DIASTOLIC BLOOD PRESSURE: 98 MMHG | SYSTOLIC BLOOD PRESSURE: 164 MMHG | HEIGHT: 63 IN | WEIGHT: 119.49 LBS | BODY MASS INDEX: 21.17 KG/M2 | HEART RATE: 98 BPM

## 2025-02-20 VITALS
WEIGHT: 124.56 LBS | DIASTOLIC BLOOD PRESSURE: 88 MMHG | HEIGHT: 62 IN | RESPIRATION RATE: 16 BRPM | SYSTOLIC BLOOD PRESSURE: 143 MMHG | BODY MASS INDEX: 22.92 KG/M2 | HEART RATE: 93 BPM

## 2025-02-20 DIAGNOSIS — F03.A0 MILD DEMENTIA, UNSPECIFIED DEMENTIA TYPE, UNSPECIFIED WHETHER BEHAVIORAL, PSYCHOTIC, OR MOOD DISTURBANCE OR ANXIETY: Primary | ICD-10-CM

## 2025-02-20 PROCEDURE — 2500000001 HC RX 250 WO HCPCS SELF ADMINISTERED DRUGS (ALT 637 FOR MEDICARE OP): Performed by: STUDENT IN AN ORGANIZED HEALTH CARE EDUCATION/TRAINING PROGRAM

## 2025-02-20 PROCEDURE — 96372 THER/PROPH/DIAG INJ SC/IM: CPT | Performed by: NURSE PRACTITIONER

## 2025-02-20 PROCEDURE — 2500000001 HC RX 250 WO HCPCS SELF ADMINISTERED DRUGS (ALT 637 FOR MEDICARE OP): Performed by: NURSE PRACTITIONER

## 2025-02-20 PROCEDURE — 99417 PROLNG OP E/M EACH 15 MIN: CPT | Performed by: PSYCHIATRY & NEUROLOGY

## 2025-02-20 PROCEDURE — G0378 HOSPITAL OBSERVATION PER HR: HCPCS

## 2025-02-20 PROCEDURE — 2500000004 HC RX 250 GENERAL PHARMACY W/ HCPCS (ALT 636 FOR OP/ED): Performed by: NURSE PRACTITIONER

## 2025-02-20 PROCEDURE — 99214 OFFICE O/P EST MOD 30 MIN: CPT | Performed by: PSYCHIATRY & NEUROLOGY

## 2025-02-20 RX ORDER — DONEPEZIL HYDROCHLORIDE 10 MG/1
TABLET, FILM COATED ORAL
Qty: 90 TABLET | Refills: 3 | Status: SHIPPED | OUTPATIENT
Start: 2025-02-20

## 2025-02-20 RX ADMIN — HEPARIN SODIUM 5000 UNITS: 5000 INJECTION, SOLUTION INTRAVENOUS; SUBCUTANEOUS at 05:41

## 2025-02-20 RX ADMIN — AMLODIPINE BESYLATE 5 MG: 5 TABLET ORAL at 08:21

## 2025-02-20 RX ADMIN — BUPROPION HYDROCHLORIDE 300 MG: 300 TABLET, EXTENDED RELEASE ORAL at 08:21

## 2025-02-20 RX ADMIN — CYANOCOBALAMIN TAB 1000 MCG 1000 MCG: 1000 TAB at 08:20

## 2025-02-20 ASSESSMENT — ACTIVITIES OF DAILY LIVING (ADL): LACK_OF_TRANSPORTATION: NO

## 2025-02-20 ASSESSMENT — COGNITIVE AND FUNCTIONAL STATUS - GENERAL
MOVING FROM LYING ON BACK TO SITTING ON SIDE OF FLAT BED WITH BEDRAILS: A LITTLE
WALKING IN HOSPITAL ROOM: A LITTLE
DRESSING REGULAR UPPER BODY CLOTHING: A LITTLE
STANDING UP FROM CHAIR USING ARMS: A LITTLE
TURNING FROM BACK TO SIDE WHILE IN FLAT BAD: A LITTLE
PERSONAL GROOMING: A LITTLE
HELP NEEDED FOR BATHING: A LITTLE
CLIMB 3 TO 5 STEPS WITH RAILING: A LOT
DRESSING REGULAR LOWER BODY CLOTHING: A LITTLE
TOILETING: A LITTLE
MOBILITY SCORE: 17
DAILY ACTIVITIY SCORE: 19
MOVING TO AND FROM BED TO CHAIR: A LITTLE

## 2025-02-20 ASSESSMENT — PAIN SCALES - GENERAL
PAINLEVEL_OUTOF10: 0 - NO PAIN
PAINLEVEL_OUTOF10: 0-NO PAIN
PAINLEVEL_OUTOF10: 0 - NO PAIN

## 2025-02-20 ASSESSMENT — PAIN - FUNCTIONAL ASSESSMENT: PAIN_FUNCTIONAL_ASSESSMENT: 0-10

## 2025-02-20 NOTE — NURSING NOTE
PT was seen by doctor and cleared for discharge. PT was discharged with daughter bedside who will take PT to appointment then to [facility].

## 2025-02-20 NOTE — NURSING NOTE
LPN called [facility] and gave report to Ignacio also informing that PT was taken to appointment by daughter then will be brought there by same daughter.

## 2025-02-20 NOTE — PATIENT INSTRUCTIONS
You were seen today by Dr. Giles.  It is a pleasure seeing you.    Given the history and today's evaluation, I suspect mild dementia     PLAN:    - Increase the dose of donepezil to 10 mg daily  -We discussed the importance of adequate control of vascular risk factors  -Continue vitamin B12 supplementation  -I ordered driving evaluation  - encouraged mediterranean diet and active lifestyle  -It is great to participate in acute rehab  - return to clinic in 4-6 months    Please call [phone] if you have any questions.     General brain health guidelines:  - make sure your other medical conditions are well controlled (e.g., high blood pressure, high cholesterol, diabetes, sleep apnea etc)  - do not smoke or chew tobacco  - address any sensory deficits (e.g., proper glasses for poor eyesight, hearing aids for hearing loss)  - use a weekly pill box  - eat a heart healthy diet (e.g., lots of fruits and vegetables, low fat and cholesterol)  - exercise at least four days per week, 30 minutes at a time at an intensity that would make it difficult to converse with someone   - make sure you are getting at least 7 hours of quality sleep per night  - keep yourself mentally active daily by reading, playing cards, doing word searches, puzzles, etc.  - stay socially active by being part of a group or organization     Here are more resources available for you :    a. Consultation with a  who specializes in cognitive decline in older adulthood. They can assist with counseling, educational resources, patient support groups, caregiver support groups, and preparation for future changes. Beth Wachter at [facility] ([address] [place] [address] [address] in [place]; [phone]) could help with a referral, or she can contact the Alzheimer's Association 24-hour Helpline (1-603.644.5934).    b. Yessi Morales at [facility] runs a caregiver training program that uses empirically based  techniques to prepare and equip caregivers for the demands of that role when caring for someone with dementia.    c. Helpful resources for addressing the day-to-day challenges of cognitive dysfunction are offered at the Alzheimer's Association (now the Alzheimer's Disease and Related Disorders Association) website (www.alz.org) or by calling their 24-hour Helpline (1-444.839.8215).    d. The Family Caregiver [place] website also has helpful information: http://www.caregiver.org/caregiver/jsp/home.jsp
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
## 2025-02-20 NOTE — CARE PLAN
The patient's goals for the shift include Patient will remain safe and free from injury and falls during shift    The clinical goals for the shift include Patient will remaine safe during the shift    Over the shift, the patient did not make progress toward the following goals. Barriers to progression include Patient still confused. Recommendations to address these barriers include monitor and reorient to floor  Problem: Fall/Injury  Goal: Not fall by end of shift  Outcome: Progressing     Problem: Nutrition  Goal: Nutrient intake appropriate for maintaining nutritional needs  Outcome: Progressing   .

## 2025-02-20 NOTE — PROGRESS NOTES
02/20/25 0934   Discharge Planning   Home or Post Acute Services Post acute facilities (Rehab/SNF/etc)  ([facility])   Type of Post Acute Facility Services Skilled nursing   Expected Discharge Disposition SNF   Does the patient need discharge transport arranged? No       Patient was approved for [facility]. Patient will discharge today. Family will provide transportation after attending her scheduled outpatient Neurology appointment. The final goldenrod and AVS sent to facility. The packet of information was given to the daughter Antonia to give to the nursing home.      Alexia Gamez RN, BSN  Transitional Care Coordinator

## 2025-02-22 ENCOUNTER — NURSING HOME VISIT (OUTPATIENT)
Dept: POST ACUTE CARE | Facility: EXTERNAL LOCATION | Age: 85
End: 2025-02-22
Payer: MEDICARE

## 2025-02-22 DIAGNOSIS — I10 UNCONTROLLED HYPERTENSION: ICD-10-CM

## 2025-02-22 DIAGNOSIS — D12.6 TUBULAR ADENOMA OF COLON: ICD-10-CM

## 2025-02-22 DIAGNOSIS — F41.9 ANXIETY: ICD-10-CM

## 2025-02-22 DIAGNOSIS — G31.84 AMNESTIC MCI (MILD COGNITIVE IMPAIRMENT WITH MEMORY LOSS): Primary | ICD-10-CM

## 2025-02-22 DIAGNOSIS — Z86.73 HISTORY OF CVA (CEREBROVASCULAR ACCIDENT) WITHOUT RESIDUAL DEFICITS: ICD-10-CM

## 2025-02-22 DIAGNOSIS — N18.4 CHRONIC RENAL DISEASE, STAGE IV (MULTI): ICD-10-CM

## 2025-02-22 PROCEDURE — 99305 1ST NF CARE MODERATE MDM 35: CPT | Performed by: INTERNAL MEDICINE

## 2025-02-22 NOTE — PROGRESS NOTES
Subjective   Patient ID: Kirstin Howe is a 84 y.o. female who is acute skilled care and presents for initial visit for skilled nursing.    HPI   I came to admit Ms. Kirstin Howe at [facility] skilled nursing facility.  84 years old female with past medical history of cognitive impairment, hyper parathyroidism, constipation, CVA without deficit, anxiety, hypertension, CKD stage IV, tubular adenoma of the colon no active treatment.  She was seen in the emergency department for dysarthria and gait instability.  The patient daughter stated that she was in her normal state of her the day prior to her visit to the emergency room.  At around 830 that day she had difficulty walking and she was off balance.  She was hallucinating according to the daughter and thinking that she is seeing her kids who are not in town.  CT scan of the head without contrast showed no acute abnormalities.  Suspected hypertension.  MRI of the brain with MRA of the head and neck  negative for stroke.  But the imaging did show findings suggestive of Alzheimer dementia.  Her lab revealed renal insufficiency.  In the emergency department again her blood pressure was 187/90.  She did improve after she received her home medication Norvasc and lisinopril with 5 mg of IV hydralazine.  Pressure went down to 160/72.  Her blood pressure at the doctor's visit in November 2024 was also elevated.  The impressions from the emergency room on her last admission was acute on chronic moderate cognitive impairment the acute changes resolved.  Dysarthria and difficulty walking likely related to acute metabolic encephalopathy/delirium resolved  Acute mental change and hallucinations reported by the patient, uncontrolled hypertension possible hypertensive encephalopathy,  She was negative for urinalysis, negative COVID and flu, thyroid test was within normal limit.  Brain MRI reflect Alzheimer dementia.  She was diagnosed by neurology who follows her with  cognitive impairment and was started on donepezil to slow the progression.  She takes 5 mg at bedtime.  She lives in [place] alone.  1 son lives in [place] , 3 daughters 1 in [place], 1 in [place] and 1 in [place].  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  Non-smoker and no alcohol intake.  Mother  at age 95 age.  Father  age 71 gastric cancer  1 brother he was the first president for the [employer].  He  at age 57 pancreatic cancer and he was an accomplished musician.  Pre-Admission Details:  Admitted from: [facility]  Admitted on:  2025  Primary caregiver: patient, son, and daughter    :      ADELA MOORE   [place] .                                 DAVID MOORE   [phone]                                  INDU    OSCAR      Advance Directive: <no information> full code    Review of Systems  A comprehensive review of systems was negative.    Fall History: None  History of falls no  Frequency in last year 0  Circumstances:NA  Injuries :NA    Assistive Devices: none    Functional History - Prior To Admission:  IADL (PTA)      Telephone: Independent   Transport: Independent   Shopping: Partial assistance   Meal prep: Partial assistance   Housework: Partial assistance   Meds:  Partial assistance   Finances: Total assistance     ADL Baseline  Ambulation: Independent  Bathing: Partial assistance  Dressing: Independent  Toileting: Independent  Transfer: Independent  Continence: Independent  Feeding: Independent    Functional History - At Time Of Consult: She sounded very confident and self-sufficient.    Review of Systems  12 system review 12 system are negative.  Objective   There were no vitals taken for this visit.    Physical Exam  Alert oriented in no distress sitting at the edge of the bed.  Nonicteric sclera no jaundice.  Face symmetrical cranial nerves intact.  Neck supple no masses lymph no thyromegaly or jugular venous distention.  Lungs  diminished but clear no rales wheezing or crackles.  Heart normal S1 and S2 regular rhythm.  Abdomen benign nontender flat and not obese.  Bowel sounds present nontender no pain.  Neurologically she moves her upper and lower extremities with equal strength.  She had no sensory deficit.  Remember things when we discussed her condition.  Mood and affect very normal.  Her behavior was normal.  She remembered and was oriented to the date of today and to the place [facility] receiving [facility] she never heard of it she said.  Obviously at this stage she recovered.  Cognitively?.  Skin intact.  Assessment/Plan   Diagnoses and all orders for this visit:  Amnestic MCI (mild cognitive impairment with memory loss)  Uncontrolled hypertension  History of CVA (cerebrovascular accident) without residual deficits  Chronic renal disease, stage IV (Multi)  Tubular adenoma of colon  Anxiety       Goals    None

## 2025-02-22 NOTE — LETTER
Patient: Kirstin Howe  : 1940    Encounter Date: 2025    Subjective  Patient ID: Kirstin Howe is a 84 y.o. female who is acute skilled care and presents for initial visit for skilled nursing.    HPI   I came to admit Ms. Kirstin Hwoe at [facility] skilled nursing facility.  84 years old female with past medical history of cognitive impairment, hyper parathyroidism, constipation, CVA without deficit, anxiety, hypertension, CKD stage IV, tubular adenoma of the colon no active treatment.  She was seen in the emergency department for dysarthria and gait instability.  The patient daughter stated that she was in her normal state of her the day prior to her visit to the emergency room.  At around 830 that day she had difficulty walking and she was off balance.  She was hallucinating according to the daughter and thinking that she is seeing her kids who are not in town.  CT scan of the head without contrast showed no acute abnormalities.  Suspected hypertension.  MRI of the brain with MRA of the head and neck  negative for stroke.  But the imaging did show findings suggestive of Alzheimer dementia.  Her lab revealed renal insufficiency.  In the emergency department again her blood pressure was 187/90.  She did improve after she received her home medication Norvasc and lisinopril with 5 mg of IV hydralazine.  Pressure went down to 160/72.  Her blood pressure at the doctor's visit in 2024 was also elevated.  The impressions from the emergency room on her last admission was acute on chronic moderate cognitive impairment the acute changes resolved.  Dysarthria and difficulty walking likely related to acute metabolic encephalopathy/delirium resolved  Acute mental change and hallucinations reported by the patient, uncontrolled hypertension possible hypertensive encephalopathy,  She was negative for urinalysis, negative COVID and flu, thyroid test was within normal limit.  Brain MRI reflect  Alzheimer dementia.  She was diagnosed by neurology who follows her with cognitive impairment and was started on donepezil to slow the progression.  She takes 5 mg at bedtime.  She lives in [place] alone.  1 son lives in [place] , 3 daughters 1 in [place], 1 in [place] and 1 in [place].  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  Non-smoker and no alcohol intake.  Mother  at age 95 age.  Father  age 71 gastric cancer  1 brother he was the first president for the [employer].  He  at age 57 pancreatic cancer and he was an accomplished musician.  Pre-Admission Details:  Admitted from: [facility]  Admitted on:  2025  Primary caregiver: patient, son, and daughter    :      ADELA MOORE   [place] .                                 DAVID MOORE   [phone]                                  INDU    OSCAR      Advance Directive: <no information> full code    Review of Systems  A comprehensive review of systems was negative.    Fall History: None  History of falls no  Frequency in last year 0  Circumstances:NA  Injuries :NA    Assistive Devices: none    Functional History - Prior To Admission:  IADL (PTA)      Telephone: Independent   Transport: Independent   Shopping: Partial assistance   Meal prep: Partial assistance   Housework: Partial assistance   Meds:  Partial assistance   Finances: Total assistance     ADL Baseline  Ambulation: Independent  Bathing: Partial assistance  Dressing: Independent  Toileting: Independent  Transfer: Independent  Continence: Independent  Feeding: Independent    Functional History - At Time Of Consult: She sounded very confident and self-sufficient.    Review of Systems  12 system review 12 system are negative.  Objective  There were no vitals taken for this visit.    Physical Exam  Alert oriented in no distress sitting at the edge of the bed.  Nonicteric sclera no jaundice.  Face symmetrical cranial nerves intact.  Neck  supple no masses lymph no thyromegaly or jugular venous distention.  Lungs diminished but clear no rales wheezing or crackles.  Heart normal S1 and S2 regular rhythm.  Abdomen benign nontender flat and not obese.  Bowel sounds present nontender no pain.  Neurologically she moves her upper and lower extremities with equal strength.  She had no sensory deficit.  Remember things when we discussed her condition.  Mood and affect very normal.  Her behavior was normal.  She remembered and was oriented to the date of today and to the place [facility] receiving home [facility] she never heard of it she said.  Obviously at this stage she recovered.  Cognitively?.  Skin intact.  Assessment/Plan  Diagnoses and all orders for this visit:  Amnestic MCI (mild cognitive impairment with memory loss)  Uncontrolled hypertension  History of CVA (cerebrovascular accident) without residual deficits  Chronic renal disease, stage IV (Multi)  Tubular adenoma of colon  Anxiety       Goals    None           Electronically Signed By: Abhay Peguero MD   2/22/25  4:08 PM

## 2025-02-26 ENCOUNTER — NURSING HOME VISIT (OUTPATIENT)
Dept: POST ACUTE CARE | Facility: EXTERNAL LOCATION | Age: 85
End: 2025-02-26
Payer: MEDICARE

## 2025-02-26 DIAGNOSIS — G31.84 AMNESTIC MCI (MILD COGNITIVE IMPAIRMENT WITH MEMORY LOSS): Primary | ICD-10-CM

## 2025-02-26 DIAGNOSIS — I67.9 CEREBROVASCULAR DISEASE: ICD-10-CM

## 2025-02-26 DIAGNOSIS — N18.4 CHRONIC RENAL DISEASE, STAGE IV (MULTI): ICD-10-CM

## 2025-02-26 DIAGNOSIS — R26.81 GAIT INSTABILITY: ICD-10-CM

## 2025-02-26 DIAGNOSIS — I10 UNCONTROLLED HYPERTENSION: ICD-10-CM

## 2025-02-26 PROCEDURE — 99308 SBSQ NF CARE LOW MDM 20: CPT | Performed by: INTERNAL MEDICINE

## 2025-02-26 NOTE — LETTER
Patient: Kirstin Howe  : 1940    Encounter Date: 2025    Name: Kirstin Howe  Medical Record Number: [id number]  YOB: 1940    Kirstin Howe is a 84 y.o. female who presents for was admitted recently to [facility] skilled nursing facility on 2025.  She came from the hospital with the following history of cognitive impairment, hyperparathyroidism, constipation, CVA without deficits, anxiety, hypertension, CKD 4 and tubular adenoma of the colon no active treatment for now.  She was seen in the emergency room prior to that for dysarthria and gait instability.  Her daughter noticed some difficulty walking and her balance was off hallucination and thinking she is seeing kids around when they are not here.  Most her workup in the emergency room revealed likely Alzheimer dementia.  With renal insufficiency and very elevated blood pressure on arrival to the /89 which improved after receiving a dose of Norvasc and lisinopril with IV 5 mg hydralazine.  Blood pressure was elevated at the last visit at her PCP on 2024 she lives alone.  She just woke up she tells me this morning when I came in to see her.  Trying to adjust and to remember things.  Did not eat her breakfast yet.  But she has no specific complaint or symptoms.  She remembers that she is receiving physical therapy at the gym and she is doing well.       Advance Directive: To be confirmed by     Review of Systems  12 system reviewed 12 system negative.  I found the pill in her bed looked like senna .?    There were no vitals taken for this visit.  Wt Readings from Last 3 Encounters:   25 56.5 kg (124 lb 9 oz)   25 54.2 kg (119 lb 7.8 oz)   24 61.2 kg (135 lb)     BP Readings from Last 3 Encounters:   25 143/88   25 (!) 164/98   24 144/83       Physical Exam   Alert oriented to me but not yet to date or to place need guidance.  Nonicteric sclera no  jaundice.  Face symmetrical cranial nerves intact.  Lungs clear no rales wheezing or crackles.  Heart normal S1 and S2 regular rhythm.  Abdomen benign neurologically intact moves upper and lower extremities without deficit.  No sensory deficit and no skin rash or disease.  Continue the same plan of care  Assessment/Plan   Diagnoses and all orders for this visit:  Amnestic MCI (mild cognitive impairment with memory loss)  Chronic renal disease, stage IV (Multi)  Cerebrovascular disease  Gait instability  Uncontrolled hypertension      Electronically Signed By: Abhay Peguero MD   2/26/25 12:26 PM

## 2025-02-26 NOTE — PROGRESS NOTES
Name: Kirstin Howe  Medical Record Number: [id number]  YOB: 1940    Kirstin Howe is a 84 y.o. female who presents for was admitted recently to [facility] skilled nursing facility on February 22, 2025.  She came from the hospital with the following history of cognitive impairment, hyperparathyroidism, constipation, CVA without deficits, anxiety, hypertension, CKD 4 and tubular adenoma of the colon no active treatment for now.  She was seen in the emergency room prior to that for dysarthria and gait instability.  Her daughter noticed some difficulty walking and her balance was off hallucination and thinking she is seeing kids around when they are not here.  Most her workup in the emergency room revealed likely Alzheimer dementia.  With renal insufficiency and very elevated blood pressure on arrival to the /89 which improved after receiving a dose of Norvasc and lisinopril with IV 5 mg hydralazine.  Blood pressure was elevated at the last visit at her PCP on November 18, 2024 she lives alone.  She just woke up she tells me this morning when I came in to see her.  Trying to adjust and to remember things.  Did not eat her breakfast yet.  But she has no specific complaint or symptoms.  She remembers that she is receiving physical therapy at the gym and she is doing well.       Advance Directive: To be confirmed by     Review of Systems  12 system reviewed 12 system negative.  I found the pill in her bed looked like senna .?    There were no vitals taken for this visit.  Wt Readings from Last 3 Encounters:   02/20/25 56.5 kg (124 lb 9 oz)   02/17/25 54.2 kg (119 lb 7.8 oz)   11/24/24 61.2 kg (135 lb)     BP Readings from Last 3 Encounters:   02/20/25 143/88   02/20/25 (!) 164/98   11/24/24 144/83       Physical Exam   Alert oriented to me but not yet to date or to place need guidance.  Nonicteric sclera no jaundice.  Face symmetrical cranial nerves intact.  Lungs clear no rales  wheezing or crackles.  Heart normal S1 and S2 regular rhythm.  Abdomen benign neurologically intact moves upper and lower extremities without deficit.  No sensory deficit and no skin rash or disease.  Continue the same plan of care  Assessment/Plan    Diagnoses and all orders for this visit:  Amnestic MCI (mild cognitive impairment with memory loss)  Chronic renal disease, stage IV (Multi)  Cerebrovascular disease  Gait instability  Uncontrolled hypertension

## 2025-03-03 ENCOUNTER — DOCUMENTATION (OUTPATIENT)
Dept: HOME HEALTH SERVICES | Facility: HOME HEALTH | Age: 85
End: 2025-03-03
Payer: MEDICARE

## 2025-03-03 ENCOUNTER — HOME HEALTH ADMISSION (OUTPATIENT)
Dept: HOME HEALTH SERVICES | Facility: HOME HEALTH | Age: 85
End: 2025-03-03
Payer: MEDICARE

## 2025-03-03 NOTE — HH CARE COORDINATION
Home Care received a Referral for Nursing, Physical Therapy, and Occupational Therapy. We have processed the referral for a Start of Care on 3.4 or 3.5.25.     If you have any questions or concerns, please feel free to contact us at [phone]. Follow the prompts, enter your five digit zip code, and you will be directed to your care team on [facility] 3.

## 2025-03-04 ENCOUNTER — PATIENT OUTREACH (OUTPATIENT)
Dept: PRIMARY CARE | Facility: CLINIC | Age: 85
End: 2025-03-04
Payer: MEDICARE

## 2025-03-04 NOTE — PROGRESS NOTES
Discharge Facility: [facility]  Discharge Diagnosis: Transient alteration of awareness  Admission Date: [facility] 2/16/2025 to 2/20/2025  Discharge Date: [facility] 2/20/2025 to 3/3/2025    PCP Appointment Date: Message sent to office to schedule  Specialist Appointment Date: 5/19/2025 2:00 PM Dr. Carolina Gómez, Nephrology,  8/29/2025 1:30 PM Dr. Miguel Giles, Neurology  Hospital Encounter and Summary Linked: Yes  ED to Hosp-Admission (Discharged) with Blanca Alba MD; Jose Millan MD; Marvin De Leon DO (02/16/2025)     Two attempts were made to reach patient within two business days after discharge. Voicemail left with contact information for patient to call back with any non-emergent questions or concerns.

## 2025-03-05 ENCOUNTER — HOSPITAL ENCOUNTER (EMERGENCY)
Facility: HOSPITAL | Age: 85
Discharge: HOME | End: 2025-03-06
Payer: MEDICARE

## 2025-03-05 ENCOUNTER — APPOINTMENT (OUTPATIENT)
Dept: CARDIOLOGY | Facility: HOSPITAL | Age: 85
End: 2025-03-05
Payer: MEDICARE

## 2025-03-05 ENCOUNTER — APPOINTMENT (OUTPATIENT)
Dept: RADIOLOGY | Facility: HOSPITAL | Age: 85
End: 2025-03-05
Payer: MEDICARE

## 2025-03-05 VITALS
BODY MASS INDEX: 22.68 KG/M2 | WEIGHT: 124 LBS | HEART RATE: 87 BPM | DIASTOLIC BLOOD PRESSURE: 77 MMHG | TEMPERATURE: 98.6 F | SYSTOLIC BLOOD PRESSURE: 149 MMHG | OXYGEN SATURATION: 97 % | RESPIRATION RATE: 18 BRPM

## 2025-03-05 DIAGNOSIS — J10.1 INFLUENZA A: Primary | ICD-10-CM

## 2025-03-05 DIAGNOSIS — N30.90 CYSTITIS: ICD-10-CM

## 2025-03-05 LAB
ALBUMIN SERPL BCP-MCNC: 3.9 G/DL (ref 3.4–5)
ALP SERPL-CCNC: 76 U/L (ref 33–136)
ALT SERPL W P-5'-P-CCNC: 8 U/L (ref 7–45)
ANION GAP SERPL CALC-SCNC: 13 MMOL/L (ref 10–20)
APPEARANCE UR: CLEAR
AST SERPL W P-5'-P-CCNC: 17 U/L (ref 9–39)
BASOPHILS # BLD AUTO: 0.04 X10*3/UL (ref 0–0.1)
BASOPHILS NFR BLD AUTO: 0.8 %
BILIRUB SERPL-MCNC: 0.4 MG/DL (ref 0–1.2)
BILIRUB UR STRIP.AUTO-MCNC: NEGATIVE MG/DL
BNP SERPL-MCNC: 60 PG/ML (ref 0–99)
BUN SERPL-MCNC: 20 MG/DL (ref 6–23)
CALCIUM SERPL-MCNC: 9.3 MG/DL (ref 8.6–10.3)
CARDIAC TROPONIN I PNL SERPL HS: 15 NG/L (ref 0–13)
CARDIAC TROPONIN I PNL SERPL HS: 17 NG/L (ref 0–13)
CHLORIDE SERPL-SCNC: 109 MMOL/L (ref 98–107)
CO2 SERPL-SCNC: 25 MMOL/L (ref 21–32)
COLOR UR: YELLOW
CREAT SERPL-MCNC: 1.89 MG/DL (ref 0.5–1.05)
EGFRCR SERPLBLD CKD-EPI 2021: 26 ML/MIN/1.73M*2
EOSINOPHIL # BLD AUTO: 0.01 X10*3/UL (ref 0–0.4)
EOSINOPHIL NFR BLD AUTO: 0.2 %
ERYTHROCYTE [DISTWIDTH] IN BLOOD BY AUTOMATED COUNT: 15.5 % (ref 11.5–14.5)
FLUAV RNA RESP QL NAA+PROBE: DETECTED
FLUAV RNA RESP QL NAA+PROBE: DETECTED
FLUBV RNA RESP QL NAA+PROBE: NOT DETECTED
FLUBV RNA RESP QL NAA+PROBE: NOT DETECTED
GLUCOSE SERPL-MCNC: 92 MG/DL (ref 74–99)
GLUCOSE UR STRIP.AUTO-MCNC: NORMAL MG/DL
HCT VFR BLD AUTO: 41 % (ref 36–46)
HGB BLD-MCNC: 12.9 G/DL (ref 12–16)
IMM GRANULOCYTES # BLD AUTO: 0.02 X10*3/UL (ref 0–0.5)
IMM GRANULOCYTES NFR BLD AUTO: 0.4 % (ref 0–0.9)
KETONES UR STRIP.AUTO-MCNC: ABNORMAL MG/DL
LEUKOCYTE ESTERASE UR QL STRIP.AUTO: ABNORMAL
LYMPHOCYTES # BLD AUTO: 0.7 X10*3/UL (ref 0.8–3)
LYMPHOCYTES NFR BLD AUTO: 13.8 %
MCH RBC QN AUTO: 27.2 PG (ref 26–34)
MCHC RBC AUTO-ENTMCNC: 31.5 G/DL (ref 32–36)
MCV RBC AUTO: 87 FL (ref 80–100)
MONOCYTES # BLD AUTO: 0.64 X10*3/UL (ref 0.05–0.8)
MONOCYTES NFR BLD AUTO: 12.6 %
MUCOUS THREADS #/AREA URNS AUTO: NORMAL /LPF
NEUTROPHILS # BLD AUTO: 3.68 X10*3/UL (ref 1.6–5.5)
NEUTROPHILS NFR BLD AUTO: 72.2 %
NITRITE UR QL STRIP.AUTO: NEGATIVE
NRBC BLD-RTO: 0 /100 WBCS (ref 0–0)
PH UR STRIP.AUTO: 5 [PH]
PLATELET # BLD AUTO: 305 X10*3/UL (ref 150–450)
POTASSIUM SERPL-SCNC: 4.2 MMOL/L (ref 3.5–5.3)
PROT SERPL-MCNC: 6.5 G/DL (ref 6.4–8.2)
PROT UR STRIP.AUTO-MCNC: ABNORMAL MG/DL
RBC # BLD AUTO: 4.74 X10*6/UL (ref 4–5.2)
RBC # UR STRIP.AUTO: NEGATIVE MG/DL
RBC #/AREA URNS AUTO: NORMAL /HPF
RSV RNA RESP QL NAA+PROBE: NOT DETECTED
RSV RNA RESP QL NAA+PROBE: NOT DETECTED
SARS-COV-2 RNA RESP QL NAA+PROBE: NOT DETECTED
SARS-COV-2 RNA RESP QL NAA+PROBE: NOT DETECTED
SODIUM SERPL-SCNC: 143 MMOL/L (ref 136–145)
SP GR UR STRIP.AUTO: 1.02
SQUAMOUS #/AREA URNS AUTO: NORMAL /HPF
UROBILINOGEN UR STRIP.AUTO-MCNC: NORMAL MG/DL
WBC # BLD AUTO: 5.1 X10*3/UL (ref 4.4–11.3)
WBC #/AREA URNS AUTO: NORMAL /HPF

## 2025-03-05 PROCEDURE — 87086 URINE CULTURE/COLONY COUNT: CPT | Mod: AHULAB | Performed by: NURSE PRACTITIONER

## 2025-03-05 PROCEDURE — 2500000002 HC RX 250 W HCPCS SELF ADMINISTERED DRUGS (ALT 637 FOR MEDICARE OP, ALT 636 FOR OP/ED): Performed by: PHYSICIAN ASSISTANT

## 2025-03-05 PROCEDURE — 2500000001 HC RX 250 WO HCPCS SELF ADMINISTERED DRUGS (ALT 637 FOR MEDICARE OP): Performed by: PHYSICIAN ASSISTANT

## 2025-03-05 PROCEDURE — 36415 COLL VENOUS BLD VENIPUNCTURE: CPT | Performed by: NURSE PRACTITIONER

## 2025-03-05 PROCEDURE — 71046 X-RAY EXAM CHEST 2 VIEWS: CPT | Performed by: STUDENT IN AN ORGANIZED HEALTH CARE EDUCATION/TRAINING PROGRAM

## 2025-03-05 PROCEDURE — 99285 EMERGENCY DEPT VISIT HI MDM: CPT | Mod: 25

## 2025-03-05 PROCEDURE — 80053 COMPREHEN METABOLIC PANEL: CPT | Performed by: NURSE PRACTITIONER

## 2025-03-05 PROCEDURE — 84484 ASSAY OF TROPONIN QUANT: CPT | Performed by: NURSE PRACTITIONER

## 2025-03-05 PROCEDURE — 71046 X-RAY EXAM CHEST 2 VIEWS: CPT

## 2025-03-05 PROCEDURE — 87637 SARSCOV2&INF A&B&RSV AMP PRB: CPT | Mod: MUE | Performed by: PHYSICIAN ASSISTANT

## 2025-03-05 PROCEDURE — 83880 ASSAY OF NATRIURETIC PEPTIDE: CPT | Performed by: PHYSICIAN ASSISTANT

## 2025-03-05 PROCEDURE — 93005 ELECTROCARDIOGRAM TRACING: CPT

## 2025-03-05 PROCEDURE — 85025 COMPLETE CBC W/AUTO DIFF WBC: CPT | Performed by: NURSE PRACTITIONER

## 2025-03-05 PROCEDURE — 81001 URINALYSIS AUTO W/SCOPE: CPT | Performed by: NURSE PRACTITIONER

## 2025-03-05 PROCEDURE — 87637 SARSCOV2&INF A&B&RSV AMP PRB: CPT | Performed by: NURSE PRACTITIONER

## 2025-03-05 PROCEDURE — 2500000004 HC RX 250 GENERAL PHARMACY W/ HCPCS (ALT 636 FOR OP/ED): Performed by: PHYSICIAN ASSISTANT

## 2025-03-05 RX ORDER — CEPHALEXIN 500 MG/1
500 CAPSULE ORAL 3 TIMES DAILY
Qty: 15 CAPSULE | Refills: 0 | Status: SHIPPED | OUTPATIENT
Start: 2025-03-05 | End: 2025-03-10

## 2025-03-05 RX ORDER — BENZONATATE 100 MG/1
100 CAPSULE ORAL ONCE
Status: COMPLETED | OUTPATIENT
Start: 2025-03-05 | End: 2025-03-05

## 2025-03-05 RX ORDER — OSELTAMIVIR PHOSPHATE 6 MG/ML
30 FOR SUSPENSION ORAL 2 TIMES DAILY
Qty: 50 ML | Refills: 0 | Status: SHIPPED | OUTPATIENT
Start: 2025-03-05 | End: 2025-03-10

## 2025-03-05 RX ORDER — OSELTAMIVIR PHOSPHATE 30 MG/1
30 CAPSULE ORAL ONCE
Status: COMPLETED | OUTPATIENT
Start: 2025-03-05 | End: 2025-03-05

## 2025-03-05 RX ORDER — CEPHALEXIN 500 MG/1
500 CAPSULE ORAL ONCE
Status: COMPLETED | OUTPATIENT
Start: 2025-03-05 | End: 2025-03-05

## 2025-03-05 RX ADMIN — BENZONATATE 100 MG: 100 CAPSULE ORAL at 20:07

## 2025-03-05 RX ADMIN — CEPHALEXIN 500 MG: 500 CAPSULE ORAL at 22:37

## 2025-03-05 RX ADMIN — OSELTAMIVIR PHOSPHATE 30 MG: 30 CAPSULE ORAL at 22:37

## 2025-03-05 RX ADMIN — SODIUM CHLORIDE 1000 ML: 9 INJECTION, SOLUTION INTRAVENOUS at 21:47

## 2025-03-05 ASSESSMENT — PAIN SCALES - GENERAL: PAINLEVEL_OUTOF10: 0 - NO PAIN

## 2025-03-05 ASSESSMENT — PAIN - FUNCTIONAL ASSESSMENT: PAIN_FUNCTIONAL_ASSESSMENT: 0-10

## 2025-03-06 LAB
ATRIAL RATE: 98 BPM
BACTERIA UR CULT: NORMAL
P AXIS: 39 DEGREES
P OFFSET: 201 MS
P ONSET: 152 MS
PR INTERVAL: 138 MS
Q ONSET: 221 MS
QRS COUNT: 16 BEATS
QRS DURATION: 76 MS
QT INTERVAL: 334 MS
QTC CALCULATION(BAZETT): 426 MS
QTC FREDERICIA: 393 MS
R AXIS: 61 DEGREES
T AXIS: 36 DEGREES
T OFFSET: 388 MS
VENTRICULAR RATE: 98 BPM

## 2025-03-06 NOTE — ED TRIAGE NOTES
This patient was seen in triage.     Vitals are noted.      HPI:  Patient is an 84-year-old female presenting to the emergency department with decreased appetite, just discharged from rehab facility on Monday, also with cough that started today, decreased appetite and weakness, chills, also with urinary urgency, frequency    Focused PE:  Gen: Well-appearing, not in acute distress  Cardiovascular: Regular rate, normal rhythm, no murmur, no gallop  Respiratory: No adventitious lung sounds auscultated.  Abdomen: No reproducible abdominal tenderness upon palpation,  physical exam may be limited by patient positioning sitting up in a chair  Neuro:  Alert and Oriented, speech clear and coherent     Plan:  IV, lab work, EKG, imaging        For the remainder of the patient's workup and ED course, please see the main ED provider note.  We discussed need for diagnostic testing including lab studies and imaging.  We also discussed that they may be asked to wait in the waiting room while these test are pending.  They understand that if they choose to leave without having the testing completed or resulted that we cannot rule out acute life-threatening illnesses and the risks involved to lead to worsening condition, permanent disability or even death.

## 2025-03-06 NOTE — DISCHARGE INSTRUCTIONS
Please begin Tamiflu.  Take twice per day for the next 5 days.  Please begin cephalexin.  This is for UTI/bladder infection.  Please take 3 times per day for the next 5 days.  Continue Tylenol 650 mg every 4-6 hours as needed.  Follow-up with your primary care doctor within the next 24 to 72 hours.  Return to ER for any new or worsening symptoms.

## 2025-03-06 NOTE — ED PROVIDER NOTES
Chief Complaint   Patient presents with    Flu Symptoms     Limitations to History: age/dementia  Additional History Obtained from: Daughter    HPI:   Kirstin Howe is an 84 y.o. female complicated PMH including HTN, memory loss, PIA, osteoporosis, stage IV CKD, CAD who presents to the ED with daughters for evaluation of decreased appetite, fatigue and cough.  Both patient and her daughter Antonia are historian.  Patient denies any complaints.  Says sometimes she just does not feel like eating.  She denies chest pain, abdominal pain.  Daughter at bedside said that she seems to be doing well after she got home but the cough has become worrisome.  Mother only ate chili today.  Yesterday had a few sips of soup.  Has been sleeping more than normal.  Patient other daughter says it seems like maybe patient was having urinary urgency.  Patient denies dysuria, abdominal pain, chest pain, headache, chills.  No fevers.  She is staying with daughter right now.  No new falls or injuries    Medications:  Soc HX: Lives alone  Allergies   Allergen Reactions    Iodine Unknown   :  Past Medical History:   Diagnosis Date    Age-related nuclear cataract, right eye 03/31/2016    Age-related nuclear cataract of right eye    Anxiety disorder, unspecified 03/31/2014    Anxiety    Bunion of unspecified foot 11/26/2014    Bunion    Concussion 04/08/2021    Last Assessment & Plan: Formatting of this note might be different from the original. -concern for possible concussion on admission, but head/neck/throat pain more likely due to infectious etiology as noted above    Contusion of left forearm, initial encounter 08/16/2016    Contusion of left forearm    Contusion of left knee, initial encounter 08/09/2016    Contusion of knee, left    Cortical age-related cataract, right eye 03/31/2016    Cortical age-related cataract of right eye    Depression     Dermatochalasis of unspecified eye, unspecified eyelid 11/10/2017    Dermatochalasis     Dislocation of right shoulder joint     Essential (primary) hypertension 11/23/2022    Hypertension    Fracture of orbital floor, right side, initial encounter for open fracture (Multi) 06/27/2017    Open fracture of right orbital floor, initial encounter    Generalized anxiety disorder 11/26/2014    PIA (generalized anxiety disorder)    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    Herpes zoster 03/29/2023    History of cataract 06/11/2024    History of depression 06/11/2024    Hypertensive urgency 03/25/2016    Hypertensive urgency    Insomnia, unspecified 11/26/2014    Insomnia    Maxillary fracture, unspecified side, initial encounter for closed fracture (Multi) 06/21/2017    Fracture of maxillary sinus, closed, initial encounter    Primary osteoarthritis, 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    Primary osteoarthritis of left wrist    Stage III chronic kidney disease (Multi) 03/29/2023    Status post left knee replacement 09/02/2022     Past Surgical History:   Procedure Laterality Date    CATARACT EXTRACTION  06/07/2017    Cataract Surgery    MR HEAD ANGIO WO IV CONTRAST  4/9/2021    MR HEAD ANGIO WO IV CONTRAST 4/9/2021    TOTAL ABDOMINAL HYSTERECTOMY W/ BILATERAL SALPINGOOPHORECTOMY       Family History   Problem Relation Name Age of Onset    Dementia Mother      Breast cancer Mother  82    Hypertension Mother      Stomach cancer Father      Pancreatic cancer Brother      Breast cancer Daughter  58      Physical Exam  Vitals and nursing note reviewed.   Constitutional:       General: She is not in acute distress.     Appearance: Normal appearance. She is not ill-appearing or toxic-appearing.      Comments: Appears younger than stated age   HENT:      Right Ear: External ear normal.      Left Ear: External ear normal.      Nose: Nose normal.      Mouth/Throat:      Mouth: Mucous membranes are dry.      Pharynx: No posterior oropharyngeal erythema.   Eyes:      Pupils: Pupils are equal, round, and reactive to  light.   Cardiovascular:      Rate and Rhythm: Regular rhythm. Tachycardia present.      Pulses: Normal pulses.      Heart sounds: Normal heart sounds.   Pulmonary:      Effort: Pulmonary effort is normal. No respiratory distress.      Breath sounds: Normal breath sounds.   Abdominal:      General: Bowel sounds are normal.      Palpations: Abdomen is soft.      Tenderness: There is no abdominal tenderness. There is no guarding or rebound.   Musculoskeletal:         General: Normal range of motion.      Cervical back: Normal range of motion.   Skin:     General: Skin is warm and dry.      Capillary Refill: Capillary refill takes less than 2 seconds.   Neurological:      General: No focal deficit present.      Mental Status: She is alert and oriented to person, place, and time.      Cranial Nerves: No cranial nerve deficit.      Sensory: No sensory deficit.      Motor: No weakness.     VS: As documented in the triage note and EMR flowsheet from this visit were reviewed.    External Records Reviewed: I reviewed recent and relevant outside records including: Reviewed PCP note from yesterday.  Patient seen for follow-up.  She was admitted to [facility] from 2/16-20/2025 and then discharged to [facility] where she was admitted from 2/20-3/3/20/2025.  Reviewed postacute care provider note 2/26/2025.  Patient admitted to hospital following ED visit for dysarthria and gait instability.  Workup revealed likely Alzheimer dementia with renal insufficiency and poorly controlled hypertension.  She underwent MRI imaging which showed diffuse volume loss, microangiopathy.  Neurology felt was consistent with metabolic encephalopathy in the setting of uncontrolled hypertension and GRICEL.  Donepezil increased to 10 mg daily    EKG INTERPRETATION:      Personally Reviewed      Rhythm:  Normal sinus rhythm      Rate: 98 bpm      Axis: Normal axis      Intervals:  Normal ID interval 138 ms      QRS Complex:  Normal      ST Segment:  Normal ST-T  segments      QT Interval: QTc 426 ms     Compared with Prior: Similar to prior        Medical Decision Making:   ED Course as of 03/05/25 2328   Wed Mar 05, 2025   1916 Vitals Reviewed: Afebrile. Normotensive. Tachycardic; not tachypneic. No hypoxia.   [KA]   1917 Patient seen by clinician in triage and workup initiated to include basic blood work, COVID flu swabs, urine chest x-ray and ECG.  Personally viewed labs.  Troponin elevated at 15, was 10 two weeks ago.  CBC without leukocytosis, anemia CMP shows mild hyperchloremia with creatinine 1.89 which is similar to value 2 weeks ago; BUN to creatinine ratio 10, normal.  Normal LFTs.  COVID and flu swab not yet obtained.  Urine not yet obtained. [KA]   1919 IMPRESSION:  1.  No evidence of acute cardiopulmonary process.   [KA]   2052 I personally viewed labs.  Influenza A positive. [KA]   2157 Delta troponin negative for significant change.  Urine positive for trace amount of leukocytes. [KA]   2226 Cussed results with patient and her family.  They would like to start Tamiflu.  I will order Tamiflu and we will cover for UTI given she is symptomatic.  Only recent urine culture available for comparison had no growth.  We discussed admission versus discharge home with close follow-up and through shared decision making patient and her family feel it would be best for her to be discharged home.  I will send secure chat to patient's PCP letting them know she was here so that she can have close follow-up. [KA]   2230 Sent a secure chat to patient's PCP Dr. ZAMBRANO [KA]   2325 Patient did well with walking pulse ox.  Heart rate normal.  Oxygen not fall below 96%.  She ambulated without difficulty.  I heard back from her PCP who said they will reach out to her tomorrow.  Encouraged patient to continue Tamiflu and Keflex and follow-up with PCP.  Advised return to ED for any new or worsening symptoms.  They are agreeable. [KA]      ED Course User Index  [KA] Kimberlyn Rivera PA-C          Diagnoses as of 03/05/25 2328   Influenza A   Cystitis      Escalation of Care: Appropriate for outpatient management   Counseling: Spoke with the patient and discussed today´s findings, in addition to providing specific details for the plan of care and expected course.  Patient was given the opportunity to ask questions.    Discussed return precautions and importance of follow-up.  Advised to follow-up with PCP.  Advised to return to the ED for changing or worsening symptoms, new symptoms, complaint specific precautions, and precautions listed on the discharge paperwork.  Educated on the common potential side effects of medications prescribed.    I advised the patient that the emergency evaluation and treatment provided today doesn't end their need for medical care. It is very important that they follow-up with their primary care provider or other specialist as instructed.    The plan of care was mutually agreed upon with the patient. The patient and/or family were given the opportunity to ask questions. All questions asked today in the ED were answered to the best of my ability with today's information.    I specifically advised the patient to return to the ED for changing or worsening symptoms, worrisome new symptoms, or for any complaint specific precautions listed on the discharge paperwork.    This patient was cared for in the setting of nationwide stress on resources and staffing.    This report was transcribed using voice recognition software.  Every effort was made to ensure accuracy, however, inadvertently computerized transcription errors may be present.       Kimberlyn Rivera PA-C  03/05/25 3830

## 2025-03-07 ENCOUNTER — APPOINTMENT (OUTPATIENT)
Dept: NEUROLOGY | Facility: HOSPITAL | Age: 85
End: 2025-03-07
Payer: MEDICARE

## 2025-03-07 ENCOUNTER — HOME CARE VISIT (OUTPATIENT)
Dept: HOME HEALTH SERVICES | Facility: HOME HEALTH | Age: 85
End: 2025-03-07
Payer: MEDICARE

## 2025-03-07 VITALS
DIASTOLIC BLOOD PRESSURE: 80 MMHG | WEIGHT: 124 LBS | RESPIRATION RATE: 18 BRPM | HEART RATE: 80 BPM | SYSTOLIC BLOOD PRESSURE: 110 MMHG | HEIGHT: 64 IN | BODY MASS INDEX: 21.17 KG/M2 | OXYGEN SATURATION: 98 % | TEMPERATURE: 97.9 F

## 2025-03-07 PROCEDURE — G0299 HHS/HOSPICE OF RN EA 15 MIN: HCPCS | Mod: HHH

## 2025-03-07 PROCEDURE — 169592 NO-PAY CLAIM PROCEDURE

## 2025-03-07 ASSESSMENT — ENCOUNTER SYMPTOMS
PAIN: 1
PERSON REPORTING PAIN: PATIENT
PAIN LOCATION - PAIN FREQUENCY: INTERMITTENT
HIGHEST PAIN SEVERITY IN PAST 24 HOURS: 6/10
LAST BOWEL MOVEMENT: 67271
BOWEL PATTERN NORMAL: 1
PAIN LOCATION: GENERALIZED
PAIN LOCATION - EXACERBATING FACTORS: WITH ACTIVITY
PAIN LOCATION - RELIEVING FACTORS: REST
LOWEST PAIN SEVERITY IN PAST 24 HOURS: 4/10
PAIN LOCATION - PAIN QUALITY: ACHING
PAIN SEVERITY GOAL: 0/10
APPETITE LEVEL: FAIR
PAIN LOCATION - PAIN SEVERITY: 6/10

## 2025-03-07 ASSESSMENT — ACTIVITIES OF DAILY LIVING (ADL)
ENTERING_EXITING_HOME: MODERATE ASSIST
OASIS_M1830: 03

## 2025-03-09 ENCOUNTER — HOME CARE VISIT (OUTPATIENT)
Dept: HOME HEALTH SERVICES | Facility: HOME HEALTH | Age: 85
End: 2025-03-09
Payer: MEDICARE

## 2025-03-11 ENCOUNTER — HOME CARE VISIT (OUTPATIENT)
Dept: HOME HEALTH SERVICES | Facility: HOME HEALTH | Age: 85
End: 2025-03-11
Payer: MEDICARE

## 2025-03-11 ENCOUNTER — APPOINTMENT (OUTPATIENT)
Dept: PRIMARY CARE | Facility: CLINIC | Age: 85
End: 2025-03-11
Payer: MEDICARE

## 2025-03-11 PROCEDURE — G0152 HHCP-SERV OF OT,EA 15 MIN: HCPCS | Mod: HHH

## 2025-03-11 SDOH — ECONOMIC STABILITY: HOUSING INSECURITY: HOME SAFETY: PT IS STAYING WITH DTR SINCE HOME FROM HOSP

## 2025-03-11 ASSESSMENT — ACTIVITIES OF DAILY LIVING (ADL)
TOILETING: SUPERVISION
TOILETING: 1
GROOMING_CURRENT_FUNCTION: SUPERVISION
BATHING ASSESSED: 1
GROOMING ASSESSED: 1
FEEDING ASSESSED: 1

## 2025-03-11 ASSESSMENT — ENCOUNTER SYMPTOMS: DENIES PAIN: 1

## 2025-03-12 ENCOUNTER — HOME CARE VISIT (OUTPATIENT)
Dept: HOME HEALTH SERVICES | Facility: HOME HEALTH | Age: 85
End: 2025-03-12
Payer: MEDICARE

## 2025-03-12 VITALS
OXYGEN SATURATION: 100 % | RESPIRATION RATE: 16 BRPM | DIASTOLIC BLOOD PRESSURE: 62 MMHG | TEMPERATURE: 96.2 F | SYSTOLIC BLOOD PRESSURE: 112 MMHG | HEART RATE: 76 BPM

## 2025-03-12 PROCEDURE — G0300 HHS/HOSPICE OF LPN EA 15 MIN: HCPCS | Mod: HHH

## 2025-03-13 ENCOUNTER — HOME CARE VISIT (OUTPATIENT)
Dept: HOME HEALTH SERVICES | Facility: HOME HEALTH | Age: 85
End: 2025-03-13
Payer: MEDICARE

## 2025-03-15 ENCOUNTER — APPOINTMENT (OUTPATIENT)
Dept: RADIOLOGY | Facility: HOSPITAL | Age: 85
DRG: 682 | End: 2025-03-15
Payer: MEDICARE

## 2025-03-15 ENCOUNTER — HOSPITAL ENCOUNTER (INPATIENT)
Facility: HOSPITAL | Age: 85
DRG: 682 | End: 2025-03-15
Attending: EMERGENCY MEDICINE | Admitting: INTERNAL MEDICINE
Payer: MEDICARE

## 2025-03-15 ENCOUNTER — APPOINTMENT (OUTPATIENT)
Dept: CARDIOLOGY | Facility: HOSPITAL | Age: 85
DRG: 682 | End: 2025-03-15
Payer: MEDICARE

## 2025-03-15 DIAGNOSIS — T73.0XXA STARVATION KETOACIDOSIS: ICD-10-CM

## 2025-03-15 DIAGNOSIS — R53.1 GENERALIZED WEAKNESS: Primary | ICD-10-CM

## 2025-03-15 DIAGNOSIS — E87.29 STARVATION KETOACIDOSIS: ICD-10-CM

## 2025-03-15 DIAGNOSIS — M54.9 BACK PAIN, UNSPECIFIED BACK LOCATION, UNSPECIFIED BACK PAIN LATERALITY, UNSPECIFIED CHRONICITY: ICD-10-CM

## 2025-03-15 DIAGNOSIS — J10.1 INFLUENZA A: ICD-10-CM

## 2025-03-15 DIAGNOSIS — I16.0 HYPERTENSIVE URGENCY: ICD-10-CM

## 2025-03-15 LAB
FLUAV RNA RESP QL NAA+PROBE: DETECTED
FLUBV RNA RESP QL NAA+PROBE: NOT DETECTED
SARS-COV-2 RNA RESP QL NAA+PROBE: NOT DETECTED

## 2025-03-15 PROCEDURE — 96361 HYDRATE IV INFUSION ADD-ON: CPT

## 2025-03-15 PROCEDURE — 71045 X-RAY EXAM CHEST 1 VIEW: CPT

## 2025-03-15 PROCEDURE — 71045 X-RAY EXAM CHEST 1 VIEW: CPT | Performed by: RADIOLOGY

## 2025-03-15 PROCEDURE — 93005 ELECTROCARDIOGRAM TRACING: CPT

## 2025-03-15 PROCEDURE — 99285 EMERGENCY DEPT VISIT HI MDM: CPT | Mod: 25 | Performed by: EMERGENCY MEDICINE

## 2025-03-15 PROCEDURE — 87636 SARSCOV2 & INF A&B AMP PRB: CPT | Performed by: EMERGENCY MEDICINE

## 2025-03-15 ASSESSMENT — PAIN DESCRIPTION - PAIN TYPE: TYPE: ACUTE PAIN

## 2025-03-15 ASSESSMENT — PAIN SCALES - GENERAL: PAINLEVEL_OUTOF10: 7

## 2025-03-15 ASSESSMENT — PAIN - FUNCTIONAL ASSESSMENT: PAIN_FUNCTIONAL_ASSESSMENT: 0-10

## 2025-03-16 VITALS
HEART RATE: 72 BPM | TEMPERATURE: 97.7 F | SYSTOLIC BLOOD PRESSURE: 144 MMHG | OXYGEN SATURATION: 100 % | DIASTOLIC BLOOD PRESSURE: 80 MMHG | BODY MASS INDEX: 21.46 KG/M2 | WEIGHT: 125 LBS | RESPIRATION RATE: 18 BRPM

## 2025-03-16 PROBLEM — R53.1 GENERALIZED WEAKNESS: Status: ACTIVE | Noted: 2025-03-16

## 2025-03-16 LAB
ALBUMIN SERPL BCP-MCNC: 3.3 G/DL (ref 3.4–5)
ALBUMIN SERPL BCP-MCNC: 4.3 G/DL (ref 3.4–5)
ALP SERPL-CCNC: 79 U/L (ref 33–136)
ALT SERPL W P-5'-P-CCNC: 7 U/L (ref 7–45)
ANION GAP SERPL CALC-SCNC: 10 MMOL/L (ref 10–20)
ANION GAP SERPL CALC-SCNC: 21 MMOL/L (ref 10–20)
APPEARANCE UR: CLEAR
APTT PPP: 30 SECONDS (ref 26–36)
AST SERPL W P-5'-P-CCNC: 16 U/L (ref 9–39)
B-OH-BUTYR SERPL-SCNC: 0.78 MMOL/L (ref 0.02–0.27)
B-OH-BUTYR SERPL-SCNC: 5.98 MMOL/L (ref 0.02–0.27)
BASOPHILS # BLD AUTO: 0.02 X10*3/UL (ref 0–0.1)
BASOPHILS NFR BLD AUTO: 0.4 %
BILIRUB SERPL-MCNC: 0.5 MG/DL (ref 0–1.2)
BILIRUB UR STRIP.AUTO-MCNC: NEGATIVE MG/DL
BNP SERPL-MCNC: 45 PG/ML (ref 0–99)
BUN SERPL-MCNC: 19 MG/DL (ref 6–23)
BUN SERPL-MCNC: 31 MG/DL (ref 6–23)
CALCIUM SERPL-MCNC: 8.7 MG/DL (ref 8.6–10.3)
CALCIUM SERPL-MCNC: 9.9 MG/DL (ref 8.6–10.3)
CARDIAC TROPONIN I PNL SERPL HS: 13 NG/L (ref 0–13)
CARDIAC TROPONIN I PNL SERPL HS: 15 NG/L (ref 0–13)
CHLORIDE SERPL-SCNC: 105 MMOL/L (ref 98–107)
CHLORIDE SERPL-SCNC: 108 MMOL/L (ref 98–107)
CO2 SERPL-SCNC: 19 MMOL/L (ref 21–32)
CO2 SERPL-SCNC: 26 MMOL/L (ref 21–32)
COLOR UR: ABNORMAL
CREAT SERPL-MCNC: 1.36 MG/DL (ref 0.5–1.05)
CREAT SERPL-MCNC: 1.91 MG/DL (ref 0.5–1.05)
EGFRCR SERPLBLD CKD-EPI 2021: 25 ML/MIN/1.73M*2
EGFRCR SERPLBLD CKD-EPI 2021: 38 ML/MIN/1.73M*2
EOSINOPHIL # BLD AUTO: 0.04 X10*3/UL (ref 0–0.4)
EOSINOPHIL NFR BLD AUTO: 0.8 %
ERYTHROCYTE [DISTWIDTH] IN BLOOD BY AUTOMATED COUNT: 15 % (ref 11.5–14.5)
GLUCOSE BLD MANUAL STRIP-MCNC: 115 MG/DL (ref 74–99)
GLUCOSE BLD MANUAL STRIP-MCNC: 140 MG/DL (ref 74–99)
GLUCOSE BLD MANUAL STRIP-MCNC: 251 MG/DL (ref 74–99)
GLUCOSE BLD MANUAL STRIP-MCNC: 61 MG/DL (ref 74–99)
GLUCOSE BLD MANUAL STRIP-MCNC: 72 MG/DL (ref 74–99)
GLUCOSE BLD MANUAL STRIP-MCNC: 98 MG/DL (ref 74–99)
GLUCOSE SERPL-MCNC: 107 MG/DL (ref 74–99)
GLUCOSE SERPL-MCNC: 67 MG/DL (ref 74–99)
GLUCOSE UR STRIP.AUTO-MCNC: ABNORMAL MG/DL
HCT VFR BLD AUTO: 44.2 % (ref 36–46)
HGB BLD-MCNC: 14 G/DL (ref 12–16)
IMM GRANULOCYTES # BLD AUTO: 0.02 X10*3/UL (ref 0–0.5)
IMM GRANULOCYTES NFR BLD AUTO: 0.4 % (ref 0–0.9)
INR PPP: 1 (ref 0.9–1.1)
KETONES UR STRIP.AUTO-MCNC: ABNORMAL MG/DL
LEUKOCYTE ESTERASE UR QL STRIP.AUTO: NEGATIVE
LYMPHOCYTES # BLD AUTO: 1.29 X10*3/UL (ref 0.8–3)
LYMPHOCYTES NFR BLD AUTO: 25.7 %
MCH RBC QN AUTO: 26.4 PG (ref 26–34)
MCHC RBC AUTO-ENTMCNC: 31.7 G/DL (ref 32–36)
MCV RBC AUTO: 83 FL (ref 80–100)
MONOCYTES # BLD AUTO: 0.21 X10*3/UL (ref 0.05–0.8)
MONOCYTES NFR BLD AUTO: 4.2 %
NEUTROPHILS # BLD AUTO: 3.43 X10*3/UL (ref 1.6–5.5)
NEUTROPHILS NFR BLD AUTO: 68.5 %
NITRITE UR QL STRIP.AUTO: NEGATIVE
NRBC BLD-RTO: 0 /100 WBCS (ref 0–0)
PH UR STRIP.AUTO: 5 [PH]
PHOSPHATE SERPL-MCNC: 2 MG/DL (ref 2.5–4.9)
PLATELET # BLD AUTO: 337 X10*3/UL (ref 150–450)
POTASSIUM SERPL-SCNC: 3.6 MMOL/L (ref 3.5–5.3)
POTASSIUM SERPL-SCNC: 4.2 MMOL/L (ref 3.5–5.3)
PROT SERPL-MCNC: 7.1 G/DL (ref 6.4–8.2)
PROT UR STRIP.AUTO-MCNC: NEGATIVE MG/DL
PROTHROMBIN TIME: 10.8 SECONDS (ref 9.8–12.4)
RBC # BLD AUTO: 5.3 X10*6/UL (ref 4–5.2)
RBC # UR STRIP.AUTO: NEGATIVE MG/DL
SODIUM SERPL-SCNC: 140 MMOL/L (ref 136–145)
SODIUM SERPL-SCNC: 141 MMOL/L (ref 136–145)
SP GR UR STRIP.AUTO: 1.01
UROBILINOGEN UR STRIP.AUTO-MCNC: NORMAL MG/DL
WBC # BLD AUTO: 5 X10*3/UL (ref 4.4–11.3)

## 2025-03-16 PROCEDURE — 85610 PROTHROMBIN TIME: CPT | Performed by: EMERGENCY MEDICINE

## 2025-03-16 PROCEDURE — 84484 ASSAY OF TROPONIN QUANT: CPT | Performed by: EMERGENCY MEDICINE

## 2025-03-16 PROCEDURE — 80069 RENAL FUNCTION PANEL: CPT | Mod: CCI | Performed by: NURSE PRACTITIONER

## 2025-03-16 PROCEDURE — 85025 COMPLETE CBC W/AUTO DIFF WBC: CPT | Performed by: EMERGENCY MEDICINE

## 2025-03-16 PROCEDURE — 81003 URINALYSIS AUTO W/O SCOPE: CPT | Performed by: EMERGENCY MEDICINE

## 2025-03-16 PROCEDURE — 36415 COLL VENOUS BLD VENIPUNCTURE: CPT | Performed by: EMERGENCY MEDICINE

## 2025-03-16 PROCEDURE — 80053 COMPREHEN METABOLIC PANEL: CPT | Performed by: EMERGENCY MEDICINE

## 2025-03-16 PROCEDURE — 2500000001 HC RX 250 WO HCPCS SELF ADMINISTERED DRUGS (ALT 637 FOR MEDICARE OP): Performed by: STUDENT IN AN ORGANIZED HEALTH CARE EDUCATION/TRAINING PROGRAM

## 2025-03-16 PROCEDURE — 2500000004 HC RX 250 GENERAL PHARMACY W/ HCPCS (ALT 636 FOR OP/ED): Performed by: INTERNAL MEDICINE

## 2025-03-16 PROCEDURE — 83880 ASSAY OF NATRIURETIC PEPTIDE: CPT | Performed by: EMERGENCY MEDICINE

## 2025-03-16 PROCEDURE — 2500000005 HC RX 250 GENERAL PHARMACY W/O HCPCS: Performed by: NURSE PRACTITIONER

## 2025-03-16 PROCEDURE — 2500000001 HC RX 250 WO HCPCS SELF ADMINISTERED DRUGS (ALT 637 FOR MEDICARE OP): Performed by: NURSE PRACTITIONER

## 2025-03-16 PROCEDURE — 2500000004 HC RX 250 GENERAL PHARMACY W/ HCPCS (ALT 636 FOR OP/ED): Performed by: EMERGENCY MEDICINE

## 2025-03-16 PROCEDURE — 2500000004 HC RX 250 GENERAL PHARMACY W/ HCPCS (ALT 636 FOR OP/ED): Performed by: NURSE PRACTITIONER

## 2025-03-16 PROCEDURE — 82947 ASSAY GLUCOSE BLOOD QUANT: CPT

## 2025-03-16 PROCEDURE — 82010 KETONE BODYS QUAN: CPT | Performed by: STUDENT IN AN ORGANIZED HEALTH CARE EDUCATION/TRAINING PROGRAM

## 2025-03-16 PROCEDURE — 2500000004 HC RX 250 GENERAL PHARMACY W/ HCPCS (ALT 636 FOR OP/ED): Performed by: STUDENT IN AN ORGANIZED HEALTH CARE EDUCATION/TRAINING PROGRAM

## 2025-03-16 PROCEDURE — 96360 HYDRATION IV INFUSION INIT: CPT

## 2025-03-16 PROCEDURE — 82010 KETONE BODYS QUAN: CPT | Performed by: NURSE PRACTITIONER

## 2025-03-16 PROCEDURE — 99222 1ST HOSP IP/OBS MODERATE 55: CPT | Performed by: INTERNAL MEDICINE

## 2025-03-16 PROCEDURE — 2500000002 HC RX 250 W HCPCS SELF ADMINISTERED DRUGS (ALT 637 FOR MEDICARE OP, ALT 636 FOR OP/ED): Performed by: NURSE PRACTITIONER

## 2025-03-16 PROCEDURE — 85730 THROMBOPLASTIN TIME PARTIAL: CPT | Performed by: EMERGENCY MEDICINE

## 2025-03-16 PROCEDURE — 1100000001 HC PRIVATE ROOM DAILY

## 2025-03-16 RX ORDER — ACETAMINOPHEN 160 MG/5ML
650 SOLUTION ORAL EVERY 4 HOURS PRN
Status: DISCONTINUED | OUTPATIENT
Start: 2025-03-16 | End: 2025-03-21 | Stop reason: HOSPADM

## 2025-03-16 RX ORDER — ACETAMINOPHEN 650 MG/1
650 SUPPOSITORY RECTAL EVERY 4 HOURS PRN
Status: DISCONTINUED | OUTPATIENT
Start: 2025-03-16 | End: 2025-03-21 | Stop reason: HOSPADM

## 2025-03-16 RX ORDER — OSELTAMIVIR PHOSPHATE 30 MG/1
30 CAPSULE ORAL DAILY
Status: DISCONTINUED | OUTPATIENT
Start: 2025-03-16 | End: 2025-03-17

## 2025-03-16 RX ORDER — ENOXAPARIN SODIUM 100 MG/ML
30 INJECTION SUBCUTANEOUS
Status: DISCONTINUED | OUTPATIENT
Start: 2025-03-16 | End: 2025-03-21 | Stop reason: HOSPADM

## 2025-03-16 RX ORDER — BUPROPION HYDROCHLORIDE 150 MG/1
300 TABLET ORAL DAILY
Status: DISCONTINUED | OUTPATIENT
Start: 2025-03-16 | End: 2025-03-21 | Stop reason: HOSPADM

## 2025-03-16 RX ORDER — ACETAMINOPHEN 325 MG/1
650 TABLET ORAL EVERY 4 HOURS PRN
Status: DISCONTINUED | OUTPATIENT
Start: 2025-03-16 | End: 2025-03-21 | Stop reason: HOSPADM

## 2025-03-16 RX ORDER — AMOXICILLIN 250 MG
1 CAPSULE ORAL 2 TIMES DAILY
Status: DISCONTINUED | OUTPATIENT
Start: 2025-03-16 | End: 2025-03-21 | Stop reason: HOSPADM

## 2025-03-16 RX ORDER — TALC
5 POWDER (GRAM) TOPICAL NIGHTLY PRN
Status: DISCONTINUED | OUTPATIENT
Start: 2025-03-16 | End: 2025-03-21 | Stop reason: HOSPADM

## 2025-03-16 RX ORDER — LANOLIN ALCOHOL/MO/W.PET/CERES
1000 CREAM (GRAM) TOPICAL DAILY
Status: DISCONTINUED | OUTPATIENT
Start: 2025-03-16 | End: 2025-03-21 | Stop reason: HOSPADM

## 2025-03-16 RX ORDER — DONEPEZIL HYDROCHLORIDE 5 MG/1
10 TABLET, FILM COATED ORAL NIGHTLY
Status: DISCONTINUED | OUTPATIENT
Start: 2025-03-16 | End: 2025-03-21 | Stop reason: HOSPADM

## 2025-03-16 RX ORDER — DEXTROSE, SODIUM CHLORIDE, SODIUM LACTATE, POTASSIUM CHLORIDE, AND CALCIUM CHLORIDE 5; .6; .31; .03; .02 G/100ML; G/100ML; G/100ML; G/100ML; G/100ML
500 INJECTION, SOLUTION INTRAVENOUS ONCE
Status: COMPLETED | OUTPATIENT
Start: 2025-03-16 | End: 2025-03-16

## 2025-03-16 RX ORDER — DEXTROSE 40 %
15 GEL (GRAM) ORAL ONCE
Status: COMPLETED | OUTPATIENT
Start: 2025-03-16 | End: 2025-03-16

## 2025-03-16 RX ORDER — DEXTROSE, SODIUM CHLORIDE, SODIUM LACTATE, POTASSIUM CHLORIDE, AND CALCIUM CHLORIDE 5; .6; .31; .03; .02 G/100ML; G/100ML; G/100ML; G/100ML; G/100ML
75 INJECTION, SOLUTION INTRAVENOUS CONTINUOUS
Status: DISCONTINUED | OUTPATIENT
Start: 2025-03-16 | End: 2025-03-17

## 2025-03-16 RX ORDER — OSELTAMIVIR PHOSPHATE 6 MG/ML
30 FOR SUSPENSION ORAL EVERY 24 HOURS
Status: DISCONTINUED | OUTPATIENT
Start: 2025-03-16 | End: 2025-03-16 | Stop reason: CLARIF

## 2025-03-16 RX ORDER — OSELTAMIVIR PHOSPHATE 6 MG/ML
30 FOR SUSPENSION ORAL 2 TIMES DAILY
Status: DISCONTINUED | OUTPATIENT
Start: 2025-03-16 | End: 2025-03-16

## 2025-03-16 RX ADMIN — BUPROPION HYDROCHLORIDE 300 MG: 150 TABLET, EXTENDED RELEASE ORAL at 08:39

## 2025-03-16 RX ADMIN — SODIUM CHLORIDE 1000 ML: 0.9 INJECTION, SOLUTION INTRAVENOUS at 00:09

## 2025-03-16 RX ADMIN — SODIUM CHLORIDE, SODIUM LACTATE, POTASSIUM CHLORIDE, CALCIUM CHLORIDE AND DEXTROSE MONOHYDRATE 500 ML: 5; 600; 310; 30; 20 INJECTION, SOLUTION INTRAVENOUS at 01:37

## 2025-03-16 RX ADMIN — SENNOSIDES AND DOCUSATE SODIUM 1 TABLET: 50; 8.6 TABLET ORAL at 08:39

## 2025-03-16 RX ADMIN — CYANOCOBALAMIN TAB 500 MCG 1000 MCG: 500 TAB at 08:39

## 2025-03-16 RX ADMIN — SENNOSIDES AND DOCUSATE SODIUM 1 TABLET: 50; 8.6 TABLET ORAL at 20:42

## 2025-03-16 RX ADMIN — DONEPEZIL HYDROCHLORIDE 10 MG: 5 TABLET ORAL at 20:42

## 2025-03-16 RX ADMIN — SODIUM CHLORIDE, SODIUM LACTATE, POTASSIUM CHLORIDE, CALCIUM CHLORIDE AND DEXTROSE MONOHYDRATE 75 ML/HR: 5; 600; 310; 30; 20 INJECTION, SOLUTION INTRAVENOUS at 15:53

## 2025-03-16 RX ADMIN — Medication 4.5 MG: at 20:42

## 2025-03-16 RX ADMIN — OSELTAMAVIR PHOSPHATE 30 MG: 30 CAPSULE ORAL at 08:39

## 2025-03-16 RX ADMIN — DEXTROSE 15 G: 15 GEL ORAL at 00:44

## 2025-03-16 RX ADMIN — ENOXAPARIN SODIUM 30 MG: 30 INJECTION SUBCUTANEOUS at 08:39

## 2025-03-16 SDOH — SOCIAL STABILITY: SOCIAL INSECURITY: DO YOU FEEL UNSAFE GOING BACK TO THE PLACE WHERE YOU ARE LIVING?: NO

## 2025-03-16 SDOH — SOCIAL STABILITY: SOCIAL INSECURITY: HAVE YOU HAD THOUGHTS OF HARMING ANYONE ELSE?: NO

## 2025-03-16 SDOH — SOCIAL STABILITY: SOCIAL INSECURITY: ARE YOU OR HAVE YOU BEEN THREATENED OR ABUSED PHYSICALLY, EMOTIONALLY, OR SEXUALLY BY ANYONE?: NO

## 2025-03-16 SDOH — SOCIAL STABILITY: SOCIAL INSECURITY: DO YOU FEEL ANYONE HAS EXPLOITED OR TAKEN ADVANTAGE OF YOU FINANCIALLY OR OF YOUR PERSONAL PROPERTY?: NO

## 2025-03-16 SDOH — SOCIAL STABILITY: SOCIAL INSECURITY: ABUSE: ADULT

## 2025-03-16 SDOH — SOCIAL STABILITY: SOCIAL INSECURITY: HAVE YOU HAD ANY THOUGHTS OF HARMING ANYONE ELSE?: NO

## 2025-03-16 SDOH — SOCIAL STABILITY: SOCIAL INSECURITY: DOES ANYONE TRY TO KEEP YOU FROM HAVING/CONTACTING OTHER FRIENDS OR DOING THINGS OUTSIDE YOUR HOME?: NO

## 2025-03-16 SDOH — SOCIAL STABILITY: SOCIAL INSECURITY: WERE YOU ABLE TO COMPLETE ALL THE BEHAVIORAL HEALTH SCREENINGS?: YES

## 2025-03-16 SDOH — SOCIAL STABILITY: SOCIAL INSECURITY: HAS ANYONE EVER THREATENED TO HURT YOUR FAMILY OR YOUR PETS?: NO

## 2025-03-16 SDOH — SOCIAL STABILITY: SOCIAL INSECURITY: ARE THERE ANY APPARENT SIGNS OF INJURIES/BEHAVIORS THAT COULD BE RELATED TO ABUSE/NEGLECT?: NO

## 2025-03-16 ASSESSMENT — COGNITIVE AND FUNCTIONAL STATUS - GENERAL
CLIMB 3 TO 5 STEPS WITH RAILING: A LITTLE
DAILY ACTIVITIY SCORE: 19
DRESSING REGULAR UPPER BODY CLOTHING: A LITTLE
TURNING FROM BACK TO SIDE WHILE IN FLAT BAD: A LITTLE
WALKING IN HOSPITAL ROOM: A LITTLE
HELP NEEDED FOR BATHING: A LOT
MOVING TO AND FROM BED TO CHAIR: A LITTLE
PERSONAL GROOMING: A LITTLE
PERSONAL GROOMING: A LITTLE
DAILY ACTIVITIY SCORE: 19
MOVING FROM LYING ON BACK TO SITTING ON SIDE OF FLAT BED WITH BEDRAILS: A LITTLE
MOVING TO AND FROM BED TO CHAIR: A LITTLE
PATIENT BASELINE BEDBOUND: NO
TURNING FROM BACK TO SIDE WHILE IN FLAT BAD: A LITTLE
TOILETING: A LITTLE
HELP NEEDED FOR BATHING: A LITTLE
WALKING IN HOSPITAL ROOM: A LITTLE
DRESSING REGULAR LOWER BODY CLOTHING: A LITTLE
MOBILITY SCORE: 18
WALKING IN HOSPITAL ROOM: A LITTLE
TOILETING: A LOT
MOBILITY SCORE: 22
CLIMB 3 TO 5 STEPS WITH RAILING: A LOT
STANDING UP FROM CHAIR USING ARMS: A LITTLE
DAILY ACTIVITIY SCORE: 14
HELP NEEDED FOR BATHING: A LITTLE
TOILETING: A LITTLE
DRESSING REGULAR LOWER BODY CLOTHING: A LITTLE
EATING MEALS: A LITTLE
DRESSING REGULAR UPPER BODY CLOTHING: A LOT
DRESSING REGULAR UPPER BODY CLOTHING: A LITTLE
STANDING UP FROM CHAIR USING ARMS: A LITTLE
DRESSING REGULAR LOWER BODY CLOTHING: A LITTLE
CLIMB 3 TO 5 STEPS WITH RAILING: A LOT
PERSONAL GROOMING: A LOT
MOBILITY SCORE: 17

## 2025-03-16 ASSESSMENT — ACTIVITIES OF DAILY LIVING (ADL)
DRESSING YOURSELF: INDEPENDENT
FEEDING YOURSELF: INDEPENDENT
ADEQUATE_TO_COMPLETE_ADL: YES
PATIENT'S MEMORY ADEQUATE TO SAFELY COMPLETE DAILY ACTIVITIES?: YES
LACK_OF_TRANSPORTATION: NO
WALKS IN HOME: INDEPENDENT
JUDGMENT_ADEQUATE_SAFELY_COMPLETE_DAILY_ACTIVITIES: YES
TOILETING: INDEPENDENT
HEARING - LEFT EAR: FUNCTIONAL
HEARING - RIGHT EAR: FUNCTIONAL
BATHING: INDEPENDENT
GROOMING: INDEPENDENT

## 2025-03-16 ASSESSMENT — PATIENT HEALTH QUESTIONNAIRE - PHQ9
2. FEELING DOWN, DEPRESSED OR HOPELESS: NOT AT ALL
1. LITTLE INTEREST OR PLEASURE IN DOING THINGS: NOT AT ALL
SUM OF ALL RESPONSES TO PHQ9 QUESTIONS 1 & 2: 0

## 2025-03-16 ASSESSMENT — LIFESTYLE VARIABLES
SUBSTANCE_ABUSE_PAST_12_MONTHS: NO
AUDIT-C TOTAL SCORE: 0
PRESCIPTION_ABUSE_PAST_12_MONTHS: NO
HOW MANY STANDARD DRINKS CONTAINING ALCOHOL DO YOU HAVE ON A TYPICAL DAY: PATIENT DOES NOT DRINK
HOW OFTEN DO YOU HAVE A DRINK CONTAINING ALCOHOL: NEVER
AUDIT-C TOTAL SCORE: 0
SKIP TO QUESTIONS 9-10: 1
HOW OFTEN DO YOU HAVE 6 OR MORE DRINKS ON ONE OCCASION: NEVER

## 2025-03-16 ASSESSMENT — PAIN SCALES - GENERAL
PAINLEVEL_OUTOF10: 0 - NO PAIN
PAINLEVEL_OUTOF10: 0 - NO PAIN

## 2025-03-16 NOTE — ED TRIAGE NOTES
From home for c/o for weakness, nausea, and decrease in eating x2 weeks. Pts daughter in triage states pt was discharged from rehab 2 weeks prior for balance issues and since being back home, decrease in eating, increased weakness and increased nausea. Pt denies CP, SOB or any recent falls.

## 2025-03-16 NOTE — PROGRESS NOTES
Emergency Department Transition of Care Note       Signout   I received Kirstin Howe in signout from Dr. Chavez.  Please see the ED Provider Note for all HPI, PE and MDM up to the time of signout which is documented in the ED course.  This is in addition to the primary record.    See ED course for sign out comments.    ED Course & Medical Decision Making   ED Course:  ED Course as of 03/15/25 2203   Sat Mar 15, 2025   2200 SS for PV    FTT not eating, drinking, can't walk. Recently discharged from rehab. Labs, imaging pending. Anticipate admit [SS]      ED Course User Index  [SS] Steffen Simerlink, MD       Medical Decision Making:  Under my care, ***    Disposition   {ED Disposition:14694}    Procedures   Procedures    Steffen Simerlink, MD   opportunity to ask questions and I answered them. The patient agreed to be admitted to the hospital.    Procedures   Procedures    Steffen Simerlink, MD

## 2025-03-16 NOTE — ASSESSMENT & PLAN NOTE
84-year-old female with past medical history of hypertension, early dementia with some memory loss, osteoporosis, chronic kidney disease stage IV, coronary artery disease was admitted to [facility] [place] with altered mentation which was thought to be secondary to metabolic encephalopathy and MRI of the brain was negative for acute stroke.  Subsequently patient went to skilled nursing facility for about 2 weeks and discharged home.  She was seen in the emergency room at [facility] on March 5, 2025 and was diagnosed with influenza A advised prescribed Tamiflu and discharged home.  Patient was brought back to the emergency room late last night by her family with increasing fatigue and patient not eating and drinking very well and feeling very weak.  She denied any abdominal pain nausea vomiting or diarrhea no chest pain or shortness of breath.  Her labs are satisfactory except for elevated creatinine of 1.91.

## 2025-03-16 NOTE — ED PROVIDER NOTES
HPI   Chief Complaint   Patient presents with    Weakness, Gen       84-year-old woman with past medical history hypertension memory loss, PIA, osteoporosis CKD 4, CAD who presented with worsening fatigue, decreased appetite not eating and drinking.  Denies any fever or chills.  No chest or abdominal pain.  States she has no appetite.  She has not been walking around per the family.  They have a hard time getting her to eat or drink.            Patient History   Past Medical History:   Diagnosis Date    Age-related nuclear cataract, right eye 03/31/2016    Age-related nuclear cataract of right eye    Anxiety disorder, unspecified 03/31/2014    Anxiety    Bunion of unspecified foot 11/26/2014    Bunion    Concussion 04/08/2021    Last Assessment & Plan: Formatting of this note might be different from the original. -concern for possible concussion on admission, but head/neck/throat pain more likely due to infectious etiology as noted above    Contusion of left forearm, initial encounter 08/16/2016    Contusion of left forearm    Contusion of left knee, initial encounter 08/09/2016    Contusion of knee, left    Cortical age-related cataract, right eye 03/31/2016    Cortical age-related cataract of right eye    Depression     Dermatochalasis of unspecified eye, unspecified eyelid 11/10/2017    Dermatochalasis    Dislocation of right shoulder joint     Essential (primary) hypertension 11/23/2022    Hypertension    Fracture of orbital floor, right side, initial encounter for open fracture (Multi) 06/27/2017    Open fracture of right orbital floor, initial encounter    Generalized anxiety disorder 11/26/2014    PIA (generalized anxiety disorder)    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    Herpes zoster 03/29/2023    History of cataract 06/11/2024    History of depression 06/11/2024    Hypertensive urgency 03/25/2016    Hypertensive urgency    Insomnia, unspecified 11/26/2014    Insomnia    Maxillary  fracture, unspecified side, initial encounter for closed fracture (Multi) 06/21/2017    Fracture of maxillary sinus, closed, initial encounter    Primary osteoarthritis, 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    Primary osteoarthritis of left wrist    Stage III chronic kidney disease (Multi) 03/29/2023    Status post left knee replacement 09/02/2022     Past Surgical History:   Procedure Laterality Date    CATARACT EXTRACTION  06/07/2017    Cataract Surgery    MR HEAD ANGIO WO IV CONTRAST  4/9/2021    MR HEAD ANGIO WO IV CONTRAST 4/9/2021    TOTAL ABDOMINAL HYSTERECTOMY W/ BILATERAL SALPINGOOPHORECTOMY       Family History   Problem Relation Name Age of Onset    Dementia Mother      Breast cancer Mother  82    Hypertension Mother      Stomach cancer Father      Pancreatic cancer Brother      Breast cancer Daughter  58     Social History     Tobacco Use    Smoking status: Former     Types: Cigarettes    Smokeless tobacco: Never   Vaping Use    Vaping status: Never Used   Substance Use Topics    Alcohol use: Not Currently    Drug use: Never       Physical Exam   ED Triage Vitals [03/15/25 2002]   Temperature Heart Rate Respirations BP   36.5 °C (97.7 °F) 82 16 125/70      Pulse Ox Temp Source Heart Rate Source Patient Position   98 % Temporal Monitor Sitting      BP Location FiO2 (%)     Right arm --       Physical Exam  Vitals and nursing note reviewed.   Constitutional:       General: She is not in acute distress.     Appearance: Normal appearance. She is normal weight. She is not ill-appearing.   HENT:      Head: Normocephalic and atraumatic.      Nose: Nose normal.      Mouth/Throat:      Mouth: Mucous membranes are dry.      Pharynx: Oropharynx is clear.   Eyes:      Extraocular Movements: Extraocular movements intact.      Conjunctiva/sclera: Conjunctivae normal.      Pupils: Pupils are equal, round, and reactive to light.   Cardiovascular:      Rate and Rhythm: Normal rate and regular rhythm.      Pulses: Normal pulses.       Heart sounds: Normal heart sounds.   Pulmonary:      Effort: Pulmonary effort is normal.      Breath sounds: Normal breath sounds.   Abdominal:      General: Abdomen is flat. Bowel sounds are normal. There is no distension.      Tenderness: There is no abdominal tenderness. There is no right CVA tenderness, left CVA tenderness, guarding or rebound.   Musculoskeletal:         General: Normal range of motion.      Cervical back: Normal range of motion and neck supple.      Right lower leg: No edema.      Left lower leg: No edema.   Skin:     General: Skin is warm and dry.      Capillary Refill: Capillary refill takes less than 2 seconds.      Coloration: Skin is not pale.      Findings: No bruising.   Neurological:      General: No focal deficit present.      Mental Status: She is alert and oriented to person, place, and time. Mental status is at baseline.      Sensory: No sensory deficit.      Motor: No weakness.   Psychiatric:         Thought Content: Thought content normal.         Judgment: Judgment normal.           ED Course & MDM   ED Course as of 03/15/25 2257   Sat Mar 15, 2025   2200 SS for PV    FTT not eating, drinking, can't walk. Recently discharged from rehab. Labs, imaging pending. Anticipate admit [SS]   2234 Sars-CoV-2 and Influenza A/B PCR(!)  Pos flu A [SS]      ED Course User Index  [SS] Steffen Simerlink, MD                 No data recorded     Anders Coma Scale Score: 15 (03/15/25 2014 : Moody Suazo RN)                           Medical Decision Making  IV is placed and labs drawn including CBC, CMPUA and chest x-ray.  No pneumonia is on her chest x-ray.  Her flu test was noted positive on 3 5 again positive today.  Given her persistent weakness, dehydration, innovator ambulate she will likely require admission for further evaluation with possible SNF placement    Amount and/or Complexity of Data Reviewed  Labs: ordered. Decision-making details documented in ED Course.  Radiology: ordered.  Decision-making details documented in ED Course.  ECG/medicine tests: ordered. Decision-making details documented in ED Course.     Details: EKG interpreted by me shows normal sinus rhythm at a rate of 74 with no acute ischemic change but no STEMI.  No A-fib        Procedure  Procedures     Romero Chavez MD  03/15/25 5698

## 2025-03-16 NOTE — ED NOTES
This RN was attempting to place an ultrasound guided IV on patient, when pt's son was noted to be recording this RN on his cell phone. This RN noted that pt's son's cell phone had the flash on and was pointed in this RN's direction. This RN informed pt's son that I noted that he was recording and that it was not permitted and asked him to stop. Pt son denied that he  was recording this RN and put his phone down despite this RN noting that the flash on the cell phone did not turn off, nor did this RN hear the phone stop recording. After about 5 minutes, once this RN determined that another RN needed to attempt as this RN was unsuccessful, this RN noted that pt's son picked up his phone and that the phone then made the noise that indicates that the video was ended. This RN did not endorse the recording and did ask pt son to stop once it was noted but he did not.      Gabrielle Spatz, RN  03/15/25 3294    Charge RN and Nursing Supervisor Notified.      Gabrielle Spatz, RN  03/15/25 [id number]

## 2025-03-16 NOTE — PROGRESS NOTES
Pharmacy Medication History     Source of Information: patient    Additional concerns with the patient's PTA list.   N/a  The following updates were made to the Prior to Admission medication list:     Medications ADDED:   N/a  Medications CHANGED:  N/a  Medications REMOVED:   N/a  Medications NOT TAKING:   N/a    Allergy reviewed : Yes    Meds 2 Beds : No    Outpatient pharmacy confirmed and updated in chart : Yes    Pharmacy name: [facility], [place]    The list below reflectives the updated PTA list. Please review each medication in order reconciliation for additional clarification and justification.    Prior to Admission Medications   Prescriptions Last Dose Informant   amLODIPine (Norvasc) 5 mg tablet Unknown Self   Sig: Take 2 tablets (10 mg) by mouth once daily.   buPROPion XL (Wellbutrin XL) 300 mg 24 hr tablet Unknown Self   Sig: Take 1 tablet (300 mg) by mouth once daily.   cephalexin (Keflex) 500 mg capsule  Self   Sig: Take 1 capsule (500 mg) by mouth 3 times a day for 5 days.   cyanocobalamin (Vitamin B-12) 1,000 mcg tablet Unknown Self   Sig: Take 1 tablet (1,000 mcg) by mouth once daily.   donepezil (Aricept) 10 mg tablet  Self   Sig: Take one tab daily   Patient taking differently: Take one tab daily by mouth   lisinopril 40 mg tablet Unknown Self   Sig: Take 1 tablet (40 mg) by mouth once daily.   melatonin 5 mg tablet Unknown Self   Sig: Take 1 tablet (5 mg) by mouth as needed at bedtime for sleep.   oseltamivir (Tamiflu) 6 mg/mL suspension     Sig: Take 5 mL (30 mg) by mouth 2 times a day for 5 days.   sennosides-docusate sodium (Griselda-Colace) 8.6-50 mg tablet Unknown Self   Sig: Take 1 tablet by mouth 2 times a day.      Facility-Administered Medications: None       The list below reflectives the updated allergy list. Please review each documented allergy for additional clarification and justification.    Allergies   Allergen Reactions    Iodine Unknown          03/16/25 at 9:53 AM - Seema Chance

## 2025-03-16 NOTE — H&P
History Of Present Illness  Kirstin Howe is a 84 y.o. female presenting with generalized weakness and not eating and drinking very well..  84-year-old woman with past medical history hypertension memory loss,  osteoporosis CKD 4, CAD who presented with worsening fatigue, decreased appetite not eating and drinking. Denies any fever or chills. No chest or abdominal pain   Patient was seen in the emergency room on March 5 and was discharged home on Tamiflu but was diagnosed with influenza A.  [place] [facility] [place] 16, 2025 and acute distress coagulated out.  Subsequently she was sent to skilled nursing facility from where she was discharged about 2 and half weeks ago.  Apparently lately she has had more cognitive impairment and has not been doing her physical therapy, not eating and regularly and feels very weak and tired and fatigued.  She has no specific symptoms, denies any abdominal pain nausea vomiting or diarrhea.  No chest pain or shortness of breath.     Past Medical History  She has a past medical history of Age-related nuclear cataract, right eye (03/31/2016), Anxiety disorder, unspecified (03/31/2014), Bunion of unspecified foot (11/26/2014), Concussion (04/08/2021), Contusion of left forearm, initial encounter (08/16/2016), Contusion of left knee, initial encounter (08/09/2016), Cortical age-related cataract, right eye (03/31/2016), Depression, Dermatochalasis of unspecified eye, unspecified eyelid (11/10/2017), Dislocation of right shoulder joint, Essential (primary) hypertension (11/23/2022), Fracture of orbital floor, right side, initial encounter for open fracture (Multi) (06/27/2017), Generalized anxiety disorder (11/26/2014), Herpes simplex infection of genitourinary system (08/28/2018), Herpes zoster (03/29/2023), History of cataract (06/11/2024), History of depression (06/11/2024), Hypertensive urgency (03/25/2016), Insomnia, unspecified  (11/26/2014), Maxillary fracture, unspecified side, initial encounter for closed fracture (Multi) (06/21/2017), Primary osteoarthritis, left wrist (02/09/2017), Stage III chronic kidney disease (Multi) (03/29/2023), and Status post left knee replacement (09/02/2022).    Surgical History  She has a past surgical history that includes Cataract extraction (06/07/2017); Total abdominal hysterectomy w/ bilateral salpingoophorectomy; and MR angio head wo IV contrast (4/9/2021).     Social History  She reports that she has quit smoking. Her smoking use included cigarettes. She has never used smokeless tobacco. She reports that she does not currently use alcohol. She reports that she does not use drugs.    Family History  Family History   Problem Relation Name Age of Onset    Dementia Mother      Breast cancer Mother  82    Hypertension Mother      Stomach cancer Father      Pancreatic cancer Brother      Breast cancer Daughter  58        Allergies  Iodine    Review of Systems  COMPLETE REVIEW OF SYSTEMS:    GENERAL: +weight loss, general fatigue and malaise ,no fevers., SEE HPI  HEENT: Negative for frequent or significant headaches, No changes in hearing or vision, no nose bleeds or other nasal problems  NECK: Negative for lumps, goiter, pain and significant neck swelling  RESPIRATORY: Negative for cough, wheezing or shortness of breath.  CARDIOVASCULAR: Negative for chest pain, leg swelling or palpitations.  GI: Negative for abdominal discomfort, blood in stools or black stools or change in bowel habits  : No history of dysuria, frequency or incontinence  GYN: Negative for abnormal vaginal bleeding, abnormal vaginal discharge.    MUSCULOSKELETAL: Remote left Total knee arthroplasty, negative for joint pain or swelling, back pain or muscle pain.  SKIN: Negative for lesions, rash, and itching.  PSYCH: Negative for sleep disturbance, mood disorder and recent psychosocial stressors.  HEMATOLOGY/LYMPHOLOGY: Negative for  prolonged bleeding, bruising easily or swollen nodes.  ENDOCRINE: Negative for cold or heat intolerance, polyuria, polydipsia and goiter.  NEURO: Mild to moderate cognitive impairment, no history of headaches, syncope,  seizures or tremors     Physical Exam  GENERAL: Awake, alert, no distress, cooperative, appears to be very tired and exhausted  SKIN: Skin color, texture, turgor diminished,. No rashes or lesions.  HEAD/SINUSES: No significant findings.  EYES: PERRLA and EOMI  EARS: external ears normal,   NOSE:  Septum midline.  OROPHARYNX: Lips, mucosa, and tongue dry, Teeth and gums normal. Oropharynx normal.  NECK: no jugulovenous distention, no carotid bruits, carotid pulse normal contour, supple  BACK: , No CVAT.  LUNGS: Lungs clear to auscultation. Good diaphragmatic excursion.  CARDIAC: Rate rhythm is regular, normal S1 and S2; no rubs, murmurs, or gallops  ABDOMEN: Abdomen soft, non-tender. BS normal. No masses or organomegaly.  EXTREMITIES: Extremities normal. No deformities, edema, clubbing or skin discoloration.  NEURO: Patient is awake and alert, oriented x 3, moving all extremities, no acute neurological deficit.  PULSES: 2+ radial, 2+ carotid  RECTAL: not done          Last Recorded Vitals  BP (!) 142/94 (BP Location: Right arm, Patient Position: Lying)   Pulse 74   Temp 36.5 °C (97.7 °F) (Oral)   Resp 17   Wt 56.7 kg (125 lb)   SpO2 98%     Relevant Results  Scheduled medications  buPROPion XL, 300 mg, oral, Daily  cyanocobalamin, 1,000 mcg, oral, Daily  donepezil, 10 mg, oral, Nightly  enoxaparin, 30 mg, subcutaneous, q24h FAVIAN  oseltamivir, 30 mg, oral, Daily  sennosides-docusate sodium, 1 tablet, oral, BID      Continuous medications     PRN medications  PRN medications: acetaminophen **OR** acetaminophen **OR** acetaminophen, acetaminophen **OR** acetaminophen **OR** acetaminophen, melatonin   Results for orders placed or performed during the hospital encounter of 03/15/25 (from the past 96  hours)   Sars-CoV-2 and Influenza A/B PCR   Result Value Ref Range    Flu A Result Detected (A) Not Detected    Flu B Result Not Detected Not Detected    Coronavirus 2019, PCR Not Detected Not Detected   CBC and Auto Differential   Result Value Ref Range    WBC 5.0 4.4 - 11.3 x10*3/uL    nRBC 0.0 0.0 - 0.0 /100 WBCs    RBC 5.30 (H) 4.00 - 5.20 x10*6/uL    Hemoglobin 14.0 12.0 - 16.0 g/dL    Hematocrit 44.2 36.0 - 46.0 %    MCV 83 80 - 100 fL    MCH 26.4 26.0 - 34.0 pg    MCHC 31.7 (L) 32.0 - 36.0 g/dL    RDW 15.0 (H) 11.5 - 14.5 %    Platelets 337 150 - 450 x10*3/uL    Neutrophils % 68.5 40.0 - 80.0 %    Immature Granulocytes %, Automated 0.4 0.0 - 0.9 %    Lymphocytes % 25.7 13.0 - 44.0 %    Monocytes % 4.2 2.0 - 10.0 %    Eosinophils % 0.8 0.0 - 6.0 %    Basophils % 0.4 0.0 - 2.0 %    Neutrophils Absolute 3.43 1.60 - 5.50 x10*3/uL    Immature Granulocytes Absolute, Automated 0.02 0.00 - 0.50 x10*3/uL    Lymphocytes Absolute 1.29 0.80 - 3.00 x10*3/uL    Monocytes Absolute 0.21 0.05 - 0.80 x10*3/uL    Eosinophils Absolute 0.04 0.00 - 0.40 x10*3/uL    Basophils Absolute 0.02 0.00 - 0.10 x10*3/uL   Comprehensive metabolic panel   Result Value Ref Range    Glucose 67 (L) 74 - 99 mg/dL    Sodium 141 136 - 145 mmol/L    Potassium 4.2 3.5 - 5.3 mmol/L    Chloride 105 98 - 107 mmol/L    Bicarbonate 19 (L) 21 - 32 mmol/L    Anion Gap 21 (H) 10 - 20 mmol/L    Urea Nitrogen 31 (H) 6 - 23 mg/dL    Creatinine 1.91 (H) 0.50 - 1.05 mg/dL    eGFR 25 (L) >60 mL/min/1.73m*2    Calcium 9.9 8.6 - 10.3 mg/dL    Albumin 4.3 3.4 - 5.0 g/dL    Alkaline Phosphatase 79 33 - 136 U/L    Total Protein 7.1 6.4 - 8.2 g/dL    AST 16 9 - 39 U/L    Bilirubin, Total 0.5 0.0 - 1.2 mg/dL    ALT 7 7 - 45 U/L   Protime-INR   Result Value Ref Range    Protime 10.8 9.8 - 12.4 seconds    INR 1.0 0.9 - 1.1   aPTT   Result Value Ref Range    aPTT 30 26 - 36 seconds   B-Type Natriuretic Peptide   Result Value Ref Range    BNP 45 0 - 99 pg/mL   Troponin I,  High Sensitivity, Initial   Result Value Ref Range    Troponin I, High Sensitivity 15 (H) 0 - 13 ng/L   Beta Hydroxybutyrate   Result Value Ref Range    Beta-Hydroxybutyrate 5.98 (H) 0.02 - 0.27 mmol/L   POCT GLUCOSE   Result Value Ref Range    POCT Glucose 61 (L) 74 - 99 mg/dL   Troponin, High Sensitivity, 1 Hour   Result Value Ref Range    Troponin I, High Sensitivity 13 0 - 13 ng/L   POCT GLUCOSE   Result Value Ref Range    POCT Glucose 251 (H) 74 - 99 mg/dL   POCT GLUCOSE   Result Value Ref Range    POCT Glucose 140 (H) 74 - 99 mg/dL   Urinalysis with Reflex Culture and Microscopic   Result Value Ref Range    Color, Urine Light-Yellow Light-Yellow, Yellow, Dark-Yellow    Appearance, Urine Clear Clear    Specific Gravity, Urine 1.013 1.005 - 1.035    pH, Urine 5.0 5.0, 5.5, 6.0, 6.5, 7.0, 7.5, 8.0    Protein, Urine NEGATIVE NEGATIVE, 10 (TRACE), 20 (TRACE) mg/dL    Glucose, Urine 300 (3+) (A) Normal mg/dL    Blood, Urine NEGATIVE NEGATIVE mg/dL    Ketones, Urine 40 (2+) (A) NEGATIVE mg/dL    Bilirubin, Urine NEGATIVE NEGATIVE mg/dL    Urobilinogen, Urine Normal Normal mg/dL    Nitrite, Urine NEGATIVE NEGATIVE    Leukocyte Esterase, Urine NEGATIVE NEGATIVE   POCT GLUCOSE   Result Value Ref Range    POCT Glucose 72 (L) 74 - 99 mg/dL   POCT GLUCOSE   Result Value Ref Range    POCT Glucose 98 74 - 99 mg/dL     *Note: Due to a large number of results and/or encounters for the requested time period, some results have not been displayed. A complete set of results can be found in Results Review.    XR chest 1 view    Result Date: 3/15/2025  Interpreted By:  Jose Acosta, STUDY: XR CHEST 1 VIEW;  3/15/2025 9:28 pm   INDICATION: Signs/Symptoms:weakness.     COMPARISON: Chest radiograph 03/05/2025   ACCESSION NUMBER(S): [id number]   ORDERING CLINICIAN: CARLY KAN   FINDINGS: SUPPORT DEVICES: None.   CARDIOMEDIASTINAL SILHOUETTE: Cardiomediastinal silhouette is normal in size and configuration.   LUNGS: No  pulmonary consolidation, pleural effusion or pneumothorax.   ABDOMEN: No remarkable upper abdominal findings.   BONES: No acute osseous abnormality.       1. No acute cardiopulmonary abnormality.     Signed by: Jose Acosta 3/15/2025 10:00 PM Dictation workstation:   [id number]    ECG 12 lead    Result Date: 3/6/2025  Normal sinus rhythm Nonspecific ST and T wave abnormality Abnormal ECG When compared with ECG of 16-FEB-2025 15:31, Nonspecific T wave abnormality no longer evident in Inferior leads Nonspecific T wave abnormality, worse in Lateral leads See ED provider note for full interpretation and clinical correlation Confirmed by Steve Chisholm ([id number]) on 3/6/2025 7:59:40 AM    XR chest 2 views    Result Date: 3/5/2025  Interpreted By:  Irasema Terry, STUDY: XR CHEST 2 VIEWS;  3/5/2025 6:04 pm   INDICATION: Signs/Symptoms:cough, weakness.     COMPARISON: Radiographs of the chest dated 02/16/2025   ACCESSION NUMBER(S): [id number]   ORDERING CLINICIAN: JOSE DANIEL SIMMS   FINDINGS: PA and lateral radiographs of the chest were provided.       CARDIOMEDIASTINAL SILHOUETTE: Cardiomediastinal silhouette is normal in size and configuration.   LUNGS: Lungs are clear, without evidence of new consolidation or pleural effusion mild atelectatic changes are present in the lung bases.   ABDOMEN: No remarkable upper abdominal findings.   BONES: No acute osseous changes.       1.  No evidence of acute cardiopulmonary process.       MACRO: None   Signed by: Irasema Terry 3/5/2025 6:12 PM Dictation workstation:   [id number]    ECG 12 lead    Result Date: 2/16/2025  Normal sinus rhythm Left ventricular hypertrophy with repolarization abnormality ( Sokolow-Villalobos ) Abnormal ECG When compared with ECG of 10-OCT-2024 16:14, No significant change was found See ED provider note for full interpretation and clinical correlation Confirmed by Anyi Alarcon ([id number]) on 2/16/2025 11:48:43 PM    MR brain wo IV  contrast    Result Date: 2/16/2025  Interpreted By:  Diego Beach  and Mely Valladares STUDY: MR BRAIN WO IV CONTRAST; MR ANGIO HEAD WO IV CONTRAST; MR ANGIO NECK WO IV CONTRAST;  2/16/2025 8:32 pm   INDICATION: Signs/Symptoms:speech difficulties, left sided sensory changes. Stroke protocol.   Per EMR patient is a 84-year-old female who presented the emergency department for dysarthria and gait instability. CT head showed no acute abnormality.   COMPARISON: Same day CT head MRI brain 02/06/2025 and 10/11/2024   ACCESSION NUMBER(S): [id number]; [id number]; [id number]   ORDERING CLINICIAN: LAURA LUGO   TECHNIQUE: Axial T2, FLAIR, DWI, gradient echo T2 and  sagittal and coronal T1 weighted images of brain were acquired.   Time-of-flight MRA of the head  and neck was performed. The images were reviewed as source images and maximum intensity projections.   The estimate of the degree of stenosis included in this report is based on the NASCET method for calculating stenosis, using the internal carotid artery distal to the stenosis as the reference point.   FINDINGS: MRI BRAIN:   No diffusion restriction abnormality to suggest acute infarct. No mass effect or midline shift. No acute intracranial hemorrhage. Previously described susceptibility artifact on gradient echo sequences in nonspecific distribution again favored to represent chronic cerebral microhemorrhages.   Mild-to-moderate nonspecific patchy and confluent T2/FLAIR hyperintense signal within the periventricular and subcortical white matter, likely sequela of chronic microangiopathy unchanged from prior.   Dilatation of the supratentorial ventricles, most pronounced in the temporal horns to disproportionate medial temporal lobe atrophy. No extra-axial fluid collection. Mineralization is noted of the anterior interhemispheric falx cerebri.   Opacification of the right-greater-than-left mastoid air cells, similar to prior. The visualized paranasal  sinuses are essentially clear.   Status post right lens replacement.       MRA OF HEAD:   Anterior circulation:    There is expected flow signal in bilateral intracranial internal carotid arteries, bilateral carotid terminals, bilateral proximal anterior and middle cerebral arteries.   Posterior circulation:  Minimal narrowing of the bilateral P2 PCA segment. Bilateral intracranial vertebral arteries, vertebrobasilar junction, basilar artery and proximal posterior cerebral arteries demonstrate expected flow signal.   MRA OF NECK:   The source images are mildly degraded by artifact.   Right carotid vessels:  There is expected flow signal in the visualized portion of the common carotid artery.  There is mild attenuation of flow signal at the carotid bifurcation which may be secondary to flow related artifact. The internal carotid artery in the neck demonstrates expected flow signal.  There is 0% stenosis per NASCET criteria.   Left carotid vessels:   There is expected flow signal in the visualized portion of the common carotid artery.  There is mild attenuation of flow signal at the carotid bifurcation which may be secondary to flow related artifact. The internal carotid artery in the neck demonstrates expected flow signal.  There is 0% stenosis per NASCET criteria.   Vertebral vessels:   The visualized segments of the cervical vertebral arteries demonstrate expected flow signal.       1. No acute infarct, acute intracranial hemorrhage, or mass effect. No major vessel cut off or significant stenosis in the head or neck. 2. No significant interval change in disproportionate dilation of the supratentorial ventricles which can be seen in the setting of normal pressure hydrocephalus or central atrophy. 3. Unchanged disproportionate hippocampal volume loss bilaterally which may reflect Alzheimer's dementia. 4. Similar mild-to-moderate nonspecific white matter changes which can be seen in the setting of chronic  microangiopathy.   I personally reviewed the images/study and I agree with Jacquelyn Boone DO's (radiology resident) findings as stated. This study was interpreted at [facility], [place], [place].   MACRO: None   Signed by: Diego Beach 2/16/2025 10:03 PM Dictation workstation:   [id number]    MR angio head wo IV contrast    Result Date: 2/16/2025  Interpreted By:  Diego Beach  and Mely Valladares STUDY: MR BRAIN WO IV CONTRAST; MR ANGIO HEAD WO IV CONTRAST; MR ANGIO NECK WO IV CONTRAST;  2/16/2025 8:32 pm   INDICATION: Signs/Symptoms:speech difficulties, left sided sensory changes. Stroke protocol.   Per EMR patient is a 84-year-old female who presented the emergency department for dysarthria and gait instability. CT head showed no acute abnormality.   COMPARISON: Same day CT head MRI brain 02/06/2025 and 10/11/2024   ACCESSION NUMBER(S): [id number]; [id number]; [id number]   ORDERING CLINICIAN: LAURA LUGO   TECHNIQUE: Axial T2, FLAIR, DWI, gradient echo T2 and  sagittal and coronal T1 weighted images of brain were acquired.   Time-of-flight MRA of the head  and neck was performed. The images were reviewed as source images and maximum intensity projections.   The estimate of the degree of stenosis included in this report is based on the NASCET method for calculating stenosis, using the internal carotid artery distal to the stenosis as the reference point.   FINDINGS: MRI BRAIN:   No diffusion restriction abnormality to suggest acute infarct. No mass effect or midline shift. No acute intracranial hemorrhage. Previously described susceptibility artifact on gradient echo sequences in nonspecific distribution again favored to represent chronic cerebral microhemorrhages.   Mild-to-moderate nonspecific patchy and confluent T2/FLAIR hyperintense signal within the periventricular and subcortical white matter, likely sequela of chronic microangiopathy unchanged  from prior.   Dilatation of the supratentorial ventricles, most pronounced in the temporal horns to disproportionate medial temporal lobe atrophy. No extra-axial fluid collection. Mineralization is noted of the anterior interhemispheric falx cerebri.   Opacification of the right-greater-than-left mastoid air cells, similar to prior. The visualized paranasal sinuses are essentially clear.   Status post right lens replacement.       MRA OF HEAD:   Anterior circulation:    There is expected flow signal in bilateral intracranial internal carotid arteries, bilateral carotid terminals, bilateral proximal anterior and middle cerebral arteries.   Posterior circulation:  Minimal narrowing of the bilateral P2 PCA segment. Bilateral intracranial vertebral arteries, vertebrobasilar junction, basilar artery and proximal posterior cerebral arteries demonstrate expected flow signal.   MRA OF NECK:   The source images are mildly degraded by artifact.   Right carotid vessels:  There is expected flow signal in the visualized portion of the common carotid artery.  There is mild attenuation of flow signal at the carotid bifurcation which may be secondary to flow related artifact. The internal carotid artery in the neck demonstrates expected flow signal.  There is 0% stenosis per NASCET criteria.   Left carotid vessels:   There is expected flow signal in the visualized portion of the common carotid artery.  There is mild attenuation of flow signal at the carotid bifurcation which may be secondary to flow related artifact. The internal carotid artery in the neck demonstrates expected flow signal.  There is 0% stenosis per NASCET criteria.   Vertebral vessels:   The visualized segments of the cervical vertebral arteries demonstrate expected flow signal.       1. No acute infarct, acute intracranial hemorrhage, or mass effect. No major vessel cut off or significant stenosis in the head or neck. 2. No significant interval change in  disproportionate dilation of the supratentorial ventricles which can be seen in the setting of normal pressure hydrocephalus or central atrophy. 3. Unchanged disproportionate hippocampal volume loss bilaterally which may reflect Alzheimer's dementia. 4. Similar mild-to-moderate nonspecific white matter changes which can be seen in the setting of chronic microangiopathy.   I personally reviewed the images/study and I agree with Jacquelyn Boone DO's (radiology resident) findings as stated. This study was interpreted at [place], [place].   MACRO: None   Signed by: Diego Beach 2/16/2025 10:03 PM Dictation workstation:   [id number]    MR angio neck wo IV contrast    Result Date: 2/16/2025  Interpreted By:  Diego Beach and Stephens Katherine STUDY: MR BRAIN WO IV CONTRAST; MR ANGIO HEAD WO IV CONTRAST; MR ANGIO NECK WO IV CONTRAST;  2/16/2025 8:32 pm   INDICATION: Signs/Symptoms:speech difficulties, left sided sensory changes. Stroke protocol.   Per EMR patient is a 84-year-old female who presented the emergency department for dysarthria and gait instability. CT head showed no acute abnormality.   COMPARISON: Same day CT head MRI brain 02/06/2025 and 10/11/2024   ACCESSION NUMBER(S): [id number]; [id number]; [id number]   ORDERING CLINICIAN: LAURA LUGO   TECHNIQUE: Axial T2, FLAIR, DWI, gradient echo T2 and  sagittal and coronal T1 weighted images of brain were acquired.   Time-of-flight MRA of the head  and neck was performed. The images were reviewed as source images and maximum intensity projections.   The estimate of the degree of stenosis included in this report is based on the NASCET method for calculating stenosis, using the internal carotid artery distal to the stenosis as the reference point.   FINDINGS: MRI BRAIN:   No diffusion restriction abnormality to suggest acute infarct. No mass effect or midline shift. No acute intracranial hemorrhage.  Previously described susceptibility artifact on gradient echo sequences in nonspecific distribution again favored to represent chronic cerebral microhemorrhages.   Mild-to-moderate nonspecific patchy and confluent T2/FLAIR hyperintense signal within the periventricular and subcortical white matter, likely sequela of chronic microangiopathy unchanged from prior.   Dilatation of the supratentorial ventricles, most pronounced in the temporal horns to disproportionate medial temporal lobe atrophy. No extra-axial fluid collection. Mineralization is noted of the anterior interhemispheric falx cerebri.   Opacification of the right-greater-than-left mastoid air cells, similar to prior. The visualized paranasal sinuses are essentially clear.   Status post right lens replacement.       MRA OF HEAD:   Anterior circulation:    There is expected flow signal in bilateral intracranial internal carotid arteries, bilateral carotid terminals, bilateral proximal anterior and middle cerebral arteries.   Posterior circulation:  Minimal narrowing of the bilateral P2 PCA segment. Bilateral intracranial vertebral arteries, vertebrobasilar junction, basilar artery and proximal posterior cerebral arteries demonstrate expected flow signal.   MRA OF NECK:   The source images are mildly degraded by artifact.   Right carotid vessels:  There is expected flow signal in the visualized portion of the common carotid artery.  There is mild attenuation of flow signal at the carotid bifurcation which may be secondary to flow related artifact. The internal carotid artery in the neck demonstrates expected flow signal.  There is 0% stenosis per NASCET criteria.   Left carotid vessels:   There is expected flow signal in the visualized portion of the common carotid artery.  There is mild attenuation of flow signal at the carotid bifurcation which may be secondary to flow related artifact. The internal carotid artery in the neck demonstrates expected flow  signal.  There is 0% stenosis per NASCET criteria.   Vertebral vessels:   The visualized segments of the cervical vertebral arteries demonstrate expected flow signal.       1. No acute infarct, acute intracranial hemorrhage, or mass effect. No major vessel cut off or significant stenosis in the head or neck. 2. No significant interval change in disproportionate dilation of the supratentorial ventricles which can be seen in the setting of normal pressure hydrocephalus or central atrophy. 3. Unchanged disproportionate hippocampal volume loss bilaterally which may reflect Alzheimer's dementia. 4. Similar mild-to-moderate nonspecific white matter changes which can be seen in the setting of chronic microangiopathy.   I personally reviewed the images/study and I agree with Jacquelyn Boone DO's (radiology resident) findings as stated. This study was interpreted at [place], [place].   MACRO: None   Signed by: Diego Beach 2/16/2025 10:03 PM Dictation workstation:   [id number]    XR chest 1 view    Result Date: 2/16/2025  Interpreted By:  Mike Suarez and Ohs Zachary STUDY: XR CHEST 1 VIEW;  2/16/2025 4:33 pm   INDICATION: Signs/Symptoms:ams, weakness.     COMPARISON: Chest radiograph 10/10/2024, CT abdomen 10/10/2024.   ACCESSION NUMBER(S): [id number]   ORDERING CLINICIAN: LAURA LUGO   FINDINGS: AP radiograph of the chest was provided.   MEDICAL DEVICES: None.   CARDIOMEDIASTINAL SILHOUETTE: Cardiomediastinal silhouette is normal in size and configuration.   LUNGS: No pneumothorax, pleural effusion or focal consolidation. Left midlung calcified granuloma.   ABDOMEN: No remarkable upper abdominal findings.   BONES: No acute osseous changes.       1.  No evidence of acute cardiopulmonary process.   I personally reviewed the images/study and I agree with the findings as stated by Dr. Kojo Willett. This study was interpreted at [facility]  [place], [place].   MACRO: None   Signed by: Mike Suarez 2/16/2025 5:35 PM Dictation workstation:   [id number]    CT brain attack head wo IV contrast    Result Date: 2/16/2025  Interpreted By:  Jeramy Matthews, STUDY: CT BRAIN ATTACK HEAD WO IV CONTRAST;  2/16/2025 4:20 pm   INDICATION: Signs/Symptoms:Stroke Evaluation.     COMPARISON: None.   ACCESSION NUMBER(S): [id number]   ORDERING CLINICIAN: LAURA LUGO   TECHNIQUE: Noncontrast axial CT scan of head was performed. Angled reformats in brain and bone windows were generated. The images were reviewed in bone, brain, blood and soft tissue windows.   FINDINGS: No acute hemorrhage, intracranial mass, or evidence of large evolving cortical infarction. Osseous structures intact.   Supratentorial white matter hypodensities most likely represent chronic small vessel ischemic change.   Ventricles are dilated commensurate with overall degree of brain parenchymal volume loss that is present. Brain parenchymal atrophy is mild.           No acute findings or intracranial mass.   Supratentorial white matter hypodensities most likely represent chronic small vessel ischemic change.   MACRO: Dr. Kojo Willett discussed the significance and urgency of this critical finding by telephone with  Kai Candelario on 2/16/2025 at 4:15 pm.  (**-RCF-**) Findings:  See findings.     Signed by: Jeramy Matthews 2/16/2025 4:26 PM Dictation workstation:   [id number]       Assessment/Plan   Assessment & Plan  Generalized weakness  84-year-old female with past medical history of hypertension, early dementia with some memory loss, osteoporosis, chronic kidney disease stage IV, coronary artery disease was admitted to [facility] last month with altered mentation which was thought to be secondary to metabolic encephalopathy and MRI of the brain was negative for acute stroke.  Subsequently patient went to skilled nursing facility for about 2 weeks and discharged home.  She was seen in  the emergency room at [facility] on March 5, 2025 and was diagnosed with influenza A advised prescribed Tamiflu and discharged home.  Patient was brought back to the emergency room late last night by her family with increasing fatigue and patient not eating and drinking very well and feeling very weak.  She denied any abdominal pain nausea vomiting or diarrhea no chest pain or shortness of breath.  Her labs are satisfactory except for elevated creatinine of 1.91.    1.  Generalized fatigue and tiredness and evidence of acute on chronic kidney disease disease stage IV  Serum creatinine is elevated at 1.91.  Patient was given IV fluids and this morning she was not eating very well and blood sugars were rather low at 72 and 78 and IV started on IV D5 and lactated Ringer at 75 cc and will observe closely.    2.  Influenza A positive by nasal swab and she has been started on Tamiflu 30 mg daily which she took also 2 weeks ago.    3.  Hypertension, blood pressures are stable, will hold lisinopril in view of chronic kidney disease and monitor BP closely.    4.  Early dementia with moderate memory impairment, patient is  on donezepil.  And was seen by neurology 3 weeks ago and her donezepil was increased to 10 mg daily.    Reviewed all labs and imaging studies and discussed the plan of treatment with patient in detail.  DVT prophylaxis, will order PT OT and monitor closely.  Total time spent in examination counseling coordinating care for this patient was greater than 65 minutes.     George Apodaca MD

## 2025-03-16 NOTE — CARE PLAN
The patient's goals for the shift include      The clinical goals for the shift include        Problem: Skin  Goal: Decreased wound size/increased tissue granulation at next dressing change  Outcome: Progressing     Problem: Skin  Goal: Prevent/minimize sheer/friction injuries  Outcome: Progressing     Problem: Fall/Injury  Goal: Not fall by end of shift  Outcome: Progressing   .

## 2025-03-17 ENCOUNTER — HOME CARE VISIT (OUTPATIENT)
Dept: HOME HEALTH SERVICES | Facility: HOME HEALTH | Age: 85
End: 2025-03-17
Payer: MEDICARE

## 2025-03-17 LAB
ALBUMIN SERPL BCP-MCNC: 3 G/DL (ref 3.4–5)
ANION GAP SERPL CALC-SCNC: 8 MMOL/L (ref 10–20)
ATRIAL RATE: 74 BPM
BUN SERPL-MCNC: 12 MG/DL (ref 6–23)
CALCIUM SERPL-MCNC: 8.2 MG/DL (ref 8.6–10.3)
CHLORIDE SERPL-SCNC: 111 MMOL/L (ref 98–107)
CO2 SERPL-SCNC: 24 MMOL/L (ref 21–32)
CREAT SERPL-MCNC: 1.17 MG/DL (ref 0.5–1.05)
EGFRCR SERPLBLD CKD-EPI 2021: 46 ML/MIN/1.73M*2
GLUCOSE BLD MANUAL STRIP-MCNC: 108 MG/DL (ref 74–99)
GLUCOSE BLD MANUAL STRIP-MCNC: 116 MG/DL (ref 74–99)
GLUCOSE BLD MANUAL STRIP-MCNC: 119 MG/DL (ref 74–99)
GLUCOSE BLD MANUAL STRIP-MCNC: 98 MG/DL (ref 74–99)
GLUCOSE SERPL-MCNC: 119 MG/DL (ref 74–99)
HOLD SPECIMEN: NORMAL
P AXIS: 62 DEGREES
P OFFSET: 207 MS
P ONSET: 157 MS
PHOSPHATE SERPL-MCNC: 1.9 MG/DL (ref 2.5–4.9)
POTASSIUM SERPL-SCNC: 3.6 MMOL/L (ref 3.5–5.3)
PR INTERVAL: 134 MS
Q ONSET: 224 MS
QRS COUNT: 12 BEATS
QRS DURATION: 84 MS
QT INTERVAL: 392 MS
QTC CALCULATION(BAZETT): 435 MS
QTC FREDERICIA: 420 MS
R AXIS: 46 DEGREES
SODIUM SERPL-SCNC: 139 MMOL/L (ref 136–145)
T AXIS: 55 DEGREES
T OFFSET: 420 MS
VENTRICULAR RATE: 74 BPM

## 2025-03-17 PROCEDURE — 82947 ASSAY GLUCOSE BLOOD QUANT: CPT

## 2025-03-17 PROCEDURE — 2500000001 HC RX 250 WO HCPCS SELF ADMINISTERED DRUGS (ALT 637 FOR MEDICARE OP): Performed by: NURSE PRACTITIONER

## 2025-03-17 PROCEDURE — 36415 COLL VENOUS BLD VENIPUNCTURE: CPT | Performed by: NURSE PRACTITIONER

## 2025-03-17 PROCEDURE — 99232 SBSQ HOSP IP/OBS MODERATE 35: CPT | Performed by: INTERNAL MEDICINE

## 2025-03-17 PROCEDURE — 97530 THERAPEUTIC ACTIVITIES: CPT | Mod: GO

## 2025-03-17 PROCEDURE — 1100000001 HC PRIVATE ROOM DAILY

## 2025-03-17 PROCEDURE — 2500000002 HC RX 250 W HCPCS SELF ADMINISTERED DRUGS (ALT 637 FOR MEDICARE OP, ALT 636 FOR OP/ED): Performed by: NURSE PRACTITIONER

## 2025-03-17 PROCEDURE — 80069 RENAL FUNCTION PANEL: CPT | Performed by: NURSE PRACTITIONER

## 2025-03-17 PROCEDURE — 97165 OT EVAL LOW COMPLEX 30 MIN: CPT | Mod: GO

## 2025-03-17 PROCEDURE — 2500000004 HC RX 250 GENERAL PHARMACY W/ HCPCS (ALT 636 FOR OP/ED): Performed by: NURSE PRACTITIONER

## 2025-03-17 RX ADMIN — SENNOSIDES AND DOCUSATE SODIUM 1 TABLET: 50; 8.6 TABLET ORAL at 20:12

## 2025-03-17 RX ADMIN — SENNOSIDES AND DOCUSATE SODIUM 1 TABLET: 50; 8.6 TABLET ORAL at 09:57

## 2025-03-17 RX ADMIN — CYANOCOBALAMIN TAB 500 MCG 1000 MCG: 500 TAB at 09:57

## 2025-03-17 RX ADMIN — ENOXAPARIN SODIUM 30 MG: 30 INJECTION SUBCUTANEOUS at 09:57

## 2025-03-17 RX ADMIN — OSELTAMAVIR PHOSPHATE 30 MG: 30 CAPSULE ORAL at 09:57

## 2025-03-17 RX ADMIN — DONEPEZIL HYDROCHLORIDE 10 MG: 5 TABLET ORAL at 20:12

## 2025-03-17 RX ADMIN — BUPROPION HYDROCHLORIDE 300 MG: 150 TABLET, EXTENDED RELEASE ORAL at 09:57

## 2025-03-17 SDOH — ECONOMIC STABILITY: TRANSPORTATION INSECURITY: IN THE PAST 12 MONTHS, HAS LACK OF TRANSPORTATION KEPT YOU FROM MEDICAL APPOINTMENTS OR FROM GETTING MEDICATIONS?: NO

## 2025-03-17 SDOH — SOCIAL STABILITY: SOCIAL INSECURITY: WITHIN THE LAST YEAR, HAVE YOU BEEN HUMILIATED OR EMOTIONALLY ABUSED IN OTHER WAYS BY YOUR PARTNER OR EX-PARTNER?: NO

## 2025-03-17 SDOH — ECONOMIC STABILITY: HOUSING INSECURITY: IN THE PAST 12 MONTHS, HOW MANY TIMES HAVE YOU MOVED WHERE YOU WERE LIVING?: 0

## 2025-03-17 SDOH — ECONOMIC STABILITY: HOUSING INSECURITY: IN THE LAST 12 MONTHS, WAS THERE A TIME WHEN YOU WERE NOT ABLE TO PAY THE MORTGAGE OR RENT ON TIME?: NO

## 2025-03-17 SDOH — SOCIAL STABILITY: SOCIAL INSECURITY: WITHIN THE LAST YEAR, HAVE YOU BEEN AFRAID OF YOUR PARTNER OR EX-PARTNER?: NO

## 2025-03-17 SDOH — ECONOMIC STABILITY: HOUSING INSECURITY: AT ANY TIME IN THE PAST 12 MONTHS, WERE YOU HOMELESS OR LIVING IN A SHELTER (INCLUDING NOW)?: NO

## 2025-03-17 SDOH — HEALTH STABILITY: MENTAL HEALTH: HOW OFTEN DO YOU HAVE SIX OR MORE DRINKS ON ONE OCCASION?: NEVER

## 2025-03-17 SDOH — ECONOMIC STABILITY: INCOME INSECURITY: IN THE PAST 12 MONTHS HAS THE ELECTRIC, GAS, OIL, OR WATER COMPANY THREATENED TO SHUT OFF SERVICES IN YOUR HOME?: NO

## 2025-03-17 SDOH — ECONOMIC STABILITY: FOOD INSECURITY: HOW HARD IS IT FOR YOU TO PAY FOR THE VERY BASICS LIKE FOOD, HOUSING, MEDICAL CARE, AND HEATING?: NOT VERY HARD

## 2025-03-17 SDOH — SOCIAL STABILITY: SOCIAL INSECURITY
WITHIN THE LAST YEAR, HAVE YOU BEEN KICKED, HIT, SLAPPED, OR OTHERWISE PHYSICALLY HURT BY YOUR PARTNER OR EX-PARTNER?: NO

## 2025-03-17 SDOH — ECONOMIC STABILITY: FOOD INSECURITY: WITHIN THE PAST 12 MONTHS, THE FOOD YOU BOUGHT JUST DIDN'T LAST AND YOU DIDN'T HAVE MONEY TO GET MORE.: NEVER TRUE

## 2025-03-17 SDOH — SOCIAL STABILITY: SOCIAL INSECURITY
WITHIN THE LAST YEAR, HAVE YOU BEEN RAPED OR FORCED TO HAVE ANY KIND OF SEXUAL ACTIVITY BY YOUR PARTNER OR EX-PARTNER?: NO

## 2025-03-17 SDOH — HEALTH STABILITY: MENTAL HEALTH: HOW OFTEN DO YOU HAVE A DRINK CONTAINING ALCOHOL?: NEVER

## 2025-03-17 SDOH — ECONOMIC STABILITY: FOOD INSECURITY: WITHIN THE PAST 12 MONTHS, YOU WORRIED THAT YOUR FOOD WOULD RUN OUT BEFORE YOU GOT THE MONEY TO BUY MORE.: NEVER TRUE

## 2025-03-17 SDOH — HEALTH STABILITY: MENTAL HEALTH: HOW MANY DRINKS CONTAINING ALCOHOL DO YOU HAVE ON A TYPICAL DAY WHEN YOU ARE DRINKING?: PATIENT DOES NOT DRINK

## 2025-03-17 ASSESSMENT — LIFESTYLE VARIABLES
AUDIT-C TOTAL SCORE: 0
SKIP TO QUESTIONS 9-10: 1

## 2025-03-17 ASSESSMENT — COGNITIVE AND FUNCTIONAL STATUS - GENERAL
TOILETING: A LITTLE
CLIMB 3 TO 5 STEPS WITH RAILING: A LOT
TURNING FROM BACK TO SIDE WHILE IN FLAT BAD: A LITTLE
TOILETING: A LITTLE
DAILY ACTIVITIY SCORE: 21
HELP NEEDED FOR BATHING: A LITTLE
MOBILITY SCORE: 18
MOBILITY SCORE: 18
PERSONAL GROOMING: A LITTLE
WALKING IN HOSPITAL ROOM: A LITTLE
TURNING FROM BACK TO SIDE WHILE IN FLAT BAD: A LITTLE
STANDING UP FROM CHAIR USING ARMS: A LITTLE
DRESSING REGULAR UPPER BODY CLOTHING: A LITTLE
STANDING UP FROM CHAIR USING ARMS: A LITTLE
MOVING TO AND FROM BED TO CHAIR: A LITTLE
DRESSING REGULAR LOWER BODY CLOTHING: A LITTLE
HELP NEEDED FOR BATHING: A LITTLE
WALKING IN HOSPITAL ROOM: A LITTLE
DRESSING REGULAR LOWER BODY CLOTHING: A LITTLE
DAILY ACTIVITIY SCORE: 19
CLIMB 3 TO 5 STEPS WITH RAILING: A LOT
MOVING TO AND FROM BED TO CHAIR: A LITTLE

## 2025-03-17 ASSESSMENT — ACTIVITIES OF DAILY LIVING (ADL)
ADL_ASSISTANCE: INDEPENDENT
LACK_OF_TRANSPORTATION: NO
LACK_OF_TRANSPORTATION: NO
AMBULATION ASSISTANCE: 1
AMBULATION ASSISTANCE: INDEPENDENT

## 2025-03-17 ASSESSMENT — PAIN - FUNCTIONAL ASSESSMENT
PAIN_FUNCTIONAL_ASSESSMENT: 0-10

## 2025-03-17 ASSESSMENT — PAIN SCALES - GENERAL
PAINLEVEL_OUTOF10: 0 - NO PAIN

## 2025-03-17 ASSESSMENT — ENCOUNTER SYMPTOMS
APPETITE LEVEL: GOOD
CHANGE IN APPETITE: INCREASED
DENIES PAIN: 1

## 2025-03-17 NOTE — ASSESSMENT & PLAN NOTE
84-year-old female with past medical history of hypertension, early dementia with some memory loss, osteoporosis, chronic kidney disease stage IV, coronary artery disease was admitted to [facility] last month with altered mentation which was thought to be secondary to metabolic encephalopathy and MRI of the brain was negative for acute stroke.  Subsequently patient went to skilled nursing facility for about 2 weeks and discharged home.  She was seen in the emergency room at [facility] on March 5, 2025 and was diagnosed with influenza A advised prescribed Tamiflu and discharged home.  Patient was brought back to the emergency room late last night by her family with increasing fatigue and patient not eating and drinking very well and feeling very weak.  She denied any abdominal pain nausea vomiting or diarrhea no chest pain or shortness of breath.  Her labs are satisfactory except for elevated creatinine of 1.91.

## 2025-03-17 NOTE — NURSING NOTE
Call placed to patient's daughter - Kalyani. Full update given on patient. Questions and concerns addressed.

## 2025-03-17 NOTE — CARE PLAN
Problem: Skin  Goal: Decreased wound size/increased tissue granulation at next dressing change  Outcome: Progressing  Goal: Prevent/manage excess moisture  Outcome: Progressing     Problem: Fall/Injury  Goal: Not fall by end of shift  Outcome: Progressing  Goal: Be free from injury by end of the shift  Outcome: Progressing     Problem: Fall/Injury  Goal: Not fall by end of shift  Outcome: Progressing     Problem: Fall/Injury  Goal: Be free from injury by end of the shift  Outcome: Progressing

## 2025-03-17 NOTE — CARE PLAN
The patient's goals for the shift include      The clinical goals for the shift include pt will remian    Over the shift, the patient did not make progress toward the following goals. Barriers to progression include . Recommendations to address these barriers include .  Problem: Fall/Injury  Goal: Not fall by end of shift  Outcome: Progressing     Problem: Fall/Injury  Goal: Be free from injury by end of the shift  Outcome: Progressing

## 2025-03-17 NOTE — PROGRESS NOTES
Kirstin Howe is a 84 y.o. female on day 1 of admission presenting with Generalized weakness.    Subjective   Patient seen and examined, she is fully awake and alert, seems very depressed, she is afebrile no abdominal discomfort and no new symptoms.  She seems well-hydrated now and blood pressures are stable.  Renal function is improved.       Objective     Physical Exam  GENERAL:  Alert, no distress, cooperative  SKIN:  Skin color, texture, turgor normal. No rashes or lesions.  OROPHARYNX:  Lips, mucosa, and tongue are normal.Teeth and gums, normal. Oropharynx normal.  NECK:  No jugulovenous distention, No carotid bruits, Carotid pulse normal contour, Supple  LUNGS:  Lungs clear to auscultation. Good diaphragmatic excursion.  CARDIAC: Rate and rhythm is regular.  Normal S1 and S2; no rubs, murmurs, or gallops  ABDOMEN:  Abdomen soft, non-tender, BS normal, No masses or organomegaly  EXTREMETIES:  Extremities normal, no deformities, edema, clubbing or skin discoloration. Good capillary refill., No ulcers  NEURO:  Alert, oriented X 3, Gait not examinedl. Non-focal.   PULSES:  2+ radial, 2+ carotid    Last Recorded Vitals  Blood pressure 143/83, pulse 71, temperature 36.3 °C (97.4 °F), temperature source Temporal, resp. rate 17, weight 56.7 kg (125 lb), SpO2 99%.  Intake/Output last 3 Shifts:  I/O last 3 completed shifts:  In: 1692.5 (29.9 mL/kg) [I.V.:692.5 (12.2 mL/kg); IV Piggyback:1000]  Out: 250 (4.4 mL/kg) [Urine:250 (0.1 mL/kg/hr)]  Weight: 56.7 kg     Relevant Results      Scheduled medications  buPROPion XL, 300 mg, oral, Daily  cyanocobalamin, 1,000 mcg, oral, Daily  donepezil, 10 mg, oral, Nightly  enoxaparin, 30 mg, subcutaneous, q24h FAVIAN  sennosides-docusate sodium, 1 tablet, oral, BID      Continuous medications     PRN medications  PRN medications: acetaminophen **OR** acetaminophen **OR** acetaminophen, acetaminophen **OR** acetaminophen **OR** acetaminophen, melatonin   Results for orders placed or  performed during the hospital encounter of 03/15/25 (from the past 96 hours)   Electrocardiogram, 12-lead PRN ACS symptoms   Result Value Ref Range    Ventricular Rate 74 BPM    Atrial Rate 74 BPM    MO Interval 134 ms    QRS Duration 84 ms    QT Interval 392 ms    QTC Calculation(Bazett) 435 ms    P Axis 62 degrees    R Axis 46 degrees    T Axis 55 degrees    QRS Count 12 beats    Q Onset 224 ms    P Onset 157 ms    P Offset 207 ms    T Offset 420 ms    QTC Fredericia 420 ms   Sars-CoV-2 and Influenza A/B PCR   Result Value Ref Range    Flu A Result Detected (A) Not Detected    Flu B Result Not Detected Not Detected    Coronavirus 2019, PCR Not Detected Not Detected   CBC and Auto Differential   Result Value Ref Range    WBC 5.0 4.4 - 11.3 x10*3/uL    nRBC 0.0 0.0 - 0.0 /100 WBCs    RBC 5.30 (H) 4.00 - 5.20 x10*6/uL    Hemoglobin 14.0 12.0 - 16.0 g/dL    Hematocrit 44.2 36.0 - 46.0 %    MCV 83 80 - 100 fL    MCH 26.4 26.0 - 34.0 pg    MCHC 31.7 (L) 32.0 - 36.0 g/dL    RDW 15.0 (H) 11.5 - 14.5 %    Platelets 337 150 - 450 x10*3/uL    Neutrophils % 68.5 40.0 - 80.0 %    Immature Granulocytes %, Automated 0.4 0.0 - 0.9 %    Lymphocytes % 25.7 13.0 - 44.0 %    Monocytes % 4.2 2.0 - 10.0 %    Eosinophils % 0.8 0.0 - 6.0 %    Basophils % 0.4 0.0 - 2.0 %    Neutrophils Absolute 3.43 1.60 - 5.50 x10*3/uL    Immature Granulocytes Absolute, Automated 0.02 0.00 - 0.50 x10*3/uL    Lymphocytes Absolute 1.29 0.80 - 3.00 x10*3/uL    Monocytes Absolute 0.21 0.05 - 0.80 x10*3/uL    Eosinophils Absolute 0.04 0.00 - 0.40 x10*3/uL    Basophils Absolute 0.02 0.00 - 0.10 x10*3/uL   Comprehensive metabolic panel   Result Value Ref Range    Glucose 67 (L) 74 - 99 mg/dL    Sodium 141 136 - 145 mmol/L    Potassium 4.2 3.5 - 5.3 mmol/L    Chloride 105 98 - 107 mmol/L    Bicarbonate 19 (L) 21 - 32 mmol/L    Anion Gap 21 (H) 10 - 20 mmol/L    Urea Nitrogen 31 (H) 6 - 23 mg/dL    Creatinine 1.91 (H) 0.50 - 1.05 mg/dL    eGFR 25 (L) >60  mL/min/1.73m*2    Calcium 9.9 8.6 - 10.3 mg/dL    Albumin 4.3 3.4 - 5.0 g/dL    Alkaline Phosphatase 79 33 - 136 U/L    Total Protein 7.1 6.4 - 8.2 g/dL    AST 16 9 - 39 U/L    Bilirubin, Total 0.5 0.0 - 1.2 mg/dL    ALT 7 7 - 45 U/L   Protime-INR   Result Value Ref Range    Protime 10.8 9.8 - 12.4 seconds    INR 1.0 0.9 - 1.1   aPTT   Result Value Ref Range    aPTT 30 26 - 36 seconds   B-Type Natriuretic Peptide   Result Value Ref Range    BNP 45 0 - 99 pg/mL   Troponin I, High Sensitivity, Initial   Result Value Ref Range    Troponin I, High Sensitivity 15 (H) 0 - 13 ng/L   Beta Hydroxybutyrate   Result Value Ref Range    Beta-Hydroxybutyrate 5.98 (H) 0.02 - 0.27 mmol/L   POCT GLUCOSE   Result Value Ref Range    POCT Glucose 61 (L) 74 - 99 mg/dL   Troponin, High Sensitivity, 1 Hour   Result Value Ref Range    Troponin I, High Sensitivity 13 0 - 13 ng/L   POCT GLUCOSE   Result Value Ref Range    POCT Glucose 251 (H) 74 - 99 mg/dL   POCT GLUCOSE   Result Value Ref Range    POCT Glucose 140 (H) 74 - 99 mg/dL   Urinalysis with Reflex Culture and Microscopic   Result Value Ref Range    Color, Urine Light-Yellow Light-Yellow, Yellow, Dark-Yellow    Appearance, Urine Clear Clear    Specific Gravity, Urine 1.013 1.005 - 1.035    pH, Urine 5.0 5.0, 5.5, 6.0, 6.5, 7.0, 7.5, 8.0    Protein, Urine NEGATIVE NEGATIVE, 10 (TRACE), 20 (TRACE) mg/dL    Glucose, Urine 300 (3+) (A) Normal mg/dL    Blood, Urine NEGATIVE NEGATIVE mg/dL    Ketones, Urine 40 (2+) (A) NEGATIVE mg/dL    Bilirubin, Urine NEGATIVE NEGATIVE mg/dL    Urobilinogen, Urine Normal Normal mg/dL    Nitrite, Urine NEGATIVE NEGATIVE    Leukocyte Esterase, Urine NEGATIVE NEGATIVE   POCT GLUCOSE   Result Value Ref Range    POCT Glucose 72 (L) 74 - 99 mg/dL   POCT GLUCOSE   Result Value Ref Range    POCT Glucose 98 74 - 99 mg/dL   POCT GLUCOSE   Result Value Ref Range    POCT Glucose 115 (H) 74 - 99 mg/dL   Renal Function Panel   Result Value Ref Range    Glucose 107  (H) 74 - 99 mg/dL    Sodium 140 136 - 145 mmol/L    Potassium 3.6 3.5 - 5.3 mmol/L    Chloride 108 (H) 98 - 107 mmol/L    Bicarbonate 26 21 - 32 mmol/L    Anion Gap 10 10 - 20 mmol/L    Urea Nitrogen 19 6 - 23 mg/dL    Creatinine 1.36 (H) 0.50 - 1.05 mg/dL    eGFR 38 (L) >60 mL/min/1.73m*2    Calcium 8.7 8.6 - 10.3 mg/dL    Phosphorus 2.0 (L) 2.5 - 4.9 mg/dL    Albumin 3.3 (L) 3.4 - 5.0 g/dL   Beta Hydroxybutyrate   Result Value Ref Range    Beta-Hydroxybutyrate 0.78 (H) 0.02 - 0.27 mmol/L   POCT GLUCOSE   Result Value Ref Range    POCT Glucose 119 (H) 74 - 99 mg/dL   POCT GLUCOSE   Result Value Ref Range    POCT Glucose 116 (H) 74 - 99 mg/dL   POCT GLUCOSE   Result Value Ref Range    POCT Glucose 108 (H) 74 - 99 mg/dL     *Note: Due to a large number of results and/or encounters for the requested time period, some results have not been displayed. A complete set of results can be found in Results Review.   Electrocardiogram, 12-lead PRN ACS symptoms    Result Date: 3/17/2025  Normal sinus rhythm Nonspecific ST abnormality Abnormal ECG When compared with ECG of 05-MAR-2025 17:49, Nonspecific T wave abnormality no longer evident in Lateral leads See ED provider note for full interpretation and clinical correlation Confirmed by Shweta Vines ([id number]) on 3/17/2025 10:29:53 AM    XR chest 1 view    Result Date: 3/15/2025  Interpreted By:  Jose Acosta, STUDY: XR CHEST 1 VIEW;  3/15/2025 9:28 pm   INDICATION: Signs/Symptoms:weakness.     COMPARISON: Chest radiograph 03/05/2025   ACCESSION NUMBER(S): [id number]   ORDERING CLINICIAN: CARLY KAN   FINDINGS: SUPPORT DEVICES: None.   CARDIOMEDIASTINAL SILHOUETTE: Cardiomediastinal silhouette is normal in size and configuration.   LUNGS: No pulmonary consolidation, pleural effusion or pneumothorax.   ABDOMEN: No remarkable upper abdominal findings.   BONES: No acute osseous abnormality.       1. No acute cardiopulmonary abnormality.     Signed by: Jose Acosta  3/15/2025 10:00 PM Dictation workstation:   [id number]    ECG 12 lead    Result Date: 3/6/2025  Normal sinus rhythm Nonspecific ST and T wave abnormality Abnormal ECG When compared with ECG of 16-FEB-2025 15:31, Nonspecific T wave abnormality no longer evident in Inferior leads Nonspecific T wave abnormality, worse in Lateral leads See ED provider note for full interpretation and clinical correlation Confirmed by Steve Chisholm ([id number]) on 3/6/2025 7:59:40 AM    XR chest 2 views    Result Date: 3/5/2025  Interpreted By:  Irasema Terry, STUDY: XR CHEST 2 VIEWS;  3/5/2025 6:04 pm   INDICATION: Signs/Symptoms:cough, weakness.     COMPARISON: Radiographs of the chest dated 02/16/2025   ACCESSION NUMBER(S): [id number]   ORDERING CLINICIAN: JOSE DANIEL SIMMS   FINDINGS: PA and lateral radiographs of the chest were provided.       CARDIOMEDIASTINAL SILHOUETTE: Cardiomediastinal silhouette is normal in size and configuration.   LUNGS: Lungs are clear, without evidence of new consolidation or pleural effusion mild atelectatic changes are present in the lung bases.   ABDOMEN: No remarkable upper abdominal findings.   BONES: No acute osseous changes.       1.  No evidence of acute cardiopulmonary process.       MACRO: None   Signed by: Irasema Terry 3/5/2025 6:12 PM Dictation workstation:   [id number]    ECG 12 lead    Result Date: 2/16/2025  Normal sinus rhythm Left ventricular hypertrophy with repolarization abnormality ( Sokolow-Villalobos ) Abnormal ECG When compared with ECG of 10-OCT-2024 16:14, No significant change was found See ED provider note for full interpretation and clinical correlation Confirmed by Anyi Alarcon ([id number]) on 2/16/2025 11:48:43 PM    MR brain wo IV contrast    Result Date: 2/16/2025  Interpreted By:  Diego Beach and Stephens Katherine STUDY: MR BRAIN WO IV CONTRAST; MR ANGIO HEAD WO IV CONTRAST; MR ANGIO NECK WO IV CONTRAST;  2/16/2025 8:32 pm   INDICATION:  Signs/Symptoms:speech difficulties, left sided sensory changes. Stroke protocol.   Per EMR patient is a 84-year-old female who presented the emergency department for dysarthria and gait instability. CT head showed no acute abnormality.   COMPARISON: Same day CT head MRI brain 02/06/2025 and 10/11/2024   ACCESSION NUMBER(S): [id number]; [id number]; [id number]   ORDERING CLINICIAN: LAURA LUGO   TECHNIQUE: Axial T2, FLAIR, DWI, gradient echo T2 and  sagittal and coronal T1 weighted images of brain were acquired.   Time-of-flight MRA of the head  and neck was performed. The images were reviewed as source images and maximum intensity projections.   The estimate of the degree of stenosis included in this report is based on the NASCET method for calculating stenosis, using the internal carotid artery distal to the stenosis as the reference point.   FINDINGS: MRI BRAIN:   No diffusion restriction abnormality to suggest acute infarct. No mass effect or midline shift. No acute intracranial hemorrhage. Previously described susceptibility artifact on gradient echo sequences in nonspecific distribution again favored to represent chronic cerebral microhemorrhages.   Mild-to-moderate nonspecific patchy and confluent T2/FLAIR hyperintense signal within the periventricular and subcortical white matter, likely sequela of chronic microangiopathy unchanged from prior.   Dilatation of the supratentorial ventricles, most pronounced in the temporal horns to disproportionate medial temporal lobe atrophy. No extra-axial fluid collection. Mineralization is noted of the anterior interhemispheric falx cerebri.   Opacification of the right-greater-than-left mastoid air cells, similar to prior. The visualized paranasal sinuses are essentially clear.   Status post right lens replacement.       MRA OF HEAD:   Anterior circulation:    There is expected flow signal in bilateral intracranial internal carotid arteries, bilateral carotid  terminals, bilateral proximal anterior and middle cerebral arteries.   Posterior circulation:  Minimal narrowing of the bilateral P2 PCA segment. Bilateral intracranial vertebral arteries, vertebrobasilar junction, basilar artery and proximal posterior cerebral arteries demonstrate expected flow signal.   MRA OF NECK:   The source images are mildly degraded by artifact.   Right carotid vessels:  There is expected flow signal in the visualized portion of the common carotid artery.  There is mild attenuation of flow signal at the carotid bifurcation which may be secondary to flow related artifact. The internal carotid artery in the neck demonstrates expected flow signal.  There is 0% stenosis per NASCET criteria.   Left carotid vessels:   There is expected flow signal in the visualized portion of the common carotid artery.  There is mild attenuation of flow signal at the carotid bifurcation which may be secondary to flow related artifact. The internal carotid artery in the neck demonstrates expected flow signal.  There is 0% stenosis per NASCET criteria.   Vertebral vessels:   The visualized segments of the cervical vertebral arteries demonstrate expected flow signal.       1. No acute infarct, acute intracranial hemorrhage, or mass effect. No major vessel cut off or significant stenosis in the head or neck. 2. No significant interval change in disproportionate dilation of the supratentorial ventricles which can be seen in the setting of normal pressure hydrocephalus or central atrophy. 3. Unchanged disproportionate hippocampal volume loss bilaterally which may reflect Alzheimer's dementia. 4. Similar mild-to-moderate nonspecific white matter changes which can be seen in the setting of chronic microangiopathy.   I personally reviewed the images/study and I agree with Jacquelyn Boone DO's (radiology resident) findings as stated. This study was interpreted at [facility], [place],  [place].   MACRO: None   Signed by: Diego Beach 2/16/2025 10:03 PM Dictation workstation:   [id number]    MR angio head wo IV contrast    Result Date: 2/16/2025  Interpreted By:  Diego Beach and Stephens Katherine STUDY: MR BRAIN WO IV CONTRAST; MR ANGIO HEAD WO IV CONTRAST; MR ANGIO NECK WO IV CONTRAST;  2/16/2025 8:32 pm   INDICATION: Signs/Symptoms:speech difficulties, left sided sensory changes. Stroke protocol.   Per EMR patient is a 84-year-old female who presented the emergency department for dysarthria and gait instability. CT head showed no acute abnormality.   COMPARISON: Same day CT head MRI brain 02/06/2025 and 10/11/2024   ACCESSION NUMBER(S): [id number]; [id number]; [id number]   ORDERING CLINICIAN: LAURA LUGO   TECHNIQUE: Axial T2, FLAIR, DWI, gradient echo T2 and  sagittal and coronal T1 weighted images of brain were acquired.   Time-of-flight MRA of the head  and neck was performed. The images were reviewed as source images and maximum intensity projections.   The estimate of the degree of stenosis included in this report is based on the NASCET method for calculating stenosis, using the internal carotid artery distal to the stenosis as the reference point.   FINDINGS: MRI BRAIN:   No diffusion restriction abnormality to suggest acute infarct. No mass effect or midline shift. No acute intracranial hemorrhage. Previously described susceptibility artifact on gradient echo sequences in nonspecific distribution again favored to represent chronic cerebral microhemorrhages.   Mild-to-moderate nonspecific patchy and confluent T2/FLAIR hyperintense signal within the periventricular and subcortical white matter, likely sequela of chronic microangiopathy unchanged from prior.   Dilatation of the supratentorial ventricles, most pronounced in the temporal horns to disproportionate medial temporal lobe atrophy. No extra-axial fluid collection. Mineralization is noted of the anterior  interhemispheric falx cerebri.   Opacification of the right-greater-than-left mastoid air cells, similar to prior. The visualized paranasal sinuses are essentially clear.   Status post right lens replacement.       MRA OF HEAD:   Anterior circulation:    There is expected flow signal in bilateral intracranial internal carotid arteries, bilateral carotid terminals, bilateral proximal anterior and middle cerebral arteries.   Posterior circulation:  Minimal narrowing of the bilateral P2 PCA segment. Bilateral intracranial vertebral arteries, vertebrobasilar junction, basilar artery and proximal posterior cerebral arteries demonstrate expected flow signal.   MRA OF NECK:   The source images are mildly degraded by artifact.   Right carotid vessels:  There is expected flow signal in the visualized portion of the common carotid artery.  There is mild attenuation of flow signal at the carotid bifurcation which may be secondary to flow related artifact. The internal carotid artery in the neck demonstrates expected flow signal.  There is 0% stenosis per NASCET criteria.   Left carotid vessels:   There is expected flow signal in the visualized portion of the common carotid artery.  There is mild attenuation of flow signal at the carotid bifurcation which may be secondary to flow related artifact. The internal carotid artery in the neck demonstrates expected flow signal.  There is 0% stenosis per NASCET criteria.   Vertebral vessels:   The visualized segments of the cervical vertebral arteries demonstrate expected flow signal.       1. No acute infarct, acute intracranial hemorrhage, or mass effect. No major vessel cut off or significant stenosis in the head or neck. 2. No significant interval change in disproportionate dilation of the supratentorial ventricles which can be seen in the setting of normal pressure hydrocephalus or central atrophy. 3. Unchanged disproportionate hippocampal volume loss bilaterally which may reflect  Alzheimer's dementia. 4. Similar mild-to-moderate nonspecific white matter changes which can be seen in the setting of chronic microangiopathy.   I personally reviewed the images/study and I agree with Jacquelyn Boone DO's (radiology resident) findings as stated. This study was interpreted at [place], [place].   MACRO: None   Signed by: Diego Beach 2/16/2025 10:03 PM Dictation workstation:   [id number]    MR angio neck wo IV contrast    Result Date: 2/16/2025  Interpreted By:  Diego Beach,  and Mely Valladares STUDY: MR BRAIN WO IV CONTRAST; MR ANGIO HEAD WO IV CONTRAST; MR ANGIO NECK WO IV CONTRAST;  2/16/2025 8:32 pm   INDICATION: Signs/Symptoms:speech difficulties, left sided sensory changes. Stroke protocol.   Per EMR patient is a 84-year-old female who presented the emergency department for dysarthria and gait instability. CT head showed no acute abnormality.   COMPARISON: Same day CT head MRI brain 02/06/2025 and 10/11/2024   ACCESSION NUMBER(S): [id number]; [id number]; [id number]   ORDERING CLINICIAN: LAURA LUGO   TECHNIQUE: Axial T2, FLAIR, DWI, gradient echo T2 and  sagittal and coronal T1 weighted images of brain were acquired.   Time-of-flight MRA of the head  and neck was performed. The images were reviewed as source images and maximum intensity projections.   The estimate of the degree of stenosis included in this report is based on the NASCET method for calculating stenosis, using the internal carotid artery distal to the stenosis as the reference point.   FINDINGS: MRI BRAIN:   No diffusion restriction abnormality to suggest acute infarct. No mass effect or midline shift. No acute intracranial hemorrhage. Previously described susceptibility artifact on gradient echo sequences in nonspecific distribution again favored to represent chronic cerebral microhemorrhages.   Mild-to-moderate nonspecific patchy and confluent T2/FLAIR  hyperintense signal within the periventricular and subcortical white matter, likely sequela of chronic microangiopathy unchanged from prior.   Dilatation of the supratentorial ventricles, most pronounced in the temporal horns to disproportionate medial temporal lobe atrophy. No extra-axial fluid collection. Mineralization is noted of the anterior interhemispheric falx cerebri.   Opacification of the right-greater-than-left mastoid air cells, similar to prior. The visualized paranasal sinuses are essentially clear.   Status post right lens replacement.       MRA OF HEAD:   Anterior circulation:    There is expected flow signal in bilateral intracranial internal carotid arteries, bilateral carotid terminals, bilateral proximal anterior and middle cerebral arteries.   Posterior circulation:  Minimal narrowing of the bilateral P2 PCA segment. Bilateral intracranial vertebral arteries, vertebrobasilar junction, basilar artery and proximal posterior cerebral arteries demonstrate expected flow signal.   MRA OF NECK:   The source images are mildly degraded by artifact.   Right carotid vessels:  There is expected flow signal in the visualized portion of the common carotid artery.  There is mild attenuation of flow signal at the carotid bifurcation which may be secondary to flow related artifact. The internal carotid artery in the neck demonstrates expected flow signal.  There is 0% stenosis per NASCET criteria.   Left carotid vessels:   There is expected flow signal in the visualized portion of the common carotid artery.  There is mild attenuation of flow signal at the carotid bifurcation which may be secondary to flow related artifact. The internal carotid artery in the neck demonstrates expected flow signal.  There is 0% stenosis per NASCET criteria.   Vertebral vessels:   The visualized segments of the cervical vertebral arteries demonstrate expected flow signal.       1. No acute infarct, acute intracranial hemorrhage,  or mass effect. No major vessel cut off or significant stenosis in the head or neck. 2. No significant interval change in disproportionate dilation of the supratentorial ventricles which can be seen in the setting of normal pressure hydrocephalus or central atrophy. 3. Unchanged disproportionate hippocampal volume loss bilaterally which may reflect Alzheimer's dementia. 4. Similar mild-to-moderate nonspecific white matter changes which can be seen in the setting of chronic microangiopathy.   I personally reviewed the images/study and I agree with Jacquelyn Boone DO's (radiology resident) findings as stated. This study was interpreted at [place], [place].   MACRO: None   Signed by: Diego Beach 2/16/2025 10:03 PM Dictation workstation:   [id number]    XR chest 1 view    Result Date: 2/16/2025  Interpreted By:  Mike Suarez and Ohs Zachary STUDY: XR CHEST 1 VIEW;  2/16/2025 4:33 pm   INDICATION: Signs/Symptoms:ams, weakness.     COMPARISON: Chest radiograph 10/10/2024, CT abdomen 10/10/2024.   ACCESSION NUMBER(S): [id number]   ORDERING CLINICIAN: LAURA LUGO   FINDINGS: AP radiograph of the chest was provided.   MEDICAL DEVICES: None.   CARDIOMEDIASTINAL SILHOUETTE: Cardiomediastinal silhouette is normal in size and configuration.   LUNGS: No pneumothorax, pleural effusion or focal consolidation. Left midlung calcified granuloma.   ABDOMEN: No remarkable upper abdominal findings.   BONES: No acute osseous changes.       1.  No evidence of acute cardiopulmonary process.   I personally reviewed the images/study and I agree with the findings as stated by Dr. Kojo Willett. This study was interpreted at [place], [place].   MACRO: None   Signed by: Mike Suarez 2/16/2025 5:35 PM Dictation workstation:   [id number]    CT brain attack head wo IV contrast    Result Date: 2/16/2025  Interpreted By:  Jeramy Matthews  STUDY: CT BRAIN ATTACK HEAD WO IV CONTRAST;  2/16/2025 4:20 pm   INDICATION: Signs/Symptoms:Stroke Evaluation.     COMPARISON: None.   ACCESSION NUMBER(S): [id number]   ORDERING CLINICIAN: LAURA LUGO   TECHNIQUE: Noncontrast axial CT scan of head was performed. Angled reformats in brain and bone windows were generated. The images were reviewed in bone, brain, blood and soft tissue windows.   FINDINGS: No acute hemorrhage, intracranial mass, or evidence of large evolving cortical infarction. Osseous structures intact.   Supratentorial white matter hypodensities most likely represent chronic small vessel ischemic change.   Ventricles are dilated commensurate with overall degree of brain parenchymal volume loss that is present. Brain parenchymal atrophy is mild.           No acute findings or intracranial mass.   Supratentorial white matter hypodensities most likely represent chronic small vessel ischemic change.   MACRO: Dr. Kojo Willett discussed the significance and urgency of this critical finding by telephone with  Kai Candelario on 2/16/2025 at 4:15 pm.  (**-RCF-**) Findings:  See findings.     Signed by: Jeramy Matthews 2/16/2025 4:26 PM Dictation workstation:   [id number]                 Assessment/Plan   Assessment & Plan  Generalized weakness  84-year-old female with past medical history of hypertension, early dementia with some memory loss, osteoporosis, chronic kidney disease stage IV, coronary artery disease was admitted to [facility] last month with altered mentation which was thought to be secondary to metabolic encephalopathy and MRI of the brain was negative for acute stroke.  Subsequently patient went to skilled nursing facility for about 2 weeks and discharged home.  She was seen in the emergency room at [facility] on March 5, 2025 and was diagnosed with influenza A advised prescribed Tamiflu and discharged home.  Patient was brought back to the emergency room late last night by her family with increasing  fatigue and patient not eating and drinking very well and feeling very weak.  She denied any abdominal pain nausea vomiting or diarrhea no chest pain or shortness of breath.  Her labs are satisfactory except for elevated creatinine of 1.91.  1.  Generalized fatigue and tiredness and evidence of acute on chronic kidney disease disease stage IV  Serum creatinine is elevated at 1.91.  On admission which is improved to 1.36 today.  Patient was given IV fluids and this morning she was not eating very well and blood sugars were rather low at 72 and 78 and IV started on IV D5 and lactated Ringer at 75 cc and will observe closely.  Overall significant improvement, well-hydrated renal function is improved, has started PT OT.     2.  Influenza A positive by nasal swab and she has been started on Tamiflu 30 mg daily which she took also 2 weeks ago.     3.  Hypertension, blood pressures are stable, will hold lisinopril in view of chronic kidney disease and monitor BP closely.     4.  Early dementia with moderate memory impairment, patient is  on donezepil.  And was seen by neurology 3 weeks ago and her donezepil was increased to 10 mg daily.     Reviewed all labs and imaging studies and discussed the plan of treatment with patient in detail.  DVT prophylaxis, will order PT OT and monitor closely.     Tried to contact patient's daughter around 1230 this afternoon and again at 534 and left a voicemail.  Also tried to call her son at 12:30 PM and left a voicemail.    I spent 35 minutes in the professional and overall care of this patient.      George Apodaca MD

## 2025-03-17 NOTE — PROGRESS NOTES
03/17/25 1043   Discharge Planning   Living Arrangements Alone   Support Systems Children;Family members   Assistance Needed independent at baseline   Type of Residence Private residence   Who is requesting discharge planning? Provider   Home or Post Acute Services None   Expected Discharge Disposition Home   Does the patient need discharge transport arranged? No   Financial Resource Strain   How hard is it for you to pay for the very basics like food, housing, medical care, and heating? Not hard   Housing Stability   In the last 12 months, was there a time when you were not able to pay the mortgage or rent on time? N   In the past 12 months, how many times have you moved where you were living? 0   At any time in the past 12 months, were you homeless or living in a shelter (including now)? N   Transportation Needs   In the past 12 months, has lack of transportation kept you from medical appointments or from getting medications? no   In the past 12 months, has lack of transportation kept you from meetings, work, or from getting things needed for daily living? No     Patient admitted for general weakness, decreased PO intake  Met with patient at bedside  Explained role of TCC    Patient states she lives alone.  Has stairs in her home which she navigates on her own  Does her own laundry, makes meals.  Drives at baseline  Daughter or son will provide transport home when med ready  Has a cane at home but does not use it.  Wants to return home with no needs when med ready.  States daughter does come over to clean her house.    ADOD few days  BARRIERS med clearnace, +FLU  DISPO home no needs

## 2025-03-17 NOTE — PROGRESS NOTES
Occupational Therapy    Evaluation/Treatment    Patient Name: Kirstin Howe  MRN: [id number]  Department: [place] [id number]  Room: [address] [place] [address]  Today's Date: 03/17/25  Time Calculation  Start Time: 1447  Stop Time: 1514  Time Calculation (min): 27 min       Assessment:  OT Assessment: Pt requiring min cues for safety and use of the FWW for increased stability during functional ambulation.  Prognosis: Good  Barriers to Discharge Home: No anticipated barriers  Evaluation/Treatment Tolerance: Patient tolerated treatment well, Patient limited by fatigue  Medical Staff Made Aware: Yes  End of Session Communication: Bedside nurse  End of Session Patient Position: Bed, 3 rail up, Alarm on  OT Assessment Results: Decreased ADL status, Decreased endurance, Decreased functional mobility  Prognosis: Good  Barriers to Discharge: Inaccessible home environment  Evaluation/Treatment Tolerance: Patient tolerated treatment well, Patient limited by fatigue  Medical Staff Made Aware: Yes  Strengths: Ability to acquire knowledge, Access to adaptive/assistive products, Attitude of self, Capable of completing ADLs semi/independent  Barriers to Participation: Housing layout, Comorbidities  Plan:  Treatment Interventions: ADL retraining, Functional transfer training, UE strengthening/ROM, Endurance training, Compensatory technique education  OT Frequency: 3 times per week  OT Discharge Recommendations: Low intensity level of continued care  Equipment Recommended upon Discharge: Wheeled walker  OT Recommended Transfer Status: Assist of 1  OT - OK to Discharge: Yes  Treatment Interventions: ADL retraining, Functional transfer training, UE strengthening/ROM, Endurance training, Compensatory technique education    Subjective   Current Problem:  1. Generalized weakness        2. Influenza A        3. Starvation ketoacidosis          General:   OT Received On: 03/17/25  General  Reason for Referral: 84 y.o. female presenting with generalized weakness and not  eating and drinking very well  Past Medical History Relevant to Rehab:   Past Medical History:   Diagnosis Date    Age-related nuclear cataract, right eye 03/31/2016    Age-related nuclear cataract of right eye    Anxiety disorder, unspecified 03/31/2014    Anxiety    Bunion of unspecified foot 11/26/2014    Bunion    Concussion 04/08/2021    Last Assessment & Plan: Formatting of this note might be different from the original. -concern for possible concussion on admission, but head/neck/throat pain more likely due to infectious etiology as noted above    Contusion of left forearm, initial encounter 08/16/2016    Contusion of left forearm    Contusion of left knee, initial encounter 08/09/2016    Contusion of knee, left    Cortical age-related cataract, right eye 03/31/2016    Cortical age-related cataract of right eye    Depression     Dermatochalasis of unspecified eye, unspecified eyelid 11/10/2017    Dermatochalasis    Dislocation of right shoulder joint     Essential (primary) hypertension 11/23/2022    Hypertension    Fracture of orbital floor, right side, initial encounter for open fracture (Multi) 06/27/2017    Open fracture of right orbital floor, initial encounter    Generalized anxiety disorder 11/26/2014    PIA (generalized anxiety disorder)    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    Herpes zoster 03/29/2023    History of cataract 06/11/2024    History of depression 06/11/2024    Hypertensive urgency 03/25/2016    Hypertensive urgency    Insomnia, unspecified 11/26/2014    Insomnia    Maxillary fracture, unspecified side, initial encounter for closed fracture (Multi) 06/21/2017    Fracture of maxillary sinus, closed, initial encounter    Primary osteoarthritis, 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    Primary osteoarthritis of left wrist    Stage III chronic kidney disease (Multi) 03/29/2023    Status post left knee replacement 09/02/2022     Prior to Session Communication: Bedside nurse  Patient  "Position Received: Bed, 3 rail up (pt sitting EOB, alarm on)  Preferred Learning Style: auditory, kinesthetic, verbal, visual  General Comment: pt agreeable to OT, \"I feel better\"   Precautions:  Medical Precautions: Fall precautions    Pain:  Pain Assessment  Pain Assessment: 0-10  0-10 (Numeric) Pain Score: 0 - No pain    Objective   Cognition:  Orientation Level: Oriented X4     Home Living:  Type of Home: House  Lives With: Alone (pt has been staying at her dtr's house the last 8 days)  Home Adaptive Equipment: Walker rolling or standard, Cane  Home Layout: Multi-level, Stairs to alternate level with rails  Alternate Level Stairs-Number of Steps: 13  Home Access: Stairs to enter with rails  Entrance Stairs-Number of Steps: 3 (pt also reporting 7+2)  Bathroom Shower/Tub: Tub/shower unit  Bathroom Toilet: Handicapped height  Bathroom Equipment: Grab bars in shower, Shower chair with back  Home Living Comments: pt's dtr's house is 1 level  Prior Function:  Level of [place]: Independent with ADLs and functional transfers, Needs assistance with ADLs  Receives Help From: Family (pt's dtr assists)  ADL Assistance: Independent  Homemaking Assistance: Needs assistance (pt reports she is mostly indep with IADLs, drives, dtr assists with cleaning)  Driving/Transportation:  (pt reports she drives sometimes)  Ambulatory Assistance: Independent (no AD at baseline)  Prior Function Comments: pt d/c from SNF 2.5 weeks ago, reports she was at her dtr's house the past week due to weakness, denies falls     Activities of Daily Living:  figure-4 for LB dressing  Activity Tolerance:  Endurance: Tolerates 10 - 20 min exercise with multiple rests  Functional Standing Tolerance: 5 minutes     Bed Mobility/Transfers: Bed Mobility  Bed Mobility: Yes  Bed Mobility 1  Bed Mobility 1: Sitting to supine  Level of Assistance 1: Close supervision    Transfers  Transfer: Yes  Transfer 1  Technique 1: Sit to stand, Stand to sit  Transfer " "Device 1: Walker  Transfer Level of Assistance 1: Minimum assistance  Trials/Comments 1: min A to stand from bed with no FWW  Transfers 2  Technique 2: Sit to stand, Stand to sit  Transfer Device 2: Walker  Transfer Level of Assistance 2: Contact guard  Trials/Comments 2: pt able to complete second stand with increased stability, cues for hand placement  Transfers 3  Transfer From 3: Stand to  Transfer to 3: Bed  Technique 3:  (pt ambulating)  Transfer Device 3: Walker  Transfer Level of Assistance 3: Contact guard    Functional Mobility:  Functional Mobility  Functional Mobility Performed: Yes  Functional Mobility 1  Surface 1: Level tile  Device 1: Rolling walker  Assistance 1: Contact guard  Comments 1: pt feeling \"unsteadying\" standing EOB without UE support, pt instructed to complete functional ambulation with FWW, pt reporting increased stabilitywith AD  Sitting Balance:  Static Sitting Balance  Static Sitting-Balance Support: Feet supported  Static Sitting-Level of Assistance: Modified Independent  Standing Balance:  Static Standing Balance  Static Standing-Balance Support: Bilateral upper extremity supported  Static Standing-Level of Assistance: Close supervision    Vision:Vision - Basic Assessment  Current Vision: No visual deficits  Sensation:  Light Touch: No apparent deficits  Strength:  Strength Comments: MARKEL GOLDBERG's 4/5 MMT     Perception:  Inattention/Neglect: Appears intact  Coordination:  Movements are Fluid and Coordinated: Yes   Hand Function:  Hand Function  Gross Grasp: Functional  Coordination: Functional  Extremities: RUE   RUE : Within Functional Limits and LUE   LUE: Within Functional Limits    Outcome Measures: [facility] Daily Activity  Putting on and taking off regular lower body clothing: A little  Bathing (including washing, rinsing, drying): A little  Putting on and taking off regular upper body clothing: None  Toileting, which includes using toilet, bedpan or urinal: A little  Taking care of " personal grooming such as brushing teeth: None  Eating Meals: None  Daily Activity - Total Score: 21      Education Documentation  Body Mechanics, taught by Tania Ortiz OT at 3/17/2025  4:05 PM.  Learner: Patient  Readiness: Acceptance  Method: Explanation, Demonstration  Response: Verbalizes Understanding, Demonstrated Understanding    ADL Training, taught by Tania Ortiz OT at 3/17/2025  4:05 PM.  Learner: Patient  Readiness: Acceptance  Method: Explanation, Demonstration  Response: Verbalizes Understanding, Demonstrated Understanding    Education Comments  No comments found.        OP EDUCATION:       Goals:  Encounter Problems       Encounter Problems (Active)       ADLs       Patient with complete lower body dressing with modified independent level of assistance donning and doffing all LE clothes.       Start:  03/17/25    Expected End:  03/31/25               MOBILITY       Patient will perform Functional mobility mod  Household distances/Community Distances with stand by assist level of assistance and least restrictive device in order to improve safety and functional mobility.       Start:  03/17/25    Expected End:  03/31/25

## 2025-03-17 NOTE — CARE PLAN
The patient's goals for the shift include      Problem: Skin  Goal: Participates in plan/prevention/treatment measures  Outcome: Progressing     Problem: Skin  Goal: Prevent/manage excess moisture  3/17/2025 1803 by Kate Farrar LPN  Outcome: Progressing  3/17/2025 1734 by Kate Farrar LPN  Outcome: Progressing

## 2025-03-18 ENCOUNTER — HOME CARE VISIT (OUTPATIENT)
Dept: HOME HEALTH SERVICES | Facility: HOME HEALTH | Age: 85
End: 2025-03-18
Payer: MEDICARE

## 2025-03-18 LAB
ALBUMIN SERPL BCP-MCNC: 3.3 G/DL (ref 3.4–5)
ANION GAP SERPL CALC-SCNC: 10 MMOL/L (ref 10–20)
ANION GAP SERPL CALC-SCNC: 10 MMOL/L (ref 10–20)
BUN SERPL-MCNC: 11 MG/DL (ref 6–23)
BUN SERPL-MCNC: 12 MG/DL (ref 6–23)
CALCIUM SERPL-MCNC: 8.4 MG/DL (ref 8.6–10.3)
CALCIUM SERPL-MCNC: 8.8 MG/DL (ref 8.6–10.3)
CHLORIDE SERPL-SCNC: 109 MMOL/L (ref 98–107)
CHLORIDE SERPL-SCNC: 110 MMOL/L (ref 98–107)
CO2 SERPL-SCNC: 26 MMOL/L (ref 21–32)
CO2 SERPL-SCNC: 27 MMOL/L (ref 21–32)
CREAT SERPL-MCNC: 1.24 MG/DL (ref 0.5–1.05)
CREAT SERPL-MCNC: 1.4 MG/DL (ref 0.5–1.05)
EGFRCR SERPLBLD CKD-EPI 2021: 37 ML/MIN/1.73M*2
EGFRCR SERPLBLD CKD-EPI 2021: 43 ML/MIN/1.73M*2
ERYTHROCYTE [DISTWIDTH] IN BLOOD BY AUTOMATED COUNT: 14.8 % (ref 11.5–14.5)
GLUCOSE BLD MANUAL STRIP-MCNC: 105 MG/DL (ref 74–99)
GLUCOSE BLD MANUAL STRIP-MCNC: 83 MG/DL (ref 74–99)
GLUCOSE BLD MANUAL STRIP-MCNC: 84 MG/DL (ref 74–99)
GLUCOSE BLD MANUAL STRIP-MCNC: 88 MG/DL (ref 74–99)
GLUCOSE SERPL-MCNC: 79 MG/DL (ref 74–99)
GLUCOSE SERPL-MCNC: 85 MG/DL (ref 74–99)
HCT VFR BLD AUTO: 35.2 % (ref 36–46)
HGB BLD-MCNC: 11.4 G/DL (ref 12–16)
MCH RBC QN AUTO: 27 PG (ref 26–34)
MCHC RBC AUTO-ENTMCNC: 32.4 G/DL (ref 32–36)
MCV RBC AUTO: 83 FL (ref 80–100)
NRBC BLD-RTO: 0 /100 WBCS (ref 0–0)
PHOSPHATE SERPL-MCNC: 2.9 MG/DL (ref 2.5–4.9)
PLATELET # BLD AUTO: 285 X10*3/UL (ref 150–450)
POTASSIUM SERPL-SCNC: 3.7 MMOL/L (ref 3.5–5.3)
POTASSIUM SERPL-SCNC: 3.8 MMOL/L (ref 3.5–5.3)
RBC # BLD AUTO: 4.23 X10*6/UL (ref 4–5.2)
SODIUM SERPL-SCNC: 142 MMOL/L (ref 136–145)
SODIUM SERPL-SCNC: 142 MMOL/L (ref 136–145)
WBC # BLD AUTO: 5 X10*3/UL (ref 4.4–11.3)

## 2025-03-18 PROCEDURE — 82947 ASSAY GLUCOSE BLOOD QUANT: CPT

## 2025-03-18 PROCEDURE — 80048 BASIC METABOLIC PNL TOTAL CA: CPT | Mod: CCI | Performed by: NURSE PRACTITIONER

## 2025-03-18 PROCEDURE — 99232 SBSQ HOSP IP/OBS MODERATE 35: CPT | Performed by: INTERNAL MEDICINE

## 2025-03-18 PROCEDURE — 36415 COLL VENOUS BLD VENIPUNCTURE: CPT | Performed by: NURSE PRACTITIONER

## 2025-03-18 PROCEDURE — 1100000001 HC PRIVATE ROOM DAILY

## 2025-03-18 PROCEDURE — 97161 PT EVAL LOW COMPLEX 20 MIN: CPT | Mod: GP | Performed by: PHYSICAL THERAPIST

## 2025-03-18 PROCEDURE — 2500000005 HC RX 250 GENERAL PHARMACY W/O HCPCS: Performed by: NURSE PRACTITIONER

## 2025-03-18 PROCEDURE — 82374 ASSAY BLOOD CARBON DIOXIDE: CPT

## 2025-03-18 PROCEDURE — 2500000001 HC RX 250 WO HCPCS SELF ADMINISTERED DRUGS (ALT 637 FOR MEDICARE OP): Performed by: NURSE PRACTITIONER

## 2025-03-18 PROCEDURE — 2500000004 HC RX 250 GENERAL PHARMACY W/ HCPCS (ALT 636 FOR OP/ED): Performed by: NURSE PRACTITIONER

## 2025-03-18 PROCEDURE — 2500000004 HC RX 250 GENERAL PHARMACY W/ HCPCS (ALT 636 FOR OP/ED)

## 2025-03-18 PROCEDURE — 85027 COMPLETE CBC AUTOMATED: CPT

## 2025-03-18 PROCEDURE — 36415 COLL VENOUS BLD VENIPUNCTURE: CPT

## 2025-03-18 RX ORDER — ONDANSETRON 4 MG/1
4 TABLET, FILM COATED ORAL EVERY 8 HOURS PRN
Status: DISCONTINUED | OUTPATIENT
Start: 2025-03-18 | End: 2025-03-21 | Stop reason: HOSPADM

## 2025-03-18 RX ADMIN — BUPROPION HYDROCHLORIDE 300 MG: 150 TABLET, EXTENDED RELEASE ORAL at 08:17

## 2025-03-18 RX ADMIN — SENNOSIDES AND DOCUSATE SODIUM 1 TABLET: 50; 8.6 TABLET ORAL at 20:38

## 2025-03-18 RX ADMIN — ONDANSETRON HYDROCHLORIDE 4 MG: 4 TABLET, FILM COATED ORAL at 09:27

## 2025-03-18 RX ADMIN — DONEPEZIL HYDROCHLORIDE 10 MG: 5 TABLET ORAL at 20:38

## 2025-03-18 RX ADMIN — Medication 4.5 MG: at 20:38

## 2025-03-18 RX ADMIN — ENOXAPARIN SODIUM 30 MG: 30 INJECTION SUBCUTANEOUS at 08:17

## 2025-03-18 RX ADMIN — SENNOSIDES AND DOCUSATE SODIUM 1 TABLET: 50; 8.6 TABLET ORAL at 08:17

## 2025-03-18 RX ADMIN — CYANOCOBALAMIN TAB 500 MCG 1000 MCG: 500 TAB at 08:17

## 2025-03-18 ASSESSMENT — COGNITIVE AND FUNCTIONAL STATUS - GENERAL
DRESSING REGULAR UPPER BODY CLOTHING: A LITTLE
CLIMB 3 TO 5 STEPS WITH RAILING: A LITTLE
MOBILITY SCORE: 20
TOILETING: A LITTLE
MOVING TO AND FROM BED TO CHAIR: A LITTLE
DAILY ACTIVITIY SCORE: 18
STANDING UP FROM CHAIR USING ARMS: A LITTLE
MOVING TO AND FROM BED TO CHAIR: A LITTLE
STANDING UP FROM CHAIR USING ARMS: A LITTLE
DRESSING REGULAR LOWER BODY CLOTHING: A LITTLE
EATING MEALS: A LITTLE
WALKING IN HOSPITAL ROOM: A LITTLE
PERSONAL GROOMING: A LITTLE
WALKING IN HOSPITAL ROOM: A LITTLE
MOBILITY SCORE: 19
HELP NEEDED FOR BATHING: A LITTLE
CLIMB 3 TO 5 STEPS WITH RAILING: A LOT

## 2025-03-18 ASSESSMENT — PAIN SCALES - GENERAL
PAINLEVEL_OUTOF10: 6
PAINLEVEL_OUTOF10: 0 - NO PAIN

## 2025-03-18 ASSESSMENT — PAIN - FUNCTIONAL ASSESSMENT
PAIN_FUNCTIONAL_ASSESSMENT: 0-10
PAIN_FUNCTIONAL_ASSESSMENT: 0-10

## 2025-03-18 NOTE — CARE PLAN
The patient's goals for the shift include      The clinical goals for the shift include To remain free from fall/injuury        Problem: Skin  Goal: Participates in plan/prevention/treatment measures  Outcome: Progressing     Problem: Fall/Injury  Goal: Not fall by end of shift  Outcome: Progressing

## 2025-03-18 NOTE — PROGRESS NOTES
"Kirstin Howe is a 84 y.o. female on day 2 of admission presenting with Generalized weakness.    Subjective   Patient seen and examined, she is coming along fine, no new complaints, feels weak otherwise stable.  Renal function is improved, stated that she has no appetite and does not eat very well.         Objective     Physical Exam  GENERAL:  Alert, no distress, cooperative  SKIN:  Skin color, texture, turgor normal. No rashes or lesions.  OROPHARYNX:  Lips, mucosa, and tongue are normal.Teeth and gums, normal. Oropharynx normal.  NECK:  No jugulovenous distention, No carotid bruits, Carotid pulse normal contour, Supple  LUNGS:  Lungs clear to auscultation. Good diaphragmatic excursion.  CARDIAC: Rate and rhythm is regular.  Normal S1 and S2; no rubs, or gallops, 1/6 systolic ejection murmur left sternal border.  ABDOMEN:  Abdomen soft, non-tender, BS normal, No masses or organomegaly  EXTREMETIES:  Extremities normal, no deformities, edema, clubbing or skin discoloration. Good capillary refill., No ulcers  NEURO:  Alert, oriented X 3, Gait not examinedl. Non-focal.   PULSES:  2+ radial, 2+ carotid    Last Recorded Vitals  Blood pressure 152/87, pulse 73, temperature 36.6 °C (97.9 °F), temperature source Temporal, resp. rate 16, height 1.626 m (5' 4\"), weight 73.8 kg (162 lb 9.6 oz), SpO2 96%.  Intake/Output last 3 Shifts:  I/O last 3 completed shifts:  In: - (0 mL/kg)   Out: 250 (3.4 mL/kg) [Urine:250 (0.1 mL/kg/hr)]  Weight: 73.8 kg     Relevant Results  Scheduled medications  buPROPion XL, 300 mg, oral, Daily  cyanocobalamin, 1,000 mcg, oral, Daily  donepezil, 10 mg, oral, Nightly  enoxaparin, 30 mg, subcutaneous, q24h FAVIAN  sennosides-docusate sodium, 1 tablet, oral, BID      Continuous medications     PRN medications  PRN medications: acetaminophen **OR** acetaminophen **OR** acetaminophen, acetaminophen **OR** acetaminophen **OR** acetaminophen, melatonin, ondansetron     Results for orders placed or performed " during the hospital encounter of 03/15/25 (from the past 96 hours)   Electrocardiogram, 12-lead PRN ACS symptoms   Result Value Ref Range    Ventricular Rate 74 BPM    Atrial Rate 74 BPM    TX Interval 134 ms    QRS Duration 84 ms    QT Interval 392 ms    QTC Calculation(Bazett) 435 ms    P Axis 62 degrees    R Axis 46 degrees    T Axis 55 degrees    QRS Count 12 beats    Q Onset 224 ms    P Onset 157 ms    P Offset 207 ms    T Offset 420 ms    QTC Fredericia 420 ms   Sars-CoV-2 and Influenza A/B PCR   Result Value Ref Range    Flu A Result Detected (A) Not Detected    Flu B Result Not Detected Not Detected    Coronavirus 2019, PCR Not Detected Not Detected   CBC and Auto Differential   Result Value Ref Range    WBC 5.0 4.4 - 11.3 x10*3/uL    nRBC 0.0 0.0 - 0.0 /100 WBCs    RBC 5.30 (H) 4.00 - 5.20 x10*6/uL    Hemoglobin 14.0 12.0 - 16.0 g/dL    Hematocrit 44.2 36.0 - 46.0 %    MCV 83 80 - 100 fL    MCH 26.4 26.0 - 34.0 pg    MCHC 31.7 (L) 32.0 - 36.0 g/dL    RDW 15.0 (H) 11.5 - 14.5 %    Platelets 337 150 - 450 x10*3/uL    Neutrophils % 68.5 40.0 - 80.0 %    Immature Granulocytes %, Automated 0.4 0.0 - 0.9 %    Lymphocytes % 25.7 13.0 - 44.0 %    Monocytes % 4.2 2.0 - 10.0 %    Eosinophils % 0.8 0.0 - 6.0 %    Basophils % 0.4 0.0 - 2.0 %    Neutrophils Absolute 3.43 1.60 - 5.50 x10*3/uL    Immature Granulocytes Absolute, Automated 0.02 0.00 - 0.50 x10*3/uL    Lymphocytes Absolute 1.29 0.80 - 3.00 x10*3/uL    Monocytes Absolute 0.21 0.05 - 0.80 x10*3/uL    Eosinophils Absolute 0.04 0.00 - 0.40 x10*3/uL    Basophils Absolute 0.02 0.00 - 0.10 x10*3/uL   Comprehensive metabolic panel   Result Value Ref Range    Glucose 67 (L) 74 - 99 mg/dL    Sodium 141 136 - 145 mmol/L    Potassium 4.2 3.5 - 5.3 mmol/L    Chloride 105 98 - 107 mmol/L    Bicarbonate 19 (L) 21 - 32 mmol/L    Anion Gap 21 (H) 10 - 20 mmol/L    Urea Nitrogen 31 (H) 6 - 23 mg/dL    Creatinine 1.91 (H) 0.50 - 1.05 mg/dL    eGFR 25 (L) >60 mL/min/1.73m*2     Calcium 9.9 8.6 - 10.3 mg/dL    Albumin 4.3 3.4 - 5.0 g/dL    Alkaline Phosphatase 79 33 - 136 U/L    Total Protein 7.1 6.4 - 8.2 g/dL    AST 16 9 - 39 U/L    Bilirubin, Total 0.5 0.0 - 1.2 mg/dL    ALT 7 7 - 45 U/L   Protime-INR   Result Value Ref Range    Protime 10.8 9.8 - 12.4 seconds    INR 1.0 0.9 - 1.1   aPTT   Result Value Ref Range    aPTT 30 26 - 36 seconds   B-Type Natriuretic Peptide   Result Value Ref Range    BNP 45 0 - 99 pg/mL   Troponin I, High Sensitivity, Initial   Result Value Ref Range    Troponin I, High Sensitivity 15 (H) 0 - 13 ng/L   Beta Hydroxybutyrate   Result Value Ref Range    Beta-Hydroxybutyrate 5.98 (H) 0.02 - 0.27 mmol/L   POCT GLUCOSE   Result Value Ref Range    POCT Glucose 61 (L) 74 - 99 mg/dL   Troponin, High Sensitivity, 1 Hour   Result Value Ref Range    Troponin I, High Sensitivity 13 0 - 13 ng/L   POCT GLUCOSE   Result Value Ref Range    POCT Glucose 251 (H) 74 - 99 mg/dL   POCT GLUCOSE   Result Value Ref Range    POCT Glucose 140 (H) 74 - 99 mg/dL   Urinalysis with Reflex Culture and Microscopic   Result Value Ref Range    Color, Urine Light-Yellow Light-Yellow, Yellow, Dark-Yellow    Appearance, Urine Clear Clear    Specific Gravity, Urine 1.013 1.005 - 1.035    pH, Urine 5.0 5.0, 5.5, 6.0, 6.5, 7.0, 7.5, 8.0    Protein, Urine NEGATIVE NEGATIVE, 10 (TRACE), 20 (TRACE) mg/dL    Glucose, Urine 300 (3+) (A) Normal mg/dL    Blood, Urine NEGATIVE NEGATIVE mg/dL    Ketones, Urine 40 (2+) (A) NEGATIVE mg/dL    Bilirubin, Urine NEGATIVE NEGATIVE mg/dL    Urobilinogen, Urine Normal Normal mg/dL    Nitrite, Urine NEGATIVE NEGATIVE    Leukocyte Esterase, Urine NEGATIVE NEGATIVE   POCT GLUCOSE   Result Value Ref Range    POCT Glucose 72 (L) 74 - 99 mg/dL   POCT GLUCOSE   Result Value Ref Range    POCT Glucose 98 74 - 99 mg/dL   POCT GLUCOSE   Result Value Ref Range    POCT Glucose 115 (H) 74 - 99 mg/dL   Renal Function Panel   Result Value Ref Range    Glucose 107 (H) 74 - 99 mg/dL     Sodium 140 136 - 145 mmol/L    Potassium 3.6 3.5 - 5.3 mmol/L    Chloride 108 (H) 98 - 107 mmol/L    Bicarbonate 26 21 - 32 mmol/L    Anion Gap 10 10 - 20 mmol/L    Urea Nitrogen 19 6 - 23 mg/dL    Creatinine 1.36 (H) 0.50 - 1.05 mg/dL    eGFR 38 (L) >60 mL/min/1.73m*2    Calcium 8.7 8.6 - 10.3 mg/dL    Phosphorus 2.0 (L) 2.5 - 4.9 mg/dL    Albumin 3.3 (L) 3.4 - 5.0 g/dL   Beta Hydroxybutyrate   Result Value Ref Range    Beta-Hydroxybutyrate 0.78 (H) 0.02 - 0.27 mmol/L   POCT GLUCOSE   Result Value Ref Range    POCT Glucose 119 (H) 74 - 99 mg/dL   POCT GLUCOSE   Result Value Ref Range    POCT Glucose 116 (H) 74 - 99 mg/dL   POCT GLUCOSE   Result Value Ref Range    POCT Glucose 108 (H) 74 - 99 mg/dL   Renal Function Panel   Result Value Ref Range    Glucose 119 (H) 74 - 99 mg/dL    Sodium 139 136 - 145 mmol/L    Potassium 3.6 3.5 - 5.3 mmol/L    Chloride 111 (H) 98 - 107 mmol/L    Bicarbonate 24 21 - 32 mmol/L    Anion Gap 8 (L) 10 - 20 mmol/L    Urea Nitrogen 12 6 - 23 mg/dL    Creatinine 1.17 (H) 0.50 - 1.05 mg/dL    eGFR 46 (L) >60 mL/min/1.73m*2    Calcium 8.2 (L) 8.6 - 10.3 mg/dL    Phosphorus 1.9 (L) 2.5 - 4.9 mg/dL    Albumin 3.0 (L) 3.4 - 5.0 g/dL   Lavender Top   Result Value Ref Range    Extra Tube Hold for add-ons.    POCT GLUCOSE   Result Value Ref Range    POCT Glucose 98 74 - 99 mg/dL   POCT GLUCOSE   Result Value Ref Range    POCT Glucose 88 74 - 99 mg/dL   CBC   Result Value Ref Range    WBC 5.0 4.4 - 11.3 x10*3/uL    nRBC 0.0 0.0 - 0.0 /100 WBCs    RBC 4.23 4.00 - 5.20 x10*6/uL    Hemoglobin 11.4 (L) 12.0 - 16.0 g/dL    Hematocrit 35.2 (L) 36.0 - 46.0 %    MCV 83 80 - 100 fL    MCH 27.0 26.0 - 34.0 pg    MCHC 32.4 32.0 - 36.0 g/dL    RDW 14.8 (H) 11.5 - 14.5 %    Platelets 285 150 - 450 x10*3/uL   Basic Metabolic Panel   Result Value Ref Range    Glucose 85 74 - 99 mg/dL    Sodium 142 136 - 145 mmol/L    Potassium 3.7 3.5 - 5.3 mmol/L    Chloride 110 (H) 98 - 107 mmol/L    Bicarbonate 26 21 - 32  mmol/L    Anion Gap 10 10 - 20 mmol/L    Urea Nitrogen 11 6 - 23 mg/dL    Creatinine 1.24 (H) 0.50 - 1.05 mg/dL    eGFR 43 (L) >60 mL/min/1.73m*2    Calcium 8.4 (L) 8.6 - 10.3 mg/dL   POCT GLUCOSE   Result Value Ref Range    POCT Glucose 83 74 - 99 mg/dL     *Note: Due to a large number of results and/or encounters for the requested time period, some results have not been displayed. A complete set of results can be found in Results Review.    Electrocardiogram, 12-lead PRN ACS symptoms    Result Date: 3/17/2025  Normal sinus rhythm Nonspecific ST abnormality Abnormal ECG When compared with ECG of 05-MAR-2025 17:49, Nonspecific T wave abnormality no longer evident in Lateral leads See ED provider note for full interpretation and clinical correlation Confirmed by Shweta Vines ([id number]) on 3/17/2025 10:29:53 AM    XR chest 1 view    Result Date: 3/15/2025  Interpreted By:  Jose Acosta, STUDY: XR CHEST 1 VIEW;  3/15/2025 9:28 pm   INDICATION: Signs/Symptoms:weakness.     COMPARISON: Chest radiograph 03/05/2025   ACCESSION NUMBER(S): [id number]   ORDERING CLINICIAN: CARLY KAN   FINDINGS: SUPPORT DEVICES: None.   CARDIOMEDIASTINAL SILHOUETTE: Cardiomediastinal silhouette is normal in size and configuration.   LUNGS: No pulmonary consolidation, pleural effusion or pneumothorax.   ABDOMEN: No remarkable upper abdominal findings.   BONES: No acute osseous abnormality.       1. No acute cardiopulmonary abnormality.     Signed by: Jose Acosta 3/15/2025 10:00 PM Dictation workstation:   [id number]    ECG 12 lead    Result Date: 3/6/2025  Normal sinus rhythm Nonspecific ST and T wave abnormality Abnormal ECG When compared with ECG of 16-FEB-2025 15:31, Nonspecific T wave abnormality no longer evident in Inferior leads Nonspecific T wave abnormality, worse in Lateral leads See ED provider note for full interpretation and clinical correlation Confirmed by Steve Chisholm ([id number]) on 3/6/2025 7:59:40 AM    XR chest  2 views    Result Date: 3/5/2025  Interpreted By:  Irasema Terry, STUDY: XR CHEST 2 VIEWS;  3/5/2025 6:04 pm   INDICATION: Signs/Symptoms:cough, weakness.     COMPARISON: Radiographs of the chest dated 02/16/2025   ACCESSION NUMBER(S): [id number]   ORDERING CLINICIAN: JOSE DANIEL SIMMS   FINDINGS: PA and lateral radiographs of the chest were provided.       CARDIOMEDIASTINAL SILHOUETTE: Cardiomediastinal silhouette is normal in size and configuration.   LUNGS: Lungs are clear, without evidence of new consolidation or pleural effusion mild atelectatic changes are present in the lung bases.   ABDOMEN: No remarkable upper abdominal findings.   BONES: No acute osseous changes.       1.  No evidence of acute cardiopulmonary process.       MACRO: None   Signed by: Irasema Terry 3/5/2025 6:12 PM Dictation workstation:   [id number]    MR brain wo IV contrast    Result Date: 2/16/2025  Interpreted By:  Diego Beach and Stephens Katherine STUDY: MR BRAIN WO IV CONTRAST; MR ANGIO HEAD WO IV CONTRAST; MR ANGIO NECK WO IV CONTRAST;  2/16/2025 8:32 pm   INDICATION: Signs/Symptoms:speech difficulties, left sided sensory changes. Stroke protocol.   Per EMR patient is a 84-year-old female who presented the emergency department for dysarthria and gait instability. CT head showed no acute abnormality.   COMPARISON: Same day CT head MRI brain 02/06/2025 and 10/11/2024   ACCESSION NUMBER(S): [id number]; [id number]; [id number]   ORDERING CLINICIAN: LAURA LUGO   TECHNIQUE: Axial T2, FLAIR, DWI, gradient echo T2 and  sagittal and coronal T1 weighted images of brain were acquired.   Time-of-flight MRA of the head  and neck was performed. The images were reviewed as source images and maximum intensity projections.   The estimate of the degree of stenosis included in this report is based on the NASCET method for calculating stenosis, using the internal carotid artery distal to the stenosis as the reference  point.   FINDINGS: MRI BRAIN:   No diffusion restriction abnormality to suggest acute infarct. No mass effect or midline shift. No acute intracranial hemorrhage. Previously described susceptibility artifact on gradient echo sequences in nonspecific distribution again favored to represent chronic cerebral microhemorrhages.   Mild-to-moderate nonspecific patchy and confluent T2/FLAIR hyperintense signal within the periventricular and subcortical white matter, likely sequela of chronic microangiopathy unchanged from prior.   Dilatation of the supratentorial ventricles, most pronounced in the temporal horns to disproportionate medial temporal lobe atrophy. No extra-axial fluid collection. Mineralization is noted of the anterior interhemispheric falx cerebri.   Opacification of the right-greater-than-left mastoid air cells, similar to prior. The visualized paranasal sinuses are essentially clear.   Status post right lens replacement.       MRA OF HEAD:   Anterior circulation:    There is expected flow signal in bilateral intracranial internal carotid arteries, bilateral carotid terminals, bilateral proximal anterior and middle cerebral arteries.   Posterior circulation:  Minimal narrowing of the bilateral P2 PCA segment. Bilateral intracranial vertebral arteries, vertebrobasilar junction, basilar artery and proximal posterior cerebral arteries demonstrate expected flow signal.   MRA OF NECK:   The source images are mildly degraded by artifact.   Right carotid vessels:  There is expected flow signal in the visualized portion of the common carotid artery.  There is mild attenuation of flow signal at the carotid bifurcation which may be secondary to flow related artifact. The internal carotid artery in the neck demonstrates expected flow signal.  There is 0% stenosis per NASCET criteria.   Left carotid vessels:   There is expected flow signal in the visualized portion of the common carotid artery.  There is mild attenuation  of flow signal at the carotid bifurcation which may be secondary to flow related artifact. The internal carotid artery in the neck demonstrates expected flow signal.  There is 0% stenosis per NASCET criteria.   Vertebral vessels:   The visualized segments of the cervical vertebral arteries demonstrate expected flow signal.       1. No acute infarct, acute intracranial hemorrhage, or mass effect. No major vessel cut off or significant stenosis in the head or neck. 2. No significant interval change in disproportionate dilation of the supratentorial ventricles which can be seen in the setting of normal pressure hydrocephalus or central atrophy. 3. Unchanged disproportionate hippocampal volume loss bilaterally which may reflect Alzheimer's dementia. 4. Similar mild-to-moderate nonspecific white matter changes which can be seen in the setting of chronic microangiopathy.   I personally reviewed the images/study and I agree with Jacquelyn Boone DO's (radiology resident) findings as stated. This study was interpreted at [place], [place].   MACRO: None   Signed by: Diego Beach 2/16/2025 10:03 PM Dictation workstation:   [id number]    MR angio head wo IV contrast    Result Date: 2/16/2025  Interpreted By:  Diego Beach  and Mely Valladares STUDY: MR BRAIN WO IV CONTRAST; MR ANGIO HEAD WO IV CONTRAST; MR ANGIO NECK WO IV CONTRAST;  2/16/2025 8:32 pm   INDICATION: Signs/Symptoms:speech difficulties, left sided sensory changes. Stroke protocol.   Per EMR patient is a 84-year-old female who presented the emergency department for dysarthria and gait instability. CT head showed no acute abnormality.   COMPARISON: Same day CT head MRI brain 02/06/2025 and 10/11/2024   ACCESSION NUMBER(S): [id number]; [id number]; [id number]   ORDERING CLINICIAN: LAURA LUGO   TECHNIQUE: Axial T2, FLAIR, DWI, gradient echo T2 and  sagittal and coronal T1 weighted images of brain  were acquired.   Time-of-flight MRA of the head  and neck was performed. The images were reviewed as source images and maximum intensity projections.   The estimate of the degree of stenosis included in this report is based on the NASCET method for calculating stenosis, using the internal carotid artery distal to the stenosis as the reference point.   FINDINGS: MRI BRAIN:   No diffusion restriction abnormality to suggest acute infarct. No mass effect or midline shift. No acute intracranial hemorrhage. Previously described susceptibility artifact on gradient echo sequences in nonspecific distribution again favored to represent chronic cerebral microhemorrhages.   Mild-to-moderate nonspecific patchy and confluent T2/FLAIR hyperintense signal within the periventricular and subcortical white matter, likely sequela of chronic microangiopathy unchanged from prior.   Dilatation of the supratentorial ventricles, most pronounced in the temporal horns to disproportionate medial temporal lobe atrophy. No extra-axial fluid collection. Mineralization is noted of the anterior interhemispheric falx cerebri.   Opacification of the right-greater-than-left mastoid air cells, similar to prior. The visualized paranasal sinuses are essentially clear.   Status post right lens replacement.       MRA OF HEAD:   Anterior circulation:    There is expected flow signal in bilateral intracranial internal carotid arteries, bilateral carotid terminals, bilateral proximal anterior and middle cerebral arteries.   Posterior circulation:  Minimal narrowing of the bilateral P2 PCA segment. Bilateral intracranial vertebral arteries, vertebrobasilar junction, basilar artery and proximal posterior cerebral arteries demonstrate expected flow signal.   MRA OF NECK:   The source images are mildly degraded by artifact.   Right carotid vessels:  There is expected flow signal in the visualized portion of the common carotid artery.  There is mild attenuation of  flow signal at the carotid bifurcation which may be secondary to flow related artifact. The internal carotid artery in the neck demonstrates expected flow signal.  There is 0% stenosis per NASCET criteria.   Left carotid vessels:   There is expected flow signal in the visualized portion of the common carotid artery.  There is mild attenuation of flow signal at the carotid bifurcation which may be secondary to flow related artifact. The internal carotid artery in the neck demonstrates expected flow signal.  There is 0% stenosis per NASCET criteria.   Vertebral vessels:   The visualized segments of the cervical vertebral arteries demonstrate expected flow signal.       1. No acute infarct, acute intracranial hemorrhage, or mass effect. No major vessel cut off or significant stenosis in the head or neck. 2. No significant interval change in disproportionate dilation of the supratentorial ventricles which can be seen in the setting of normal pressure hydrocephalus or central atrophy. 3. Unchanged disproportionate hippocampal volume loss bilaterally which may reflect Alzheimer's dementia. 4. Similar mild-to-moderate nonspecific white matter changes which can be seen in the setting of chronic microangiopathy.   I personally reviewed the images/study and I agree with Jacquelyn Boone DO's (radiology resident) findings as stated. This study was interpreted at [place], [place].   MACRO: None   Signed by: Diego Beach 2/16/2025 10:03 PM Dictation workstation:   [id number]    MR angio neck wo IV contrast    Result Date: 2/16/2025  Interpreted By:  Diego Beach and Stephens Katherine STUDY: MR BRAIN WO IV CONTRAST; MR ANGIO HEAD WO IV CONTRAST; MR ANGIO NECK WO IV CONTRAST;  2/16/2025 8:32 pm   INDICATION: Signs/Symptoms:speech difficulties, left sided sensory changes. Stroke protocol.   Per EMR patient is a 84-year-old female who presented the emergency department  for dysarthria and gait instability. CT head showed no acute abnormality.   COMPARISON: Same day CT head MRI brain 02/06/2025 and 10/11/2024   ACCESSION NUMBER(S): [id number]; [id number]; [id number]   ORDERING CLINICIAN: LAURA LUGO   TECHNIQUE: Axial T2, FLAIR, DWI, gradient echo T2 and  sagittal and coronal T1 weighted images of brain were acquired.   Time-of-flight MRA of the head  and neck was performed. The images were reviewed as source images and maximum intensity projections.   The estimate of the degree of stenosis included in this report is based on the NASCET method for calculating stenosis, using the internal carotid artery distal to the stenosis as the reference point.   FINDINGS: MRI BRAIN:   No diffusion restriction abnormality to suggest acute infarct. No mass effect or midline shift. No acute intracranial hemorrhage. Previously described susceptibility artifact on gradient echo sequences in nonspecific distribution again favored to represent chronic cerebral microhemorrhages.   Mild-to-moderate nonspecific patchy and confluent T2/FLAIR hyperintense signal within the periventricular and subcortical white matter, likely sequela of chronic microangiopathy unchanged from prior.   Dilatation of the supratentorial ventricles, most pronounced in the temporal horns to disproportionate medial temporal lobe atrophy. No extra-axial fluid collection. Mineralization is noted of the anterior interhemispheric falx cerebri.   Opacification of the right-greater-than-left mastoid air cells, similar to prior. The visualized paranasal sinuses are essentially clear.   Status post right lens replacement.       MRA OF HEAD:   Anterior circulation:    There is expected flow signal in bilateral intracranial internal carotid arteries, bilateral carotid terminals, bilateral proximal anterior and middle cerebral arteries.   Posterior circulation:  Minimal narrowing of the bilateral P2 PCA segment. Bilateral intracranial  vertebral arteries, vertebrobasilar junction, basilar artery and proximal posterior cerebral arteries demonstrate expected flow signal.   MRA OF NECK:   The source images are mildly degraded by artifact.   Right carotid vessels:  There is expected flow signal in the visualized portion of the common carotid artery.  There is mild attenuation of flow signal at the carotid bifurcation which may be secondary to flow related artifact. The internal carotid artery in the neck demonstrates expected flow signal.  There is 0% stenosis per NASCET criteria.   Left carotid vessels:   There is expected flow signal in the visualized portion of the common carotid artery.  There is mild attenuation of flow signal at the carotid bifurcation which may be secondary to flow related artifact. The internal carotid artery in the neck demonstrates expected flow signal.  There is 0% stenosis per NASCET criteria.   Vertebral vessels:   The visualized segments of the cervical vertebral arteries demonstrate expected flow signal.       1. No acute infarct, acute intracranial hemorrhage, or mass effect. No major vessel cut off or significant stenosis in the head or neck. 2. No significant interval change in disproportionate dilation of the supratentorial ventricles which can be seen in the setting of normal pressure hydrocephalus or central atrophy. 3. Unchanged disproportionate hippocampal volume loss bilaterally which may reflect Alzheimer's dementia. 4. Similar mild-to-moderate nonspecific white matter changes which can be seen in the setting of chronic microangiopathy.   I personally reviewed the images/study and I agree with Jacquelyn Boone DO's (radiology resident) findings as stated. This study was interpreted at [facility], [place], [place].   MACRO: None   Signed by: Diego Beach 2/16/2025 10:03 PM Dictation workstation:   [id number]          Malnutrition Diagnosis Status: New  Malnutrition  Diagnosis: Severe malnutrition related to chronic disease or condition     As Evidenced by: Prolonged poor intake prior to hospital admit less than 75% of estimated energy needs for greater than 1 month, severe fat loss and severe muscle loss.  I agree with the dietitian's malnutrition diagnosis.      Assessment/Plan   Assessment & Plan  Generalized weakness  84-year-old female with past medical history of hypertension, early dementia with some memory loss, osteoporosis, chronic kidney disease stage IV, coronary artery disease [place] [facility] [place] mentation which was thought to be secondary to metabolic encephalopathy and MRI of the brain was negative for acute stroke.  Subsequently patient went to skilled nursing facility for about 2 weeks and discharged home.  She was seen in the emergency room at [facility] on March 5, 2025 and was diagnosed with influenza A advised prescribed Tamiflu and discharged home.  Patient was brought back to the emergency room late last night by her family with increasing fatigue and patient not eating and drinking very well and feeling very weak.  She denied any abdominal pain nausea vomiting or diarrhea no chest pain or shortness of breath.  Her labs are satisfactory except for elevated creatinine of 1.91.  1.  Generalized fatigue and tiredness and evidence of acute on chronic kidney disease disease stage IV  Serum creatinine is elevated at 1.91.  On admission which is improved to 1.24 today..  Overall significant improvement, well-hydrated renal function is improved, has started PT OT.     2.  Influenza A positive by nasal swab and she has been started on Tamiflu 30 mg daily which she took also 2 weeks ago.     3.  Hypertension, blood pressures are stable, will hold lisinopril in view of chronic kidney disease and monitor BP closely.     4.  Early dementia with moderate memory impairment, patient is  on donezepil.  And was seen by neurology 3 weeks ago and her  donezepil was increased to 10 mg daily.     Reviewed all labs and imaging studies and discussed the plan of treatment with patient in detail.  DVT prophylaxis, will order PT OT and monitor closely.    Spoke to patient's daughter Jennie who was in the room and updated her with the patient's condition.       I spent 35 minutes in the professional and overall care of this patient.      Discharge planning, stable for discharge.      George Apodaca MD

## 2025-03-18 NOTE — CARE PLAN
The patient's goals for the shift include      The clinical goals for the shift include To remain free from fall/injuury        Problem: Skin  Goal: Promote/optimize nutrition  3/18/2025 1415 by Teresita Lyman RN  Outcome: Progressing  Flowsheets (Taken 3/18/2025 1415)  Promote/optimize nutrition:   Monitor/record intake including meals   Offer water/supplements/favorite foods  3/18/2025 0826 by Teresita Lyman RN  Outcome: Progressing  Flowsheets (Taken 3/18/2025 0826)  Promote/optimize nutrition:   Monitor/record intake including meals   Consume > 50% meals/supplements   Offer water/supplements/favorite foods     Problem: Fall/Injury  Goal: Not fall by end of shift  3/18/2025 1415 by Teresita Lyman RN  Outcome: Progressing  3/18/2025 0826 by Teresita Lyman RN  Outcome: Progressing     Problem: Fall/Injury  Goal: Be free from injury by end of the shift  3/18/2025 1415 by Teresita Lyman RN  Outcome: Progressing  3/18/2025 0826 by Teresita Lyman RN  Outcome: Progressing

## 2025-03-18 NOTE — ASSESSMENT & PLAN NOTE
84-year-old female with past medical history of hypertension, early dementia with some memory loss, osteoporosis, chronic kidney disease stage IV, coronary artery disease was admitted to [facility] last month with altered mentation which was thought to be secondary to metabolic encephalopathy and MRI of the brain was negative for acute stroke.  Subsequently patient went to skilled nursing facility for about 2 weeks and discharged home.  She was seen in the emergency room at [facility] on March 5, 2025 and was diagnosed with influenza A advised prescribed Tamiflu and discharged home.  Patient was brought back to the emergency room late last night by her family with increasing fatigue and patient not eating and drinking very well and feeling very weak.  She denied any abdominal pain nausea vomiting or diarrhea no chest pain or shortness of breath.  Her labs are satisfactory except for elevated creatinine of 1.91.

## 2025-03-18 NOTE — DOCUMENTATION CLARIFICATION NOTE
PATIENT:               BAKARI MOORE  ACCT #:                  [id number]  MRN:                       [id number]  :                       1940  ADMIT DATE:       3/15/2025 8:08 PM  DISCH DATE:  RESPONDING PROVIDER #:        [id number]          PROVIDER RESPONSE TEXT:    Acute Kidney Injury on CKD 4    CDI QUERY TEXT:    Clarification    Instruction:    Based on your assessment of the patient and the clinical information, please provide the requested documentation by clicking on the appropriate radio button and enter any additional information if prompted.    Question: Please clarify a diagnosis for the patient renal status    When answering this query, please exercise your independent professional judgment. The fact that a question is being asked, does not imply that any particular answer is desired or expected.    The patient's clinical indicators include:  Clinical Information: 83 y/o female presented with weakness, decreased appetite. Admitted with Influenza A, FTT.    Clinical Indicators: Creatinine 3/16 1.91, 1.36 3/17 1.17 3/18 1.24    Treatment: 3/16 NS 0.9% IVF bolus 1000mL, monitoring    Risk Factors: age, CKD 4, poor oral intake, Influenza  Options provided:  -- Acute Kidney Injury on CKD 4  -- CKD 4 only  -- Other - I will add my own diagnosis  -- Refer to Clinical Documentation Reviewer    Query created by: Jennie Hurt on 3/18/2025 11:06 AM      Electronically signed by:  DEANNA FULLER MD 3/18/2025 11:27 AM

## 2025-03-18 NOTE — CARE PLAN
The patient's goals for the shift include      The clinical goals for the shift include Pt will remain safe

## 2025-03-18 NOTE — CONSULTS
Nutrition Consult Note  Nutrition Assessment      Reason for Assessment: Admission nursing screening    Kirstin Howe is a 84 y.o. year old female patient with Starvation ketoacidosis [T73.0XXA, E87.29]  Influenza A [J10.1]  Generalized weakness [R53.1]     referred for MST-3 wt loss and poor appetite   .   Chart reviewed  Past Medical History:   Diagnosis Date    Age-related nuclear cataract, right eye 03/31/2016    Age-related nuclear cataract of right eye    Anxiety disorder, unspecified 03/31/2014    Anxiety    Bunion of unspecified foot 11/26/2014    Bunion    Concussion 04/08/2021    Last Assessment & Plan: Formatting of this note might be different from the original. -concern for possible concussion on admission, but head/neck/throat pain more likely due to infectious etiology as noted above    Contusion of left forearm, initial encounter 08/16/2016    Contusion of left forearm    Contusion of left knee, initial encounter 08/09/2016    Contusion of knee, left    Cortical age-related cataract, right eye 03/31/2016    Cortical age-related cataract of right eye    Depression     Dermatochalasis of unspecified eye, unspecified eyelid 11/10/2017    Dermatochalasis    Dislocation of right shoulder joint     Essential (primary) hypertension 11/23/2022    Hypertension    Fracture of orbital floor, right side, initial encounter for open fracture (Multi) 06/27/2017    Open fracture of right orbital floor, initial encounter    Generalized anxiety disorder 11/26/2014    PIA (generalized anxiety disorder)    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    Herpes zoster 03/29/2023    History of cataract 06/11/2024    History of depression 06/11/2024    Hypertensive urgency 03/25/2016    Hypertensive urgency    Insomnia, unspecified 11/26/2014    Insomnia    Maxillary fracture, unspecified side, initial encounter for closed fracture (Multi) 06/21/2017    Fracture of maxillary sinus, closed, initial encounter     Primary osteoarthritis, 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    Primary osteoarthritis of left wrist    Stage III chronic kidney disease (Multi) 03/29/2023    Status post left knee replacement 09/02/2022     Per chart review:  -Admitted c/o weakness and nausea  -Po intake poor for past 2 weeks  -HO CKD4  -Dementia    Pt not available at time of 1st visit- pt care  Pt care (2nd attempt)  3rd attempt- pt available  Pt states:  -No food Intolerance to milk (makes her throw up)  -No difficulties chewing/swallowing  -Losing wt for a long time; # (bedscale shows 112#)  -Appetite poor; bites of meals  -Agreeable to nutritional supplements    Pt would like to avoid creamy ONS- do Ensure clears and Gelatein.    Scheduled medications  buPROPion XL, 300 mg, oral, Daily  cyanocobalamin, 1,000 mcg, oral, Daily  donepezil, 10 mg, oral, Nightly  enoxaparin, 30 mg, subcutaneous, q24h FAVIAN  sennosides-docusate sodium, 1 tablet, oral, BID      Continuous medications     PRN medications  PRN medications: acetaminophen **OR** acetaminophen **OR** acetaminophen, acetaminophen **OR** acetaminophen **OR** acetaminophen, melatonin, ondansetron    Nutrition Significant Labs:  BMP Trend:   Results from last 7 days   Lab Units 03/18/25  0556 03/17/25 1911 03/16/25 1917 03/16/25  0000   GLUCOSE mg/dL 85 119* 107* 67*   CALCIUM mg/dL 8.4* 8.2* 8.7 9.9   SODIUM mmol/L 142 139 140 141   POTASSIUM mmol/L 3.7 3.6 3.6 4.2   CO2 mmol/L 26 24 26 19*   CHLORIDE mmol/L 110* 111* 108* 105   BUN mg/dL 11 12 19 31*   CREATININE mg/dL 1.24* 1.17* 1.36* 1.91*    , BG POCT trend:   Results from last 7 days   Lab Units 03/18/25  1159 03/18/25  0418 03/17/25 2029 03/17/25  1144 03/17/25  0526   POCT GLUCOSE mg/dL 83 88 98 108* 116*    , Liver Function Trend:   Results from last 7 days   Lab Units 03/16/25  0000   ALK PHOS U/L 79   AST U/L 16   ALT U/L 7   BILIRUBIN TOTAL mg/dL 0.5    , Renal Lab Trend:   Results from last 7 days   Lab Units 03/18/25  5902  03/17/25 1911 03/16/25 1917 03/16/25  0000 03/16/25  0000   POTASSIUM mmol/L 3.7 3.6 3.6  --  4.2   PHOSPHORUS mg/dL  --  1.9* 2.0*   < >  --    SODIUM mmol/L 142 139 140  --  141   EGFR mL/min/1.73m*2 43* 46* 38*  --  25*   BUN mg/dL 11 12 19  --  31*   CREATININE mg/dL 1.24* 1.17* 1.36*  --  1.91*    < > = values in this interval not displayed.    , TPN/PPN Labs:   Results from last 7 days   Lab Units 03/18/25  0556 03/17/25 1911 03/16/25 1917 03/16/25  0000 03/16/25  0000   GLUCOSE mg/dL 85 119* 107*  --  67*   POTASSIUM mmol/L 3.7 3.6 3.6  --  4.2   PHOSPHORUS mg/dL  --  1.9* 2.0*   < >  --    SODIUM mmol/L 142 139 140  --  141   CHLORIDE mmol/L 110* 111* 108*  --  105   ALT U/L  --   --   --   --  7   AST U/L  --   --   --   --  16   ALK PHOS U/L  --   --   --   --  79   BILIRUBIN TOTAL mg/dL  --   --   --   --  0.5    < > = values in this interval not displayed.    , Lipid Panel:   Lab Results   Component Value Date    CHOL 178 01/30/2024    HDL 59.5 01/30/2024    CHHDL 3.0 01/30/2024    LDLF 110 (H) 01/06/2022    VLDL 13 01/30/2024    TRIG 66 01/30/2024    , Vit D:   Lab Results   Component Value Date    VITD25 33 06/28/2024    , Vit B12:   Lab Results   Component Value Date    XBRTBKXN74 208 (L) 02/17/2025    , Iron Panel:   Lab Results   Component Value Date    IRON 66 01/30/2024    TIBC 302 01/30/2024    FERRITIN 55 11/12/2020        Dietary Orders (From admission, onward)       Start     Ordered    03/18/25 1700  Oral nutritional supplements  Until discontinued        Question Answer Comment   Deliver with All meals    Select supplement: Ensure Clear        03/18/25 1659 03/16/25 1840  May Participate in Room Service  ( ROOM SERVICE MAY PARTICIPATE)  Once        Question:  .  Answer:  Yes    03/16/25 1839 03/16/25 0423  Adult diet Regular  Diet effective now        Question:  Diet type  Answer:  Regular    03/16/25 0424                  History:  Energy Intake: Poor < 50 %  Food and Nutrient  "History: Chart review reports poor oral intake 2 weeks    Anthropometrics:  Height: 162.6 cm (5' 4.02\")  Weight: 51 kg (112 lb 8 oz)  BMI (Calculated): 19.3    Weight Change: -30.81    Wt Readings from Last 20 Encounters:  03/18/25 51 kg (bedscale)  03/17/25 73.8 kg (162 lb 9.6 oz)- unsure of wt  03/15/25 56.7 kg  03/07/25 56.2 kg (124 lb)- home care visit  03/05/25 56.2 kg (124 lb)  02/20/25 56.5 kg (124 lb 9 oz)  02/17/25 54.2 kg (119 lb 7.8 oz)- standing scale  11/24/24 61.2 kg (135 lb)  11/18/24 60.8 kg (134 lb)  10/28/24 60.8 kg (134 lb)  10/23/24 62.6 kg (138 lb)  10/10/24 62.6 kg (138 lb)  10/06/24 62.6 kg (138 lb)  02/06/25 56.2 kg (124 lb)  09/26/24 64 kg (141 lb)  06/24/24 64.4 kg (142 lb)  06/11/24 63.7 kg (140 lb 6.4 oz)  06/01/24 63.5 kg (140 lb)  06/01/24 (!) 656 kg (1446 lb 14.1 oz)- keyed error  05/23/24 65.3 kg (143 lb 15.4 oz)  02/05/24 67.6 kg (149 lb)  01/30/24 66.6 kg (146 lb 12.8 oz)    Significant Weight Loss: Yes  Interpretation of Weight Loss: >20% in 1 year       IBW/kg (Dietitian Calculated): 54.5 kg  Percent of IBW: 94 %     Energy Needs:  Height: 162.6 cm (5' 4.02\")  Temp: 36.6 °C (97.9 °F)    Total Energy Estimated Needs in 24 hours (kCal): 1550 kCal  Energy Estimated Needs per kg Body Weight in 24 hours (kCal/kg): 1800 kCal/kg  Method for Estimating Needs: 30-35kcal/kg    Total Protein Estimated Needs in 24 Hours (g): 35 g  Protein Estimated Needs per kg Body Weight in 24 Hours (g/kg): 45 g/kg  Method for Estimating 24 Hour Protein Needs: 0.6-0.8g/kg (IBW)    Method for Estimating 24 Hour Fluid Needs: 1mL/kcal or MD recommendations       Nutrition Focused Physical Findings:  Orbital Fat Pads: Severe (dark circles, hollowing and loose skin) (visual assessment from window)  Buccal Fat Pads: Severe (hollow, sunken and narrow face)    Temporalis: Severe (hollowed scooping depression)  Pectoralis (Clavicular Region): Severe (protruding prominent clavicle)    Edema: none       Skin: " Negative       Nutrition Diagnosis   Malnutrition Diagnosis  Patient has Malnutrition Diagnosis: Yes  Diagnosis Status: New  Malnutrition Diagnosis: Severe malnutrition related to chronic disease or condition  Related to: poor appetite  As Evidenced by: Greater than 20% weight loss in 1 year, prolonged poor intake prior to hospital admit less than 75% of estimated energy needs for greater than 1 month, severe fat loss and severe muscle loss.    Nutrition Interventions/Recommendations        Food and/or Nutrient Delivery Interventions  Meals and Snacks: General healthful diet     Medical Food Supplement: Commercial beverage medical food supplement therapy  Goal: Ensure clear nutritional supplements    Additional Interventions: Should appetite remain poor, consider appetite stimulant    Nutrition Monitoring and Evaluation   Food and Nutrient Related History  Estimated Energy Intake: Energy intake greater or equal to 75% of estimated energy needs    Fluid Intake: Estimated fluid intake    Intake / Amount of food: Meets > 75% estimated energy needs, Consumes at least 75% or more of meals/snacks/supplements    Anthropometrics: Body Composition/Growth/Weight History  Body Weight: Body weight - Maintain stable weight    Biochemical Data, Medical Tests and Procedures  Electrolyte and Renal Panel: Other (Comment), Chloride, Calcium, ionized, Calcium, serum, Creatinine, GFR  Criteria: As clinically indicated    Gastrointestinal Profile: Other (Comment)  Criteria: As clinically indicated    Criteria: As clinically indicated    Nutritional Anemia Profile: Other (Comment)  Criteria: As clinically indicated    Vitamin Profile: Other (Comment)  Criteria: As clinically indicated    Nutrition Focused Physical Findings  Adipose Finding: Loss of subcutaneous fat       Digestive System Finding: Constipation, Vomiting, Nausea, Loose stool, Diarrhea    Time Spent (min): 70 minutes  Last Date of Nutrition Visit: 03/18/25  Nutrition  Follow-Up Needed?: Dietitian to reassess per policy  Follow up Comment: KEN Eng

## 2025-03-18 NOTE — PROGRESS NOTES
Physical Therapy    Physical Therapy Evaluation    Patient Name: Kirstin Howe  MRN: [id number]  Department: [facility] [address]  Room: [address] [place] [address]  Today's Date: 3/18/2025   Time Calculation  Start Time: 1028  Stop Time: 1045  Time Calculation (min): 17 min    Completion of this session and documentation performed under the supervision of Nyasia Lopez PT.    Assessment/Plan   PT Assessment  PT Assessment Results: Decreased strength, Decreased endurance, Decreased mobility, Impaired balance  Rehab Prognosis: Excellent  Barriers to Discharge Home: No anticipated barriers  Evaluation/Treatment Tolerance: Patient tolerated treatment well, Patient limited by fatigue (pt reports slight fatigue at end of session, states she usually never feels like this prior to being admitted)  Strengths: Ability to acquire knowledge, Access to adaptive/assistive products, Capable of completing ADLs semi/independent, Insight into problems, Premorbid level of function, Support of extended family/friends  Barriers to Participation: Attitude of self, Comorbidities, Coping skills  Assessment Comment: 84 year-old F presents with pain, weakness, decreased ambulation and transfers, and unsteadiness; can benefit from skilled PT intervention to assist with discharge planning and address the aforementioned issues to enable her to return to her prior level of function, which was independent with ADLs and functional mobility tasks.   End of Session Patient Position: Bed, 3 rail up, Alarm on   IP OR SWING BED PT PLAN  Inpatient or Swing Bed: Inpatient  PT Plan  Treatment/Interventions: Transfer training, Gait training, Stair training, Balance training, Therapeutic activity  PT Plan: Ongoing PT  PT Frequency: 3 times per week  PT Discharge Recommendations: Low intensity level of continued care  Equipment Recommended upon Discharge: Wheeled walker  PT Recommended Transfer Status: Stand by assist  PT - OK to Discharge: Yes      Subjective     General Visit  Information:  General  Reason for Referral: 84 y.o. female presenting with generalized weakness and +Flu A; not eating and drinking very well  Referred By: George Apodaca MD  Past Medical History Relevant to Rehab:   Past Medical History:   Diagnosis Date    Age-related nuclear cataract, right eye 03/31/2016    Age-related nuclear cataract of right eye    Anxiety disorder, unspecified 03/31/2014    Anxiety    Bunion of unspecified foot 11/26/2014    Bunion    Concussion 04/08/2021    Last Assessment & Plan: Formatting of this note might be different from the original. -concern for possible concussion on admission, but head/neck/throat pain more likely due to infectious etiology as noted above    Contusion of left forearm, initial encounter 08/16/2016    Contusion of left forearm    Contusion of left knee, initial encounter 08/09/2016    Contusion of knee, left    Cortical age-related cataract, right eye 03/31/2016    Cortical age-related cataract of right eye    Depression     Dermatochalasis of unspecified eye, unspecified eyelid 11/10/2017    Dermatochalasis    Dislocation of right shoulder joint     Essential (primary) hypertension 11/23/2022    Hypertension    Fracture of orbital floor, right side, initial encounter for open fracture (Multi) 06/27/2017    Open fracture of right orbital floor, initial encounter    Generalized anxiety disorder 11/26/2014    PIA (generalized anxiety disorder)    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    Herpes zoster 03/29/2023    History of cataract 06/11/2024    History of depression 06/11/2024    Hypertensive urgency 03/25/2016    Hypertensive urgency    Insomnia, unspecified 11/26/2014    Insomnia    Maxillary fracture, unspecified side, initial encounter for closed fracture (Multi) 06/21/2017    Fracture of maxillary sinus, closed, initial encounter    Primary osteoarthritis, 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    Primary osteoarthritis of left wrist    Stage III  chronic kidney disease (Multi) 03/29/2023    Status post left knee replacement 09/02/2022     Past Surgical History:   Procedure Laterality Date    CATARACT EXTRACTION  06/07/2017    Cataract Surgery    MR HEAD ANGIO WO IV CONTRAST  4/9/2021    MR HEAD ANGIO WO IV CONTRAST 4/9/2021    TOTAL ABDOMINAL HYSTERECTOMY W/ BILATERAL SALPINGOOPHORECTOMY       Family/Caregiver Present: No  Prior to Session Communication: Bedside nurse  Patient Position Received: Bed, 3 rail up  Preferred Learning Style: auditory, kinesthetic, verbal, visual  General Comment: pt presents supine in bed, asleep upon arrival but easy to wake up; pt pleasant and cooperative with PT  Home Living:  Home Living  Type of Home: House  Lives With: Alone (pt has been living with dtr for about a week, plans to stay with dtr after d/c)  Home Adaptive Equipment: Walker rolling or standard, Cane  Home Layout: One level (dtr's house where she plans to stay is 1 level with no stairs inside or to enter)  Home Access: No concerns  Bathroom Shower/Tub: Walk-in shower  Bathroom Toilet: Standard  Bathroom Equipment: None  Home Living Comments: Home living information taken on dtr's house where pt plans to stay following d/c  Prior Level of Function:  Prior Function Per Pt/Caregiver Report  Level of [place]: Independent with ADLs and functional transfers, Needs assistance with ADLs  Receives Help From: Family (dtr comes over to help clean)  Ambulatory Assistance: Independent (no AD at baseline; owns cane and walker and only uses cane when necessary)  Prior Function Comments: pt d/c from SNF 2.5 weeks ago, reports she was at her dtr's house the past week due to weakness, denies falls  Precautions:  Precautions  Medical Precautions: Fall precautions           Objective   Pain:  Pain Assessment  Pain Assessment: 0-10  0-10 (Numeric) Pain Score: 6  Pain Type: Acute pain  Pain Location: Abdomen  Pain Orientation: Upper  Response to Interventions: No change in  pain  Cognition:  Cognition  Overall Cognitive Status: Within Functional Limits  Orientation Level: Oriented X4  Attention: Within Functional Limits  Insight: Within function limits  Impulsive: Within functional limits    General Assessments:  General Observation  General Observation: pt's mood seemed to be down, blinds were opened with lights off in the room     Activity Tolerance  Endurance: Tolerates 10 - 20 min exercise with multiple rests    Static Sitting Balance  Static Sitting-Balance Support: Bilateral upper extremity supported, Feet supported  Static Sitting-Level of Assistance: Close supervision  Static Sitting-Comment/Number of Minutes: EOB grossly 5 min  Dynamic Sitting Balance  Dynamic Sitting-Balance Support: Bilateral upper extremity supported  Dynamic Sitting-Level of Assistance: Close supervision  Dynamic Sitting-Balance: Trunk control activities  Dynamic Sitting-Comments: EOB    Static Standing Balance  Static Standing-Balance Support: Bilateral upper extremity supported  Static Standing-Level of Assistance: Close supervision  Dynamic Standing Balance  Dynamic Standing-Balance Support: Bilateral upper extremity supported  Dynamic Standing-Level of Assistance: Contact guard  Dynamic Standing-Balance: Turning  Dynamic Standing-Comments: pt took very small, short steps to turn and required extra time    Functional Assessments:  Bed Mobility  Bed Mobility: Yes  Bed Mobility 1  Bed Mobility 1: Sitting to supine, Supine to sitting  Level of Assistance 1: Distant supervision  Bed Mobility Comments 1: bed flat with no use of bed rails  Bed Mobility 2  Bed Mobility  2: Scooting  Level of Assistance 2: Distant supervision  Bed Mobility Comments 2: seated scooting L/R and fwd to EOB for feet to touch the floor    Transfers  Transfer: Yes  Transfer 1  Transfer From 1: Sit to  Transfer to 1: Stand  Technique 1: Sit to stand  Transfer Device 1: Walker  Transfer Level of Assistance 1: Contact guard, Minimal  verbal cues  Trials/Comments 1: verbal cueing for hand placement prior to standing; pt only used 1 hand to push off bed while other hand was pulling on walker  Transfers 2  Transfer From 2: Stand to  Transfer to 2: Sit  Technique 2: Stand to sit  Transfer Device 2: Walker  Transfer Level of Assistance 2: Close supervision  Trials/Comments 2: no cueing needed for transfer; pt did not require UE support to perform transfer    Ambulation/Gait Training  Ambulation/Gait Training Performed: Yes  Ambulation/Gait Training 1  Surface 1: Level tile  Device 1: Rolling walker, Large base quad cane  Gait Support Devices: Gait belt  Assistance 1: Contact guard, Close supervision  Quality of Gait 1: Narrow base of support, Decreased step length, Forward flexed posture  Comments/Distance (ft) 1: pt ambulated 40' within room with walker x1 and switched to cane for another 40' x1; pt reports she doesn't use or need AD at home; took small short steps to make turns  Extremity/Trunk Assessments:  RUE   RUE : Within Functional Limits  LUE   LUE: Within Functional Limits  RLE   RLE : Exceptions to WFL  Strength RLE  RLE Overall Strength: Greater than or equal to 3/5 as evidenced by functional mobility  LLE   LLE : Exceptions to WFL  Strength LLE  LLE Overall Strength: Greater than or equal to 3/5 as evidenced by functional mobility    Outcome Measures:  [facility] Basic Mobility  Turning from your back to your side while in a flat bed without using bedrails: None  Moving from lying on your back to sitting on the side of a flat bed without using bedrails: None  Moving to and from bed to chair (including a wheelchair): A little  Standing up from a chair using your arms (e.g. wheelchair or bedside chair): A little  To walk in hospital room: A little  Climbing 3-5 steps with railing: A little  Basic Mobility - Total Score: 20    Tinetti  Sitting Balance: Steady, safe  Arises: Able, uses arms to help  Attempts to Arise: Able to arise, one  attempt  Immediate Standing Balance (First 5 Seconds): Steady but uses walker or other support  Standing Balance: Steady but wide stance, uses cane or other support  Nudged: Staggers, grabs, catches self (sternal push, pt took small step to balance, feet remained staggered)  Eyes Closed: Unsteady (pt swayed and staggered, only able to keep eyes closed for ~4 seconds before losing balance)  Turned 360 Degrees: Steadiness: Steady  Turned 360 Degrees: Continuity of Steps: Discontinuous steps (inconsistent steps and pace of turn)  Sitting Down: Safe, smooth motion  Balance Score: 10  Initiation of Gait: No hesitancy  Step Height: R Swing Foot: Right foot complete clears floor  Step Length: R Swing Foot: Passes left stance foot  Step Height: L Swing Foot: Left foot complete clears floor  Step Length: L Swing Foot: Passes right stance foot  Step Symmetry: Right and left step appear equal  Step Continuity: Steps appear continuous  Path: Mild/moderate deviation or uses walking aid  Trunk: No sway but flexion of knees or back or spreads arms out while walking  Walking Time: Heels almost touching while walking  Gait Score: 10  Total Score: 20    Encounter Problems       Encounter Problems (Active)       Balance       STG - Maintains dynamic standing balance with one upper extremity support mod indep with SPC for >5 min       Start:  03/18/25    Expected End:  04/01/25       INTERVENTIONS:1. Practice standing with minimal support.2. Educate patient about standing tolerance.3. Educate patient about independence with gait, transfers, and ADL's.4. Educate patient about use of assistive device.5. Educate patient about self-directed care.         STG - Maintains static standing balance without upper extremity support indep for >5 min       Start:  03/18/25    Expected End:  04/01/25       INTERVENTIONS:1. Practice standing with minimal support.2. Educate patient about maintaining total hip precautions while maintaining balance.3.  Educate patient about standing tolerance.4. Educate patient about independence with gait, transfers, and ADL's.5. Educate patient about use of assistive device.6. Educate patient about self-directed care.            Mobility       STG - Patient will ambulate 200' mod indep with SPC for hosuehold ambulation       Start:  03/18/25    Expected End:  04/01/25               Mobility       Patient's Tinetti score will be >= 24/28 to reduce risk of falls       Start:  03/18/25    Expected End:  04/01/25               PT Transfers       STG - Patient will transfer sit to and from stand mod indep with SPC        Start:  03/18/25    Expected End:  04/01/25                   Education Documentation  Body Mechanics, taught by IRENE Murray at 3/18/2025 11:31 AM.  Learner: Patient  Readiness: Acceptance  Method: Explanation  Response: Verbalizes Understanding  Comment: See above    Precautions, taught by IRENE Murray at 3/18/2025 11:31 AM.  Learner: Patient  Readiness: Acceptance  Method: Explanation  Response: Verbalizes Understanding  Comment: See above    Mobility Training, taught by IRENE Murray at 3/18/2025 11:31 AM.  Learner: Patient  Readiness: Acceptance  Method: Explanation  Response: Verbalizes Understanding  Comment: See above    Education Comments  No comments found.

## 2025-03-19 LAB
ALBUMIN SERPL BCP-MCNC: 3.1 G/DL (ref 3.4–5)
ANION GAP SERPL CALC-SCNC: 11 MMOL/L (ref 10–20)
ANION GAP SERPL CALC-SCNC: 12 MMOL/L (ref 10–20)
BUN SERPL-MCNC: 12 MG/DL (ref 6–23)
BUN SERPL-MCNC: 14 MG/DL (ref 6–23)
CALCIUM SERPL-MCNC: 8.1 MG/DL (ref 8.6–10.3)
CALCIUM SERPL-MCNC: 8.5 MG/DL (ref 8.6–10.3)
CHLORIDE SERPL-SCNC: 109 MMOL/L (ref 98–107)
CHLORIDE SERPL-SCNC: 110 MMOL/L (ref 98–107)
CO2 SERPL-SCNC: 24 MMOL/L (ref 21–32)
CO2 SERPL-SCNC: 25 MMOL/L (ref 21–32)
CREAT SERPL-MCNC: 1.36 MG/DL (ref 0.5–1.05)
CREAT SERPL-MCNC: 1.38 MG/DL (ref 0.5–1.05)
EGFRCR SERPLBLD CKD-EPI 2021: 38 ML/MIN/1.73M*2
EGFRCR SERPLBLD CKD-EPI 2021: 38 ML/MIN/1.73M*2
ERYTHROCYTE [DISTWIDTH] IN BLOOD BY AUTOMATED COUNT: 14.8 % (ref 11.5–14.5)
GLUCOSE BLD MANUAL STRIP-MCNC: 90 MG/DL (ref 74–99)
GLUCOSE BLD MANUAL STRIP-MCNC: 97 MG/DL (ref 74–99)
GLUCOSE SERPL-MCNC: 81 MG/DL (ref 74–99)
GLUCOSE SERPL-MCNC: 92 MG/DL (ref 74–99)
HCT VFR BLD AUTO: 32.8 % (ref 36–46)
HGB BLD-MCNC: 10.6 G/DL (ref 12–16)
MCH RBC QN AUTO: 27 PG (ref 26–34)
MCHC RBC AUTO-ENTMCNC: 32.3 G/DL (ref 32–36)
MCV RBC AUTO: 84 FL (ref 80–100)
NRBC BLD-RTO: 0 /100 WBCS (ref 0–0)
PHOSPHATE SERPL-MCNC: 3.3 MG/DL (ref 2.5–4.9)
PLATELET # BLD AUTO: 265 X10*3/UL (ref 150–450)
POTASSIUM SERPL-SCNC: 3.8 MMOL/L (ref 3.5–5.3)
POTASSIUM SERPL-SCNC: 3.8 MMOL/L (ref 3.5–5.3)
RBC # BLD AUTO: 3.92 X10*6/UL (ref 4–5.2)
SODIUM SERPL-SCNC: 141 MMOL/L (ref 136–145)
SODIUM SERPL-SCNC: 142 MMOL/L (ref 136–145)
WBC # BLD AUTO: 4.9 X10*3/UL (ref 4.4–11.3)

## 2025-03-19 PROCEDURE — 1100000001 HC PRIVATE ROOM DAILY

## 2025-03-19 PROCEDURE — 99232 SBSQ HOSP IP/OBS MODERATE 35: CPT | Performed by: INTERNAL MEDICINE

## 2025-03-19 PROCEDURE — 2500000004 HC RX 250 GENERAL PHARMACY W/ HCPCS (ALT 636 FOR OP/ED): Performed by: NURSE PRACTITIONER

## 2025-03-19 PROCEDURE — 2500000005 HC RX 250 GENERAL PHARMACY W/O HCPCS: Performed by: NURSE PRACTITIONER

## 2025-03-19 PROCEDURE — 2500000005 HC RX 250 GENERAL PHARMACY W/O HCPCS

## 2025-03-19 PROCEDURE — 36415 COLL VENOUS BLD VENIPUNCTURE: CPT

## 2025-03-19 PROCEDURE — 80048 BASIC METABOLIC PNL TOTAL CA: CPT

## 2025-03-19 PROCEDURE — 85027 COMPLETE CBC AUTOMATED: CPT

## 2025-03-19 PROCEDURE — 36415 COLL VENOUS BLD VENIPUNCTURE: CPT | Performed by: NURSE PRACTITIONER

## 2025-03-19 PROCEDURE — 97530 THERAPEUTIC ACTIVITIES: CPT | Mod: GO

## 2025-03-19 PROCEDURE — 2500000001 HC RX 250 WO HCPCS SELF ADMINISTERED DRUGS (ALT 637 FOR MEDICARE OP): Performed by: NURSE PRACTITIONER

## 2025-03-19 PROCEDURE — 80048 BASIC METABOLIC PNL TOTAL CA: CPT | Mod: CCI | Performed by: NURSE PRACTITIONER

## 2025-03-19 PROCEDURE — 82947 ASSAY GLUCOSE BLOOD QUANT: CPT

## 2025-03-19 RX ORDER — LIDOCAINE 560 MG/1
1 PATCH PERCUTANEOUS; TOPICAL; TRANSDERMAL DAILY
Status: DISCONTINUED | OUTPATIENT
Start: 2025-03-19 | End: 2025-03-21 | Stop reason: HOSPADM

## 2025-03-19 RX ORDER — LISINOPRIL 40 MG/1
TABLET ORAL
Status: CANCELLED | COMMUNITY
Start: 2025-03-19

## 2025-03-19 RX ADMIN — Medication 4.5 MG: at 20:40

## 2025-03-19 RX ADMIN — ENOXAPARIN SODIUM 30 MG: 30 INJECTION SUBCUTANEOUS at 09:09

## 2025-03-19 RX ADMIN — BUPROPION HYDROCHLORIDE 300 MG: 150 TABLET, EXTENDED RELEASE ORAL at 09:09

## 2025-03-19 RX ADMIN — CYANOCOBALAMIN TAB 500 MCG 1000 MCG: 500 TAB at 09:09

## 2025-03-19 RX ADMIN — SENNOSIDES AND DOCUSATE SODIUM 1 TABLET: 50; 8.6 TABLET ORAL at 20:40

## 2025-03-19 RX ADMIN — DONEPEZIL HYDROCHLORIDE 10 MG: 5 TABLET ORAL at 20:40

## 2025-03-19 RX ADMIN — ACETAMINOPHEN 650 MG: 325 TABLET, FILM COATED ORAL at 11:00

## 2025-03-19 RX ADMIN — LIDOCAINE 4% 1 PATCH: 40 PATCH TOPICAL at 11:29

## 2025-03-19 RX ADMIN — ACETAMINOPHEN 650 MG: 325 TABLET, FILM COATED ORAL at 15:01

## 2025-03-19 RX ADMIN — SENNOSIDES AND DOCUSATE SODIUM 1 TABLET: 50; 8.6 TABLET ORAL at 09:09

## 2025-03-19 RX ADMIN — ACETAMINOPHEN 650 MG: 325 TABLET, FILM COATED ORAL at 20:40

## 2025-03-19 ASSESSMENT — COGNITIVE AND FUNCTIONAL STATUS - GENERAL
CLIMB 3 TO 5 STEPS WITH RAILING: A LITTLE
MOVING TO AND FROM BED TO CHAIR: A LITTLE
MOBILITY SCORE: 20
CLIMB 3 TO 5 STEPS WITH RAILING: A LITTLE
TOILETING: A LITTLE
DRESSING REGULAR UPPER BODY CLOTHING: A LITTLE
HELP NEEDED FOR BATHING: A LOT
STANDING UP FROM CHAIR USING ARMS: A LITTLE
PERSONAL GROOMING: A LITTLE
TOILETING: A LOT
PERSONAL GROOMING: A LITTLE
STANDING UP FROM CHAIR USING ARMS: A LITTLE
MOBILITY SCORE: 20
DAILY ACTIVITIY SCORE: 18
WALKING IN HOSPITAL ROOM: A LITTLE
DAILY ACTIVITIY SCORE: 15
MOVING TO AND FROM BED TO CHAIR: A LITTLE
WALKING IN HOSPITAL ROOM: A LITTLE
HELP NEEDED FOR BATHING: A LITTLE
DRESSING REGULAR UPPER BODY CLOTHING: A LITTLE
DRESSING REGULAR LOWER BODY CLOTHING: TOTAL
EATING MEALS: A LITTLE
DRESSING REGULAR LOWER BODY CLOTHING: A LITTLE

## 2025-03-19 ASSESSMENT — PAIN DESCRIPTION - DESCRIPTORS
DESCRIPTORS: STABBING;SHOOTING;SHARP
DESCRIPTORS: ACHING;DISCOMFORT

## 2025-03-19 ASSESSMENT — PAIN SCALES - GENERAL
PAINLEVEL_OUTOF10: 3
PAINLEVEL_OUTOF10: 0 - NO PAIN
PAINLEVEL_OUTOF10: 9
PAINLEVEL_OUTOF10: 6
PAINLEVEL_OUTOF10: 0 - NO PAIN
PAINLEVEL_OUTOF10: 7
PAINLEVEL_OUTOF10: 0 - NO PAIN

## 2025-03-19 ASSESSMENT — PAIN DESCRIPTION - ORIENTATION
ORIENTATION: LOWER;MID
ORIENTATION: LOWER;MID

## 2025-03-19 ASSESSMENT — PAIN DESCRIPTION - LOCATION: LOCATION: BACK

## 2025-03-19 ASSESSMENT — PAIN - FUNCTIONAL ASSESSMENT
PAIN_FUNCTIONAL_ASSESSMENT: 0-10

## 2025-03-19 NOTE — CARE PLAN
The patient's goals for the shift include      The clinical goals for the shift include Patient will remain free from falls and injury.      Problem: Skin  Goal: Promote/optimize nutrition  Outcome: Progressing  Flowsheets (Taken 3/19/2025 0003)  Promote/optimize nutrition:   Offer water/supplements/favorite foods   Monitor/record intake including meals     Problem: Fall/Injury  Goal: Not fall by end of shift  Outcome: Progressing  Goal: Be free from injury by end of the shift  Outcome: Progressing

## 2025-03-19 NOTE — NURSING NOTE
"1620 - Patient light on and RN entering room. Patient daughter entering room at same time. Patient complaining left lower back pain and stating \"I need an Xray.\"  RN asked patient again what she feels caused this pain to occur this morning.  Patient now stating \"It started when I was sitting on the side of my bed eating this morning and could not move my tray.\"  Patient had coffee earlier this morning sitting upright in bed with tray table across her lap.  RN stated that she just had PRN Tylenol at 3pm. Patient and daughter both insistent on Xray. RN stated that she would have the [facility] come speak with them.    1623 - Patient call light on.    1624 - RN and PA-C at patient bedside.  RN asked patient what she needed since call light on. Patient stated \"my back is hurting\". RN asked patient if she remembered RN coming in the minutes prior. Patient replied \"I guess so\".  PA-C stayed in room to discuss lower back pain interventions with patient and daughter.  "

## 2025-03-19 NOTE — PROGRESS NOTES
Occupational Therapy    OT Treatment    Patient Name: Kirstin Howe  MRN: [id number]  Department: [place] [address]  Room: [address] [place] [address]  Today's Date: 3/19/2025  Time Calculation  Start Time: 1142  Stop Time: 1209  Time Calculation (min): 27 min        Assessment:  OT Assessment: Patient presenting with a significant decline since last tx session due to new onset of L hip/ back pain. If pain persists, pt will require mod intensity OT at dc due to inability to ambulate or complete LB ADLs. At this time recommenidng mod intensity OT with hope to progress to low intensity  Prognosis: Good  Barriers to Discharge Home: Physical needs, Caregiver assistance  Caregiver Assistance: Patient lives alone and/or does not have reliable caregiver assistance  Physical Needs: 24hr mobility assistance needed, 24hr ADL assistance needed, High falls risk due to function or environment  Evaluation/Treatment Tolerance: Patient limited by pain  Medical Staff Made Aware: Yes  End of Session Communication: Bedside nurse  End of Session Patient Position: Alarm on, Up in chair  OT Assessment Results: Decreased ADL status, Decreased endurance, Decreased functional mobility  Prognosis: Good  Barriers to Discharge: Inaccessible home environment, Decreased caregiver support  Evaluation/Treatment Tolerance: Patient limited by pain  Medical Staff Made Aware: Yes  Strengths: Support of extended family/friends  Barriers to Participation: Coping skills, Comorbidities, Insight into problems  Plan:  Treatment Interventions: ADL retraining, Functional transfer training, UE strengthening/ROM, Endurance training, Compensatory technique education  OT Frequency: 3 times per week  OT Discharge Recommendations: Moderate intensity level of continued care  Equipment Recommended upon Discharge: Wheeled walker  OT Recommended Transfer Status: Moderate assist, Assist of 1  OT - OK to Discharge: Yes (per POC)  Treatment Interventions: ADL retraining, Functional transfer training,  UE strengthening/ROM, Endurance training, Compensatory technique education    Subjective   Previous Visit Info:  OT Last Visit  OT Received On: 03/19/25  General:  General  Reason for Referral: 84 y.o. female presenting with generalized weakness and +Flu A; not eating and drinking very well  Referred By: George Apodaca MD  Past Medical History Relevant to Rehab:   Past Medical History:   Diagnosis Date    Age-related nuclear cataract, right eye 03/31/2016    Age-related nuclear cataract of right eye    Anxiety disorder, unspecified 03/31/2014    Anxiety    Bunion of unspecified foot 11/26/2014    Bunion    Concussion 04/08/2021    Last Assessment & Plan: Formatting of this note might be different from the original. -concern for possible concussion on admission, but head/neck/throat pain more likely due to infectious etiology as noted above    Contusion of left forearm, initial encounter 08/16/2016    Contusion of left forearm    Contusion of left knee, initial encounter 08/09/2016    Contusion of knee, left    Cortical age-related cataract, right eye 03/31/2016    Cortical age-related cataract of right eye    Depression     Dermatochalasis of unspecified eye, unspecified eyelid 11/10/2017    Dermatochalasis    Dislocation of right shoulder joint     Essential (primary) hypertension 11/23/2022    Hypertension    Fracture of orbital floor, right side, initial encounter for open fracture (Multi) 06/27/2017    Open fracture of right orbital floor, initial encounter    Generalized anxiety disorder 11/26/2014    PIA (generalized anxiety disorder)    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    Herpes zoster 03/29/2023    History of cataract 06/11/2024    History of depression 06/11/2024    Hypertensive urgency 03/25/2016    Hypertensive urgency    Insomnia, unspecified 11/26/2014    Insomnia    Maxillary fracture, unspecified side, initial encounter for closed fracture (Multi) 06/21/2017    Fracture  "of maxillary sinus, closed, initial encounter    Primary osteoarthritis, 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 Per RN, pt reporting a new onset of L hip/ low back pain this date but has been medicated and is okay to participate as tolerated.  Precautions:  Medical Precautions: Fall precautions     Date/Time Vitals Session Patient Position Pulse Resp SpO2 BP MAP (mmHg)    03/19/25 1119 --  --  80  17  99 %  146/91  --           Pain:  Pain Assessment  0-10 (Numeric) Pain Score: 9  Pain Type: Acute pain  Pain Location: Back  Pain Orientation: Left, Lower, Mid  Pain Descriptors: Stabbing, Shooting, Sharp  Pain Interventions: Heat applied, Repositioned (RN notified.)    Objective    Cognition:  Cognition  Overall Cognitive Status:  (Patient presents as a fluctuating historian during tx session. She initially reports bedside table \"pinning her in bed and poking into her back.\" When trying to assess how patient was mobilizing due to pain reports, she then states she was laying in bed.)  Orientation Level: Oriented X4 (requires reference to board on date)  Attention: Exceptions to WFL  Selective Attention: Impaired  Sustained Attention: Impaired  Other (Comment): presents with perseveration on being \"pinned\" in bed.  Memory: Exceptions to WFL  Working Memory: Impaired  Memory Comments: Fluctuating report of positioning and events surrounding onset of low back pain  Problem Solving: Exceptions to WFL  Verbal Reasoning Skills: Impaired  Insight: Moderate  Impulsive: Mildly  Bed Mobility/Transfers: Transfer 1  Transfer From 1: Chair " with arms to  Transfer to 1: Stand  Technique 1: Sit to stand  Transfer Device 1: Walker  Transfer Level of Assistance 1: Moderate assistance, Moderate tactile cues  Trials/Comments 1: tactile cues for hand placement. Patient reports pain shoots up through LUE and LLE when using LUE to push up from chair to RW.    Standing Balance:  Static Standing Balance  Static Standing-Balance Support: Bilateral upper extremity supported  Static Standing-Level of Assistance: Contact guard  Static Standing-Comment/Number of Minutes: <1 min standing tolerance  Therapy/Activity: Therapeutic Activity  Therapeutic Activity Performed: Yes  Therapeutic Activity 1: engaged pt in repositioning in bedside chair including lowering LEs that were elelvated on leg rest to decrease possible strain on low back/ hips. Engaged pt in slight forward flexion for stretching. pt completes without reporting increased pain but prefers LEs to be up on stool. Heat applied to low back with sheet covering skin.    Outcome Measures:[facility] Daily Activity  Putting on and taking off regular lower body clothing: Total  Bathing (including washing, rinsing, drying): A lot  Putting on and taking off regular upper body clothing: A little  Toileting, which includes using toilet, bedpan or urinal: A lot  Taking care of personal grooming such as brushing teeth: A little  Eating Meals: None  Daily Activity - Total Score: 15      Education Documentation  Body Mechanics, taught by Traci Summers OT at 3/19/2025 12:32 PM.  Learner: Patient  Readiness: Acceptance  Method: Explanation  Response: Verbalizes Understanding, Needs Reinforcement    Precautions, taught by Traci Summers OT at 3/19/2025 12:32 PM.  Learner: Patient  Readiness: Acceptance  Method: Explanation  Response: Verbalizes Understanding, Needs Reinforcement    ADL Training, taught by Traci Summers OT at 3/19/2025 12:32 PM.  Learner: Patient  Readiness: Acceptance  Method: Explanation  Response: Verbalizes  Understanding, Needs Reinforcement    Education Comments  No comments found.      Goals:  Encounter Problems       Encounter Problems (Active)       ADLs       Patient with complete lower body dressing with modified independent level of assistance donning and doffing all LE clothes. (Not Progressing)       Start:  03/17/25    Expected End:  03/31/25               MOBILITY       Patient will perform Functional mobility mod  Household distances/Community Distances with stand by assist level of assistance and least restrictive device in order to improve safety and functional mobility. (Not Progressing)       Start:  03/17/25    Expected End:  03/31/25

## 2025-03-19 NOTE — ASSESSMENT & PLAN NOTE
84-year-old female with past medical history of hypertension, early dementia with some memory loss, osteoporosis, chronic kidney disease stage IV, coronary artery disease was admitted to [facility] last month with altered mentation which was thought to be secondary to metabolic encephalopathy and MRI of the brain was negative for acute stroke.  Subsequently patient went to skilled nursing facility for about 2 weeks and discharged home.  She was seen in the emergency room at [facility] on March 5, 2025 and was diagnosed with influenza A advised prescribed Tamiflu and discharged home.  Patient was brought back to the emergency room late last night by her family with increasing fatigue and patient not eating and drinking very well and feeling very weak.  She denied any abdominal pain nausea vomiting or diarrhea no chest pain or shortness of breath.  Her labs are satisfactory except for elevated creatinine of 1.91 on admission which has improved to 1.38 today.

## 2025-03-19 NOTE — NURSING NOTE
"1045 - mobility aid working with patient. Walked patient in room with walker. Patient complaint of left knee pain. Mobility aid sat patient in chair with chair alarm on and call light within reach.    1050 - mobility aid updated RN that patient had left knee pain during ambulation and was requesting \"something for pain\".    1100 - RN went to assess patient and provide PRN Tylenol.  RN asked patient about left knee pain. Patient stated that it was \"not knee pain. It is my back because you had be trapped in this chair with the table in front of me. I could not call anyone or get out.\"  Tray table was on right side of patient and call light was on patient's left side in reach.  RN told patient that she was notified of her discomfort and came as soon as she could. RN asked about location of left lower back pain and if she had twisted her side while in chair. Patient stated \"no I did not. It is because you had me trapped in this chair for so long\". RN told patient that she was going to update Physician Assistant.    1110 - Physician Assistant updated on pain complain of new onset left lower back pain.  Lidoderm patch ordered.    1129 - After RX verified medication, RN administered Lidoderm patch to patient.  Patient still upset because she feels that she \"was left too long in pain\". RN asked patient if she was comfortable to keep sitting in chair. Patient stated \"yes\".  Tray table left to right of patient so that she can reach cell phone and beverage. Call light placed closer to patient on the left side. RN reminded patient that she needs to press the red button to get assistance when needed.    1130 - RN med PT outside patient room. Updated on patient new complaint of left lower back pain.    1149 - Voicemail left for daughter, Kalyani, per her request for return call.    1152 - Daughter kalyani returned call and provided above update.  "

## 2025-03-19 NOTE — CARE PLAN
The patient's goals for the shift include      The clinical goals for the shift include To remain free from fall/injury with increased activity    Problem: Skin  Goal: Prevent/minimize sheer/friction injuries  Outcome: Progressing  Flowsheets (Taken 3/19/2025 1028)  Prevent/minimize sheer/friction injuries:   Increase activity/out of bed for meals   Turn/reposition every 2 hours/use positioning/transfer devices   HOB 30 degrees or less     Problem: Fall/Injury  Goal: Not fall by end of shift  Outcome: Progressing     Problem: Fall/Injury  Goal: Be free from injury by end of the shift  Outcome: Progressing

## 2025-03-19 NOTE — PROGRESS NOTES
03/19/25 1353   Discharge Planning   Support Systems Children   Expected Discharge Disposition Home     Patient states she is not ready for dc today but when she does discharge, she will go to her daughter Shantal marcus.    Antonia confirms this is correct and patient will dc to her house at [address] [place] [id number]. They would also like [facility] at discharge as they have used them before.     Service address placed on [facility] orders.     ADOD today?  BARRIERS med ready  DISPO home with daughter with [facility]    DAUGHTER ASKED FOR MEDICAL UPDATE FROM NP, NP NOTIFIED VIA SECURE CHAT, BEDSIDE RN ALSO AWARE

## 2025-03-19 NOTE — PROGRESS NOTES
"Kirstin Howe is a 84 y.o. female on day 3 of admission presenting with Generalized weakness.    Subjective   Patient seen and examined, sitting up in chair, stating that she is very uncomfortable and she has some left lower back and left hip pain which started this morning after she was moved to the chair as well as she stated that the table in front of her pushed against her belly and that is hurting too.  Her mentation is improved but she is rather slow to respond.  Physical therapy has been working on her, I feel her overall condition is improved significantly and she should be able to go home in the next day or so.  Patient will go to her daughter's house Antonia  and will probably need home PT OT.  Did speak to patient daughter Antonia other day other day.       Objective     Physical Exam  GENERAL: Awake, alert,moderate distress, cooperative  Sitting out in chair, holding in the left hip and left lower lumbar region and a lidocaine patch has been applied.  SKIN: Skin color, texture, turgor normal. No rashes or lesions..  OROPHARYNX: Lips, mucosa, and tongue normal. Teeth and gums normal. Oropharynx normal.  NECK: no jugulovenous distention, no carotid bruits, carotid pulse normal contour, supple  BACK: Tender left lower costovertebral angle as well as left hip and left lumbar region has lidocaine patch on.  LUNGS: Lungs clear to auscultation. Good diaphragmatic excursion.  CARDIAC: Rate and rhythm is regular.  Normal S1 and S2; no rubs, murmurs, or gallops  ABDOMEN: Abdomen soft, non-tender. BS normal. No masses or organomegaly.  EXTREMITIES: Extremities normal. No deformities, edema, clubbing or skin discoloration.  NEURO: Gait not examined.  Nonfocal.    PULSES: 2+ radial, 2+ carotid      Last Recorded Vitals  Blood pressure 136/82, pulse 75, temperature 36.6 °C (97.9 °F), temperature source Temporal, resp. rate 17, height 1.626 m (5' 4.02\"), weight 51 kg (112 lb 8 oz), SpO2 97%.  Intake/Output last 3 " Shifts:  No intake/output data recorded.    Relevant Results  Scheduled medications  buPROPion XL, 300 mg, oral, Daily  cyanocobalamin, 1,000 mcg, oral, Daily  donepezil, 10 mg, oral, Nightly  enoxaparin, 30 mg, subcutaneous, q24h FAVIAN  lidocaine, 1 patch, transdermal, Daily  sennosides-docusate sodium, 1 tablet, oral, BID      Continuous medications     PRN medications  PRN medications: acetaminophen **OR** acetaminophen **OR** acetaminophen, acetaminophen **OR** acetaminophen **OR** acetaminophen, melatonin, ondansetron    Results for orders placed or performed during the hospital encounter of 03/15/25 (from the past 96 hours)   Electrocardiogram, 12-lead PRN ACS symptoms   Result Value Ref Range    Ventricular Rate 74 BPM    Atrial Rate 74 BPM    WA Interval 134 ms    QRS Duration 84 ms    QT Interval 392 ms    QTC Calculation(Bazett) 435 ms    P Axis 62 degrees    R Axis 46 degrees    T Axis 55 degrees    QRS Count 12 beats    Q Onset 224 ms    P Onset 157 ms    P Offset 207 ms    T Offset 420 ms    QTC Fredericia 420 ms   Sars-CoV-2 and Influenza A/B PCR   Result Value Ref Range    Flu A Result Detected (A) Not Detected    Flu B Result Not Detected Not Detected    Coronavirus 2019, PCR Not Detected Not Detected   CBC and Auto Differential   Result Value Ref Range    WBC 5.0 4.4 - 11.3 x10*3/uL    nRBC 0.0 0.0 - 0.0 /100 WBCs    RBC 5.30 (H) 4.00 - 5.20 x10*6/uL    Hemoglobin 14.0 12.0 - 16.0 g/dL    Hematocrit 44.2 36.0 - 46.0 %    MCV 83 80 - 100 fL    MCH 26.4 26.0 - 34.0 pg    MCHC 31.7 (L) 32.0 - 36.0 g/dL    RDW 15.0 (H) 11.5 - 14.5 %    Platelets 337 150 - 450 x10*3/uL    Neutrophils % 68.5 40.0 - 80.0 %    Immature Granulocytes %, Automated 0.4 0.0 - 0.9 %    Lymphocytes % 25.7 13.0 - 44.0 %    Monocytes % 4.2 2.0 - 10.0 %    Eosinophils % 0.8 0.0 - 6.0 %    Basophils % 0.4 0.0 - 2.0 %    Neutrophils Absolute 3.43 1.60 - 5.50 x10*3/uL    Immature Granulocytes Absolute, Automated 0.02 0.00 - 0.50 x10*3/uL     Lymphocytes Absolute 1.29 0.80 - 3.00 x10*3/uL    Monocytes Absolute 0.21 0.05 - 0.80 x10*3/uL    Eosinophils Absolute 0.04 0.00 - 0.40 x10*3/uL    Basophils Absolute 0.02 0.00 - 0.10 x10*3/uL   Comprehensive metabolic panel   Result Value Ref Range    Glucose 67 (L) 74 - 99 mg/dL    Sodium 141 136 - 145 mmol/L    Potassium 4.2 3.5 - 5.3 mmol/L    Chloride 105 98 - 107 mmol/L    Bicarbonate 19 (L) 21 - 32 mmol/L    Anion Gap 21 (H) 10 - 20 mmol/L    Urea Nitrogen 31 (H) 6 - 23 mg/dL    Creatinine 1.91 (H) 0.50 - 1.05 mg/dL    eGFR 25 (L) >60 mL/min/1.73m*2    Calcium 9.9 8.6 - 10.3 mg/dL    Albumin 4.3 3.4 - 5.0 g/dL    Alkaline Phosphatase 79 33 - 136 U/L    Total Protein 7.1 6.4 - 8.2 g/dL    AST 16 9 - 39 U/L    Bilirubin, Total 0.5 0.0 - 1.2 mg/dL    ALT 7 7 - 45 U/L   Protime-INR   Result Value Ref Range    Protime 10.8 9.8 - 12.4 seconds    INR 1.0 0.9 - 1.1   aPTT   Result Value Ref Range    aPTT 30 26 - 36 seconds   B-Type Natriuretic Peptide   Result Value Ref Range    BNP 45 0 - 99 pg/mL   Troponin I, High Sensitivity, Initial   Result Value Ref Range    Troponin I, High Sensitivity 15 (H) 0 - 13 ng/L   Beta Hydroxybutyrate   Result Value Ref Range    Beta-Hydroxybutyrate 5.98 (H) 0.02 - 0.27 mmol/L   POCT GLUCOSE   Result Value Ref Range    POCT Glucose 61 (L) 74 - 99 mg/dL   Troponin, High Sensitivity, 1 Hour   Result Value Ref Range    Troponin I, High Sensitivity 13 0 - 13 ng/L   POCT GLUCOSE   Result Value Ref Range    POCT Glucose 251 (H) 74 - 99 mg/dL   POCT GLUCOSE   Result Value Ref Range    POCT Glucose 140 (H) 74 - 99 mg/dL   Urinalysis with Reflex Culture and Microscopic   Result Value Ref Range    Color, Urine Light-Yellow Light-Yellow, Yellow, Dark-Yellow    Appearance, Urine Clear Clear    Specific Gravity, Urine 1.013 1.005 - 1.035    pH, Urine 5.0 5.0, 5.5, 6.0, 6.5, 7.0, 7.5, 8.0    Protein, Urine NEGATIVE NEGATIVE, 10 (TRACE), 20 (TRACE) mg/dL    Glucose, Urine 300 (3+) (A) Normal  mg/dL    Blood, Urine NEGATIVE NEGATIVE mg/dL    Ketones, Urine 40 (2+) (A) NEGATIVE mg/dL    Bilirubin, Urine NEGATIVE NEGATIVE mg/dL    Urobilinogen, Urine Normal Normal mg/dL    Nitrite, Urine NEGATIVE NEGATIVE    Leukocyte Esterase, Urine NEGATIVE NEGATIVE   POCT GLUCOSE   Result Value Ref Range    POCT Glucose 72 (L) 74 - 99 mg/dL   POCT GLUCOSE   Result Value Ref Range    POCT Glucose 98 74 - 99 mg/dL   POCT GLUCOSE   Result Value Ref Range    POCT Glucose 115 (H) 74 - 99 mg/dL   Renal Function Panel   Result Value Ref Range    Glucose 107 (H) 74 - 99 mg/dL    Sodium 140 136 - 145 mmol/L    Potassium 3.6 3.5 - 5.3 mmol/L    Chloride 108 (H) 98 - 107 mmol/L    Bicarbonate 26 21 - 32 mmol/L    Anion Gap 10 10 - 20 mmol/L    Urea Nitrogen 19 6 - 23 mg/dL    Creatinine 1.36 (H) 0.50 - 1.05 mg/dL    eGFR 38 (L) >60 mL/min/1.73m*2    Calcium 8.7 8.6 - 10.3 mg/dL    Phosphorus 2.0 (L) 2.5 - 4.9 mg/dL    Albumin 3.3 (L) 3.4 - 5.0 g/dL   Beta Hydroxybutyrate   Result Value Ref Range    Beta-Hydroxybutyrate 0.78 (H) 0.02 - 0.27 mmol/L   POCT GLUCOSE   Result Value Ref Range    POCT Glucose 119 (H) 74 - 99 mg/dL   POCT GLUCOSE   Result Value Ref Range    POCT Glucose 116 (H) 74 - 99 mg/dL   POCT GLUCOSE   Result Value Ref Range    POCT Glucose 108 (H) 74 - 99 mg/dL   Renal Function Panel   Result Value Ref Range    Glucose 119 (H) 74 - 99 mg/dL    Sodium 139 136 - 145 mmol/L    Potassium 3.6 3.5 - 5.3 mmol/L    Chloride 111 (H) 98 - 107 mmol/L    Bicarbonate 24 21 - 32 mmol/L    Anion Gap 8 (L) 10 - 20 mmol/L    Urea Nitrogen 12 6 - 23 mg/dL    Creatinine 1.17 (H) 0.50 - 1.05 mg/dL    eGFR 46 (L) >60 mL/min/1.73m*2    Calcium 8.2 (L) 8.6 - 10.3 mg/dL    Phosphorus 1.9 (L) 2.5 - 4.9 mg/dL    Albumin 3.0 (L) 3.4 - 5.0 g/dL   Lavender Top   Result Value Ref Range    Extra Tube Hold for add-ons.    POCT GLUCOSE   Result Value Ref Range    POCT Glucose 98 74 - 99 mg/dL   POCT GLUCOSE   Result Value Ref Range    POCT Glucose  88 74 - 99 mg/dL   CBC   Result Value Ref Range    WBC 5.0 4.4 - 11.3 x10*3/uL    nRBC 0.0 0.0 - 0.0 /100 WBCs    RBC 4.23 4.00 - 5.20 x10*6/uL    Hemoglobin 11.4 (L) 12.0 - 16.0 g/dL    Hematocrit 35.2 (L) 36.0 - 46.0 %    MCV 83 80 - 100 fL    MCH 27.0 26.0 - 34.0 pg    MCHC 32.4 32.0 - 36.0 g/dL    RDW 14.8 (H) 11.5 - 14.5 %    Platelets 285 150 - 450 x10*3/uL   Basic Metabolic Panel   Result Value Ref Range    Glucose 85 74 - 99 mg/dL    Sodium 142 136 - 145 mmol/L    Potassium 3.7 3.5 - 5.3 mmol/L    Chloride 110 (H) 98 - 107 mmol/L    Bicarbonate 26 21 - 32 mmol/L    Anion Gap 10 10 - 20 mmol/L    Urea Nitrogen 11 6 - 23 mg/dL    Creatinine 1.24 (H) 0.50 - 1.05 mg/dL    eGFR 43 (L) >60 mL/min/1.73m*2    Calcium 8.4 (L) 8.6 - 10.3 mg/dL   POCT GLUCOSE   Result Value Ref Range    POCT Glucose 83 74 - 99 mg/dL   Renal Function Panel   Result Value Ref Range    Glucose 79 74 - 99 mg/dL    Sodium 142 136 - 145 mmol/L    Potassium 3.8 3.5 - 5.3 mmol/L    Chloride 109 (H) 98 - 107 mmol/L    Bicarbonate 27 21 - 32 mmol/L    Anion Gap 10 10 - 20 mmol/L    Urea Nitrogen 12 6 - 23 mg/dL    Creatinine 1.40 (H) 0.50 - 1.05 mg/dL    eGFR 37 (L) >60 mL/min/1.73m*2    Calcium 8.8 8.6 - 10.3 mg/dL    Phosphorus 2.9 2.5 - 4.9 mg/dL    Albumin 3.3 (L) 3.4 - 5.0 g/dL   POCT GLUCOSE   Result Value Ref Range    POCT Glucose 105 (H) 74 - 99 mg/dL   POCT GLUCOSE   Result Value Ref Range    POCT Glucose 84 74 - 99 mg/dL   CBC   Result Value Ref Range    WBC 4.9 4.4 - 11.3 x10*3/uL    nRBC 0.0 0.0 - 0.0 /100 WBCs    RBC 3.92 (L) 4.00 - 5.20 x10*6/uL    Hemoglobin 10.6 (L) 12.0 - 16.0 g/dL    Hematocrit 32.8 (L) 36.0 - 46.0 %    MCV 84 80 - 100 fL    MCH 27.0 26.0 - 34.0 pg    MCHC 32.3 32.0 - 36.0 g/dL    RDW 14.8 (H) 11.5 - 14.5 %    Platelets 265 150 - 450 x10*3/uL   Basic Metabolic Panel   Result Value Ref Range    Glucose 81 74 - 99 mg/dL    Sodium 142 136 - 145 mmol/L    Potassium 3.8 3.5 - 5.3 mmol/L    Chloride 110 (H) 98 - 107  mmol/L    Bicarbonate 24 21 - 32 mmol/L    Anion Gap 12 10 - 20 mmol/L    Urea Nitrogen 12 6 - 23 mg/dL    Creatinine 1.38 (H) 0.50 - 1.05 mg/dL    eGFR 38 (L) >60 mL/min/1.73m*2    Calcium 8.1 (L) 8.6 - 10.3 mg/dL   POCT GLUCOSE   Result Value Ref Range    POCT Glucose 90 74 - 99 mg/dL     *Note: Due to a large number of results and/or encounters for the requested time period, some results have not been displayed. A complete set of results can be found in Results Review.   Electrocardiogram, 12-lead PRN ACS symptoms    Result Date: 3/17/2025  Normal sinus rhythm Nonspecific ST abnormality Abnormal ECG When compared with ECG of 05-MAR-2025 17:49, Nonspecific T wave abnormality no longer evident in Lateral leads See ED provider note for full interpretation and clinical correlation Confirmed by Shweta Vines ([id number]) on 3/17/2025 10:29:53 AM    XR chest 1 view    Result Date: 3/15/2025  Interpreted By:  Jose Acosta, STUDY: XR CHEST 1 VIEW;  3/15/2025 9:28 pm   INDICATION: Signs/Symptoms:weakness.     COMPARISON: Chest radiograph 03/05/2025   ACCESSION NUMBER(S): [id number]   ORDERING CLINICIAN: CARLY KAN   FINDINGS: SUPPORT DEVICES: None.   CARDIOMEDIASTINAL SILHOUETTE: Cardiomediastinal silhouette is normal in size and configuration.   LUNGS: No pulmonary consolidation, pleural effusion or pneumothorax.   ABDOMEN: No remarkable upper abdominal findings.   BONES: No acute osseous abnormality.       1. No acute cardiopulmonary abnormality.     Signed by: Jose Acosta 3/15/2025 10:00 PM Dictation workstation:   [id number]    ECG 12 lead    Result Date: 3/6/2025  Normal sinus rhythm Nonspecific ST and T wave abnormality Abnormal ECG When compared with ECG of 16-FEB-2025 15:31, Nonspecific T wave abnormality no longer evident in Inferior leads Nonspecific T wave abnormality, worse in Lateral leads See ED provider note for full interpretation and clinical correlation Confirmed by Steve Chisholm ([id number]) on  3/6/2025 7:59:40 AM    XR chest 2 views    Result Date: 3/5/2025  Interpreted By:  Irasema Terry, STUDY: XR CHEST 2 VIEWS;  3/5/2025 6:04 pm   INDICATION: Signs/Symptoms:cough, weakness.     COMPARISON: Radiographs of the chest dated 02/16/2025   ACCESSION NUMBER(S): [id number]   ORDERING CLINICIAN: JOSE DANIEL SIMMS   FINDINGS: PA and lateral radiographs of the chest were provided.       CARDIOMEDIASTINAL SILHOUETTE: Cardiomediastinal silhouette is normal in size and configuration.   LUNGS: Lungs are clear, without evidence of new consolidation or pleural effusion mild atelectatic changes are present in the lung bases.   ABDOMEN: No remarkable upper abdominal findings.   BONES: No acute osseous changes.       1.  No evidence of acute cardiopulmonary process.       MACRO: None   Signed by: Irasema Terry 3/5/2025 6:12 PM Dictation workstation:   [id number]                  Assessment/Plan   Assessment & Plan  Generalized weakness  84-year-old female with past medical history of hypertension, early dementia with some memory loss, osteoporosis, chronic kidney disease stage IV, coronary artery disease was admitted to [facility] [place] last month with altered mentation which was thought to be secondary to metabolic encephalopathy and MRI of the brain was negative for acute stroke.  Subsequently patient went to skilled nursing facility for about 2 weeks and discharged home.  She was seen in the emergency room at [facility] on March 5, 2025 and was diagnosed with influenza A advised prescribed Tamiflu and discharged home.  Patient was brought back to the emergency room late last night by her family with increasing fatigue and patient not eating and drinking very well and feeling very weak.  She denied any abdominal pain nausea vomiting or diarrhea no chest pain or shortness of breath.  Her labs are satisfactory except for elevated creatinine of 1.91 on admission which has improved to 1.38 today.       1.  Generalized fatigue  and tiredness and evidence of acute on chronic kidney disease disease stage IV  Serum creatinine is elevated at 1.91.  On admission which is improved to 1.24 today..  Overall significant improvement, well-hydrated renal function is improved, has started PT OT.     2.  Influenza A positive by nasal swab and she has been started on Tamiflu 30 mg daily which she took also 2 weeks ago.  Will discontinue Tamiflu now.     3.  Hypertension, blood pressures are stable, will hold lisinopril in view of chronic kidney disease and monitor BP closely.     4.  Early dementia with moderate memory impairment, patient is  on donezepil.  And was seen by neurology 3 weeks ago and her donezepil was increased to 10 mg daily.    5.  New onset left lower back and left hip pain started this morning and lidocaine patch has been provided.     Reviewed all labs and imaging studies and discussed the plan of treatment with patient in detail.  DVT prophylaxis, will order PT OT and monitor closely.     Spoke to patient's daughter Antonia on March 18, 2025 and updated her with the patient's condition.        I spent 35 minutes in the professional and overall care of this patient.      Discharge planning, possible discharge in next day or so.       George Apodaca MD

## 2025-03-19 NOTE — NURSING NOTE
"1239 - patient call light on.  1240 - RN went to patient room. Patient stated \" my back is still hurting\", RN updated patient that pain medication takes time to work to its fullest potential. RN placed another pillow behind patient for comfort and let patient know that MD is aware of her pain and will be addressing when team comes to bedside. RN asked patient if she was comfortable in chair enough to eat lunch as meal was currently being delivered. Patient stated \"yes\". RN made sure call light was in reach on left side and tray table in front of patient for lunch meal.  "

## 2025-03-19 NOTE — PROGRESS NOTES
"Physical Therapy                 Therapy Communication Note    Patient Name: Kirstin Howe  MRN: [id number]  Department: [place] [address]  Room: [address] [place] [address]  Today's Date: 3/19/2025     Discipline: Physical Therapy    PT Missed Visit: Yes     Missed Visit Reason: Missed Visit Reason: Patient refused    Comment: Per RN prior to session, pt developed a new onset of low back pain yesterday. Pt reports 8/10 pain currently and reports there is \"something seriously wrong\", pt declined PT until she receives an x-ray. Will check back in tomorrow per pt request.    "

## 2025-03-20 ENCOUNTER — APPOINTMENT (OUTPATIENT)
Dept: RADIOLOGY | Facility: HOSPITAL | Age: 85
DRG: 682 | End: 2025-03-20
Payer: MEDICARE

## 2025-03-20 LAB
ANION GAP SERPL CALC-SCNC: 12 MMOL/L (ref 10–20)
BUN SERPL-MCNC: 15 MG/DL (ref 6–23)
CALCIUM SERPL-MCNC: 8.3 MG/DL (ref 8.6–10.3)
CHLORIDE SERPL-SCNC: 109 MMOL/L (ref 98–107)
CO2 SERPL-SCNC: 25 MMOL/L (ref 21–32)
CREAT SERPL-MCNC: 1.35 MG/DL (ref 0.5–1.05)
EGFRCR SERPLBLD CKD-EPI 2021: 39 ML/MIN/1.73M*2
ERYTHROCYTE [DISTWIDTH] IN BLOOD BY AUTOMATED COUNT: 14.6 % (ref 11.5–14.5)
GLUCOSE BLD MANUAL STRIP-MCNC: 68 MG/DL (ref 74–99)
GLUCOSE SERPL-MCNC: 75 MG/DL (ref 74–99)
HCT VFR BLD AUTO: 31.9 % (ref 36–46)
HGB BLD-MCNC: 11 G/DL (ref 12–16)
MCH RBC QN AUTO: 27.7 PG (ref 26–34)
MCHC RBC AUTO-ENTMCNC: 34.5 G/DL (ref 32–36)
MCV RBC AUTO: 80 FL (ref 80–100)
NRBC BLD-RTO: 0 /100 WBCS (ref 0–0)
PLATELET # BLD AUTO: 272 X10*3/UL (ref 150–450)
POTASSIUM SERPL-SCNC: 3.8 MMOL/L (ref 3.5–5.3)
RBC # BLD AUTO: 3.97 X10*6/UL (ref 4–5.2)
SODIUM SERPL-SCNC: 142 MMOL/L (ref 136–145)
WBC # BLD AUTO: 5.1 X10*3/UL (ref 4.4–11.3)

## 2025-03-20 PROCEDURE — 80048 BASIC METABOLIC PNL TOTAL CA: CPT

## 2025-03-20 PROCEDURE — 72110 X-RAY EXAM L-2 SPINE 4/>VWS: CPT

## 2025-03-20 PROCEDURE — 82947 ASSAY GLUCOSE BLOOD QUANT: CPT

## 2025-03-20 PROCEDURE — 36415 COLL VENOUS BLD VENIPUNCTURE: CPT

## 2025-03-20 PROCEDURE — 2500000004 HC RX 250 GENERAL PHARMACY W/ HCPCS (ALT 636 FOR OP/ED): Performed by: NURSE PRACTITIONER

## 2025-03-20 PROCEDURE — 2500000001 HC RX 250 WO HCPCS SELF ADMINISTERED DRUGS (ALT 637 FOR MEDICARE OP): Performed by: NURSE PRACTITIONER

## 2025-03-20 PROCEDURE — 97530 THERAPEUTIC ACTIVITIES: CPT | Mod: GP,CQ | Performed by: PHYSICAL THERAPY ASSISTANT

## 2025-03-20 PROCEDURE — 72110 X-RAY EXAM L-2 SPINE 4/>VWS: CPT | Performed by: RADIOLOGY

## 2025-03-20 PROCEDURE — 2500000005 HC RX 250 GENERAL PHARMACY W/O HCPCS

## 2025-03-20 PROCEDURE — 97116 GAIT TRAINING THERAPY: CPT | Mod: GP,CQ | Performed by: PHYSICAL THERAPY ASSISTANT

## 2025-03-20 PROCEDURE — 85027 COMPLETE CBC AUTOMATED: CPT

## 2025-03-20 PROCEDURE — 1100000001 HC PRIVATE ROOM DAILY

## 2025-03-20 PROCEDURE — 99232 SBSQ HOSP IP/OBS MODERATE 35: CPT | Performed by: INTERNAL MEDICINE

## 2025-03-20 PROCEDURE — 36415 COLL VENOUS BLD VENIPUNCTURE: CPT | Performed by: NURSE PRACTITIONER

## 2025-03-20 RX ORDER — CYCLOBENZAPRINE HCL 5 MG
5 TABLET ORAL 3 TIMES DAILY PRN
Status: DISCONTINUED | OUTPATIENT
Start: 2025-03-20 | End: 2025-03-21 | Stop reason: HOSPADM

## 2025-03-20 RX ADMIN — CYANOCOBALAMIN TAB 500 MCG 1000 MCG: 500 TAB at 09:42

## 2025-03-20 RX ADMIN — SENNOSIDES AND DOCUSATE SODIUM 1 TABLET: 50; 8.6 TABLET ORAL at 20:59

## 2025-03-20 RX ADMIN — LIDOCAINE 4% 1 PATCH: 40 PATCH TOPICAL at 09:42

## 2025-03-20 RX ADMIN — DONEPEZIL HYDROCHLORIDE 10 MG: 5 TABLET ORAL at 20:59

## 2025-03-20 RX ADMIN — ENOXAPARIN SODIUM 30 MG: 30 INJECTION SUBCUTANEOUS at 09:41

## 2025-03-20 RX ADMIN — BUPROPION HYDROCHLORIDE 300 MG: 150 TABLET, EXTENDED RELEASE ORAL at 09:42

## 2025-03-20 RX ADMIN — SENNOSIDES AND DOCUSATE SODIUM 1 TABLET: 50; 8.6 TABLET ORAL at 09:42

## 2025-03-20 ASSESSMENT — COGNITIVE AND FUNCTIONAL STATUS - GENERAL
WALKING IN HOSPITAL ROOM: A LITTLE
WALKING IN HOSPITAL ROOM: A LITTLE
CLIMB 3 TO 5 STEPS WITH RAILING: A LITTLE
STANDING UP FROM CHAIR USING ARMS: A LITTLE
PERSONAL GROOMING: A LITTLE
CLIMB 3 TO 5 STEPS WITH RAILING: A LITTLE
HELP NEEDED FOR BATHING: A LITTLE
TOILETING: A LITTLE
MOBILITY SCORE: 20
MOVING FROM LYING ON BACK TO SITTING ON SIDE OF FLAT BED WITH BEDRAILS: A LITTLE
TURNING FROM BACK TO SIDE WHILE IN FLAT BAD: A LITTLE
DRESSING REGULAR UPPER BODY CLOTHING: A LITTLE
STANDING UP FROM CHAIR USING ARMS: A LITTLE
PERSONAL GROOMING: A LITTLE
HELP NEEDED FOR BATHING: A LITTLE
CLIMB 3 TO 5 STEPS WITH RAILING: A LITTLE
STANDING UP FROM CHAIR USING ARMS: A LITTLE
MOBILITY SCORE: 20
DRESSING REGULAR LOWER BODY CLOTHING: A LITTLE
DAILY ACTIVITIY SCORE: 18
WALKING IN HOSPITAL ROOM: A LITTLE
DRESSING REGULAR LOWER BODY CLOTHING: A LITTLE
MOVING TO AND FROM BED TO CHAIR: A LITTLE
TOILETING: A LITTLE
MOVING TO AND FROM BED TO CHAIR: A LITTLE
MOVING TO AND FROM BED TO CHAIR: A LITTLE
DRESSING REGULAR UPPER BODY CLOTHING: A LITTLE
DAILY ACTIVITIY SCORE: 18
EATING MEALS: A LITTLE
EATING MEALS: A LITTLE
MOBILITY SCORE: 18

## 2025-03-20 ASSESSMENT — PAIN SCALES - GENERAL
PAINLEVEL_OUTOF10: 0 - NO PAIN
PAINLEVEL_OUTOF10: 4
PAINLEVEL_OUTOF10: 0 - NO PAIN
PAINLEVEL_OUTOF10: 7

## 2025-03-20 ASSESSMENT — PAIN - FUNCTIONAL ASSESSMENT
PAIN_FUNCTIONAL_ASSESSMENT: 0-10

## 2025-03-20 NOTE — CARE PLAN
The patient's goals for the shift include      The clinical goals for the shift include Patient to remain free from falls and injury.      Problem: Skin  Goal: Promote/optimize nutrition  Outcome: Progressing  Flowsheets (Taken 3/19/2025 2250)  Promote/optimize nutrition:   Offer water/supplements/favorite foods   Monitor/record intake including meals     Problem: Fall/Injury  Goal: Not fall by end of shift  Outcome: Progressing  Goal: Be free from injury by end of the shift  Outcome: Progressing

## 2025-03-20 NOTE — ASSESSMENT & PLAN NOTE
84-year-old female with past medical history of hypertension, early dementia with some memory loss, osteoporosis, chronic kidney disease stage IV, coronary artery disease was admitted to [facility] [place] [facility] last month with altered mentation which was thought to be secondary to metabolic encephalopathy and MRI of the brain was negative for acute stroke.  Subsequently patient went to skilled nursing facility for about 2 weeks and discharged home.  She was seen in the emergency room at [facility] on March 5, 2025 and was diagnosed with influenza A advised prescribed Tamiflu and discharged home.  Patient was brought back to the emergency room late last night by her family with increasing fatigue and patient not eating and drinking very well and feeling very weak.  She denied any abdominal pain nausea vomiting or diarrhea no chest pain or shortness of breath.  Her labs are satisfactory except for elevated creatinine of 1.91 on admission which has improved to 1.35 today.

## 2025-03-20 NOTE — PROGRESS NOTES
Physical Therapy    Physical Therapy Treatment    Patient Name: Kirstin Howe  MRN: [id number]  Department: [place] [id number]  Room: [address] [place] [address]  Today's Date: 3/20/2025  Time Calculation  Start Time: 1416  Stop Time: 1441  Time Calculation (min): 25 min         Assessment/Plan   PT Assessment  Rehab Prognosis: Good  Barriers to Discharge Home: No anticipated barriers  End of Session Communication: Bedside nurse  Assessment Comment:  (pt demo fair + leroy to activity)  End of Session Patient Position: Alarm on, Bed, 3 rail up  PT Plan  Inpatient/Swing Bed or Outpatient: Inpatient  PT Plan  Treatment/Interventions: Bed mobility, Transfer training, Gait training, Therapeutic activity  PT Plan: Ongoing PT  PT Frequency: 3 times per week  PT Discharge Recommendations: Low intensity level of continued care  Equipment Recommended upon Discharge: Wheeled walker  PT Recommended Transfer Status: Stand by assist  PT - OK to Discharge: Yes (per PT POC)      General Visit Information:   PT  Visit  PT Received On: 03/20/25  General  Reason for Referral: 84 y.o. female presenting with generalized weakness and +Flu A; not eating and drinking very well  Referred By: George Apodaca MD  Prior to Session Communication: Bedside nurse  Patient Position Received: Bed, 3 rail up  Preferred Learning Style: auditory, kinesthetic, verbal, visual  General Comment:  (pt agreeable to tx.)    Subjective   Precautions:  Precautions  Medical Precautions: Fall precautions            Objective   Pain:  Pain Assessment  Pain Assessment: 0-10  0-10 (Numeric) Pain Score: 7 (RN aware)  Pain Type: Acute pain  Pain Location: Back  Cognition:  Cognition  Orientation Level: Oriented X4     Treatments:       Bed Mobility  Bed Mobility: Yes  Bed Mobility 1  Bed Mobility 1: Supine to sitting, Sitting to supine, Log roll (log roll for comfort)  Level of Assistance 1: Close supervision  Bed Mobility Comments 1:  (pt dmeo good sequencing.)    Ambulation/Gait  Training  Ambulation/Gait Training Performed: Yes  Ambulation/Gait Training 1  Surface 1: Level tile  Device 1: Rolling walker  Gait Support Devices: Gait belt  Assistance 1: Close supervision  Quality of Gait 1: Narrow base of support, Decreased step length, Forward flexed posture  Comments/Distance (ft) 1: 40ft x 2 (pt dmeo slow short step throguh pattern,fwd flex posture,cues for FWW placement.)  Transfers  Transfer: Yes  Transfer 1  Transfer From 1: Sit to, Stand to  Technique 1: Sit to stand, Stand to sit  Transfer Device 1: Walker, Gait belt  Transfer Level of Assistance 1: Contact guard  Trials/Comments 1: 2 (pt req min cues for proper hand placement.)  Transfers 2  Transfer From 2: Bed to, Chair with drop arm to  Technique 2: Sit to stand, Stand to sit  Transfer Device 2: Walker, Gait belt  Transfer Level of Assistance 2: Close supervision  Trials/Comments 2:  (pt req min cues for proper hand placement.)    Outcome Measures:  [facility] Basic Mobility  Turning from your back to your side while in a flat bed without using bedrails: A little  Moving from lying on your back to sitting on the side of a flat bed without using bedrails: A little  Moving to and from bed to chair (including a wheelchair): A little  Standing up from a chair using your arms (e.g. wheelchair or bedside chair): A little  To walk in hospital room: A little  Climbing 3-5 steps with railing: A little  Basic Mobility - Total Score: 18    Education Documentation  Body Mechanics, taught by Sarmad Carter PTA at 3/20/2025  4:13 PM.  Learner: Patient  Readiness: Acceptance  Method: Explanation  Response: Needs Reinforcement    Precautions, taught by Sarmad Carter PTA at 3/20/2025  4:13 PM.  Learner: Patient  Readiness: Acceptance  Method: Explanation  Response: Needs Reinforcement    Mobility Training, taught by Sarmad Carter PTA at 3/20/2025  4:13 PM.  Learner: Patient  Readiness: Acceptance  Method: Explanation  Response:  Needs Reinforcement    Education Comments  No comments found.        OP EDUCATION:       Encounter Problems       Encounter Problems (Active)       Balance       STG - Maintains dynamic standing balance with one upper extremity support mod indep with SPC for >5 min (Progressing)       Start:  03/18/25    Expected End:  04/01/25       INTERVENTIONS:1. Practice standing with minimal support.2. Educate patient about standing tolerance.3. Educate patient about independence with gait, transfers, and ADL's.4. Educate patient about use of assistive device.5. Educate patient about self-directed care.         STG - Maintains static standing balance without upper extremity support indep for >5 min (Progressing)       Start:  03/18/25    Expected End:  04/01/25       INTERVENTIONS:1. Practice standing with minimal support.2. Educate patient about maintaining total hip precautions while maintaining balance.3. Educate patient about standing tolerance.4. Educate patient about independence with gait, transfers, and ADL's.5. Educate patient about use of assistive device.6. Educate patient about self-directed care.            Mobility       STG - Patient will ambulate 200' mod indep with SPC for hosuehold ambulation (Progressing)       Start:  03/18/25    Expected End:  04/01/25               Mobility       Patient's Tinetti score will be >= 24/28 to reduce risk of falls (Progressing)       Start:  03/18/25    Expected End:  04/01/25               PT Transfers       STG - Patient will transfer sit to and from stand mod indep with SPC  (Progressing)       Start:  03/18/25    Expected End:  04/01/25

## 2025-03-20 NOTE — PROGRESS NOTES
"Kirstin Howe is a 84 y.o. female on day 4 of admission presenting with Generalized weakness.    Subjective   Patient seen and examined.  No improvement and patient stated that since yesterday the hydroboost the tray while she was sitting in the chair and that hurt her belly and her lower back is still hurting and there is no improvement with the lidocaine patch.  She had x-rays of the left hip which revealed osteoarthritis but no bony injuries.  She stated that she is not well enough to go home yet.         Objective     Physical Exam  GENERAL: Awake, alert,moderate distress, cooperative  Sitting out in chair, holding in the left hip and left lower lumbar region and a lidocaine patch has been applied.  SKIN: Skin color, texture, turgor normal. No rashes or lesions..  OROPHARYNX: Lips, mucosa, and tongue normal. Teeth and gums normal. Oropharynx normal.  NECK: no jugulovenous distention, no carotid bruits, carotid pulse normal contour, supple  BACK: Tender left lower costovertebral angle as well as left hip and left lumbar region has lidocaine patch on.  LUNGS: Lungs clear to auscultation. Good diaphragmatic excursion.  CARDIAC: Rate and rhythm is regular.  Normal S1 and S2; no rubs, murmurs, or gallops  ABDOMEN: Abdomen soft, non-tender. BS normal. No masses or organomegaly.  EXTREMITIES: Extremities normal. No deformities, edema, clubbing or skin discoloration.  NEURO: Gait not examined.  Nonfocal.    PULSES: 2+ radial, 2+ carotid  Last Recorded Vitals  Blood pressure 159/78, pulse 67, temperature 36.1 °C (97 °F), temperature source Temporal, resp. rate 17, height 1.626 m (5' 4.02\"), weight 51 kg (112 lb 8 oz), SpO2 93%.  Intake/Output last 3 Shifts:  I/O last 3 completed shifts:  In: 240 (4.7 mL/kg) [P.O.:240]  Out: - (0 mL/kg)   Weight: 51 kg     Relevant Results  Scheduled medications  buPROPion XL, 300 mg, oral, Daily  cyanocobalamin, 1,000 mcg, oral, Daily  donepezil, 10 mg, oral, Nightly  enoxaparin, 30 mg, " subcutaneous, q24h FAVIAN  lidocaine, 1 patch, transdermal, Daily  sennosides-docusate sodium, 1 tablet, oral, BID      Continuous medications     PRN medications  PRN medications: acetaminophen **OR** acetaminophen **OR** acetaminophen, acetaminophen **OR** acetaminophen **OR** acetaminophen, cyclobenzaprine, melatonin, ondansetron      Results for orders placed or performed during the hospital encounter of 03/15/25 (from the past 96 hours)   POCT GLUCOSE   Result Value Ref Range    POCT Glucose 115 (H) 74 - 99 mg/dL   Renal Function Panel   Result Value Ref Range    Glucose 107 (H) 74 - 99 mg/dL    Sodium 140 136 - 145 mmol/L    Potassium 3.6 3.5 - 5.3 mmol/L    Chloride 108 (H) 98 - 107 mmol/L    Bicarbonate 26 21 - 32 mmol/L    Anion Gap 10 10 - 20 mmol/L    Urea Nitrogen 19 6 - 23 mg/dL    Creatinine 1.36 (H) 0.50 - 1.05 mg/dL    eGFR 38 (L) >60 mL/min/1.73m*2    Calcium 8.7 8.6 - 10.3 mg/dL    Phosphorus 2.0 (L) 2.5 - 4.9 mg/dL    Albumin 3.3 (L) 3.4 - 5.0 g/dL   Beta Hydroxybutyrate   Result Value Ref Range    Beta-Hydroxybutyrate 0.78 (H) 0.02 - 0.27 mmol/L   POCT GLUCOSE   Result Value Ref Range    POCT Glucose 119 (H) 74 - 99 mg/dL   POCT GLUCOSE   Result Value Ref Range    POCT Glucose 116 (H) 74 - 99 mg/dL   POCT GLUCOSE   Result Value Ref Range    POCT Glucose 108 (H) 74 - 99 mg/dL   Renal Function Panel   Result Value Ref Range    Glucose 119 (H) 74 - 99 mg/dL    Sodium 139 136 - 145 mmol/L    Potassium 3.6 3.5 - 5.3 mmol/L    Chloride 111 (H) 98 - 107 mmol/L    Bicarbonate 24 21 - 32 mmol/L    Anion Gap 8 (L) 10 - 20 mmol/L    Urea Nitrogen 12 6 - 23 mg/dL    Creatinine 1.17 (H) 0.50 - 1.05 mg/dL    eGFR 46 (L) >60 mL/min/1.73m*2    Calcium 8.2 (L) 8.6 - 10.3 mg/dL    Phosphorus 1.9 (L) 2.5 - 4.9 mg/dL    Albumin 3.0 (L) 3.4 - 5.0 g/dL   Lavender Top   Result Value Ref Range    Extra Tube Hold for add-ons.    POCT GLUCOSE   Result Value Ref Range    POCT Glucose 98 74 - 99 mg/dL   POCT GLUCOSE   Result  Value Ref Range    POCT Glucose 88 74 - 99 mg/dL   CBC   Result Value Ref Range    WBC 5.0 4.4 - 11.3 x10*3/uL    nRBC 0.0 0.0 - 0.0 /100 WBCs    RBC 4.23 4.00 - 5.20 x10*6/uL    Hemoglobin 11.4 (L) 12.0 - 16.0 g/dL    Hematocrit 35.2 (L) 36.0 - 46.0 %    MCV 83 80 - 100 fL    MCH 27.0 26.0 - 34.0 pg    MCHC 32.4 32.0 - 36.0 g/dL    RDW 14.8 (H) 11.5 - 14.5 %    Platelets 285 150 - 450 x10*3/uL   Basic Metabolic Panel   Result Value Ref Range    Glucose 85 74 - 99 mg/dL    Sodium 142 136 - 145 mmol/L    Potassium 3.7 3.5 - 5.3 mmol/L    Chloride 110 (H) 98 - 107 mmol/L    Bicarbonate 26 21 - 32 mmol/L    Anion Gap 10 10 - 20 mmol/L    Urea Nitrogen 11 6 - 23 mg/dL    Creatinine 1.24 (H) 0.50 - 1.05 mg/dL    eGFR 43 (L) >60 mL/min/1.73m*2    Calcium 8.4 (L) 8.6 - 10.3 mg/dL   POCT GLUCOSE   Result Value Ref Range    POCT Glucose 83 74 - 99 mg/dL   Renal Function Panel   Result Value Ref Range    Glucose 79 74 - 99 mg/dL    Sodium 142 136 - 145 mmol/L    Potassium 3.8 3.5 - 5.3 mmol/L    Chloride 109 (H) 98 - 107 mmol/L    Bicarbonate 27 21 - 32 mmol/L    Anion Gap 10 10 - 20 mmol/L    Urea Nitrogen 12 6 - 23 mg/dL    Creatinine 1.40 (H) 0.50 - 1.05 mg/dL    eGFR 37 (L) >60 mL/min/1.73m*2    Calcium 8.8 8.6 - 10.3 mg/dL    Phosphorus 2.9 2.5 - 4.9 mg/dL    Albumin 3.3 (L) 3.4 - 5.0 g/dL   POCT GLUCOSE   Result Value Ref Range    POCT Glucose 105 (H) 74 - 99 mg/dL   POCT GLUCOSE   Result Value Ref Range    POCT Glucose 84 74 - 99 mg/dL   CBC   Result Value Ref Range    WBC 4.9 4.4 - 11.3 x10*3/uL    nRBC 0.0 0.0 - 0.0 /100 WBCs    RBC 3.92 (L) 4.00 - 5.20 x10*6/uL    Hemoglobin 10.6 (L) 12.0 - 16.0 g/dL    Hematocrit 32.8 (L) 36.0 - 46.0 %    MCV 84 80 - 100 fL    MCH 27.0 26.0 - 34.0 pg    MCHC 32.3 32.0 - 36.0 g/dL    RDW 14.8 (H) 11.5 - 14.5 %    Platelets 265 150 - 450 x10*3/uL   Basic Metabolic Panel   Result Value Ref Range    Glucose 81 74 - 99 mg/dL    Sodium 142 136 - 145 mmol/L    Potassium 3.8 3.5 - 5.3  mmol/L    Chloride 110 (H) 98 - 107 mmol/L    Bicarbonate 24 21 - 32 mmol/L    Anion Gap 12 10 - 20 mmol/L    Urea Nitrogen 12 6 - 23 mg/dL    Creatinine 1.38 (H) 0.50 - 1.05 mg/dL    eGFR 38 (L) >60 mL/min/1.73m*2    Calcium 8.1 (L) 8.6 - 10.3 mg/dL   POCT GLUCOSE   Result Value Ref Range    POCT Glucose 90 74 - 99 mg/dL   Renal Function Panel   Result Value Ref Range    Glucose 92 74 - 99 mg/dL    Sodium 141 136 - 145 mmol/L    Potassium 3.8 3.5 - 5.3 mmol/L    Chloride 109 (H) 98 - 107 mmol/L    Bicarbonate 25 21 - 32 mmol/L    Anion Gap 11 10 - 20 mmol/L    Urea Nitrogen 14 6 - 23 mg/dL    Creatinine 1.36 (H) 0.50 - 1.05 mg/dL    eGFR 38 (L) >60 mL/min/1.73m*2    Calcium 8.5 (L) 8.6 - 10.3 mg/dL    Phosphorus 3.3 2.5 - 4.9 mg/dL    Albumin 3.1 (L) 3.4 - 5.0 g/dL   POCT GLUCOSE   Result Value Ref Range    POCT Glucose 97 74 - 99 mg/dL   CBC   Result Value Ref Range    WBC 5.1 4.4 - 11.3 x10*3/uL    nRBC 0.0 0.0 - 0.0 /100 WBCs    RBC 3.97 (L) 4.00 - 5.20 x10*6/uL    Hemoglobin 11.0 (L) 12.0 - 16.0 g/dL    Hematocrit 31.9 (L) 36.0 - 46.0 %    MCV 80 80 - 100 fL    MCH 27.7 26.0 - 34.0 pg    MCHC 34.5 32.0 - 36.0 g/dL    RDW 14.6 (H) 11.5 - 14.5 %    Platelets 272 150 - 450 x10*3/uL   Basic Metabolic Panel   Result Value Ref Range    Glucose 75 74 - 99 mg/dL    Sodium 142 136 - 145 mmol/L    Potassium 3.8 3.5 - 5.3 mmol/L    Chloride 109 (H) 98 - 107 mmol/L    Bicarbonate 25 21 - 32 mmol/L    Anion Gap 12 10 - 20 mmol/L    Urea Nitrogen 15 6 - 23 mg/dL    Creatinine 1.35 (H) 0.50 - 1.05 mg/dL    eGFR 39 (L) >60 mL/min/1.73m*2    Calcium 8.3 (L) 8.6 - 10.3 mg/dL     *Note: Due to a large number of results and/or encounters for the requested time period, some results have not been displayed. A complete set of results can be found in Results Review.    XR lumbar spine complete 4+ views    Result Date: 3/20/2025  Interpreted By:  Diego Santana, STUDY: XR LUMBAR SPINE COMPLETE 4+ VIEWS;  3/20/2025 11:21 am   INDICATION:  Signs/Symptoms:back pain.   COMPARISON: Prior exam from 07/25/2013.   ACCESSION NUMBER(S): [id number]   ORDERING CLINICIAN: ABHISHEK WHEAT   TECHNIQUE: 5 views of the lumbar spine were obtained.   FINDINGS: Trace anterolisthesis of L3 over L4 and mild anterolisthesis of L4 over L5 and L5 over S1. Mild disc space narrowing at all 3 of those levels. There is vacuum disc phenomenon at those levels as well. There is mild endplate spurring at L2-3. Diffuse interfacet hypertrophy with spur formation, most pronounced distally. Moderate sclerotic arthritic changes in both SI joints.   No lytic or blastic bone lesion.   No compression fracture.   Mild aortoiliac calcifications. Moderate  retained colonic stool and gas. Nonobstructive, nonspecific bowel gas pattern.       Lumbosacral spine arthritic changes as described.   MACRO: None   Signed by: Diego Santana 3/20/2025 11:48 AM Dictation workstation:   [id number]    Electrocardiogram, 12-lead PRN ACS symptoms    Result Date: 3/17/2025  Normal sinus rhythm Nonspecific ST abnormality Abnormal ECG When compared with ECG of 05-MAR-2025 17:49, Nonspecific T wave abnormality no longer evident in Lateral leads See ED provider note for full interpretation and clinical correlation Confirmed by Shweta Vines ([id number]) on 3/17/2025 10:29:53 AM    XR chest 1 view    Result Date: 3/15/2025  Interpreted By:  Jose Acosta, STUDY: XR CHEST 1 VIEW;  3/15/2025 9:28 pm   INDICATION: Signs/Symptoms:weakness.     COMPARISON: Chest radiograph 03/05/2025   ACCESSION NUMBER(S): [id number]   ORDERING CLINICIAN: CARLY KAN   FINDINGS: SUPPORT DEVICES: None.   CARDIOMEDIASTINAL SILHOUETTE: Cardiomediastinal silhouette is normal in size and configuration.   LUNGS: No pulmonary consolidation, pleural effusion or pneumothorax.   ABDOMEN: No remarkable upper abdominal findings.   BONES: No acute osseous abnormality.       1. No acute cardiopulmonary abnormality.     Signed by: Jose Acosta  3/15/2025 10:00 PM Dictation workstation:   [id number]    ECG 12 lead    Result Date: 3/6/2025  Normal sinus rhythm Nonspecific ST and T wave abnormality Abnormal ECG When compared with ECG of 16-FEB-2025 15:31, Nonspecific T wave abnormality no longer evident in Inferior leads Nonspecific T wave abnormality, worse in Lateral leads See ED provider note for full interpretation and clinical correlation Confirmed by Steve Chisholm ([id number]) on 3/6/2025 7:59:40 AM    XR chest 2 views    Result Date: 3/5/2025  Interpreted By:  Irasema Terry, STUDY: XR CHEST 2 VIEWS;  3/5/2025 6:04 pm   INDICATION: Signs/Symptoms:cough, weakness.     COMPARISON: Radiographs of the chest dated 02/16/2025   ACCESSION NUMBER(S): [id number]   ORDERING CLINICIAN: JOSE DANIEL SIMMS   FINDINGS: PA and lateral radiographs of the chest were provided.       CARDIOMEDIASTINAL SILHOUETTE: Cardiomediastinal silhouette is normal in size and configuration.   LUNGS: Lungs are clear, without evidence of new consolidation or pleural effusion mild atelectatic changes are present in the lung bases.   ABDOMEN: No remarkable upper abdominal findings.   BONES: No acute osseous changes.       1.  No evidence of acute cardiopulmonary process.       MACRO: None   Signed by: Irasema Terry 3/5/2025 6:12 PM Dictation workstation:   [id number]                      Assessment/Plan   Assessment & Plan  Generalized weakness  84-year-old female with past medical history of hypertension, early dementia with some memory loss, osteoporosis, chronic kidney disease stage IV, coronary artery disease was admitted to [facility] last month with altered mentation which was thought to be secondary to metabolic encephalopathy and MRI of the brain was negative for acute stroke.  Subsequently patient went to skilled nursing facility for about 2 weeks and discharged home.  She was seen in the emergency room at [facility] on March 5, 2025 and was diagnosed with influenza A  advised prescribed Tamiflu and discharged home.  Patient was brought back to the emergency room late last night by her family with increasing fatigue and patient not eating and drinking very well and feeling very weak.  She denied any abdominal pain nausea vomiting or diarrhea no chest pain or shortness of breath.  Her labs are satisfactory except for elevated creatinine of 1.91 on admission which has improved to 1.35 today.      1.  Generalized fatigue and tiredness and evidence of acute on chronic kidney disease disease stage IV  Serum creatinine is elevated at 1.91.  On admission which is improved to 1.35 today..  Overall significant improvement, well-hydrated renal function is improved, has started PT OT.     2.  Influenza A positive by nasal swab and she has been started on Tamiflu 30 mg daily which she took also 2 weeks ago.  Will discontinue Tamiflu now.     3.  Hypertension, blood pressures are stable, will hold lisinopril in view of chronic kidney disease and monitor BP closely.     4.  Early dementia with moderate memory impairment, patient is  on donezepil.  And was seen by neurology 3 weeks ago and her donezepil was increased to 10 mg daily.     5.  New onset left lower back and left hip pain started this morning and lidocaine patch has been provided.     Reviewed all labs and imaging studies and discussed the plan of treatment with patient in detail.  DVT prophylaxis, will order PT OT and monitor closely.     Spoke to patient's daughter Antonia on March 18, 2025 and updated her with the patient's condition.        I spent 35 minutes in the professional and overall care of this patient.      George Apodaca MD

## 2025-03-20 NOTE — CARE PLAN
Problem: Skin  Goal: Promote skin healing  Outcome: Progressing  Flowsheets (Taken 3/20/2025 1200)  Promote skin healing: Turn/reposition every 2 hours/use positioning/transfer devices   The patient's goals for the shift include      The clinical goals for the shift include Patient to remain free from falls and injury.    Over the shift, the patient did not make progress toward the following goals. Barriers to progression include Recommendations to address these barriers include promote good nutrition.

## 2025-03-21 ENCOUNTER — DOCUMENTATION (OUTPATIENT)
Dept: HOME HEALTH SERVICES | Facility: HOME HEALTH | Age: 85
End: 2025-03-21
Payer: MEDICARE

## 2025-03-21 VITALS
DIASTOLIC BLOOD PRESSURE: 78 MMHG | BODY MASS INDEX: 19.21 KG/M2 | TEMPERATURE: 97.7 F | HEIGHT: 64 IN | OXYGEN SATURATION: 96 % | HEART RATE: 83 BPM | WEIGHT: 112.5 LBS | SYSTOLIC BLOOD PRESSURE: 152 MMHG | RESPIRATION RATE: 16 BRPM

## 2025-03-21 LAB
ALBUMIN SERPL BCP-MCNC: 2.9 G/DL (ref 3.4–5)
ANION GAP SERPL CALC-SCNC: 14 MMOL/L (ref 10–20)
BUN SERPL-MCNC: 14 MG/DL (ref 6–23)
CALCIUM SERPL-MCNC: 8.4 MG/DL (ref 8.6–10.3)
CHLORIDE SERPL-SCNC: 109 MMOL/L (ref 98–107)
CO2 SERPL-SCNC: 24 MMOL/L (ref 21–32)
CREAT SERPL-MCNC: 1.2 MG/DL (ref 0.5–1.05)
EGFRCR SERPLBLD CKD-EPI 2021: 44 ML/MIN/1.73M*2
ERYTHROCYTE [DISTWIDTH] IN BLOOD BY AUTOMATED COUNT: 14.7 % (ref 11.5–14.5)
GLUCOSE SERPL-MCNC: 72 MG/DL (ref 74–99)
HCT VFR BLD AUTO: 32.6 % (ref 36–46)
HGB BLD-MCNC: 10.6 G/DL (ref 12–16)
MCH RBC QN AUTO: 27 PG (ref 26–34)
MCHC RBC AUTO-ENTMCNC: 32.5 G/DL (ref 32–36)
MCV RBC AUTO: 83 FL (ref 80–100)
NRBC BLD-RTO: 0 /100 WBCS (ref 0–0)
PHOSPHATE SERPL-MCNC: 2.8 MG/DL (ref 2.5–4.9)
PLATELET # BLD AUTO: 303 X10*3/UL (ref 150–450)
POTASSIUM SERPL-SCNC: 4 MMOL/L (ref 3.5–5.3)
RBC # BLD AUTO: 3.93 X10*6/UL (ref 4–5.2)
SODIUM SERPL-SCNC: 143 MMOL/L (ref 136–145)
WBC # BLD AUTO: 4.2 X10*3/UL (ref 4.4–11.3)

## 2025-03-21 PROCEDURE — 2500000004 HC RX 250 GENERAL PHARMACY W/ HCPCS (ALT 636 FOR OP/ED): Performed by: NURSE PRACTITIONER

## 2025-03-21 PROCEDURE — 36415 COLL VENOUS BLD VENIPUNCTURE: CPT

## 2025-03-21 PROCEDURE — 80069 RENAL FUNCTION PANEL: CPT | Performed by: NURSE PRACTITIONER

## 2025-03-21 PROCEDURE — 85027 COMPLETE CBC AUTOMATED: CPT

## 2025-03-21 PROCEDURE — 97535 SELF CARE MNGMENT TRAINING: CPT | Mod: GO

## 2025-03-21 PROCEDURE — 2500000005 HC RX 250 GENERAL PHARMACY W/O HCPCS

## 2025-03-21 PROCEDURE — 2500000001 HC RX 250 WO HCPCS SELF ADMINISTERED DRUGS (ALT 637 FOR MEDICARE OP): Performed by: NURSE PRACTITIONER

## 2025-03-21 PROCEDURE — 99239 HOSP IP/OBS DSCHRG MGMT >30: CPT | Performed by: INTERNAL MEDICINE

## 2025-03-21 RX ORDER — LIDOCAINE 560 MG/1
1 PATCH PERCUTANEOUS; TOPICAL; TRANSDERMAL DAILY
Qty: 30 PATCH | Refills: 0 | Status: SHIPPED | OUTPATIENT
Start: 2025-03-21 | End: 2025-04-20

## 2025-03-21 RX ADMIN — CYANOCOBALAMIN TAB 500 MCG 1000 MCG: 500 TAB at 09:44

## 2025-03-21 RX ADMIN — BUPROPION HYDROCHLORIDE 300 MG: 150 TABLET, EXTENDED RELEASE ORAL at 09:44

## 2025-03-21 RX ADMIN — ENOXAPARIN SODIUM 30 MG: 30 INJECTION SUBCUTANEOUS at 09:44

## 2025-03-21 RX ADMIN — LIDOCAINE 4% 1 PATCH: 40 PATCH TOPICAL at 09:45

## 2025-03-21 RX ADMIN — SENNOSIDES AND DOCUSATE SODIUM 1 TABLET: 50; 8.6 TABLET ORAL at 09:46

## 2025-03-21 ASSESSMENT — PAIN SCALES - GENERAL
PAINLEVEL_OUTOF10: 0 - NO PAIN
PAINLEVEL_OUTOF10: 2

## 2025-03-21 ASSESSMENT — COGNITIVE AND FUNCTIONAL STATUS - GENERAL
DRESSING REGULAR LOWER BODY CLOTHING: A LITTLE
DRESSING REGULAR UPPER BODY CLOTHING: A LITTLE
DAILY ACTIVITIY SCORE: 21
HELP NEEDED FOR BATHING: A LITTLE

## 2025-03-21 ASSESSMENT — ACTIVITIES OF DAILY LIVING (ADL): HOME_MANAGEMENT_TIME_ENTRY: 26

## 2025-03-21 ASSESSMENT — PAIN - FUNCTIONAL ASSESSMENT: PAIN_FUNCTIONAL_ASSESSMENT: 0-10

## 2025-03-21 NOTE — PROGRESS NOTES
3/21/2025 11:58 AM I spoke with  patient 's daughters Marlyn and Kalyani. Daughters want patient to stay with daughter Antonia, Patient's home has several steps to get into the house and into the bedroom. I met with patient. She wants to return to her own home. Gely Valdez [facility]

## 2025-03-21 NOTE — PROGRESS NOTES
3/21/2025 2:11 PM   I spoke to two daughters.  Patient wants to return to her own home. Daughter Antonia will transport patient home. Gely MONZON

## 2025-03-21 NOTE — HH CARE COORDINATION
Home Care received a Referral for Nursing, Physical Therapy, and Occupational Therapy. We have processed the referral for a RESUMPTION of Care on 3/22 - 3/24/25.     If you have any questions or concerns, please feel free to contact us at [phone]. Follow the prompts, enter your five digit zip code, and you will be directed to your care team on [facility].

## 2025-03-21 NOTE — CARE PLAN
Problem: Skin  Goal: Promote skin healing  3/21/2025 1157 by Ruby Martinez, RN  Outcome: Progressing  3/21/2025 1155 by Ruby Martinez RN  Outcome: Progressing   The patient's goals for the shift include to get some sleep    The clinical goals for the shift include saftey and rest    Over the shift, the patient did not make progress toward the following goals. Barriers to progression include . Recommendations to address these barriers include asses skin.

## 2025-03-21 NOTE — CARE PLAN
Problem: Skin  Goal: Decreased wound size/increased tissue granulation at next dressing change  Outcome: Progressing  Flowsheets (Taken 3/20/2025 2218)  Decreased wound size/increased tissue granulation at next dressing change: Promote sleep for wound healing  Goal: Participates in plan/prevention/treatment measures  Outcome: Progressing  Flowsheets (Taken 3/20/2025 2218)  Participates in plan/prevention/treatment measures: Increase activity/out of bed for meals  Goal: Prevent/manage excess moisture  Outcome: Progressing  Flowsheets (Taken 3/20/2025 2218)  Prevent/manage excess moisture:   Monitor for/manage infection if present   Cleanse incontinence/protect with barrier cream  Goal: Prevent/minimize sheer/friction injuries  Outcome: Progressing  Flowsheets (Taken 3/20/2025 2218)  Prevent/minimize sheer/friction injuries:   Increase activity/out of bed for meals   HOB 30 degrees or less  Goal: Promote/optimize nutrition  Outcome: Progressing  Flowsheets (Taken 3/20/2025 2218)  Promote/optimize nutrition:   Consume > 50% meals/supplements   Offer water/supplements/favorite foods   Monitor/record intake including meals  Goal: Promote skin healing  Outcome: Progressing  Flowsheets (Taken 3/20/2025 2218)  Promote skin healing:   Protective dressings over bony prominences   Assess skin/pad under line(s)/device(s)     Problem: Fall/Injury  Goal: Not fall by end of shift  Outcome: Progressing  Goal: Be free from injury by end of the shift  Outcome: Progressing  Goal: Verbalize understanding of personal risk factors for fall in the hospital  Outcome: Progressing  Goal: Verbalize understanding of risk factor reduction measures to prevent injury from fall in the home  Outcome: Progressing  Goal: Use assistive devices by end of the shift  Outcome: Progressing  Goal: Pace activities to prevent fatigue by end of the shift  Outcome: Progressing   The patient's goals for the shift include to get some sleep    The clinical goals  for the shift include saftey and rest

## 2025-03-21 NOTE — CARE PLAN
Problem: Skin  Goal: Promote skin healing  3/21/2025 1201 by Ruby Martinez RN  Outcome: Progressing  Flowsheets (Taken 3/21/2025 1201)  Promote skin healing: Turn/reposition every 2 hours/use positioning/transfer devices  3/21/2025 1159 by Ruby Martinez RN  Outcome: Progressing  3/21/2025 1157 by Ruby Martinez RN  Outcome: Progressing  3/21/2025 1155 by Ruby Martinez RN  Outcome: Progressing   The patient's goals for the shift include to get some sleep    The clinical goals for the shift include saftey and rest    Over the shift, the patient did not make progress toward the following goals. Barriers to progression include . Recommendations to address these barriers include  promote good nutrition.

## 2025-03-21 NOTE — CARE PLAN
Problem: Skin  Goal: Promote skin healing  3/21/2025 1159 by Ruby Martinez RN  Outcome: Progressing  3/21/2025 1157 by Ruby Martinez RN  Outcome: Progressing  3/21/2025 1155 by Ruby Martinez RN  Outcome: Progressing   The patient's goals for the shift include to get some sleep    The clinical goals for the shift include saftey and rest    Over the shift, the patient did not make progress toward the following goals. Barriers to progression include . Recommendations to address these barriers include . Promote good nutrition

## 2025-03-21 NOTE — PROGRESS NOTES
Occupational Therapy    OT Treatment    Patient Name: Kirstin Howe  MRN: [id number]  Department: [place] [id number]  Room: [address] [place] [address]  Today's Date: 3/21/2025  Time Calculation  Start Time: 0931  Stop Time: 0957  Time Calculation (min): 26 min        Assessment:  OT Assessment: Pt progressing with OT, Pt requiring cues throughout for safety. Pt educated in use of FWW for increased stability and safety during ADLs and functional mobility. Pt would benefit from SUP at home for safety due to decreased safety awareness and judgement.     Evaluation/Treatment Tolerance: Patient tolerated treatment well  Medical Staff Made Aware: Yes  End of Session Communication: Bedside nurse (RN present intermittently during session)  OT Assessment Results: Decreased ADL status, Decreased endurance, Decreased functional mobility  Evaluation/Treatment Tolerance: Patient tolerated treatment well  Medical Staff Made Aware: Yes  Plan:  Treatment Interventions: ADL retraining, Functional transfer training, UE strengthening/ROM, Endurance training, Compensatory technique education  OT Frequency: 3 times per week  OT Discharge Recommendations: Moderate intensity level of continued care  Equipment Recommended upon Discharge: Wheeled walker  OT Recommended Transfer Status: Assist of 1, Stand by assist  OT - OK to Discharge: Yes  Treatment Interventions: ADL retraining, Functional transfer training, UE strengthening/ROM, Endurance training, Compensatory technique education    Subjective   Previous Visit Info:  OT Last Visit  OT Received On: 03/21/25  General:  General  Reason for Referral: 84 y.o. female presenting with generalized weakness and +Flu A; not eating and drinking very well  Referred By: George Apodaca MD  Past Medical History Relevant to Rehab:   Past Medical History:   Diagnosis Date    Age-related nuclear cataract, right eye 03/31/2016    Age-related nuclear cataract of right eye    Anxiety disorder, unspecified 03/31/2014    Anxiety     Bunion of unspecified foot 11/26/2014    Bunion    Concussion 04/08/2021    Last Assessment & Plan: Formatting of this note might be different from the original. -concern for possible concussion on admission, but head/neck/throat pain more likely due to infectious etiology as noted above    Contusion of left forearm, initial encounter 08/16/2016    Contusion of left forearm    Contusion of left knee, initial encounter 08/09/2016    Contusion of knee, left    Cortical age-related cataract, right eye 03/31/2016    Cortical age-related cataract of right eye    Depression     Dermatochalasis of unspecified eye, unspecified eyelid 11/10/2017    Dermatochalasis    Dislocation of right shoulder joint     Essential (primary) hypertension 11/23/2022    Hypertension    Fracture of orbital floor, right side, initial encounter for open fracture (Multi) 06/27/2017    Open fracture of right orbital floor, initial encounter    Generalized anxiety disorder 11/26/2014    PIA (generalized anxiety disorder)    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    Herpes zoster 03/29/2023    History of cataract 06/11/2024    History of depression 06/11/2024    Hypertensive urgency 03/25/2016    Hypertensive urgency    Insomnia, unspecified 11/26/2014    Insomnia    Maxillary fracture, unspecified side, initial encounter for closed fracture (Multi) 06/21/2017    Fracture of maxillary sinus, closed, initial encounter    Primary osteoarthritis, 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    Primary osteoarthritis of left wrist    Stage III chronic kidney disease (Multi) 03/29/2023    Status post left knee replacement 09/02/2022     Prior to Session Communication: Bedside nurse  Patient Position Received: Bed, 3 rail up  Preferred Learning Style: auditory, kinesthetic, verbal, visual  General Comment: pt agreeable to OT  Precautions:  Medical Precautions: Fall precautions    Pain:  Pain Assessment  Pain Assessment: 0-10  0-10 (Numeric) Pain Score: 0 -  No pain    Objective    Cognition:  Cognition  Orientation Level: Oriented X4  Safety/Judgement: Exceptions to WFL     Activities of Daily Living: Grooming  Grooming Level of Assistance: Setup  Grooming Where Assessed: Chair  Grooming Comments: pt washed face seated in chair    UE Dressing  UE Dressing Comments: pt donned slicke standing EOB with assist posteriorly    Toileting  Toileting Level of Assistance: Distant supervision  Where Assessed: Toilet  Toileting Comments: pt completed tasks on toilet, SUP for safety     Bed Mobility/Transfers: Bed Mobility  Bed Mobility: Yes  Bed Mobility 1  Bed Mobility 1: Supine to sitting  Level of Assistance 1: Distant supervision  Bed Mobility Comments 1: pt able to perform with SUP for safety, increased time    Transfers  Transfer: Yes  Transfer 1  Technique 1: Sit to stand, Stand to sit  Transfer Device 1: Walker  Transfer Level of Assistance 1: Close supervision  Trials/Comments 1: cues for safety    Functional Mobility:  Functional Mobility  Functional Mobility Performed: Yes  Functional Mobility 1  Surface 1: Level tile  Device 1: Rolling walker  Assistance 1: Contact guard, Close supervision  Comments 1: cues to keep FWW closer to body for stability, pt able to carryover education, no LOB, pt ambulating bed>bathroom>chair       Standing Balance:  Static Standing Balance  Static Standing-Balance Support: Bilateral upper extremity supported  Static Standing-Level of Assistance: Close supervision    Outcome Measures:[facility] Daily Activity  Putting on and taking off regular lower body clothing: A little  Bathing (including washing, rinsing, drying): A little  Putting on and taking off regular upper body clothing: A little  Toileting, which includes using toilet, bedpan or urinal: None  Taking care of personal grooming such as brushing teeth: None  Eating Meals: None  Daily Activity - Total Score: 21    Education Documentation  Body Mechanics, taught by Tania Ortiz OT at  3/21/2025 12:13 PM.  Learner: Patient  Readiness: Acceptance  Method: Explanation, Demonstration  Response: Verbalizes Understanding, Needs Reinforcement    Precautions, taught by Tania Ortiz OT at 3/21/2025 12:13 PM.  Learner: Patient  Readiness: Acceptance  Method: Explanation, Demonstration  Response: Verbalizes Understanding, Needs Reinforcement    ADL Training, taught by Tania Ortiz OT at 3/21/2025 12:13 PM.  Learner: Patient  Readiness: Acceptance  Method: Explanation, Demonstration  Response: Verbalizes Understanding, Needs Reinforcement    Education Comments  No comments found.           Goals:  Encounter Problems       Encounter Problems (Active)       ADLs       Patient with complete lower body dressing with modified independent level of assistance donning and doffing all LE clothes. (Progressing)       Start:  03/17/25    Expected End:  03/31/25               MOBILITY       Patient will perform Functional mobility mod  Household distances/Community Distances with stand by assist level of assistance and least restrictive device in order to improve safety and functional mobility. (Progressing)       Start:  03/17/25    Expected End:  03/31/25

## 2025-03-21 NOTE — CARE PLAN
Problem: Skin  Goal: Promote skin healing  Outcome: Progressing   The patient's goals for the shift include to get some sleep    The clinical goals for the shift include saftey and rest    Over the shift, the patient did not make progress toward the following goals. Barriers to progression include . Recommendations to address these barriers include .promote good nutrition

## 2025-03-21 NOTE — NURSING NOTE
Patient given discharge instructions along with daughter. Verbalized understanding. Home per car in stable condition

## 2025-03-22 ENCOUNTER — HOME CARE VISIT (OUTPATIENT)
Dept: HOME HEALTH SERVICES | Facility: HOME HEALTH | Age: 85
End: 2025-03-22
Payer: MEDICARE

## 2025-03-22 NOTE — DISCHARGE SUMMARY
ADMISSION DATE: 3/15/2025     DISCHARGE DATE:  03/21/25     Discharge Diagnosis  Generalized weakness  Acute on chronic kidney injury improved and creatinine baseline to 1.2 acute on discharge  Hypertension: Blood pressures are stable,   Early dementia, patient is on donepezil 10 mg daily  Influenza A, recently treated with Tamiflu 2 weeks ago,  Treated with Tamiflu    Issues Requiring Follow-Up  Follow-up with PCP in 1 week        Discharge Meds     Your medication list        START taking these medications        Instructions Last Dose Given Next Dose Due   lidocaine 4 % patch      Place 1 patch over 12 hours on the skin once daily. Remove & discard patch within 12 hours or as directed by MD.              CONTINUE taking these medications        Instructions Last Dose Given Next Dose Due   amLODIPine 5 mg tablet  Commonly known as: Norvasc      Take 2 tablets (10 mg) by mouth once daily.       buPROPion  mg 24 hr tablet  Commonly known as: Wellbutrin XL      Take 1 tablet (300 mg) by mouth once daily.       cyanocobalamin 1,000 mcg tablet  Commonly known as: Vitamin B-12      Take 1 tablet (1,000 mcg) by mouth once daily.       donepezil 10 mg tablet  Commonly known as: Aricept      Take one tab daily       lisinopril 40 mg tablet      Take 1 tablet (40 mg) by mouth once daily.       melatonin 5 mg tablet      Take 1 tablet (5 mg) by mouth as needed at bedtime for sleep.       sennosides-docusate sodium 8.6-50 mg tablet  Commonly known as: Griselda-Colace      Take 1 tablet by mouth 2 times a day.                 Where to Get Your Medications        These medications were sent to [facility]/pharmacy #[id number] - [place], [place] - [address]  [address] [facility] [address]      Phone: [phone]   lidocaine 4 % patch             Results for orders placed or performed during the hospital encounter of 03/15/25 (from the past 24 hours)   Renal Function Panel   Result Value Ref Range    Glucose 72 (L) 74 - 99 mg/dL     Sodium 143 136 - 145 mmol/L    Potassium 4.0 3.5 - 5.3 mmol/L    Chloride 109 (H) 98 - 107 mmol/L    Bicarbonate 24 21 - 32 mmol/L    Anion Gap 14 10 - 20 mmol/L    Urea Nitrogen 14 6 - 23 mg/dL    Creatinine 1.20 (H) 0.50 - 1.05 mg/dL    eGFR 44 (L) >60 mL/min/1.73m*2    Calcium 8.4 (L) 8.6 - 10.3 mg/dL    Phosphorus 2.8 2.5 - 4.9 mg/dL    Albumin 2.9 (L) 3.4 - 5.0 g/dL   CBC   Result Value Ref Range    WBC 4.2 (L) 4.4 - 11.3 x10*3/uL    nRBC 0.0 0.0 - 0.0 /100 WBCs    RBC 3.93 (L) 4.00 - 5.20 x10*6/uL    Hemoglobin 10.6 (L) 12.0 - 16.0 g/dL    Hematocrit 32.6 (L) 36.0 - 46.0 %    MCV 83 80 - 100 fL    MCH 27.0 26.0 - 34.0 pg    MCHC 32.5 32.0 - 36.0 g/dL    RDW 14.7 (H) 11.5 - 14.5 %    Platelets 303 150 - 450 x10*3/uL            Hospital Course   Generalized weakness  84-year-old female with past medical history of hypertension, early dementia with some memory loss, osteoporosis, chronic kidney disease stage IV, coronary artery disease was admitted to [facility] last month with altered mentation which was thought to be secondary to metabolic encephalopathy and MRI of the brain was negative for acute stroke.  Subsequently patient went to skilled nursing facility for about 2 weeks and discharged home.  She was seen in the emergency room at [facility] on March 5, 2025 and was diagnosed with influenza A a prescribed Tamiflu and discharged home.  Patient was brought back to the emergency room by her family with increasing fatigue and patient not eating and drinking very well and feeling very weak.  She denied any abdominal pain nausea vomiting or diarrhea no chest pain or shortness of breath.  Her labs are satisfactory except for elevated creatinine of 1.91 on admission which has improved to 1.20 today.       1.  Generalized fatigue and tiredness and evidence of acute on chronic kidney disease disease stage IV  Serum creatinine is elevated at 1.91.  On admission which is improved to 1.20 today..  Overall significant improvement,  well-hydrated renal function is improved, ambulating better and is stable for discharge..     2.  Influenza A positive by nasal swab and she has been started on Tamiflu 30 mg daily which she took also 2 weeks ago.  Was started again on 10 including emergency room which has been discontinued since then since patient has had a full course of Tamiflu before.     3.  Hypertension, blood pressures are stable,     4.  Early dementia with moderate memory impairment, patient is  on donezepil. Was seen by neurology 3 weeks ago and her donezepil was increased to 10 mg daily.     5.  New onset left lower back and left hip pain started improved with lidocaine patch has been provided.     Reviewed all labs and imaging studies and discussed the plan of treatment with patient in detail.      Spoke to patient's daughter Antonia on March 21, 2025 and updated her with the patient's condition.  Stable for discharge home today.     I spent 35 minutes in the professional and overall care of this patient.    Pertinent Physical Exam At Time of Discharge  GENERAL: Awake, alert,moderate distress, cooperative  Sitting out in chair, holding in the left hip and left lower lumbar region and a lidocaine patch has been applied.  SKIN: Skin color, texture, turgor normal. No rashes or lesions..  OROPHARYNX: Lips, mucosa, and tongue normal. Teeth and gums normal. Oropharynx normal.  NECK: no jugulovenous distention, no carotid bruits, carotid pulse normal contour, supple  BACK: Tender left lower costovertebral angle as well as left hip and left lumbar region has lidocaine patch on.  LUNGS: Lungs clear to auscultation. Good diaphragmatic excursion.  CARDIAC: Rate and rhythm is regular.  Normal S1 and S2; no rubs, murmurs, or gallops  ABDOMEN: Abdomen soft, non-tender. BS normal. No masses or organomegaly.  EXTREMITIES: Extremities normal. No deformities, edema, clubbing or skin discoloration.  NEURO: Gait not examined.  Nonfocal.    PULSES: 2+  "radial, 2+ carotid  Visit Vitals  /78 (BP Location: Left arm, Patient Position: Lying)   Pulse 83   Temp 36.5 °C (97.7 °F) (Temporal)   Resp 16   Ht 1.626 m (5' 4.02\")   Wt 51 kg (112 lb 8 oz)   SpO2 96%   BMI 19.30 kg/m²   OB Status Postmenopausal   Smoking Status Former   BSA 1.52 m²          Outpatient Follow-Up  Future Appointments   Date Time Provider Department Center   3/22/2025 12:30 PM Camila Grey RN [facility]   3/24/2025 To Be Determined Graciela Mosquera, PT [facility]   3/24/2025 To Be Determined Tammie Wright, OT [facility]   5/19/2025  2:00 PM Carolina Gómez MD [id number] [facility]   8/29/2025  1:30 PM Miguel Giles MD [id number] [facility]         George Apodaca MD  "

## 2025-03-24 ENCOUNTER — PATIENT OUTREACH (OUTPATIENT)
Dept: PRIMARY CARE | Facility: CLINIC | Age: 85
End: 2025-03-24
Payer: MEDICARE

## 2025-03-24 NOTE — PROGRESS NOTES
Outreach made for post-discharge follow up. Spoke with patient's daughter Antonia. Per caregiver, the patient does not want of continue with Dr. West for primary care. However, the patient has not chosen a new provider. Gave caregiver the   phone number to assist with finding a new provider. Also advised to call Dr. West's office directly to schedule a hospital follow up if the patient changes her mind.     Discharge Facility: [facility]  Discharge Diagnosis: Generalized weakness   Admission Date: 3/16/2025  Discharge Date: 3/21/2025    PCP Appointment Date: Declined to schedule  Specialist Appointment Date: Appointment with Carolina Gómez MD (05/19/2025)  Nephrology  Hospital Encounter and Summary Linked: Yes  ED to Hosp-Admission (Discharged) with George Apodaca MD; Romero Chavez MD; Steffen Simerlink, MD (03/15/2025)

## 2025-03-25 ENCOUNTER — HOME CARE VISIT (OUTPATIENT)
Dept: HOME HEALTH SERVICES | Facility: HOME HEALTH | Age: 85
End: 2025-03-25
Payer: MEDICARE

## 2025-03-25 VITALS
OXYGEN SATURATION: 97 % | RESPIRATION RATE: 16 BRPM | SYSTOLIC BLOOD PRESSURE: 127 MMHG | HEART RATE: 68 BPM | HEIGHT: 64 IN | BODY MASS INDEX: 19.31 KG/M2 | DIASTOLIC BLOOD PRESSURE: 78 MMHG | TEMPERATURE: 97.2 F

## 2025-03-25 PROCEDURE — G0299 HHS/HOSPICE OF RN EA 15 MIN: HCPCS | Mod: HHH

## 2025-03-25 ASSESSMENT — ENCOUNTER SYMPTOMS
DENIES PAIN: 1
PAIN SEVERITY GOAL: 0/10
FORGETFULNESS: 1
MUSCLE WEAKNESS: 1
CHANGE IN APPETITE: DECREASED
APPETITE LEVEL: FAIR
PERSON REPORTING PAIN: PATIENT
COUGH: 1
LIMITED RANGE OF MOTION: 1
CONSTIPATION: 1

## 2025-03-25 ASSESSMENT — ACTIVITIES OF DAILY LIVING (ADL)
AMBULATION ASSISTANCE: STAND BY ASSIST
CURRENT_FUNCTION: STAND BY ASSIST
OASIS_M1830: 03
ENTERING_EXITING_HOME: MODERATE ASSIST

## 2025-03-26 DIAGNOSIS — R53.1 GENERALIZED WEAKNESS: ICD-10-CM

## 2025-03-27 ENCOUNTER — HOME CARE VISIT (OUTPATIENT)
Dept: HOME HEALTH SERVICES | Facility: HOME HEALTH | Age: 85
End: 2025-03-27
Payer: MEDICARE

## 2025-03-27 PROCEDURE — G0151 HHCP-SERV OF PT,EA 15 MIN: HCPCS | Mod: HHH

## 2025-03-28 ENCOUNTER — TELEPHONE (OUTPATIENT)
Dept: PRIMARY CARE | Facility: CLINIC | Age: 85
End: 2025-03-28

## 2025-03-29 ENCOUNTER — HOME CARE VISIT (OUTPATIENT)
Dept: HOME HEALTH SERVICES | Facility: HOME HEALTH | Age: 85
End: 2025-03-29
Payer: MEDICARE

## 2025-03-29 VITALS
WEIGHT: 124 LBS | SYSTOLIC BLOOD PRESSURE: 142 MMHG | OXYGEN SATURATION: 100 % | TEMPERATURE: 98.6 F | HEART RATE: 68 BPM | HEIGHT: 64 IN | BODY MASS INDEX: 21.17 KG/M2 | DIASTOLIC BLOOD PRESSURE: 72 MMHG | RESPIRATION RATE: 14 BRPM

## 2025-03-29 PROCEDURE — G0152 HHCP-SERV OF OT,EA 15 MIN: HCPCS | Mod: HHH

## 2025-03-29 SDOH — HEALTH STABILITY: PHYSICAL HEALTH: EXERCISE TYPE: INITIATED HEP

## 2025-03-29 ASSESSMENT — PAIN SCALES - PAIN ASSESSMENT IN ADVANCED DEMENTIA (PAINAD)
BREATHING: 0
NEGVOCALIZATION: 1 - OCCASIONAL MOAN OR GROAN. LOW-LEVEL SPEECH WITH A NEGATIVE OR DISAPPROVING QUALITY.
CONSOLABILITY: 0
NEGVOCALIZATION: 1
TOTALSCORE: 1
BODYLANGUAGE: 0
CONSOLABILITY: 0 - NO NEED TO CONSOLE.
FACIALEXPRESSION: 0
BODYLANGUAGE: 0 - RELAXED.
FACIALEXPRESSION: 0 - SMILING OR INEXPRESSIVE.

## 2025-03-29 ASSESSMENT — ENCOUNTER SYMPTOMS
PAIN LOCATION - PAIN SEVERITY: 4/10
PAIN LOCATION: ABDOMEN
PAIN LOCATION - PAIN QUALITY: ACHING
OCCASIONAL FEELINGS OF UNSTEADINESS: 0
SUBJECTIVE PAIN PROGRESSION: GRADUALLY IMPROVING
HIGHEST PAIN SEVERITY IN PAST 24 HOURS: 4/10
LOWEST PAIN SEVERITY IN PAST 24 HOURS: 2/10

## 2025-03-29 ASSESSMENT — ACTIVITIES OF DAILY LIVING (ADL)
AMBULATION ASSISTANCE ON UNEVEN SURFACES: 1
AMBULATION ASSISTANCE ON FLAT SURFACES: 1

## 2025-03-30 VITALS
HEART RATE: 91 BPM | RESPIRATION RATE: 16 BRPM | OXYGEN SATURATION: 98 % | SYSTOLIC BLOOD PRESSURE: 132 MMHG | DIASTOLIC BLOOD PRESSURE: 80 MMHG | TEMPERATURE: 98.9 F

## 2025-03-30 ASSESSMENT — ENCOUNTER SYMPTOMS
AGGRESSION WITHIN DEFINED LIMITS: 1
ANGER WITHIN DEFINED LIMITS: 1
DYSPNEA ACTIVITY LEVEL: AFTER AMBULATING LESS THAN 10 FT
PAIN SEVERITY GOAL: 0/10
SHORTNESS OF BREATH: 1
PERSON REPORTING PAIN: PATIENT

## 2025-03-30 ASSESSMENT — PAIN SCALES - PAIN ASSESSMENT IN ADVANCED DEMENTIA (PAINAD)
BREATHING: 1
CONSOLABILITY: 0 - NO NEED TO CONSOLE.
CONSOLABILITY: 0
FACIALEXPRESSION: 0
NEGVOCALIZATION: 0
BODYLANGUAGE: 1 - TENSE. DISTRESSED PACING. FIDGETING.
TOTALSCORE: 2
BODYLANGUAGE: 1
NEGVOCALIZATION: 0 - NONE.
FACIALEXPRESSION: 0 - SMILING OR INEXPRESSIVE.

## 2025-03-30 ASSESSMENT — ACTIVITIES OF DAILY LIVING (ADL)
ORAL_CARE_STANDBY_ASSIST: 1
FEEDING_WITHIN_DEFINED_LIMITS: 1
SPONGING_UB_CURRENT_FUNCTION: STAND BY ASSIST
SPONGING_LB_CURRENT_FUNCTION: STAND BY ASSIST

## 2025-03-31 ASSESSMENT — ENCOUNTER SYMPTOMS
PAIN LOCATION - PAIN SEVERITY: 5/10
PAIN: 1
SUBJECTIVE PAIN PROGRESSION: GRADUALLY IMPROVING
HIGHEST PAIN SEVERITY IN PAST 24 HOURS: 5/10
PAIN LOCATION - PAIN DURATION: 2 HOURS
PAIN LOCATION - RELIEVING FACTORS: UNLOADING
PAIN LOCATION - EXACERBATING FACTORS: LOADING
LOSS OF SENSATION IN FEET: 0
PAIN LOCATION - PAIN QUALITY: SORENESS
MUSCLE WEAKNESS: 1
PAIN LOCATION - PAIN FREQUENCY: FREQUENT
PAIN LOCATION: ABDOMEN
LOWEST PAIN SEVERITY IN PAST 24 HOURS: 5/10
DEPRESSION: 0

## 2025-03-31 ASSESSMENT — ACTIVITIES OF DAILY LIVING (ADL)
GROOMING ASSESSED: 1
AMBULATION ASSISTANCE: 1
GROOMING_CURRENT_FUNCTION: CONTACT GUARD ASSIST
AMBULATION ASSISTANCE: STAND BY ASSIST
PHYSICAL TRANSFERS ASSESSED: 1
CURRENT_FUNCTION: CONTACT GUARD ASSIST
CURRENT_FUNCTION: STAND BY ASSIST

## 2025-03-31 NOTE — CASE COMMUNICATION
Patient was seen in home for PT evaluation visit and treatment.Patient is alert and oriented with mild cognitive deficits.Dtr present for visit.Daughter is quite concerned about large number of stairs in the home.Although patient has mild weakness,has no problem negotiating stairs.Patient will be seen for 2 weeks to instruct in HEP and home safety

## 2025-04-02 ENCOUNTER — HOME CARE VISIT (OUTPATIENT)
Dept: HOME HEALTH SERVICES | Facility: HOME HEALTH | Age: 85
End: 2025-04-02
Payer: MEDICARE

## 2025-04-02 VITALS
HEART RATE: 73 BPM | RESPIRATION RATE: 18 BRPM | OXYGEN SATURATION: 98 % | SYSTOLIC BLOOD PRESSURE: 138 MMHG | TEMPERATURE: 97.6 F | DIASTOLIC BLOOD PRESSURE: 86 MMHG

## 2025-04-02 PROCEDURE — G0300 HHS/HOSPICE OF LPN EA 15 MIN: HCPCS | Mod: HHH

## 2025-04-02 ASSESSMENT — PAIN SCALES - PAIN ASSESSMENT IN ADVANCED DEMENTIA (PAINAD)
FACIALEXPRESSION: 0 - SMILING OR INEXPRESSIVE.
FACIALEXPRESSION: 0
TOTALSCORE: 0
BREATHING: 0
BODYLANGUAGE: 0
BODYLANGUAGE: 0 - RELAXED.
CONSOLABILITY: 0 - NO NEED TO CONSOLE.
NEGVOCALIZATION: 0
CONSOLABILITY: 0
NEGVOCALIZATION: 0 - NONE.

## 2025-04-02 ASSESSMENT — ENCOUNTER SYMPTOMS
PERSON REPORTING PAIN: PATIENT
LAST BOWEL MOVEMENT: 67295
DENIES PAIN: 1
CHANGE IN APPETITE: UNCHANGED
APPETITE LEVEL: FAIR

## 2025-04-03 ENCOUNTER — HOME CARE VISIT (OUTPATIENT)
Dept: HOME HEALTH SERVICES | Facility: HOME HEALTH | Age: 85
End: 2025-04-03
Payer: MEDICARE

## 2025-04-03 VITALS
HEART RATE: 82 BPM | TEMPERATURE: 98.6 F | RESPIRATION RATE: 14 BRPM | DIASTOLIC BLOOD PRESSURE: 68 MMHG | SYSTOLIC BLOOD PRESSURE: 122 MMHG

## 2025-04-03 DIAGNOSIS — G31.84 AMNESTIC MCI (MILD COGNITIVE IMPAIRMENT WITH MEMORY LOSS): Primary | ICD-10-CM

## 2025-04-03 PROCEDURE — G0151 HHCP-SERV OF PT,EA 15 MIN: HCPCS | Mod: HHH

## 2025-04-03 SDOH — HEALTH STABILITY: PHYSICAL HEALTH: EXERCISE TYPE: INSTRUCTED PATIENT IN HEP  FOR GENERAL STRENGTHENING

## 2025-04-03 ASSESSMENT — ENCOUNTER SYMPTOMS
PAIN LOCATION - PAIN SEVERITY: 7/10
SUBJECTIVE PAIN PROGRESSION: UNCHANGED
LOWEST PAIN SEVERITY IN PAST 24 HOURS: 5/10
MUSCLE WEAKNESS: 1
PAIN LOCATION - EXACERBATING FACTORS: WBING
PAIN LOCATION: LEFT HIP
DENIES PAIN: 1
PAIN LOCATION - PAIN FREQUENCY: CONSTANT
PAIN LOCATION - PAIN QUALITY: ACHE
HIGHEST PAIN SEVERITY IN PAST 24 HOURS: 7/10
PERSON REPORTING PAIN: PATIENT
OCCASIONAL FEELINGS OF UNSTEADINESS: 0

## 2025-04-03 ASSESSMENT — PAIN SCALES - PAIN ASSESSMENT IN ADVANCED DEMENTIA (PAINAD)
TOTALSCORE: 0
FACIALEXPRESSION: 0
BODYLANGUAGE: 0
BREATHING: 0
NEGVOCALIZATION: 0 - NONE.
NEGVOCALIZATION: 0
CONSOLABILITY: 0
CONSOLABILITY: 0 - NO NEED TO CONSOLE.
BODYLANGUAGE: 0 - RELAXED.
FACIALEXPRESSION: 0 - SMILING OR INEXPRESSIVE.

## 2025-04-03 ASSESSMENT — ACTIVITIES OF DAILY LIVING (ADL)
AMBULATION ASSISTANCE: STAND BY ASSIST
AMBULATION ASSISTANCE ON FLAT SURFACES: 1
AMBULATION ASSISTANCE ON UNEVEN SURFACES: 1

## 2025-04-03 NOTE — CASE COMMUNICATION
Patient was seen for Pt seen for follow up visit and treatment.  patient currently non compliant with meds,several days of pillbox untouched,including today.

## 2025-04-09 ENCOUNTER — HOME CARE VISIT (OUTPATIENT)
Dept: HOME HEALTH SERVICES | Facility: HOME HEALTH | Age: 85
End: 2025-04-09
Payer: MEDICARE

## 2025-04-09 VITALS
HEART RATE: 78 BPM | TEMPERATURE: 97.3 F | SYSTOLIC BLOOD PRESSURE: 104 MMHG | RESPIRATION RATE: 16 BRPM | DIASTOLIC BLOOD PRESSURE: 68 MMHG | OXYGEN SATURATION: 98 %

## 2025-04-09 PROCEDURE — G0299 HHS/HOSPICE OF RN EA 15 MIN: HCPCS | Mod: HHH

## 2025-04-09 PROCEDURE — G0158 HHC OT ASSISTANT EA 15: HCPCS | Mod: CO,HHH

## 2025-04-09 ASSESSMENT — ENCOUNTER SYMPTOMS
CHANGE IN APPETITE: DECREASED
APPETITE LEVEL: POOR
LIMITED RANGE OF MOTION: 1
CONSTIPATION: 1
MUSCLE WEAKNESS: 1
STOOL FREQUENCY: LESS THAN DAILY

## 2025-04-09 ASSESSMENT — ACTIVITIES OF DAILY LIVING (ADL)
CURRENT_FUNCTION: ONE PERSON
AMBULATION ASSISTANCE: ONE PERSON

## 2025-04-10 ENCOUNTER — HOME CARE VISIT (OUTPATIENT)
Dept: HOME HEALTH SERVICES | Facility: HOME HEALTH | Age: 85
End: 2025-04-10
Payer: MEDICARE

## 2025-04-10 VITALS
TEMPERATURE: 98.2 F | SYSTOLIC BLOOD PRESSURE: 122 MMHG | HEART RATE: 77 BPM | RESPIRATION RATE: 16 BRPM | DIASTOLIC BLOOD PRESSURE: 72 MMHG

## 2025-04-10 VITALS — HEART RATE: 75 BPM | OXYGEN SATURATION: 98 % | TEMPERATURE: 98.6 F

## 2025-04-10 PROCEDURE — G0158 HHC OT ASSISTANT EA 15: HCPCS | Mod: CO,HHH

## 2025-04-10 PROCEDURE — G0151 HHCP-SERV OF PT,EA 15 MIN: HCPCS | Mod: HHH

## 2025-04-10 SDOH — HEALTH STABILITY: PHYSICAL HEALTH: EXERCISE TYPE: GENERAL STRENGTHENING

## 2025-04-10 ASSESSMENT — PAIN SCALES - PAIN ASSESSMENT IN ADVANCED DEMENTIA (PAINAD)
FACIALEXPRESSION: 0 - SMILING OR INEXPRESSIVE.
CONSOLABILITY: 0
FACIALEXPRESSION: 0
TOTALSCORE: 0
NEGVOCALIZATION: 0 - NONE.
BODYLANGUAGE: 0
NEGVOCALIZATION: 0
CONSOLABILITY: 0 - NO NEED TO CONSOLE.
BREATHING: 0
BODYLANGUAGE: 0 - RELAXED.

## 2025-04-10 ASSESSMENT — ENCOUNTER SYMPTOMS
DEPRESSION: 0
PERSON REPORTING PAIN: PATIENT
OCCASIONAL FEELINGS OF UNSTEADINESS: 0
LOSS OF SENSATION IN FEET: 0
DENIES PAIN: 1

## 2025-04-10 ASSESSMENT — ACTIVITIES OF DAILY LIVING (ADL)
AMBULATION_DISTANCE/DURATION_TOLERATED: INDEP WITHOUT DEVICE
AMBULATION ASSISTANCE ON FLAT SURFACES: 1
AMBULATION ASSISTANCE ON UNEVEN SURFACES: 1

## 2025-04-15 ENCOUNTER — HOME CARE VISIT (OUTPATIENT)
Dept: HOME HEALTH SERVICES | Facility: HOME HEALTH | Age: 85
End: 2025-04-15
Payer: MEDICARE

## 2025-04-15 VITALS — HEART RATE: 69 BPM | DIASTOLIC BLOOD PRESSURE: 81 MMHG | SYSTOLIC BLOOD PRESSURE: 140 MMHG

## 2025-04-15 PROCEDURE — G0158 HHC OT ASSISTANT EA 15: HCPCS | Mod: CO,HHH

## 2025-04-16 DIAGNOSIS — I16.0 HYPERTENSIVE URGENCY: ICD-10-CM

## 2025-04-17 RX ORDER — AMLODIPINE BESYLATE 5 MG/1
10 TABLET ORAL DAILY
Qty: 60 TABLET | Refills: 0 | OUTPATIENT
Start: 2025-04-17

## 2025-04-17 RX ORDER — LISINOPRIL 40 MG/1
40 TABLET ORAL DAILY
Qty: 30 TABLET | Refills: 0 | OUTPATIENT
Start: 2025-04-17

## 2025-04-18 ENCOUNTER — HOME CARE VISIT (OUTPATIENT)
Dept: HOME HEALTH SERVICES | Facility: HOME HEALTH | Age: 85
End: 2025-04-18
Payer: MEDICARE

## 2025-04-18 PROCEDURE — G0152 HHCP-SERV OF OT,EA 15 MIN: HCPCS | Mod: HHH

## 2025-04-19 VITALS
DIASTOLIC BLOOD PRESSURE: 60 MMHG | SYSTOLIC BLOOD PRESSURE: 120 MMHG | OXYGEN SATURATION: 98 % | RESPIRATION RATE: 16 BRPM | HEART RATE: 92 BPM

## 2025-04-19 ASSESSMENT — PAIN SCALES - PAIN ASSESSMENT IN ADVANCED DEMENTIA (PAINAD)
FACIALEXPRESSION: 0 - SMILING OR INEXPRESSIVE.
CONSOLABILITY: 0
BODYLANGUAGE: 0
NEGVOCALIZATION: 0
BREATHING: 1
BODYLANGUAGE: 0 - RELAXED.
NEGVOCALIZATION: 0 - NONE.
CONSOLABILITY: 0 - NO NEED TO CONSOLE.
FACIALEXPRESSION: 0
TOTALSCORE: 1

## 2025-04-19 ASSESSMENT — ENCOUNTER SYMPTOMS
DENIES PAIN: 1
PERSON REPORTING PAIN: PATIENT

## 2025-04-21 ASSESSMENT — ENCOUNTER SYMPTOMS
AGGRESSION WITHIN DEFINED LIMITS: 1
ANGER WITHIN DEFINED LIMITS: 1

## 2025-04-21 ASSESSMENT — ACTIVITIES OF DAILY LIVING (ADL)
BATHING ASSESSED: 1
FEEDING_WITHIN_DEFINED_LIMITS: 1
PREPARING MEALS: INDEPENDENT
GROOMING_WITHIN_DEFINED_LIMITS: 1
CURRENT_FUNCTION: INDEPENDENT
HOUSEKEEPING ASSESSED: 1
BATHING_CURRENT_FUNCTION: INDEPENDENT
AMBULATION ASSISTANCE: INDEPENDENT
PHYSICAL TRANSFERS ASSESSED: 1
LIGHT HOUSEKEEPING: INDEPENDENT
GROOMING ASSESSED: 1
OASIS_M1830: 00
BATHING_WITHIN_DEFINED_LIMITS: 1
DRESSING_LB_CURRENT_FUNCTION: INDEPENDENT
HOME_HEALTH_OASIS: 00
GROOMING_CURRENT_FUNCTION: INDEPENDENT
AMBULATION ASSISTANCE: 1

## 2025-05-01 DIAGNOSIS — I16.0 HYPERTENSIVE URGENCY: ICD-10-CM

## 2025-05-02 RX ORDER — LISINOPRIL 40 MG/1
40 TABLET ORAL DAILY
Qty: 90 TABLET | Refills: 0 | Status: SHIPPED | OUTPATIENT
Start: 2025-05-02

## 2025-05-02 RX ORDER — AMLODIPINE BESYLATE 5 MG/1
10 TABLET ORAL DAILY
Qty: 180 TABLET | Refills: 0 | Status: SHIPPED | OUTPATIENT
Start: 2025-05-02

## 2025-05-16 ENCOUNTER — APPOINTMENT (OUTPATIENT)
Dept: CARDIOLOGY | Facility: HOSPITAL | Age: 85
DRG: 291 | End: 2025-05-16
Payer: MEDICARE

## 2025-05-16 ENCOUNTER — HOSPITAL ENCOUNTER (INPATIENT)
Facility: HOSPITAL | Age: 85
DRG: 291 | End: 2025-05-16
Attending: EMERGENCY MEDICINE
Payer: MEDICARE

## 2025-05-16 ENCOUNTER — APPOINTMENT (OUTPATIENT)
Dept: RADIOLOGY | Facility: HOSPITAL | Age: 85
DRG: 291 | End: 2025-05-16
Payer: MEDICARE

## 2025-05-16 DIAGNOSIS — I50.9 CONGESTIVE HEART FAILURE, UNSPECIFIED HF CHRONICITY, UNSPECIFIED HEART FAILURE TYPE: Primary | ICD-10-CM

## 2025-05-16 DIAGNOSIS — R60.0 LOWER EXTREMITY EDEMA: ICD-10-CM

## 2025-05-16 LAB
ALBUMIN SERPL BCP-MCNC: 3.7 G/DL (ref 3.4–5)
ALP SERPL-CCNC: 62 U/L (ref 33–136)
ALT SERPL W P-5'-P-CCNC: 11 U/L (ref 7–45)
ANION GAP SERPL CALC-SCNC: 14 MMOL/L (ref 10–20)
AST SERPL W P-5'-P-CCNC: 16 U/L (ref 9–39)
BASOPHILS # BLD AUTO: 0.03 X10*3/UL (ref 0–0.1)
BASOPHILS NFR BLD AUTO: 0.6 %
BILIRUB SERPL-MCNC: 0.3 MG/DL (ref 0–1.2)
BNP SERPL-MCNC: 306 PG/ML (ref 0–99)
BUN SERPL-MCNC: 18 MG/DL (ref 6–23)
CALCIUM SERPL-MCNC: 8.9 MG/DL (ref 8.6–10.3)
CARDIAC TROPONIN I PNL SERPL HS: 13 NG/L (ref 0–13)
CHLORIDE SERPL-SCNC: 113 MMOL/L (ref 98–107)
CO2 SERPL-SCNC: 20 MMOL/L (ref 21–32)
CREAT SERPL-MCNC: 1.23 MG/DL (ref 0.5–1.05)
EGFRCR SERPLBLD CKD-EPI 2021: 43 ML/MIN/1.73M*2
EOSINOPHIL # BLD AUTO: 0.07 X10*3/UL (ref 0–0.4)
EOSINOPHIL NFR BLD AUTO: 1.3 %
ERYTHROCYTE [DISTWIDTH] IN BLOOD BY AUTOMATED COUNT: 17.4 % (ref 11.5–14.5)
GLUCOSE SERPL-MCNC: 111 MG/DL (ref 74–99)
HCT VFR BLD AUTO: 30.7 % (ref 36–46)
HGB BLD-MCNC: 9.6 G/DL (ref 12–16)
IMM GRANULOCYTES # BLD AUTO: 0.02 X10*3/UL (ref 0–0.5)
IMM GRANULOCYTES NFR BLD AUTO: 0.4 % (ref 0–0.9)
LYMPHOCYTES # BLD AUTO: 1.06 X10*3/UL (ref 0.8–3)
LYMPHOCYTES NFR BLD AUTO: 19.9 %
MCH RBC QN AUTO: 27.6 PG (ref 26–34)
MCHC RBC AUTO-ENTMCNC: 31.3 G/DL (ref 32–36)
MCV RBC AUTO: 88 FL (ref 80–100)
MONOCYTES # BLD AUTO: 0.48 X10*3/UL (ref 0.05–0.8)
MONOCYTES NFR BLD AUTO: 9 %
NEUTROPHILS # BLD AUTO: 3.66 X10*3/UL (ref 1.6–5.5)
NEUTROPHILS NFR BLD AUTO: 68.8 %
NRBC BLD-RTO: 0 /100 WBCS (ref 0–0)
PLATELET # BLD AUTO: 345 X10*3/UL (ref 150–450)
POTASSIUM SERPL-SCNC: 3.6 MMOL/L (ref 3.5–5.3)
PROT SERPL-MCNC: 5.4 G/DL (ref 6.4–8.2)
RBC # BLD AUTO: 3.48 X10*6/UL (ref 4–5.2)
SODIUM SERPL-SCNC: 143 MMOL/L (ref 136–145)
WBC # BLD AUTO: 5.3 X10*3/UL (ref 4.4–11.3)

## 2025-05-16 PROCEDURE — 99285 EMERGENCY DEPT VISIT HI MDM: CPT | Mod: 25 | Performed by: EMERGENCY MEDICINE

## 2025-05-16 PROCEDURE — 2500000005 HC RX 250 GENERAL PHARMACY W/O HCPCS

## 2025-05-16 PROCEDURE — 93005 ELECTROCARDIOGRAM TRACING: CPT

## 2025-05-16 PROCEDURE — 2500000001 HC RX 250 WO HCPCS SELF ADMINISTERED DRUGS (ALT 637 FOR MEDICARE OP)

## 2025-05-16 PROCEDURE — 1200000002 HC GENERAL ROOM WITH TELEMETRY DAILY

## 2025-05-16 PROCEDURE — 71046 X-RAY EXAM CHEST 2 VIEWS: CPT

## 2025-05-16 PROCEDURE — 36415 COLL VENOUS BLD VENIPUNCTURE: CPT

## 2025-05-16 PROCEDURE — 84075 ASSAY ALKALINE PHOSPHATASE: CPT

## 2025-05-16 PROCEDURE — 85025 COMPLETE CBC W/AUTO DIFF WBC: CPT

## 2025-05-16 PROCEDURE — 2500000004 HC RX 250 GENERAL PHARMACY W/ HCPCS (ALT 636 FOR OP/ED): Mod: JZ

## 2025-05-16 PROCEDURE — 84484 ASSAY OF TROPONIN QUANT: CPT

## 2025-05-16 PROCEDURE — 71046 X-RAY EXAM CHEST 2 VIEWS: CPT | Performed by: RADIOLOGY

## 2025-05-16 PROCEDURE — 99223 1ST HOSP IP/OBS HIGH 75: CPT | Performed by: INTERNAL MEDICINE

## 2025-05-16 PROCEDURE — 83880 ASSAY OF NATRIURETIC PEPTIDE: CPT

## 2025-05-16 RX ORDER — ENOXAPARIN SODIUM 100 MG/ML
30 INJECTION SUBCUTANEOUS EVERY 24 HOURS
Status: DISCONTINUED | OUTPATIENT
Start: 2025-05-17 | End: 2025-05-20 | Stop reason: HOSPADM

## 2025-05-16 RX ORDER — LISINOPRIL 20 MG/1
40 TABLET ORAL DAILY
Status: DISCONTINUED | OUTPATIENT
Start: 2025-05-17 | End: 2025-05-20 | Stop reason: HOSPADM

## 2025-05-16 RX ORDER — ACETAMINOPHEN 160 MG/5ML
650 SOLUTION ORAL EVERY 4 HOURS PRN
Status: DISCONTINUED | OUTPATIENT
Start: 2025-05-16 | End: 2025-05-20 | Stop reason: HOSPADM

## 2025-05-16 RX ORDER — ACETAMINOPHEN 650 MG/1
650 SUPPOSITORY RECTAL EVERY 4 HOURS PRN
Status: DISCONTINUED | OUTPATIENT
Start: 2025-05-16 | End: 2025-05-20 | Stop reason: HOSPADM

## 2025-05-16 RX ORDER — ONDANSETRON 4 MG/1
4 TABLET, FILM COATED ORAL EVERY 8 HOURS PRN
Status: DISCONTINUED | OUTPATIENT
Start: 2025-05-16 | End: 2025-05-20 | Stop reason: HOSPADM

## 2025-05-16 RX ORDER — POLYETHYLENE GLYCOL 3350 17 G/17G
17 POWDER, FOR SOLUTION ORAL DAILY
Status: DISCONTINUED | OUTPATIENT
Start: 2025-05-16 | End: 2025-05-20 | Stop reason: HOSPADM

## 2025-05-16 RX ORDER — DONEPEZIL HYDROCHLORIDE 5 MG/1
10 TABLET, FILM COATED ORAL NIGHTLY
Status: DISCONTINUED | OUTPATIENT
Start: 2025-05-16 | End: 2025-05-20 | Stop reason: HOSPADM

## 2025-05-16 RX ORDER — AMLODIPINE BESYLATE 10 MG/1
10 TABLET ORAL DAILY
Status: DISCONTINUED | OUTPATIENT
Start: 2025-05-16 | End: 2025-05-17

## 2025-05-16 RX ORDER — FUROSEMIDE 10 MG/ML
40 INJECTION INTRAMUSCULAR; INTRAVENOUS ONCE
Status: COMPLETED | OUTPATIENT
Start: 2025-05-16 | End: 2025-05-16

## 2025-05-16 RX ORDER — ACETAMINOPHEN 325 MG/1
650 TABLET ORAL EVERY 4 HOURS PRN
Status: DISCONTINUED | OUTPATIENT
Start: 2025-05-16 | End: 2025-05-20 | Stop reason: HOSPADM

## 2025-05-16 RX ORDER — ONDANSETRON HYDROCHLORIDE 2 MG/ML
4 INJECTION, SOLUTION INTRAVENOUS EVERY 8 HOURS PRN
Status: DISCONTINUED | OUTPATIENT
Start: 2025-05-16 | End: 2025-05-20 | Stop reason: HOSPADM

## 2025-05-16 RX ORDER — TALC
3 POWDER (GRAM) TOPICAL NIGHTLY PRN
Status: DISCONTINUED | OUTPATIENT
Start: 2025-05-16 | End: 2025-05-20 | Stop reason: HOSPADM

## 2025-05-16 RX ORDER — AMOXICILLIN 250 MG
1 CAPSULE ORAL 2 TIMES DAILY
Status: DISCONTINUED | OUTPATIENT
Start: 2025-05-16 | End: 2025-05-20 | Stop reason: HOSPADM

## 2025-05-16 RX ORDER — BUPROPION HYDROCHLORIDE 150 MG/1
300 TABLET ORAL DAILY
Status: DISCONTINUED | OUTPATIENT
Start: 2025-05-17 | End: 2025-05-20 | Stop reason: HOSPADM

## 2025-05-16 RX ORDER — GABAPENTIN 100 MG/1
100 CAPSULE ORAL 2 TIMES DAILY
Status: DISCONTINUED | OUTPATIENT
Start: 2025-05-16 | End: 2025-05-20 | Stop reason: HOSPADM

## 2025-05-16 RX ADMIN — AMLODIPINE BESYLATE 10 MG: 10 TABLET ORAL at 17:51

## 2025-05-16 RX ADMIN — FUROSEMIDE 40 MG: 10 INJECTION, SOLUTION INTRAMUSCULAR; INTRAVENOUS at 17:51

## 2025-05-16 RX ADMIN — GABAPENTIN 100 MG: 100 CAPSULE ORAL at 23:23

## 2025-05-16 RX ADMIN — DONEPEZIL HYDROCHLORIDE 10 MG: 5 TABLET ORAL at 23:23

## 2025-05-16 RX ADMIN — MELATONIN TAB 3 MG 3 MG: 3 TAB at 23:24

## 2025-05-16 RX ADMIN — SENNOSIDES AND DOCUSATE SODIUM 1 TABLET: 50; 8.6 TABLET ORAL at 23:23

## 2025-05-16 SDOH — SOCIAL STABILITY: SOCIAL INSECURITY: WERE YOU ABLE TO COMPLETE ALL THE BEHAVIORAL HEALTH SCREENINGS?: YES

## 2025-05-16 SDOH — ECONOMIC STABILITY: FOOD INSECURITY: WITHIN THE PAST 12 MONTHS, YOU WORRIED THAT YOUR FOOD WOULD RUN OUT BEFORE YOU GOT THE MONEY TO BUY MORE.: NEVER TRUE

## 2025-05-16 SDOH — ECONOMIC STABILITY: FOOD INSECURITY: WITHIN THE PAST 12 MONTHS, THE FOOD YOU BOUGHT JUST DIDN'T LAST AND YOU DIDN'T HAVE MONEY TO GET MORE.: NEVER TRUE

## 2025-05-16 SDOH — SOCIAL STABILITY: SOCIAL INSECURITY: ARE THERE ANY APPARENT SIGNS OF INJURIES/BEHAVIORS THAT COULD BE RELATED TO ABUSE/NEGLECT?: NO

## 2025-05-16 SDOH — ECONOMIC STABILITY: HOUSING INSECURITY: IN THE LAST 12 MONTHS, WAS THERE A TIME WHEN YOU WERE NOT ABLE TO PAY THE MORTGAGE OR RENT ON TIME?: NO

## 2025-05-16 SDOH — SOCIAL STABILITY: SOCIAL INSECURITY: WITHIN THE LAST YEAR, HAVE YOU BEEN HUMILIATED OR EMOTIONALLY ABUSED IN OTHER WAYS BY YOUR PARTNER OR EX-PARTNER?: NO

## 2025-05-16 SDOH — SOCIAL STABILITY: SOCIAL INSECURITY: HAVE YOU HAD THOUGHTS OF HARMING ANYONE ELSE?: NO

## 2025-05-16 SDOH — SOCIAL STABILITY: SOCIAL INSECURITY: DO YOU FEEL UNSAFE GOING BACK TO THE PLACE WHERE YOU ARE LIVING?: NO

## 2025-05-16 SDOH — ECONOMIC STABILITY: FOOD INSECURITY: HOW HARD IS IT FOR YOU TO PAY FOR THE VERY BASICS LIKE FOOD, HOUSING, MEDICAL CARE, AND HEATING?: NOT HARD AT ALL

## 2025-05-16 SDOH — ECONOMIC STABILITY: HOUSING INSECURITY: AT ANY TIME IN THE PAST 12 MONTHS, WERE YOU HOMELESS OR LIVING IN A SHELTER (INCLUDING NOW)?: NO

## 2025-05-16 SDOH — ECONOMIC STABILITY: HOUSING INSECURITY: IN THE PAST 12 MONTHS, HOW MANY TIMES HAVE YOU MOVED WHERE YOU WERE LIVING?: 1

## 2025-05-16 SDOH — ECONOMIC STABILITY: INCOME INSECURITY: IN THE PAST 12 MONTHS HAS THE ELECTRIC, GAS, OIL, OR WATER COMPANY THREATENED TO SHUT OFF SERVICES IN YOUR HOME?: NO

## 2025-05-16 SDOH — SOCIAL STABILITY: SOCIAL INSECURITY: WITHIN THE LAST YEAR, HAVE YOU BEEN AFRAID OF YOUR PARTNER OR EX-PARTNER?: NO

## 2025-05-16 SDOH — SOCIAL STABILITY: SOCIAL INSECURITY: ARE YOU OR HAVE YOU BEEN THREATENED OR ABUSED PHYSICALLY, EMOTIONALLY, OR SEXUALLY BY ANYONE?: NO

## 2025-05-16 SDOH — SOCIAL STABILITY: SOCIAL INSECURITY: DOES ANYONE TRY TO KEEP YOU FROM HAVING/CONTACTING OTHER FRIENDS OR DOING THINGS OUTSIDE YOUR HOME?: NO

## 2025-05-16 SDOH — ECONOMIC STABILITY: TRANSPORTATION INSECURITY: IN THE PAST 12 MONTHS, HAS LACK OF TRANSPORTATION KEPT YOU FROM MEDICAL APPOINTMENTS OR FROM GETTING MEDICATIONS?: NO

## 2025-05-16 SDOH — SOCIAL STABILITY: SOCIAL INSECURITY: DO YOU FEEL ANYONE HAS EXPLOITED OR TAKEN ADVANTAGE OF YOU FINANCIALLY OR OF YOUR PERSONAL PROPERTY?: NO

## 2025-05-16 SDOH — SOCIAL STABILITY: SOCIAL INSECURITY: HAS ANYONE EVER THREATENED TO HURT YOUR FAMILY OR YOUR PETS?: NO

## 2025-05-16 SDOH — SOCIAL STABILITY: SOCIAL INSECURITY: ABUSE: ADULT

## 2025-05-16 SDOH — SOCIAL STABILITY: SOCIAL INSECURITY: HAVE YOU HAD ANY THOUGHTS OF HARMING ANYONE ELSE?: NO

## 2025-05-16 ASSESSMENT — COGNITIVE AND FUNCTIONAL STATUS - GENERAL
PATIENT BASELINE BEDBOUND: NO
DAILY ACTIVITIY SCORE: 24
MOBILITY SCORE: 24

## 2025-05-16 ASSESSMENT — ACTIVITIES OF DAILY LIVING (ADL)
LACK_OF_TRANSPORTATION: NO
ADEQUATE_TO_COMPLETE_ADL: YES
LACK_OF_TRANSPORTATION: NO
TOILETING: INDEPENDENT
JUDGMENT_ADEQUATE_SAFELY_COMPLETE_DAILY_ACTIVITIES: YES
LACK_OF_TRANSPORTATION: NO
JUDGMENT_ADEQUATE_SAFELY_COMPLETE_DAILY_ACTIVITIES: YES
ADEQUATE_TO_COMPLETE_ADL: YES
HEARING - LEFT EAR: FUNCTIONAL
DRESSING YOURSELF: INDEPENDENT
PATIENT'S MEMORY ADEQUATE TO SAFELY COMPLETE DAILY ACTIVITIES?: YES
HEARING - RIGHT EAR: FUNCTIONAL
PATIENT'S MEMORY ADEQUATE TO SAFELY COMPLETE DAILY ACTIVITIES?: YES
FEEDING YOURSELF: INDEPENDENT
BATHING: INDEPENDENT
GROOMING: INDEPENDENT
TOILETING: INDEPENDENT
BATHING: INDEPENDENT
FEEDING YOURSELF: INDEPENDENT
HEARING - RIGHT EAR: FUNCTIONAL
DRESSING YOURSELF: INDEPENDENT
HEARING - LEFT EAR: FUNCTIONAL
GROOMING: INDEPENDENT
WALKS IN HOME: INDEPENDENT
WALKS IN HOME: INDEPENDENT

## 2025-05-16 ASSESSMENT — LIFESTYLE VARIABLES
AUDIT-C TOTAL SCORE: 0
AUDIT-C TOTAL SCORE: 0
HOW MANY STANDARD DRINKS CONTAINING ALCOHOL DO YOU HAVE ON A TYPICAL DAY: PATIENT DOES NOT DRINK
HOW OFTEN DO YOU HAVE 6 OR MORE DRINKS ON ONE OCCASION: NEVER
SUBSTANCE_ABUSE_PAST_12_MONTHS: NO
HOW OFTEN DO YOU HAVE A DRINK CONTAINING ALCOHOL: NEVER
PRESCIPTION_ABUSE_PAST_12_MONTHS: NO
SKIP TO QUESTIONS 9-10: 1

## 2025-05-16 ASSESSMENT — PAIN SCALES - GENERAL
PAINLEVEL_OUTOF10: 2
PAINLEVEL_OUTOF10: 8

## 2025-05-16 ASSESSMENT — PATIENT HEALTH QUESTIONNAIRE - PHQ9
SUM OF ALL RESPONSES TO PHQ9 QUESTIONS 1 & 2: 2
1. LITTLE INTEREST OR PLEASURE IN DOING THINGS: SEVERAL DAYS
2. FEELING DOWN, DEPRESSED OR HOPELESS: SEVERAL DAYS

## 2025-05-16 ASSESSMENT — PAIN - FUNCTIONAL ASSESSMENT: PAIN_FUNCTIONAL_ASSESSMENT: 0-10

## 2025-05-16 ASSESSMENT — COLUMBIA-SUICIDE SEVERITY RATING SCALE - C-SSRS
6. HAVE YOU EVER DONE ANYTHING, STARTED TO DO ANYTHING, OR PREPARED TO DO ANYTHING TO END YOUR LIFE?: NO
1. IN THE PAST MONTH, HAVE YOU WISHED YOU WERE DEAD OR WISHED YOU COULD GO TO SLEEP AND NOT WAKE UP?: NO
2. HAVE YOU ACTUALLY HAD ANY THOUGHTS OF KILLING YOURSELF?: NO

## 2025-05-16 ASSESSMENT — PAIN DESCRIPTION - LOCATION: LOCATION: LEG

## 2025-05-16 ASSESSMENT — PAIN DESCRIPTION - ORIENTATION: ORIENTATION: LEFT;RIGHT

## 2025-05-16 NOTE — ED TRIAGE NOTES
Pt to ED with complaint of bilateral lower extremity edema. States has been ongoing for one week, denies any cardiac issues. Denies CP/SOB. States does not take a diuretic. Arrives a/o x4, ambulatory.

## 2025-05-16 NOTE — ED TRIAGE NOTES
TRIAGE NOTE   I saw the patient as the Clinician in Triage and performed a brief history and physical exam, established acuity, and ordered appropriate tests to develop basic plan of care. Patient will be seen by an [facility], resident and/or physician who will independently evaluate the patient. Please see subsequent provider notes for further details and disposition.     Brief HPI: In brief, Kirstin Howe is a 84 y.o. female that presents for lower extremity edema that started about 6 days ago.  No chest pain or shortness of breath or cough.  No other symptoms or concerns at this time.    Focused Physical exam:   2+ pitting edema lower extremities bilaterally.  Plan/MDM:   Initiating basic labs, troponin, EKG, chest x-ray.  Patient will be seen in the back in the ED for further evaluation and treatment.  I will not be following with this patient during her ED visit.  This is a preliminary evaluation during triage.    I evaluated this patient in triage with the RN. Due to the patients complaint labs and or imaging were ordered by myself in an attempt to expedite patient care however I am not participating in care after evaluation. This is a preliminary assessment. Pt does not appear in acute distress at this time. They will have a full evaluation as soon as possible. They will be cared for by another provider who will possibly order more labs, imaging or interventions. Pt did not have a full ROS or PE completed by myself however below is a summary with reasons for orders.  For the remainder of the patient's workup and ED course, please refer to the main ED provider note. We discussed need for diagnostic testing including laboratory studies and imaging.  We also discussed that patient may be asked to wait in the waiting room while these tests are pending.  They understand that if they choose to leave without having the testing completed or resulted that we cannot rule out acute life threatening illnesses and the risks  involved could lead to worsening condition, permanent disability or even death.     Please see subsequent provider note for further details and disposition

## 2025-05-16 NOTE — ED PROVIDER NOTES
HPI   Chief Complaint   Patient presents with    Leg Swelling     Bilateral       84-year-old female past medical history of CKD, osteoarthritis, hypertension, PIA presents to the ED today with a chief concern of lower extremity edema.  Patient reports symptoms started about 6 days ago.  She reports that she has noticed lower extremity edema.  She denies any chest pain or shortness of breath or cough.  Denies any recent travel or surgeries.  Denies any history of PE or DVT.  She reports it feels tight in her legs but denies any real pain.  No numbness or tingling.  No other symptoms or concerns at this time.      History provided by:  Patient   used: No            Patient History   Medical History[1]  Surgical History[2]  Family History[3]  Social History[4]    Physical Exam   ED Triage Vitals   Temperature Heart Rate Respirations BP   05/16/25 1249 05/16/25 1249 05/16/25 1249 05/16/25 1250   36.2 °C (97.2 °F) 84 18 155/78      Pulse Ox Temp src Heart Rate Source Patient Position   05/16/25 1249 -- -- --   98 %         BP Location FiO2 (%)     -- --             Physical Exam  Constitutional: Vital signs per nursing notes.  Well developed, well nourished.  No acute distress.    Eyes:  conjunctivae and lids normal  ENT: Ears normal externally; face symmetric. voice normal  Neck: neck supple, no meningismus; trachea midline without deviation  Respiratory: normal respiratory effort and excursion; no rales, rhonchi, or wheezes; equal air entry  Cardiovascular: RRR, 2+ pulses extremities   Neurological: normal speech; CN II-XII grossly intact, normal motor and sensory function  GI: no distention, soft, nontender  : Deferred  Musculoskeletal: normal gait and station; normal digits and nails; 2-3+ pitting edema in lower extremities bilaterally.  2+ symmetric dorsalis pedis pulses.  5/5 strength in lower extremities bilaterally.  Sensation intact in lower extremities bilaterally.  Compartments are soft.   No cyanosis.  Skin: normal to inspection; normal to palpation; no rash      ED Course & MDM   ED Course as of 05/16/25 1747   Fri May 16, 2025   1438 BNP(!): 306 [MC]   1441 I personally interpreted the EKG.  Ventricular rate 74 bpm.  MI interval 164 ms.  QRS duration 82 ms.  QT/QTc 412/457 ms.  Patient is in normal sinus rhythm at a rate of 74 bpm.  Normal axis.  There is good R wave progression.  No right or left bundle branch block.  No ST elevations.  Compared with previous EKGs grossly unchanged. [MC]   1441 CBC shows no evidence of leukocytosis.  Hemoglobin downtrending with a hemoglobin of 9.6.  Creatinine 1.23 around baseline.  GFR 43 also around baseline.  Troponin 13 []   1522 I personally interpreted x-ray of the left tib-fib.  X-ray shows skin thickening and edema.  Final disposition and treatment pending radiology read [MC]   2887 Roxanne beasley accepts admission of this patient to the inpatient unit under Dr. Rob []      ED Course User Index  [] Sanjay Feliciano PA-C         Diagnoses as of 05/16/25 1747   Congestive heart failure, unspecified HF chronicity, unspecified heart failure type                 No data recorded     Anders Coma Scale Score: 15 (05/16/25 1249 : Kojo Gleason RN)                           Medical Decision Making  84-year-old female past medical history of CKD, osteoarthritis, hypertension, PIA presents to the ED today with a chief concern of lower extremity edema. Patient has full range of motion of the neck without meningismus.  Satting well on room air.  Not hypoxic.  Not tachycardic.  Afebrile.  Was found to have a BNP of 306 with pleural effusions.  Low suspicion for any DVT at this time.  Do not think further workup with ultrasound is indicated at this time.  Hemoglobin stable.  Kidney function at baseline.  EKG shows no ischemic changes.  Troponin stable.  Patient also seen and evaluated by ED attending physician.  Would like to admit for cardiology consultation.   Discussed impression and findings with patient she feels comfortable being admitted to the inpatient unit.    Differential diagnosis: New onset CHF, kidney disease, liver disease, DVT    Disposition/treatment  1.  See diagnosis        Procedure  Procedures       Sanjay Feliciano PA-C  05/16/25 1748         [1]   Past Medical History:  Diagnosis Date    Age-related nuclear cataract, right eye 03/31/2016    Age-related nuclear cataract of right eye    Anxiety disorder, unspecified 03/31/2014    Anxiety    Bunion of unspecified foot 11/26/2014    Bunion    Concussion 04/08/2021    Last Assessment & Plan: Formatting of this note might be different from the original. -concern for possible concussion on admission, but head/neck/throat pain more likely due to infectious etiology as noted above    Contusion of left forearm, initial encounter 08/16/2016    Contusion of left forearm    Contusion of left knee, initial encounter 08/09/2016    Contusion of knee, left    Cortical age-related cataract, right eye 03/31/2016    Cortical age-related cataract of right eye    Depression     Dermatochalasis of unspecified eye, unspecified eyelid 11/10/2017    Dermatochalasis    Dislocation of right shoulder joint     Essential (primary) hypertension 11/23/2022    Hypertension    Fracture of orbital floor, right side, initial encounter for open fracture (Multi) 06/27/2017    Open fracture of right orbital floor, initial encounter    Generalized anxiety disorder 11/26/2014    PIA (generalized anxiety disorder)    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    Herpes zoster 03/29/2023    History of cataract 06/11/2024    History of depression 06/11/2024    Hypertensive urgency 03/25/2016    Hypertensive urgency    Insomnia, unspecified 11/26/2014    Insomnia    Maxillary fracture, unspecified side, initial encounter for closed fracture (Multi) 06/21/2017    Fracture of maxillary sinus, closed, initial encounter    Primary  osteoarthritis, 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    Primary osteoarthritis of left wrist    Stage III chronic kidney disease (Multi) 03/29/2023    Status post left knee replacement 09/02/2022   [2]   Past Surgical History:  Procedure Laterality Date    CATARACT EXTRACTION  06/07/2017    Cataract Surgery    MR HEAD ANGIO WO IV CONTRAST  4/9/2021    MR HEAD ANGIO WO IV CONTRAST 4/9/2021    TOTAL ABDOMINAL HYSTERECTOMY W/ BILATERAL SALPINGOOPHORECTOMY     [3]   Family History  Problem Relation Name Age of Onset    Dementia Mother      Breast cancer Mother  82    Hypertension Mother      Stomach cancer Father      Pancreatic cancer Brother      Breast cancer Daughter  58   [4]   Social History  Tobacco Use    Smoking status: Former     Types: Cigarettes    Smokeless tobacco: Never   Vaping Use    Vaping status: Never Used   Substance Use Topics    Alcohol use: Not Currently    Drug use: Never        Sanjay Feliciano PA-C  05/16/25 174

## 2025-05-17 LAB
ANION GAP SERPL CALC-SCNC: 13 MMOL/L (ref 10–20)
BUN SERPL-MCNC: 16 MG/DL (ref 6–23)
CALCIUM SERPL-MCNC: 8.5 MG/DL (ref 8.6–10.3)
CHLORIDE SERPL-SCNC: 111 MMOL/L (ref 98–107)
CO2 SERPL-SCNC: 25 MMOL/L (ref 21–32)
CREAT SERPL-MCNC: 1.12 MG/DL (ref 0.5–1.05)
EGFRCR SERPLBLD CKD-EPI 2021: 48 ML/MIN/1.73M*2
ERYTHROCYTE [DISTWIDTH] IN BLOOD BY AUTOMATED COUNT: 17.1 % (ref 11.5–14.5)
GLUCOSE SERPL-MCNC: 75 MG/DL (ref 74–99)
HCT VFR BLD AUTO: 30.6 % (ref 36–46)
HGB BLD-MCNC: 9.5 G/DL (ref 12–16)
MCH RBC QN AUTO: 27.5 PG (ref 26–34)
MCHC RBC AUTO-ENTMCNC: 31 G/DL (ref 32–36)
MCV RBC AUTO: 89 FL (ref 80–100)
NRBC BLD-RTO: 0 /100 WBCS (ref 0–0)
PLATELET # BLD AUTO: 315 X10*3/UL (ref 150–450)
POTASSIUM SERPL-SCNC: 3.8 MMOL/L (ref 3.5–5.3)
RBC # BLD AUTO: 3.45 X10*6/UL (ref 4–5.2)
SODIUM SERPL-SCNC: 145 MMOL/L (ref 136–145)
WBC # BLD AUTO: 5.6 X10*3/UL (ref 4.4–11.3)

## 2025-05-17 PROCEDURE — 2500000004 HC RX 250 GENERAL PHARMACY W/ HCPCS (ALT 636 FOR OP/ED): Mod: JZ

## 2025-05-17 PROCEDURE — 85027 COMPLETE CBC AUTOMATED: CPT

## 2025-05-17 PROCEDURE — 2500000001 HC RX 250 WO HCPCS SELF ADMINISTERED DRUGS (ALT 637 FOR MEDICARE OP)

## 2025-05-17 PROCEDURE — 36415 COLL VENOUS BLD VENIPUNCTURE: CPT

## 2025-05-17 PROCEDURE — 2500000001 HC RX 250 WO HCPCS SELF ADMINISTERED DRUGS (ALT 637 FOR MEDICARE OP): Performed by: INTERNAL MEDICINE

## 2025-05-17 PROCEDURE — 80048 BASIC METABOLIC PNL TOTAL CA: CPT

## 2025-05-17 PROCEDURE — 99232 SBSQ HOSP IP/OBS MODERATE 35: CPT | Performed by: INTERNAL MEDICINE

## 2025-05-17 PROCEDURE — 2500000004 HC RX 250 GENERAL PHARMACY W/ HCPCS (ALT 636 FOR OP/ED): Mod: JZ | Performed by: INTERNAL MEDICINE

## 2025-05-17 PROCEDURE — 2500000005 HC RX 250 GENERAL PHARMACY W/O HCPCS

## 2025-05-17 PROCEDURE — 1200000002 HC GENERAL ROOM WITH TELEMETRY DAILY

## 2025-05-17 RX ORDER — HYDRALAZINE HYDROCHLORIDE 10 MG/1
10 TABLET, FILM COATED ORAL 3 TIMES DAILY
Status: DISCONTINUED | OUTPATIENT
Start: 2025-05-17 | End: 2025-05-18

## 2025-05-17 RX ORDER — FUROSEMIDE 10 MG/ML
40 INJECTION INTRAMUSCULAR; INTRAVENOUS EVERY 12 HOURS
Status: DISCONTINUED | OUTPATIENT
Start: 2025-05-17 | End: 2025-05-18

## 2025-05-17 RX ADMIN — HYDRALAZINE HYDROCHLORIDE 10 MG: 10 TABLET ORAL at 14:00

## 2025-05-17 RX ADMIN — HYDRALAZINE HYDROCHLORIDE 10 MG: 10 TABLET ORAL at 20:32

## 2025-05-17 RX ADMIN — AMLODIPINE BESYLATE 10 MG: 10 TABLET ORAL at 09:57

## 2025-05-17 RX ADMIN — GABAPENTIN 100 MG: 100 CAPSULE ORAL at 20:32

## 2025-05-17 RX ADMIN — SENNOSIDES AND DOCUSATE SODIUM 1 TABLET: 50; 8.6 TABLET ORAL at 09:57

## 2025-05-17 RX ADMIN — SENNOSIDES AND DOCUSATE SODIUM 1 TABLET: 50; 8.6 TABLET ORAL at 20:32

## 2025-05-17 RX ADMIN — MELATONIN TAB 3 MG 3 MG: 3 TAB at 20:32

## 2025-05-17 RX ADMIN — BUPROPION HYDROCHLORIDE 300 MG: 150 TABLET, EXTENDED RELEASE ORAL at 09:57

## 2025-05-17 RX ADMIN — ENOXAPARIN SODIUM 30 MG: 30 INJECTION SUBCUTANEOUS at 09:57

## 2025-05-17 RX ADMIN — LISINOPRIL 40 MG: 20 TABLET ORAL at 09:57

## 2025-05-17 RX ADMIN — FUROSEMIDE 40 MG: 10 INJECTION, SOLUTION INTRAMUSCULAR; INTRAVENOUS at 13:46

## 2025-05-17 RX ADMIN — DONEPEZIL HYDROCHLORIDE 10 MG: 5 TABLET ORAL at 20:32

## 2025-05-17 RX ADMIN — GABAPENTIN 100 MG: 100 CAPSULE ORAL at 09:57

## 2025-05-17 ASSESSMENT — PAIN SCALES - GENERAL
PAINLEVEL_OUTOF10: 2
PAINLEVEL_OUTOF10: 2

## 2025-05-17 ASSESSMENT — COGNITIVE AND FUNCTIONAL STATUS - GENERAL
MOBILITY SCORE: 24
DAILY ACTIVITIY SCORE: 24

## 2025-05-17 NOTE — PROGRESS NOTES
"Kirstin Howe is a 84 y.o. female on day 1 of admission presenting with Congestive heart failure, unspecified HF chronicity, unspecified heart failure type.    Subjective   Patient seen and examined, significant improvement, her leg edema has improved with IV Lasix, her breathing is better she has no chest pain and is comfortable now.  Awaiting to have an echocardiogram.  Blood pressures are moderately elevated, I have discontinued her amlodipine because of significant leg edema and have added hydralazine 10 mg 3 times a day in addition to her lisinopril which she takes at home.  Since she is diuresing good with IV Lasix possibly changed to p.o. Lasix tomorrow.       Objective     Physical Exam  GENERAL:  Alert, no distress, cooperative  SKIN:  Skin color, texture, turgor normal. No rashes or lesions.  OROPHARYNX:  Lips, mucosa, and tongue are normal.Teeth and gums, normal. Oropharynx normal.  NECK:  No jugulovenous distention, No carotid bruits, Carotid pulse normal contour, Supple  LUNGS:  Lungs clear to auscultation. Good diaphragmatic excursion.  CARDIAC:  Normal S1 and S2; no rubs, murmurs, or gallops  ABDOMEN:  Abdomen soft, non-tender, BS normal, No masses or organomegaly  EXTREMETIES:  Extremities normal, no deformities, edema, clubbing or skin discoloration. Good capillary refill., No ulcers  NEURO:  Alert, oriented X 3, Gait normal. Non-focal. Reflexes normal and symmetric. Sensation grossly intact., Cranial nerves II-XII intact  PULSES:  2+ radial, 2+ carotid    Last Recorded Vitals  Blood pressure 154/75, pulse 84, temperature 36.6 °C (97.9 °F), temperature source Temporal, resp. rate 23, height 1.626 m (5' 4\"), weight 52.2 kg (115 lb), SpO2 97%.  Intake/Output last 3 Shifts:  No intake/output data recorded.    Relevant Results   Scheduled medications  buPROPion XL, 300 mg, oral, Daily  donepezil, 10 mg, oral, Nightly  enoxaparin, 30 mg, subcutaneous, q24h  furosemide, 40 mg, intravenous, " q12h  gabapentin, 100 mg, oral, BID  hydrALAZINE, 10 mg, oral, TID  lisinopril, 40 mg, oral, Daily  polyethylene glycol, 17 g, oral, Daily  sennosides-docusate sodium, 1 tablet, oral, BID    Continuous medications  Continuous Medications[1]  PRN medications  PRN Medications[2]      Results for orders placed or performed during the hospital encounter of 05/16/25 (from the past 96 hours)   CBC and Auto Differential   Result Value Ref Range    WBC 5.3 4.4 - 11.3 x10*3/uL    nRBC 0.0 0.0 - 0.0 /100 WBCs    RBC 3.48 (L) 4.00 - 5.20 x10*6/uL    Hemoglobin 9.6 (L) 12.0 - 16.0 g/dL    Hematocrit 30.7 (L) 36.0 - 46.0 %    MCV 88 80 - 100 fL    MCH 27.6 26.0 - 34.0 pg    MCHC 31.3 (L) 32.0 - 36.0 g/dL    RDW 17.4 (H) 11.5 - 14.5 %    Platelets 345 150 - 450 x10*3/uL    Neutrophils % 68.8 40.0 - 80.0 %    Immature Granulocytes %, Automated 0.4 0.0 - 0.9 %    Lymphocytes % 19.9 13.0 - 44.0 %    Monocytes % 9.0 2.0 - 10.0 %    Eosinophils % 1.3 0.0 - 6.0 %    Basophils % 0.6 0.0 - 2.0 %    Neutrophils Absolute 3.66 1.60 - 5.50 x10*3/uL    Immature Granulocytes Absolute, Automated 0.02 0.00 - 0.50 x10*3/uL    Lymphocytes Absolute 1.06 0.80 - 3.00 x10*3/uL    Monocytes Absolute 0.48 0.05 - 0.80 x10*3/uL    Eosinophils Absolute 0.07 0.00 - 0.40 x10*3/uL    Basophils Absolute 0.03 0.00 - 0.10 x10*3/uL   Comprehensive metabolic panel   Result Value Ref Range    Glucose 111 (H) 74 - 99 mg/dL    Sodium 143 136 - 145 mmol/L    Potassium 3.6 3.5 - 5.3 mmol/L    Chloride 113 (H) 98 - 107 mmol/L    Bicarbonate 20 (L) 21 - 32 mmol/L    Anion Gap 14 10 - 20 mmol/L    Urea Nitrogen 18 6 - 23 mg/dL    Creatinine 1.23 (H) 0.50 - 1.05 mg/dL    eGFR 43 (L) >60 mL/min/1.73m*2    Calcium 8.9 8.6 - 10.3 mg/dL    Albumin 3.7 3.4 - 5.0 g/dL    Alkaline Phosphatase 62 33 - 136 U/L    Total Protein 5.4 (L) 6.4 - 8.2 g/dL    AST 16 9 - 39 U/L    Bilirubin, Total 0.3 0.0 - 1.2 mg/dL    ALT 11 7 - 45 U/L   Troponin I, High Sensitivity   Result Value Ref  Range    Troponin I, High Sensitivity 13 0 - 13 ng/L   B-Type Natriuretic Peptide   Result Value Ref Range     (H) 0 - 99 pg/mL   Basic metabolic panel   Result Value Ref Range    Glucose 75 74 - 99 mg/dL    Sodium 145 136 - 145 mmol/L    Potassium 3.8 3.5 - 5.3 mmol/L    Chloride 111 (H) 98 - 107 mmol/L    Bicarbonate 25 21 - 32 mmol/L    Anion Gap 13 10 - 20 mmol/L    Urea Nitrogen 16 6 - 23 mg/dL    Creatinine 1.12 (H) 0.50 - 1.05 mg/dL    eGFR 48 (L) >60 mL/min/1.73m*2    Calcium 8.5 (L) 8.6 - 10.3 mg/dL   CBC   Result Value Ref Range    WBC 5.6 4.4 - 11.3 x10*3/uL    nRBC 0.0 0.0 - 0.0 /100 WBCs    RBC 3.45 (L) 4.00 - 5.20 x10*6/uL    Hemoglobin 9.5 (L) 12.0 - 16.0 g/dL    Hematocrit 30.6 (L) 36.0 - 46.0 %    MCV 89 80 - 100 fL    MCH 27.5 26.0 - 34.0 pg    MCHC 31.0 (L) 32.0 - 36.0 g/dL    RDW 17.1 (H) 11.5 - 14.5 %    Platelets 315 150 - 450 x10*3/uL     *Note: Due to a large number of results and/or encounters for the requested time period, some results have not been displayed. A complete set of results can be found in Results Review.    XR chest 2 views  Result Date: 5/16/2025  Interpreted By:  Jose Miguel Laguerre, STUDY: Chest dated  5/16/2025.   INDICATION: Signs/Symptoms:leg swelling   COMPARISON: Chest dated 03/15/2025.   ACCESSION NUMBER(S): [id number]   ORDERING CLINICIAN: HARRISON THIBODEAUX   TECHNIQUE: Two views of the chest.   FINDINGS: There is blunting of the costophrenic angles with linear bibasilar opacities. Calcified granulomas are seen in the left mid lung zone. No pneumothorax  is evident. The cardiomediastinal silhouette is  not enlarged.Degenerative change is seen of the spine and shoulders.       Small bilateral pleural effusions with associated atelectasis.   MACRO: None   Signed by: Jose Miguel Laguerre 5/16/2025 1:46 PM Dictation workstation:   [id number]    Flexible Sigmoidoscopy  Result Date: 5/9/2025  [facility] Gastrointestinal Endoscopy Patient Name: Kirstin Howe Procedure Date:  5/9/2025 9:18 AM MRN: [id number] Account Number: [id number] YOB: 1940 Admit Type: Outpatient Age: 84 Room: [place] [address] Gender: Female Note Status: Finalized Attending MD: Sarmad Rodriguez MD, [id number] Procedure:             Colonoscopy Indications:           Change in bowel habits, Chronic idiopathic                        constipation, Weight loss Providers:             Sarmad Rodriguez MD Patient Profile:       Last Colonoscopy: date unknown. Refer to note in                        patient chart for documentation of history and                        physical. Referring Physician:   Sarmad Rodriguez MD (Referring MD) Medicines:             Monitored Anesthesia Care Complications:         No immediate complications. Requesting Provider:   Procedure:             Pre-Anesthesia Assessment:                        - Prior to the procedure, a History and Physical                        was performed, and patient medications and                        allergies were reviewed. The patient is competent.                        The risks and benefits of the procedure and the                        sedation options and risks were discussed with the                        patient. All questions were answered and informed                        consent was obtained. Patient identification and                        proposed procedure were verified by the physician,                        the nurse, the anesthesiologist and the anesthetist                        in the pre-procedure area. Mental Status                        Examination: alert and oriented. Airway                        Examination: normal oropharyngeal airway and neck                        mobility. Respiratory Examination: clear to                        auscultation. CV Examination: normal. Prophylactic                        Antibiotics: The patient does not require                        prophylactic antibiotics. Prior Anticoagulants:  The                        patient has taken no anticoagulant or antiplatelet                        agents. ASA Grade Assessment: III - A patient with                        severe systemic disease. After reviewing the risks                        and benefits, the patient was deemed in                        satisfactory condition to undergo the procedure.                        The anesthesia plan was to use monitored anesthesia                        care (MAC). Immediately prior to administration of                        medications, the patient was re-assessed for                        adequacy to receive sedatives. The heart rate,                        respiratory rate, oxygen saturations, blood                        pressure, adequacy of pulmonary ventilation, and                        response to care were monitored throughout the                        procedure. The physical status of the patient was                        re-assessed after the procedure.                        After I obtained informed consent, the scope was                        passed under direct vision. Throughout the                        procedure, the patient's blood pressure, pulse, and                        oxygen saturations were monitored continuously. The                        Endoscope was introduced through the anus and                        advanced to the cecum, identified by appendiceal                        orifice and ileocecal valve. The colonoscopy was                        performed without difficulty. The patient tolerated                        the procedure well. The quality of the bowel                        preparation was poor and inadequate. The                        appendiceal orifice and the rectum were                        photographed. Scope Withdrawal Time: 0 hours 5 minutes 11 seconds Moderate Sedation:      MAC anesthesia was administered by the anesthesia team. Total Procedure  Duration: 0 hours 13 minutes 39 seconds Findings:      Many medium-mouthed diverticula were found in the left colon.      Non-bleeding internal hemorrhoids were found during retroflexion. The      hemorrhoids were Grade I (internal hemorrhoids that do not prolapse).      A moderate amount of semi-solid stool was found in the entire colon,      interfering with visualization. Lavage of the area was performed      using greater than 500 mL of tap water, resulting in incomplete      clearance with continued poor visualization. Impression:            - Preparation of the colon was poor.                        - Preparation of the colon was inadequate.                        - Diverticulosis in the left colon.                        - Non-bleeding internal hemorrhoids.                        - Stool in the entire examined colon.                        - No specimens collected. Recommendation:        - Discharge patient to home (ambulatory).                        - Resume previous diet today.                        - Continue present medications.                        - Return to referring physician as previously                        scheduled.                        - Return to GI office at appointment to be                        scheduled. ( After Repeat Colonoscopy is completed                        ) .                        - Repeat colonoscopy at appointment to be scheduled                        because the bowel preparation was poor.                        - Patient has a contact number available for                        emergencies. The signs and symptoms of potential                        delayed complications were discussed with the                        patient. Return to normal activities tomorrow.                        Written discharge instructions were provided to the                        patient. Procedure Code(s):     --- Professional ---                        01037 Diagnosis Code(s):      --- Professional ---                        K64.0                        R19.4                        K59.04                        K57.30                        R63.4 CPT copyright 2021 [employer]. All rights reserved. The codes documented in this report are preliminary and upon  review may be revised to meet current compliance requirements. Attending Participation:      I personally performed the entire procedure. Scope In: 10:40:49 AM Scope Out: 10:54:28 AM MD Sarmad Bello MD 5/9/2025 11:14:17 AM This report has been signed electronically by Sarmad Rodriguez MD Number of Addenda: 0 Note Initiated On: 5/9/2025 9:18 AM Estimated Blood Loss:      Estimated blood loss: none.                  Assessment & Plan  Congestive heart failure, unspecified HF chronicity, unspecified heart failure type    This is a 84-year-old lady with history of hypertension, early dementia, tubular adenoma of colon who underwent a colonoscopy at [facility] on 9/9/2025 and was noted to have internal hemorrhoids and diverticulosis he stated that since then she has started having bilateral lower extremity swelling which has gotten worse over past 10 days and therefore she came to emergency room for her reaction.  She denies any trauma to legs, she has had no history of DVT.  She denied any significant shortness of breath or chest pain and her serum troponin was normal however her BN peptide was elevated greater than 300.  For some reason patient had not been peptide done at [facility] on May 9 which was over 4000, although she was labeled at heart failure and not treated for that.  She is also moderately anemic with a hemoglobin of 10.3 which she  normocytic normochromic.  No echocardiogram is available in the medical records.  She has had a previous stress test.      1.  Acute bilateral lower extremity edema 4+ with elevated BN peptide however patient is not  acutely short of breath and chest x-ray is clear.  She is empirically started on IV Lasix and will get an echocardiogram to assess LV function.  Patient has been on amlodipine 10 mg daily and I wonder if part of her edema is related to amlodipine.  She does have elevated BN peptide of greater than 300.     2.  Normocytic normochromic anemia, recent colonoscopy revealed diverticulosis and no evidence of bleeding, monitor H&H closely     3.  Hypertension blood pressures are 165/90 mmHg, patient was taking amlodipine and lisinopril at home     4.  Early dementia continue with donezepil and patient does take Wellbutrin for anxiety.     Reviewed all labs and imaging studies and discussed the plan of treatment with patient in detail, will diurese with IV Lasix monitor closely, consider CT scan of the abdomen with contrast because of ongoing anemia.       I spent 35 minutes in the professional and overall care of this patient.      George Apodaca MD         [1]    [2] PRN medications: acetaminophen **OR** acetaminophen **OR** acetaminophen, melatonin, ondansetron **OR** ondansetron

## 2025-05-17 NOTE — H&P
History Of Present Illness  84-year-old lady with history of hypertension, dementia, chronic kidney disease stage IV, hyperparathyroidism was last admitted to the hospital in March 2025 with influenza A.  Prior to that she was admitted to [facility] in February 2025 with altered mentation and metabolic encephalopathy and CVA was ruled out.  Patient had colonoscopy on May 9, 2025 and preparation was poor it was done at [facility] and was noted to have diverticulosis and internal hemorrhoids.    Kirstin Howe is a 84 y.o. female presenting with bilateral lower extremity swelling.  Patient stated that she had colonoscopy about 2 weeks ago and ever since then she has started having bilateral lower extremity swelling which has been getting worse and has bilateral pedal edema and inability to wear shoes.  She denied any shortness of breath or chest pain no fever cough or sputum production.  Patient does have a history of hypertension and takes amlodipine and lisinopril at home.       Past Medical History  She has a past medical history of Age-related nuclear cataract, right eye (03/31/2016), Anxiety disorder, unspecified (03/31/2014), Bunion of unspecified foot (11/26/2014), Concussion (04/08/2021), Contusion of left forearm, initial encounter (08/16/2016), Contusion of left knee, initial encounter (08/09/2016), Cortical age-related cataract, right eye (03/31/2016), Depression, Dermatochalasis of unspecified eye, unspecified eyelid (11/10/2017), Dislocation of right shoulder joint, Essential (primary) hypertension (11/23/2022), Fracture of orbital floor, right side, initial encounter for open fracture (Multi) (06/27/2017), Generalized anxiety disorder (11/26/2014), Herpes simplex infection of genitourinary system (08/28/2018), Herpes zoster (03/29/2023), History of cataract (06/11/2024), History of depression (06/11/2024), Hypertensive urgency (03/25/2016), Insomnia, unspecified (11/26/2014), Maxillary fracture,  unspecified side, initial encounter for closed fracture (Multi) (06/21/2017), Primary osteoarthritis, left wrist (02/09/2017), Stage III chronic kidney disease (Multi) (03/29/2023), and Status post left knee replacement (09/02/2022).    Surgical History  She has a past surgical history that includes Cataract extraction (06/07/2017); Total abdominal hysterectomy w/ bilateral salpingoophorectomy; and MR angio head wo IV contrast (4/9/2021).     Social History  She reports that she has quit smoking. Her smoking use included cigarettes. She has never used smokeless tobacco. She reports that she does not currently use alcohol. She reports that she does not use drugs.    Family History  Family History[1]     Allergies  Iodine    Review of Systems  COMPLETE REVIEW OF SYSTEMS:    GENERAL: No weight loss, malaise or fevers., SEE HPI  HEENT: Negative for frequent or significant headaches, No changes in hearing or vision, no nose bleeds or other nasal problems  NECK: Negative for lumps, goiter, pain and significant neck swelling  RESPIRATORY: Negative for cough, wheezing or shortness of breath.  CARDIOVASCULAR: Negative for chest pain,  positive for bilateral eg swelling ,no palpitations.  GI: Negative for abdominal discomfort, blood in stools or black stools or change in bowel habits  : No history of dysuria, frequency or incontinence  GYN: Negative for abnormal vaginal bleeding, abnormal vaginal discharge.  She is   MUSCULOSKELETAL: Positive for bilateral lower extremity edema negative for joint pain . back pain or muscle pain.  SKIN: Negative for lesions, rash, and itching.  PSYCH: Negative for sleep disturbance, mood disorder and recent psychosocial stressors.  HEMATOLOGY/LYMPHOLOGY: Negative for prolonged bleeding, bruising easily or swollen nodes.  ENDOCRINE: Negative for cold or heat intolerance, polyuria, polydipsia and goiter.  NEURO: No history of headaches, syncope, paralysis, seizures or tremors  All other  reviewed and negative other than HPI.         Physical Exam  GENERAL: Awake and alert, moderate distress, cooperative  SKIN: Skin color, texture, turgor normal. No rashes or lesions.  HEAD/SINUSES: No significant findings.  EYES: PERRLA and EOMI  EARS: external ears normal  NOSE:  Septum midline.  OROPHARYNX: Lips, mucosa, and tongue normal. Teeth and gums normal. Oropharynx normal.  NECK: no jugulovenous distention, no carotid bruits, carotid pulse normal contour, supple  BACK: Back symmetric, Normal curvature, ROM normal, No CVAT.  LUNGS: Lungs clear to auscultation. Good diaphragmatic excursion.  CARDIAC: Rate and rhythm is regular, normal S1 and S2; no rubs,  or gallops, 2/6 systolic ejection murmur left sternal border.  ABDOMEN: Abdomen soft, non-tender. BS normal. No masses or organomegaly.  EXTREMITIES: Extremities normal. No deformities, 3+ edema,no  clubbing or skin discoloration.  NEURO: Gait normal. Reflexes normal and symmetric. Sensation grossly intact., Cranial nerves II-XII intact  PULSES: 2+ radial, 2+ carotid  RECTAL: not done       Last Recorded Vitals  /87 (BP Location: Right arm, Patient Position: Sitting)   Pulse 87   Temp 36.7 °C (98.1 °F) (Oral)   Resp 16   Wt 52.2 kg (115 lb)   SpO2 98%     Relevant Results  Scheduled medications  amLODIPine, 10 mg, oral, Daily  [START ON 5/17/2025] buPROPion XL, 300 mg, oral, Daily  donepezil, 10 mg, oral, Nightly  [START ON 5/17/2025] enoxaparin, 30 mg, subcutaneous, q24h  gabapentin, 100 mg, oral, BID  [START ON 5/17/2025] lisinopril, 40 mg, oral, Daily  polyethylene glycol, 17 g, oral, Daily  sennosides-docusate sodium, 1 tablet, oral, BID    Continuous medications  Continuous Medications[2]  PRN medications  PRN Medications[3]   Results for orders placed or performed during the hospital encounter of 05/16/25 (from the past 96 hours)   CBC and Auto Differential   Result Value Ref Range    WBC 5.3 4.4 - 11.3 x10*3/uL    nRBC 0.0 0.0 - 0.0 /100  WBCs    RBC 3.48 (L) 4.00 - 5.20 x10*6/uL    Hemoglobin 9.6 (L) 12.0 - 16.0 g/dL    Hematocrit 30.7 (L) 36.0 - 46.0 %    MCV 88 80 - 100 fL    MCH 27.6 26.0 - 34.0 pg    MCHC 31.3 (L) 32.0 - 36.0 g/dL    RDW 17.4 (H) 11.5 - 14.5 %    Platelets 345 150 - 450 x10*3/uL    Neutrophils % 68.8 40.0 - 80.0 %    Immature Granulocytes %, Automated 0.4 0.0 - 0.9 %    Lymphocytes % 19.9 13.0 - 44.0 %    Monocytes % 9.0 2.0 - 10.0 %    Eosinophils % 1.3 0.0 - 6.0 %    Basophils % 0.6 0.0 - 2.0 %    Neutrophils Absolute 3.66 1.60 - 5.50 x10*3/uL    Immature Granulocytes Absolute, Automated 0.02 0.00 - 0.50 x10*3/uL    Lymphocytes Absolute 1.06 0.80 - 3.00 x10*3/uL    Monocytes Absolute 0.48 0.05 - 0.80 x10*3/uL    Eosinophils Absolute 0.07 0.00 - 0.40 x10*3/uL    Basophils Absolute 0.03 0.00 - 0.10 x10*3/uL   Comprehensive metabolic panel   Result Value Ref Range    Glucose 111 (H) 74 - 99 mg/dL    Sodium 143 136 - 145 mmol/L    Potassium 3.6 3.5 - 5.3 mmol/L    Chloride 113 (H) 98 - 107 mmol/L    Bicarbonate 20 (L) 21 - 32 mmol/L    Anion Gap 14 10 - 20 mmol/L    Urea Nitrogen 18 6 - 23 mg/dL    Creatinine 1.23 (H) 0.50 - 1.05 mg/dL    eGFR 43 (L) >60 mL/min/1.73m*2    Calcium 8.9 8.6 - 10.3 mg/dL    Albumin 3.7 3.4 - 5.0 g/dL    Alkaline Phosphatase 62 33 - 136 U/L    Total Protein 5.4 (L) 6.4 - 8.2 g/dL    AST 16 9 - 39 U/L    Bilirubin, Total 0.3 0.0 - 1.2 mg/dL    ALT 11 7 - 45 U/L   Troponin I, High Sensitivity   Result Value Ref Range    Troponin I, High Sensitivity 13 0 - 13 ng/L   B-Type Natriuretic Peptide   Result Value Ref Range     (H) 0 - 99 pg/mL     *Note: Due to a large number of results and/or encounters for the requested time period, some results have not been displayed. A complete set of results can be found in Results Review.   XR chest 2 views  Result Date: 5/16/2025  Interpreted By:  Jose Miguel Laguerre, STUDY: Chest dated  5/16/2025.   INDICATION: Signs/Symptoms:leg swelling   COMPARISON: Chest dated  03/15/2025.   ACCESSION NUMBER(S): [id number]   ORDERING CLINICIAN: HARRISON THIBODEAUX   TECHNIQUE: Two views of the chest.   FINDINGS: There is blunting of the costophrenic angles with linear bibasilar opacities. Calcified granulomas are seen in the left mid lung zone. No pneumothorax  is evident. The cardiomediastinal silhouette is  not enlarged.Degenerative change is seen of the spine and shoulders.       Small bilateral pleural effusions with associated atelectasis.   MACRO: None   Signed by: Jose Miguel Laguerre 5/16/2025 1:46 PM Dictation workstation:   [id number]    Flexible Sigmoidoscopy  Result Date: 5/9/2025  [facility] Gastrointestinal Endoscopy Patient Name: Kirstin Howe Procedure Date: 5/9/2025 9:18 AM MRN: [id number] Account Number: [id number] YOB: 1940 Admit Type: Outpatient Age: 84 Room: [place] [address] Gender: Female Note Status: Finalized Attending MD: Sarmad Rodriguez MD, [id number] Procedure:             Colonoscopy Indications:           Change in bowel habits, Chronic idiopathic                        constipation, Weight loss Providers:             Sarmad Rodriguez MD Patient Profile:       Last Colonoscopy: date unknown. Refer to note in                        patient chart for documentation of history and                        physical. Referring Physician:   Sarmad Rodriguez MD (Referring MD) Medicines:             Monitored Anesthesia Care Complications:         No immediate complications. Requesting Provider:   Procedure:             Pre-Anesthesia Assessment:                        - Prior to the procedure, a History and Physical                        was performed, and patient medications and                        allergies were reviewed. The patient is competent.                        The risks and benefits of the procedure and the                        sedation options and risks were discussed with the                        patient. All questions were answered and  informed                        consent was obtained. Patient identification and                        proposed procedure were verified by the physician,                        the nurse, the anesthesiologist and the anesthetist                        in the pre-procedure area. Mental Status                        Examination: alert and oriented. Airway                        Examination: normal oropharyngeal airway and neck                        mobility. Respiratory Examination: clear to                        auscultation. CV Examination: normal. Prophylactic                        Antibiotics: The patient does not require                        prophylactic antibiotics. Prior Anticoagulants: The                        patient has taken no anticoagulant or antiplatelet                        agents. ASA Grade Assessment: III - A patient with                        severe systemic disease. After reviewing the risks                        and benefits, the patient was deemed in                        satisfactory condition to undergo the procedure.                        The anesthesia plan was to use monitored anesthesia                        care (MAC). Immediately prior to administration of                        medications, the patient was re-assessed for                        adequacy to receive sedatives. The heart rate,                        respiratory rate, oxygen saturations, blood                        pressure, adequacy of pulmonary ventilation, and                        response to care were monitored throughout the                        procedure. The physical status of the patient was                        re-assessed after the procedure.                        After I obtained informed consent, the scope was                        passed under direct vision. Throughout the                        procedure, the patient's blood pressure, pulse, and                        oxygen saturations  were monitored continuously. The                        Endoscope was introduced through the anus and                        advanced to the cecum, identified by appendiceal                        orifice and ileocecal valve. The colonoscopy was                        performed without difficulty. The patient tolerated                        the procedure well. The quality of the bowel                        preparation was poor and inadequate. The                        appendiceal orifice and the rectum were                        photographed. Scope Withdrawal Time: 0 hours 5 minutes 11 seconds Moderate Sedation:      MAC anesthesia was administered by the anesthesia team. Total Procedure Duration: 0 hours 13 minutes 39 seconds Findings:      Many medium-mouthed diverticula were found in the left colon.      Non-bleeding internal hemorrhoids were found during retroflexion. The      hemorrhoids were Grade I (internal hemorrhoids that do not prolapse).      A moderate amount of semi-solid stool was found in the entire colon,      interfering with visualization. Lavage of the area was performed      using greater than 500 mL of tap water, resulting in incomplete      clearance with continued poor visualization. Impression:            - Preparation of the colon was poor.                        - Preparation of the colon was inadequate.                        - Diverticulosis in the left colon.                        - Non-bleeding internal hemorrhoids.                        - Stool in the entire examined colon.                        - No specimens collected. Recommendation:        - Discharge patient to home (ambulatory).                        - Resume previous diet today.                        - Continue present medications.                        - Return to referring physician as previously                        scheduled.                        - Return to GI office at appointment to be                         scheduled. ( After Repeat Colonoscopy is completed                        ) .                        - Repeat colonoscopy at appointment to be scheduled                        because the bowel preparation was poor.                        - Patient has a contact number available for                        emergencies. The signs and symptoms of potential                        delayed complications were discussed with the                        patient. Return to normal activities tomorrow.                        Written discharge instructions were provided to the                        patient. Procedure Code(s):     --- Professional ---                        61567 Diagnosis Code(s):     --- Professional ---                        K64.0                        R19.4                        K59.04                        K57.30                        R63.4 CPT copyright 2021 [employer]. All rights reserved. The codes documented in this report are preliminary and upon  review may be revised to meet current compliance requirements. Attending Participation:      I personally performed the entire procedure. Scope In: 10:40:49 AM Scope Out: 10:54:28 AM MD Sarmad Bello MD 5/9/2025 11:14:17 AM This report has been signed electronically by Sarmad Rodriguez MD Number of Addenda: 0 Note Initiated On: 5/9/2025 9:18 AM Estimated Blood Loss:      Estimated blood loss: none.         Assessment/Plan   Assessment & Plan  Congestive heart failure, unspecified HF chronicity, unspecified heart failure type    This is a 84-year-old lady with history of hypertension, early dementia, tubular adenoma of colon who underwent a colonoscopy at [facility] on 9/9/2025 and was noted to have internal hemorrhoids and diverticulosis he stated that since then she has started having bilateral lower extremity swelling which has gotten worse over past 10 days and therefore she came to  emergency room for her reaction.  She denies any trauma to legs, she has had no history of DVT.  She denied any significant shortness of breath or chest pain and her serum troponin was normal however her BN peptide was elevated greater than 300.  For some reason patient had not been peptide done at [facility] on May 9 which was over 4000, although she was labeled at heart failure and not treated for that.  She is also moderately anemic with a hemoglobin of 10.3 which she  normocytic normochromic.  No echocardiogram is available in the medical records.  She has had a previous stress test.     1.  Acute bilateral lower extremity edema 4+ with elevated BN peptide however patient is not acutely short of breath and chest x-ray is clear.  She is empirically started on IV Lasix and will get an echocardiogram to assess LV function.  Patient has been on amlodipine 10 mg daily and I wonder if part of her edema is related to amlodipine.  She does have elevated BN peptide of greater than 300.    2.  Normocytic normochromic anemia, recent colonoscopy revealed diverticulosis and no evidence of bleeding, monitor H&H closely    3.  Hypertension blood pressures are 165/90 mmHg, patient was taking amlodipine and lisinopril at home    4.  Early dementia continue with donezepil and patient does take Wellbutrin for anxiety.    Reviewed all labs and imaging studies and discussed the plan of treatment with patient in detail, will diurese with IV Lasix monitor closely, consider CT scan of the abdomen with contrast because of ongoing anemia.     George Apodaca MD         [1]   Family History  Problem Relation Name Age of Onset    Dementia Mother      Breast cancer Mother  82    Hypertension Mother      Stomach cancer Father      Pancreatic cancer Brother      Breast cancer Daughter  58   [2]    [3] PRN medications: acetaminophen **OR** acetaminophen **OR** acetaminophen, melatonin, ondansetron **OR** ondansetron

## 2025-05-17 NOTE — CARE PLAN
The patient's goals for the shift include  decrease in BLE edema    The clinical goals for the shift include maintain pt safety    Over the shift, the patient did make progress toward the following goals.     Problem: Heart Failure  Goal: Improved gas exchange this shift  Outcome: Progressing  Flowsheets (Taken 5/17/2025 1609)  Improved gas exchange this shift:   Position to promote circulation/maximize ventilation   Prepare for use of devices/procedures to correct dysrhythmia   Assist with pulmonary hygiene and secretion clearance  Goal: Improved urinary output this shift  Outcome: Progressing  Flowsheets (Taken 5/17/2025 1609)  Improved urinary output this shift:   Med administration/monitor effect   Monitor intake/output and daily weight   Monitor serum electrolytes/replace per order  Goal: Reduction in peripheral edema within 24 hours  Outcome: Progressing  Flowsheets (Taken 5/17/2025 1607)  Reduction in peripheral edema within 24 hours:   Consult dietician   SCDs/elevate extremities   Frequent small meals/supplements per order   Monitor edema/skin/mucous membrane integrity  Goal: Report improvement of dyspnea/breathlessness this shift  Outcome: Progressing  Flowsheets (Taken 5/17/2025 1609)  Report improvement of dyspnea/breathlessness this shift:   Ambulate/OOB 3 times daily   Encourage activity/mobility/ROM   Incentive spirometry/deep breathing /HOB elevated elevated   Consider PT/OT consult   Facilitate activity/mobility per patient tolerance/advance  Goal: Weight from fluid excess reduced over 2-3 days, then stabilize  Outcome: Progressing  Flowsheets (Taken 5/17/2025 1609)  Weight from fluid excess reduced over 2-3 days, then stabilize:   Med administration/monitor effect   Monitor serum electrolytes/replace per order   Monitor bowel elimination   Monitor intake/output and daily weight  Goal: Increase self care and/or family involvement in 24 hours  Outcome: Progressing  Flowsheets (Taken 5/17/2025  7447)  Increase self care and/or family involvement in 24 hours:   Assess for signs/symptoms of depression   Organize tasks/allow time for rest   Therapy evaluation and treatment   Facilitate advanced care planning/discussion of treatment options   Recognize current coping skills and assist to develop new coping skills     Problem: Fall/Injury  Goal: Not fall by end of shift  Outcome: Progressing  Goal: Be free from injury by end of the shift  Outcome: Progressing  Goal: Verbalize understanding of personal risk factors for fall in the hospital  Outcome: Progressing  Goal: Verbalize understanding of risk factor reduction measures to prevent injury from fall in the home  Outcome: Progressing  Goal: Use assistive devices by end of the shift  Outcome: Progressing  Goal: Pace activities to prevent fatigue by end of the shift  Outcome: Progressing     Problem: Pain - Adult  Goal: Verbalizes/displays adequate comfort level or baseline comfort level  Outcome: Progressing  Flowsheets (Taken 5/17/2025 1633)  Verbalizes/displays adequate comfort level or baseline comfort level:   Administer analgesics based on type and severity of pain and evaluate response   Consider cultural and social influences on pain and pain management   Encourage patient to monitor pain and request assistance   Assess pain using appropriate pain scale   Implement non-pharmacological measures as appropriate and evaluate response   Notify Licensed Independent Practitioner if interventions unsuccessful or patient reports new pain     Problem: Safety - Adult  Goal: Free from fall injury  Outcome: Progressing  Flowsheets (Taken 5/17/2025 7025)  Free from fall injury:   Instruct family/caregiver on patient safety   Based on caregiver fall risk screen, instruct family/caregiver to ask for assistance with transferring infant if caregiver noted to have fall risk factors     Problem: Discharge Planning  Goal: Discharge to home or other facility with appropriate  resources  Outcome: Progressing  Flowsheets (Taken 5/17/2025 1608)  Discharge to home or other facility with appropriate resources:   Identify barriers to discharge with patient and caregiver   Identify discharge learning needs (meds, wound care, etc)   Refer to discharge planning if patient needs post-hospital services based on physician order or complex needs related to functional status, cognitive ability or social support system   Arrange for needed discharge resources and transportation as appropriate   Arrange for interpreters to assist at discharge as needed     Problem: Chronic Conditions and Co-morbidities  Goal: Patient's chronic conditions and co-morbidity symptoms are monitored and maintained or improved  Outcome: Progressing  Flowsheets (Taken 5/17/2025 1606)  Care Plan - Patient's Chronic Conditions and Co-Morbidity Symptoms are Monitored and Maintained or Improved:   Monitor and assess patient's chronic conditions and comorbid symptoms for stability, deterioration, or improvement   Collaborate with multidisciplinary team to address chronic and comorbid conditions and prevent exacerbation or deterioration   Update acute care plan with appropriate goals if chronic or comorbid symptoms are exacerbated and prevent overall improvement and discharge     Problem: Nutrition  Goal: Nutrient intake appropriate for maintaining nutritional needs  Outcome: Progressing

## 2025-05-17 NOTE — CARE PLAN
The patient's goals for the shift include      The clinical goals for the shift include rest      Problem: Heart Failure  Goal: Reduction in peripheral edema within 24 hours  Outcome: Progressing  Flowsheets (Taken 5/17/2025 0340)  Reduction in peripheral edema within 24 hours:   Monitor edema/skin/mucous membrane integrity   SCDs/elevate extremities  Note: Pt has on compression socks from home and BLE are elevated on three pillows to help reduce edema[ pt educated     Problem: Fall/Injury  Goal: Not fall by end of shift  Outcome: Progressing  Goal: Be free from injury by end of the shift  Outcome: Progressing  Goal: Verbalize understanding of personal risk factors for fall in the hospital  Outcome: Progressing

## 2025-05-18 VITALS
TEMPERATURE: 98.2 F | OXYGEN SATURATION: 98 % | SYSTOLIC BLOOD PRESSURE: 160 MMHG | DIASTOLIC BLOOD PRESSURE: 87 MMHG | WEIGHT: 115 LBS | HEART RATE: 94 BPM | BODY MASS INDEX: 19.63 KG/M2 | HEIGHT: 64 IN | RESPIRATION RATE: 18 BRPM

## 2025-05-18 LAB
ANION GAP SERPL CALC-SCNC: 15 MMOL/L (ref 10–20)
BUN SERPL-MCNC: 15 MG/DL (ref 6–23)
CALCIUM SERPL-MCNC: 8.6 MG/DL (ref 8.6–10.3)
CHLORIDE SERPL-SCNC: 106 MMOL/L (ref 98–107)
CO2 SERPL-SCNC: 25 MMOL/L (ref 21–32)
CREAT SERPL-MCNC: 1.35 MG/DL (ref 0.5–1.05)
EGFRCR SERPLBLD CKD-EPI 2021: 39 ML/MIN/1.73M*2
ERYTHROCYTE [DISTWIDTH] IN BLOOD BY AUTOMATED COUNT: 16.7 % (ref 11.5–14.5)
GLUCOSE SERPL-MCNC: 87 MG/DL (ref 74–99)
HCT VFR BLD AUTO: 34.3 % (ref 36–46)
HGB BLD-MCNC: 10.5 G/DL (ref 12–16)
MCH RBC QN AUTO: 26.4 PG (ref 26–34)
MCHC RBC AUTO-ENTMCNC: 30.6 G/DL (ref 32–36)
MCV RBC AUTO: 86 FL (ref 80–100)
NRBC BLD-RTO: 0 /100 WBCS (ref 0–0)
PLATELET # BLD AUTO: 408 X10*3/UL (ref 150–450)
POTASSIUM SERPL-SCNC: 3.6 MMOL/L (ref 3.5–5.3)
RBC # BLD AUTO: 3.97 X10*6/UL (ref 4–5.2)
SODIUM SERPL-SCNC: 142 MMOL/L (ref 136–145)
WBC # BLD AUTO: 6.3 X10*3/UL (ref 4.4–11.3)

## 2025-05-18 PROCEDURE — 85027 COMPLETE CBC AUTOMATED: CPT

## 2025-05-18 PROCEDURE — 99232 SBSQ HOSP IP/OBS MODERATE 35: CPT | Performed by: INTERNAL MEDICINE

## 2025-05-18 PROCEDURE — 2500000004 HC RX 250 GENERAL PHARMACY W/ HCPCS (ALT 636 FOR OP/ED): Mod: JZ

## 2025-05-18 PROCEDURE — 2500000005 HC RX 250 GENERAL PHARMACY W/O HCPCS

## 2025-05-18 PROCEDURE — 2500000001 HC RX 250 WO HCPCS SELF ADMINISTERED DRUGS (ALT 637 FOR MEDICARE OP): Performed by: INTERNAL MEDICINE

## 2025-05-18 PROCEDURE — 2500000004 HC RX 250 GENERAL PHARMACY W/ HCPCS (ALT 636 FOR OP/ED): Mod: JZ | Performed by: INTERNAL MEDICINE

## 2025-05-18 PROCEDURE — 80048 BASIC METABOLIC PNL TOTAL CA: CPT

## 2025-05-18 PROCEDURE — 1200000002 HC GENERAL ROOM WITH TELEMETRY DAILY

## 2025-05-18 PROCEDURE — 36415 COLL VENOUS BLD VENIPUNCTURE: CPT

## 2025-05-18 PROCEDURE — 2500000001 HC RX 250 WO HCPCS SELF ADMINISTERED DRUGS (ALT 637 FOR MEDICARE OP)

## 2025-05-18 RX ORDER — FUROSEMIDE 20 MG/1
40 TABLET ORAL DAILY
Status: DISCONTINUED | OUTPATIENT
Start: 2025-05-18 | End: 2025-05-20 | Stop reason: HOSPADM

## 2025-05-18 RX ORDER — HYDRALAZINE HYDROCHLORIDE 25 MG/1
25 TABLET, FILM COATED ORAL 2 TIMES DAILY
Status: DISCONTINUED | OUTPATIENT
Start: 2025-05-18 | End: 2025-05-20

## 2025-05-18 RX ADMIN — LISINOPRIL 40 MG: 20 TABLET ORAL at 08:48

## 2025-05-18 RX ADMIN — GABAPENTIN 100 MG: 100 CAPSULE ORAL at 08:49

## 2025-05-18 RX ADMIN — ACETAMINOPHEN 650 MG: 325 TABLET ORAL at 20:54

## 2025-05-18 RX ADMIN — MELATONIN TAB 3 MG 3 MG: 3 TAB at 20:54

## 2025-05-18 RX ADMIN — GABAPENTIN 100 MG: 100 CAPSULE ORAL at 20:54

## 2025-05-18 RX ADMIN — HYDRALAZINE HYDROCHLORIDE 25 MG: 25 TABLET ORAL at 08:50

## 2025-05-18 RX ADMIN — DONEPEZIL HYDROCHLORIDE 10 MG: 5 TABLET ORAL at 20:54

## 2025-05-18 RX ADMIN — POLYETHYLENE GLYCOL 3350 17 G: 17 POWDER, FOR SOLUTION ORAL at 08:48

## 2025-05-18 RX ADMIN — BUPROPION HYDROCHLORIDE 300 MG: 150 TABLET, EXTENDED RELEASE ORAL at 08:47

## 2025-05-18 RX ADMIN — SENNOSIDES AND DOCUSATE SODIUM 1 TABLET: 50; 8.6 TABLET ORAL at 20:54

## 2025-05-18 RX ADMIN — ENOXAPARIN SODIUM 30 MG: 30 INJECTION SUBCUTANEOUS at 08:49

## 2025-05-18 RX ADMIN — HYDRALAZINE HYDROCHLORIDE 25 MG: 25 TABLET ORAL at 20:54

## 2025-05-18 RX ADMIN — SENNOSIDES AND DOCUSATE SODIUM 1 TABLET: 50; 8.6 TABLET ORAL at 08:47

## 2025-05-18 RX ADMIN — FUROSEMIDE 40 MG: 10 INJECTION, SOLUTION INTRAMUSCULAR; INTRAVENOUS at 01:10

## 2025-05-18 RX ADMIN — FUROSEMIDE 40 MG: 20 TABLET ORAL at 11:20

## 2025-05-18 ASSESSMENT — COGNITIVE AND FUNCTIONAL STATUS - GENERAL
DAILY ACTIVITIY SCORE: 24
MOBILITY SCORE: 24
MOBILITY SCORE: 24
DAILY ACTIVITIY SCORE: 24

## 2025-05-18 ASSESSMENT — PAIN SCALES - GENERAL
PAINLEVEL_OUTOF10: 3
PAINLEVEL_OUTOF10: 0 - NO PAIN

## 2025-05-18 NOTE — CARE PLAN
The patient's goals for the shift include      The clinical goals for the shift include pt will remain safe this shift      Problem: Heart Failure  Goal: Improved urinary output this shift  Outcome: Progressing  Goal: Reduction in peripheral edema within 24 hours  Outcome: Progressing  Goal: Increase self care and/or family involvement in 24 hours  Outcome: Progressing     Problem: Fall/Injury  Goal: Not fall by end of shift  Outcome: Progressing  Goal: Be free from injury by end of the shift  Outcome: Progressing     Problem: Safety - Adult  Goal: Free from fall injury  Outcome: Progressing     Problem: Discharge Planning  Goal: Discharge to home or other facility with appropriate resources  Outcome: Progressing     Problem: Chronic Conditions and Co-morbidities  Goal: Patient's chronic conditions and co-morbidity symptoms are monitored and maintained or improved  Outcome: Progressing     Problem: Nutrition  Goal: Nutrient intake appropriate for maintaining nutritional needs  Outcome: Progressing

## 2025-05-18 NOTE — CARE PLAN
The patient's goals for the shift include      The clinical goals for the shift include pt will remain safe and comfortable      Problem: Safety - Adult  Goal: Free from fall injury  Outcome: Progressing     Problem: Pain - Adult  Goal: Verbalizes/displays adequate comfort level or baseline comfort level  Outcome: Progressing

## 2025-05-18 NOTE — PROGRESS NOTES
"Kirstin Howe is a 84 y.o. female on day 2 of admission presenting with Congestive heart failure, unspecified HF chronicity, unspecified heart failure type.    Subjective   Patient examined, she is coming along fine.  Her leg edema has almost completely resolved her breathing is better.  Denies any chest pain or palpitation and no new symptoms, blood pressures are improving.  I have changed IV Lasix to p.o. Lasix, will continue with lisinopril and hydralazine has been changed to 25 mg twice daily and amlodipine was discontinued.  Overall significant improvement, PT OT and ambulate patient anticipate discharge home in next 24 to 48 hours.  Transthoracic echo pending.       Objective     Physical Exam  GENERAL:  Alert, no distress, cooperative  SKIN:  Skin color, texture, turgor normal. No rashes or lesions.  OROPHARYNX:  Lips, mucosa, and tongue are normal.Teeth and gums, normal. Oropharynx normal.  NECK:  No jugulovenous distention, No carotid bruits, Carotid pulse normal contour, Supple  LUNGS:  Lungs clear to auscultation. Good diaphragmatic excursion.  CARDIAC: Rate and rhythm is regular, normal S1 and S2; no rubs, , or gallops, 2/6 systolic apixaban from border, leg edema is resolved  ABDOMEN:  Abdomen soft, non-tender, BS normal, No masses or organomegaly  EXTREMETIES:  Extremities normal, no deformities, trace edema, no clubbing or skin discoloration. Good capillary refill., No ulcers  NEURO:  Alert, oriented X 3,  Non-focal.  PULSES:  2+ radial, 2+ carotid       Last Recorded Vitals  Blood pressure 144/78, pulse 94, temperature 36.8 °C (98.2 °F), temperature source Oral, resp. rate 18, height 1.626 m (5' 4\"), weight 52.2 kg (115 lb), SpO2 96%.  Intake/Output last 3 Shifts:  No intake/output data recorded.    Relevant Results  Scheduled medications  buPROPion XL, 300 mg, oral, Daily  donepezil, 10 mg, oral, Nightly  enoxaparin, 30 mg, subcutaneous, q24h  furosemide, 40 mg, oral, Daily  gabapentin, 100 mg, oral, " BID  hydrALAZINE, 25 mg, oral, BID  lisinopril, 40 mg, oral, Daily  polyethylene glycol, 17 g, oral, Daily  sennosides-docusate sodium, 1 tablet, oral, BID    Continuous medications  Continuous Medications[1]  PRN medications  PRN Medications[2]      Results for orders placed or performed during the hospital encounter of 05/16/25 (from the past 96 hours)   CBC and Auto Differential   Result Value Ref Range    WBC 5.3 4.4 - 11.3 x10*3/uL    nRBC 0.0 0.0 - 0.0 /100 WBCs    RBC 3.48 (L) 4.00 - 5.20 x10*6/uL    Hemoglobin 9.6 (L) 12.0 - 16.0 g/dL    Hematocrit 30.7 (L) 36.0 - 46.0 %    MCV 88 80 - 100 fL    MCH 27.6 26.0 - 34.0 pg    MCHC 31.3 (L) 32.0 - 36.0 g/dL    RDW 17.4 (H) 11.5 - 14.5 %    Platelets 345 150 - 450 x10*3/uL    Neutrophils % 68.8 40.0 - 80.0 %    Immature Granulocytes %, Automated 0.4 0.0 - 0.9 %    Lymphocytes % 19.9 13.0 - 44.0 %    Monocytes % 9.0 2.0 - 10.0 %    Eosinophils % 1.3 0.0 - 6.0 %    Basophils % 0.6 0.0 - 2.0 %    Neutrophils Absolute 3.66 1.60 - 5.50 x10*3/uL    Immature Granulocytes Absolute, Automated 0.02 0.00 - 0.50 x10*3/uL    Lymphocytes Absolute 1.06 0.80 - 3.00 x10*3/uL    Monocytes Absolute 0.48 0.05 - 0.80 x10*3/uL    Eosinophils Absolute 0.07 0.00 - 0.40 x10*3/uL    Basophils Absolute 0.03 0.00 - 0.10 x10*3/uL   Comprehensive metabolic panel   Result Value Ref Range    Glucose 111 (H) 74 - 99 mg/dL    Sodium 143 136 - 145 mmol/L    Potassium 3.6 3.5 - 5.3 mmol/L    Chloride 113 (H) 98 - 107 mmol/L    Bicarbonate 20 (L) 21 - 32 mmol/L    Anion Gap 14 10 - 20 mmol/L    Urea Nitrogen 18 6 - 23 mg/dL    Creatinine 1.23 (H) 0.50 - 1.05 mg/dL    eGFR 43 (L) >60 mL/min/1.73m*2    Calcium 8.9 8.6 - 10.3 mg/dL    Albumin 3.7 3.4 - 5.0 g/dL    Alkaline Phosphatase 62 33 - 136 U/L    Total Protein 5.4 (L) 6.4 - 8.2 g/dL    AST 16 9 - 39 U/L    Bilirubin, Total 0.3 0.0 - 1.2 mg/dL    ALT 11 7 - 45 U/L   Troponin I, High Sensitivity   Result Value Ref Range    Troponin I, High  Sensitivity 13 0 - 13 ng/L   B-Type Natriuretic Peptide   Result Value Ref Range     (H) 0 - 99 pg/mL   Basic metabolic panel   Result Value Ref Range    Glucose 75 74 - 99 mg/dL    Sodium 145 136 - 145 mmol/L    Potassium 3.8 3.5 - 5.3 mmol/L    Chloride 111 (H) 98 - 107 mmol/L    Bicarbonate 25 21 - 32 mmol/L    Anion Gap 13 10 - 20 mmol/L    Urea Nitrogen 16 6 - 23 mg/dL    Creatinine 1.12 (H) 0.50 - 1.05 mg/dL    eGFR 48 (L) >60 mL/min/1.73m*2    Calcium 8.5 (L) 8.6 - 10.3 mg/dL   CBC   Result Value Ref Range    WBC 5.6 4.4 - 11.3 x10*3/uL    nRBC 0.0 0.0 - 0.0 /100 WBCs    RBC 3.45 (L) 4.00 - 5.20 x10*6/uL    Hemoglobin 9.5 (L) 12.0 - 16.0 g/dL    Hematocrit 30.6 (L) 36.0 - 46.0 %    MCV 89 80 - 100 fL    MCH 27.5 26.0 - 34.0 pg    MCHC 31.0 (L) 32.0 - 36.0 g/dL    RDW 17.1 (H) 11.5 - 14.5 %    Platelets 315 150 - 450 x10*3/uL   Basic metabolic panel   Result Value Ref Range    Glucose 87 74 - 99 mg/dL    Sodium 142 136 - 145 mmol/L    Potassium 3.6 3.5 - 5.3 mmol/L    Chloride 106 98 - 107 mmol/L    Bicarbonate 25 21 - 32 mmol/L    Anion Gap 15 10 - 20 mmol/L    Urea Nitrogen 15 6 - 23 mg/dL    Creatinine 1.35 (H) 0.50 - 1.05 mg/dL    eGFR 39 (L) >60 mL/min/1.73m*2    Calcium 8.6 8.6 - 10.3 mg/dL   CBC   Result Value Ref Range    WBC 6.3 4.4 - 11.3 x10*3/uL    nRBC 0.0 0.0 - 0.0 /100 WBCs    RBC 3.97 (L) 4.00 - 5.20 x10*6/uL    Hemoglobin 10.5 (L) 12.0 - 16.0 g/dL    Hematocrit 34.3 (L) 36.0 - 46.0 %    MCV 86 80 - 100 fL    MCH 26.4 26.0 - 34.0 pg    MCHC 30.6 (L) 32.0 - 36.0 g/dL    RDW 16.7 (H) 11.5 - 14.5 %    Platelets 408 150 - 450 x10*3/uL     *Note: Due to a large number of results and/or encounters for the requested time period, some results have not been displayed. A complete set of results can be found in Results Review.   XR chest 2 views  Result Date: 5/16/2025  Interpreted By:  Jose Miguel Laguerre, STUDY: Chest dated  5/16/2025.   INDICATION: Signs/Symptoms:leg swelling   COMPARISON: Chest  dated 03/15/2025.   ACCESSION NUMBER(S): [id number]   ORDERING CLINICIAN: HARRISON THIBODEAUX   TECHNIQUE: Two views of the chest.   FINDINGS: There is blunting of the costophrenic angles with linear bibasilar opacities. Calcified granulomas are seen in the left mid lung zone. No pneumothorax  is evident. The cardiomediastinal silhouette is  not enlarged.Degenerative change is seen of the spine and shoulders.       Small bilateral pleural effusions with associated atelectasis.   MACRO: None   Signed by: Jose Miguel Laguerre 5/16/2025 1:46 PM Dictation workstation:   [id number]    Flexible Sigmoidoscopy  Result Date: 5/9/2025  [facility] Gastrointestinal Endoscopy Patient Name: Kirstin Howe Procedure Date: 5/9/2025 9:18 AM MRN: [id number] Account Number: [id number] YOB: 1940 Admit Type: Outpatient Age: 84 Room: [place] [address] Gender: Female Note Status: Finalized Attending MD: Sarmad Rodriguez MD, [id number] Procedure:             Colonoscopy Indications:           Change in bowel habits, Chronic idiopathic                        constipation, Weight loss Providers:             Sarmad Rodriguez MD Patient Profile:       Last Colonoscopy: date unknown. Refer to note in                        patient chart for documentation of history and                        physical. Referring Physician:   Sarmad Rodriguez MD (Referring MD) Medicines:             Monitored Anesthesia Care Complications:         No immediate complications. Requesting Provider:   Procedure:             Pre-Anesthesia Assessment:                        - Prior to the procedure, a History and Physical                        was performed, and patient medications and                        allergies were reviewed. The patient is competent.                        The risks and benefits of the procedure and the                        sedation options and risks were discussed with the                        patient. All questions were answered  and informed                        consent was obtained. Patient identification and                        proposed procedure were verified by the physician,                        the nurse, the anesthesiologist and the anesthetist                        in the pre-procedure area. Mental Status                        Examination: alert and oriented. Airway                        Examination: normal oropharyngeal airway and neck                        mobility. Respiratory Examination: clear to                        auscultation. CV Examination: normal. Prophylactic                        Antibiotics: The patient does not require                        prophylactic antibiotics. Prior Anticoagulants: The                        patient has taken no anticoagulant or antiplatelet                        agents. ASA Grade Assessment: III - A patient with                        severe systemic disease. After reviewing the risks                        and benefits, the patient was deemed in                        satisfactory condition to undergo the procedure.                        The anesthesia plan was to use monitored anesthesia                        care (MAC). Immediately prior to administration of                        medications, the patient was re-assessed for                        adequacy to receive sedatives. The heart rate,                        respiratory rate, oxygen saturations, blood                        pressure, adequacy of pulmonary ventilation, and                        response to care were monitored throughout the                        procedure. The physical status of the patient was                        re-assessed after the procedure.                        After I obtained informed consent, the scope was                        passed under direct vision. Throughout the                        procedure, the patient's blood pressure, pulse, and                        oxygen  saturations were monitored continuously. The                        Endoscope was introduced through the anus and                        advanced to the cecum, identified by appendiceal                        orifice and ileocecal valve. The colonoscopy was                        performed without difficulty. The patient tolerated                        the procedure well. The quality of the bowel                        preparation was poor and inadequate. The                        appendiceal orifice and the rectum were                        photographed. Scope Withdrawal Time: 0 hours 5 minutes 11 seconds Moderate Sedation:      MAC anesthesia was administered by the anesthesia team. Total Procedure Duration: 0 hours 13 minutes 39 seconds Findings:      Many medium-mouthed diverticula were found in the left colon.      Non-bleeding internal hemorrhoids were found during retroflexion. The      hemorrhoids were Grade I (internal hemorrhoids that do not prolapse).      A moderate amount of semi-solid stool was found in the entire colon,      interfering with visualization. Lavage of the area was performed      using greater than 500 mL of tap water, resulting in incomplete      clearance with continued poor visualization. Impression:            - Preparation of the colon was poor.                        - Preparation of the colon was inadequate.                        - Diverticulosis in the left colon.                        - Non-bleeding internal hemorrhoids.                        - Stool in the entire examined colon.                        - No specimens collected. Recommendation:        - Discharge patient to home (ambulatory).                        - Resume previous diet today.                        - Continue present medications.                        - Return to referring physician as previously                        scheduled.                        - Return to GI office at appointment to be                         scheduled. ( After Repeat Colonoscopy is completed                        ) .                        - Repeat colonoscopy at appointment to be scheduled                        because the bowel preparation was poor.                        - Patient has a contact number available for                        emergencies. The signs and symptoms of potential                        delayed complications were discussed with the                        patient. Return to normal activities tomorrow.                        Written discharge instructions were provided to the                        patient. Procedure Code(s):     --- Professional ---                        91671 Diagnosis Code(s):     --- Professional ---                        K64.0                        R19.4                        K59.04                        K57.30                        R63.4 CPT copyright 2021 [employer]. All rights reserved. The codes documented in this report are preliminary and upon  review may be revised to meet current compliance requirements. Attending Participation:      I personally performed the entire procedure. Scope In: 10:40:49 AM Scope Out: 10:54:28 AM MD Sarmad Bello MD 5/9/2025 11:14:17 AM This report has been signed electronically by Sarmad Rodriguez MD Number of Addenda: 0 Note Initiated On: 5/9/2025 9:18 AM Estimated Blood Loss:      Estimated blood loss: none.                   Assessment & Plan  Congestive heart failure, unspecified HF chronicity, unspecified heart failure type    This is a 84-year-old lady with history of hypertension, early dementia, tubular adenoma of colon who underwent a colonoscopy at [facility] on 9/9/2025 and was noted to have internal hemorrhoids and diverticulosis he stated that since then she has started having bilateral lower extremity swelling which has gotten worse over past 10 days and therefore she came to  emergency room for her reaction.  She denies any trauma to legs, she has had no history of DVT.  She denied any significant shortness of breath or chest pain and her serum troponin was normal however her BN peptide was elevated greater than 300.  For some reason patient had not been peptide done at [facility] on May 9 which was over 4000, although she was labeled at heart failure and not treated for that.  She is also moderately anemic with a hemoglobin of 10.3 which she  normocytic normochromic.  No echocardiogram is available in the medical records.  She has had a previous stress test.      1.  Acute bilateral lower extremity edema 4+ with elevated BN peptide however patient is not acutely short of breath and chest x-ray is clear.  She is empirically started on IV Lasix and awaiting an echocardiogram to assess LV function.  Patient has been on amlodipine 10 mg daily and I wonder if part of her edema is related to amlodipine.  She does have elevated BN peptide of greater than 300.  She has diuresed good with IV Lasix and leg edema has resolved, breathing is better, changed to p.o. Lasix 40 mg daily from May 18, 2025.     2.  Normocytic normochromic anemia, recent colonoscopy revealed diverticulosis and no evidence of bleeding, monitor H&H closely     3.  Hypertension blood pressures were elevated patient, patient was taking amlodipine and lisinopril at home.  Amlodipine is discontinued because of significant leg edema, continue lisinopril and added hydralazine which has been changed to 25 mg twice daily.     4.  Early dementia continue with donezepil and patient does take Wellbutrin for anxiety.     Reviewed all labs and imaging studies and discussed the plan of treatment with patient in detail          I spent 35 minutes in the professional and overall care of this patient.      George Apodaca MD         [1]    [2] PRN medications: acetaminophen **OR** acetaminophen **OR** acetaminophen,  melatonin, ondansetron **OR** ondansetron

## 2025-05-19 ENCOUNTER — APPOINTMENT (OUTPATIENT)
Dept: NEPHROLOGY | Facility: CLINIC | Age: 85
End: 2025-05-19
Payer: MEDICARE

## 2025-05-19 ENCOUNTER — APPOINTMENT (OUTPATIENT)
Dept: CARDIOLOGY | Facility: HOSPITAL | Age: 85
DRG: 291 | End: 2025-05-19
Payer: MEDICARE

## 2025-05-19 PROBLEM — I50.9 CONGESTIVE HEART FAILURE, UNSPECIFIED HF CHRONICITY, UNSPECIFIED HEART FAILURE TYPE: Status: RESOLVED | Noted: 2025-05-16 | Resolved: 2025-05-19

## 2025-05-19 LAB
ANION GAP SERPL CALC-SCNC: 15 MMOL/L (ref 10–20)
AORTIC VALVE MEAN GRADIENT: 4 MMHG
AORTIC VALVE PEAK VELOCITY: 1.39 M/S
ATRIAL RATE: 74 BPM
AV PEAK GRADIENT: 8 MMHG
AVA (PEAK VEL): 2.23 CM2
AVA (VTI): 2.35 CM2
BUN SERPL-MCNC: 21 MG/DL (ref 6–23)
CALCIUM SERPL-MCNC: 8.8 MG/DL (ref 8.6–10.3)
CHLORIDE SERPL-SCNC: 107 MMOL/L (ref 98–107)
CO2 SERPL-SCNC: 25 MMOL/L (ref 21–32)
CREAT SERPL-MCNC: 1.69 MG/DL (ref 0.5–1.05)
EGFRCR SERPLBLD CKD-EPI 2021: 29 ML/MIN/1.73M*2
EJECTION FRACTION APICAL 4 CHAMBER: 66.6
EJECTION FRACTION: 64 %
ERYTHROCYTE [DISTWIDTH] IN BLOOD BY AUTOMATED COUNT: 16.6 % (ref 11.5–14.5)
GLUCOSE SERPL-MCNC: 91 MG/DL (ref 74–99)
HCT VFR BLD AUTO: 32.9 % (ref 36–46)
HGB BLD-MCNC: 10.4 G/DL (ref 12–16)
LEFT ATRIUM VOLUME AREA LENGTH INDEX BSA: 36.8 ML/M2
LEFT VENTRICLE INTERNAL DIMENSION DIASTOLE: 4.2 CM (ref 3.5–6)
LEFT VENTRICULAR OUTFLOW TRACT DIAMETER: 1.96 CM
MCH RBC QN AUTO: 27.4 PG (ref 26–34)
MCHC RBC AUTO-ENTMCNC: 31.6 G/DL (ref 32–36)
MCV RBC AUTO: 87 FL (ref 80–100)
MITRAL VALVE E/A RATIO: 0.56
NRBC BLD-RTO: 0 /100 WBCS (ref 0–0)
P AXIS: 28 DEGREES
P OFFSET: 191 MS
P ONSET: 137 MS
PLATELET # BLD AUTO: 356 X10*3/UL (ref 150–450)
POTASSIUM SERPL-SCNC: 3.5 MMOL/L (ref 3.5–5.3)
PR INTERVAL: 164 MS
Q ONSET: 219 MS
QRS COUNT: 12 BEATS
QRS DURATION: 82 MS
QT INTERVAL: 412 MS
QTC CALCULATION(BAZETT): 457 MS
QTC FREDERICIA: 442 MS
R AXIS: 28 DEGREES
RBC # BLD AUTO: 3.8 X10*6/UL (ref 4–5.2)
RIGHT VENTRICLE FREE WALL PEAK S': 15 CM/S
RIGHT VENTRICLE PEAK SYSTOLIC PRESSURE: 39.5 MMHG
SODIUM SERPL-SCNC: 143 MMOL/L (ref 136–145)
T AXIS: 29 DEGREES
T OFFSET: 425 MS
TRICUSPID ANNULAR PLANE SYSTOLIC EXCURSION: 1.9 CM
VENTRICULAR RATE: 74 BPM
WBC # BLD AUTO: 6.1 X10*3/UL (ref 4.4–11.3)

## 2025-05-19 PROCEDURE — 93306 TTE W/DOPPLER COMPLETE: CPT | Performed by: INTERNAL MEDICINE

## 2025-05-19 PROCEDURE — 2500000001 HC RX 250 WO HCPCS SELF ADMINISTERED DRUGS (ALT 637 FOR MEDICARE OP): Performed by: INTERNAL MEDICINE

## 2025-05-19 PROCEDURE — 99232 SBSQ HOSP IP/OBS MODERATE 35: CPT | Performed by: INTERNAL MEDICINE

## 2025-05-19 PROCEDURE — 80048 BASIC METABOLIC PNL TOTAL CA: CPT

## 2025-05-19 PROCEDURE — 2500000004 HC RX 250 GENERAL PHARMACY W/ HCPCS (ALT 636 FOR OP/ED): Mod: JZ

## 2025-05-19 PROCEDURE — 36415 COLL VENOUS BLD VENIPUNCTURE: CPT

## 2025-05-19 PROCEDURE — 93306 TTE W/DOPPLER COMPLETE: CPT

## 2025-05-19 PROCEDURE — 2500000005 HC RX 250 GENERAL PHARMACY W/O HCPCS

## 2025-05-19 PROCEDURE — 1200000002 HC GENERAL ROOM WITH TELEMETRY DAILY

## 2025-05-19 PROCEDURE — 2500000001 HC RX 250 WO HCPCS SELF ADMINISTERED DRUGS (ALT 637 FOR MEDICARE OP)

## 2025-05-19 PROCEDURE — 85027 COMPLETE CBC AUTOMATED: CPT

## 2025-05-19 RX ORDER — FUROSEMIDE 40 MG/1
40 TABLET ORAL DAILY
Qty: 10 TABLET | Refills: 0 | Status: SHIPPED | OUTPATIENT
Start: 2025-05-20 | End: 2025-05-20

## 2025-05-19 RX ORDER — HYDRALAZINE HYDROCHLORIDE 25 MG/1
25 TABLET, FILM COATED ORAL 2 TIMES DAILY
Qty: 60 TABLET | Refills: 0 | Status: SHIPPED | OUTPATIENT
Start: 2025-05-19 | End: 2025-05-20

## 2025-05-19 RX ADMIN — SENNOSIDES AND DOCUSATE SODIUM 1 TABLET: 50; 8.6 TABLET ORAL at 21:40

## 2025-05-19 RX ADMIN — ENOXAPARIN SODIUM 30 MG: 30 INJECTION SUBCUTANEOUS at 09:23

## 2025-05-19 RX ADMIN — FUROSEMIDE 40 MG: 20 TABLET ORAL at 09:23

## 2025-05-19 RX ADMIN — LISINOPRIL 40 MG: 20 TABLET ORAL at 09:23

## 2025-05-19 RX ADMIN — GABAPENTIN 100 MG: 100 CAPSULE ORAL at 21:40

## 2025-05-19 RX ADMIN — HYDRALAZINE HYDROCHLORIDE 25 MG: 25 TABLET ORAL at 09:23

## 2025-05-19 RX ADMIN — BUPROPION HYDROCHLORIDE 300 MG: 150 TABLET, EXTENDED RELEASE ORAL at 09:23

## 2025-05-19 RX ADMIN — GABAPENTIN 100 MG: 100 CAPSULE ORAL at 09:22

## 2025-05-19 RX ADMIN — HYDRALAZINE HYDROCHLORIDE 25 MG: 25 TABLET ORAL at 21:40

## 2025-05-19 RX ADMIN — DONEPEZIL HYDROCHLORIDE 10 MG: 5 TABLET ORAL at 21:40

## 2025-05-19 RX ADMIN — SENNOSIDES AND DOCUSATE SODIUM 1 TABLET: 50; 8.6 TABLET ORAL at 09:23

## 2025-05-19 RX ADMIN — MELATONIN TAB 3 MG 3 MG: 3 TAB at 21:40

## 2025-05-19 ASSESSMENT — COGNITIVE AND FUNCTIONAL STATUS - GENERAL
MOBILITY SCORE: 24
DAILY ACTIVITIY SCORE: 24

## 2025-05-19 ASSESSMENT — ACTIVITIES OF DAILY LIVING (ADL): LACK_OF_TRANSPORTATION: NO

## 2025-05-19 ASSESSMENT — PAIN - FUNCTIONAL ASSESSMENT: PAIN_FUNCTIONAL_ASSESSMENT: 0-10

## 2025-05-19 ASSESSMENT — PAIN SCALES - GENERAL
PAINLEVEL_OUTOF10: 0 - NO PAIN
PAINLEVEL_OUTOF10: 0 - NO PAIN

## 2025-05-19 NOTE — PROGRESS NOTES
05/19/25 1241   Discharge Planning   Living Arrangements Alone   Support Systems Children   Assistance Needed met with patient at bedside, explained role of TCC, patient admitted for leg edema, may be new onset HF, patient states she has an appt with a new pcp in a few weeks, uses the [facility] on [address], lives alone in a 3 story home, states she does NOT use DME and uses the steps independently, has not mobility issues at baseline, no DME, is currently  not driving but plans to start driving again (states her son took her car aware and doesnt think she should be driivng) plans to dc home at discharge, with no additional needs, son or daughter will transport her home.   Type of Residence Private residence   Number of Stairs to Enter Residence 9   Number of Stairs Within Residence 13   Do you have animals or pets at home? Yes   Type of Animals or Pets cats   Home or Post Acute Services None   Expected Discharge Disposition Home   Does the patient need discharge transport arranged? No   Financial Resource Strain   How hard is it for you to pay for the very basics like food, housing, medical care, and heating? Not hard   Housing Stability   In the last 12 months, was there a time when you were not able to pay the mortgage or rent on time? N   In the past 12 months, how many times have you moved where you were living? 0   At any time in the past 12 months, were you homeless or living in a shelter (including now)? N   Transportation Needs   In the past 12 months, has lack of transportation kept you from medical appointments or from getting medications? no   In the past 12 months, has lack of transportation kept you from meetings, work, or from getting things needed for daily living? No

## 2025-05-19 NOTE — PROGRESS NOTES
"Kirstin Howe is a 84 y.o. female on day 3 of admission presenting with Congestive heart failure, unspecified HF chronicity, unspecified heart failure type.    Subjective   I feel good, my breathing is better and edema is improved however still have some edema in the left foot.  No chest pain or shortness of breath, blood pressures are improving.  Overall significant improvement and ready for discharge home today.  Changed to oral diuretics other day.       Objective     Physical Exam  GENERAL:  Alert, no distress, cooperative  SKIN:  Skin color, texture, turgor normal. No rashes or lesions.  OROPHARYNX:  Lips, mucosa, and tongue are normal.Teeth and gums, normal. Oropharynx normal.  NECK:  No jugulovenous distention, No carotid bruits, Carotid pulse normal contour, Supple  LUNGS:  Lungs clear to auscultation. Good diaphragmatic excursion.  CARDIAC: Rate and rhythm is regular, normal S1 and S2; no rubs, , or gallops, 2/6 systolic apixaban from border, leg edema is resolved  ABDOMEN:  Abdomen soft, non-tender, BS normal, No masses or organomegaly  EXTREMETIES:  Extremities normal, no deformities, trace edema, no clubbing or skin discoloration. Good capillary refill., No ulcers  NEURO:  Alert, oriented X 3,  Non-focal.  PULSES:  2+ radial, 2+ carotid  Last Recorded Vitals  Blood pressure 158/85, pulse 75, temperature 36.4 °C (97.5 °F), temperature Source Temporal, resp. rate 17, height 1.626 m (5' 4\"), weight 52.2 kg (115 lb), SpO2 96%.  Intake/Output last 3 Shifts:  No intake/output data recorded.    Relevant Results  Scheduled medications  buPROPion XL, 300 mg, oral, Daily  donepezil, 10 mg, oral, Nightly  enoxaparin, 30 mg, subcutaneous, q24h  furosemide, 40 mg, oral, Daily  gabapentin, 100 mg, oral, BID  hydrALAZINE, 25 mg, oral, BID  lisinopril, 40 mg, oral, Daily  polyethylene glycol, 17 g, oral, Daily  sennosides-docusate sodium, 1 tablet, oral, BID    Continuous medications  Continuous Medications[1]  PRN " medications  PRN Medications[2]      Results for orders placed or performed during the hospital encounter of 05/16/25 (from the past 96 hours)   Electrocardiogram, 12-lead PRN ACS symptoms   Result Value Ref Range    Ventricular Rate 74 BPM    Atrial Rate 74 BPM    ID Interval 164 ms    QRS Duration 82 ms    QT Interval 412 ms    QTC Calculation(Bazett) 457 ms    P Axis 28 degrees    R Axis 28 degrees    T Axis 29 degrees    QRS Count 12 beats    Q Onset 219 ms    P Onset 137 ms    P Offset 191 ms    T Offset 425 ms    QTC Fredericia 442 ms   CBC and Auto Differential   Result Value Ref Range    WBC 5.3 4.4 - 11.3 x10*3/uL    nRBC 0.0 0.0 - 0.0 /100 WBCs    RBC 3.48 (L) 4.00 - 5.20 x10*6/uL    Hemoglobin 9.6 (L) 12.0 - 16.0 g/dL    Hematocrit 30.7 (L) 36.0 - 46.0 %    MCV 88 80 - 100 fL    MCH 27.6 26.0 - 34.0 pg    MCHC 31.3 (L) 32.0 - 36.0 g/dL    RDW 17.4 (H) 11.5 - 14.5 %    Platelets 345 150 - 450 x10*3/uL    Neutrophils % 68.8 40.0 - 80.0 %    Immature Granulocytes %, Automated 0.4 0.0 - 0.9 %    Lymphocytes % 19.9 13.0 - 44.0 %    Monocytes % 9.0 2.0 - 10.0 %    Eosinophils % 1.3 0.0 - 6.0 %    Basophils % 0.6 0.0 - 2.0 %    Neutrophils Absolute 3.66 1.60 - 5.50 x10*3/uL    Immature Granulocytes Absolute, Automated 0.02 0.00 - 0.50 x10*3/uL    Lymphocytes Absolute 1.06 0.80 - 3.00 x10*3/uL    Monocytes Absolute 0.48 0.05 - 0.80 x10*3/uL    Eosinophils Absolute 0.07 0.00 - 0.40 x10*3/uL    Basophils Absolute 0.03 0.00 - 0.10 x10*3/uL   Comprehensive metabolic panel   Result Value Ref Range    Glucose 111 (H) 74 - 99 mg/dL    Sodium 143 136 - 145 mmol/L    Potassium 3.6 3.5 - 5.3 mmol/L    Chloride 113 (H) 98 - 107 mmol/L    Bicarbonate 20 (L) 21 - 32 mmol/L    Anion Gap 14 10 - 20 mmol/L    Urea Nitrogen 18 6 - 23 mg/dL    Creatinine 1.23 (H) 0.50 - 1.05 mg/dL    eGFR 43 (L) >60 mL/min/1.73m*2    Calcium 8.9 8.6 - 10.3 mg/dL    Albumin 3.7 3.4 - 5.0 g/dL    Alkaline Phosphatase 62 33 - 136 U/L    Total Protein  5.4 (L) 6.4 - 8.2 g/dL    AST 16 9 - 39 U/L    Bilirubin, Total 0.3 0.0 - 1.2 mg/dL    ALT 11 7 - 45 U/L   Troponin I, High Sensitivity   Result Value Ref Range    Troponin I, High Sensitivity 13 0 - 13 ng/L   B-Type Natriuretic Peptide   Result Value Ref Range     (H) 0 - 99 pg/mL   Basic metabolic panel   Result Value Ref Range    Glucose 75 74 - 99 mg/dL    Sodium 145 136 - 145 mmol/L    Potassium 3.8 3.5 - 5.3 mmol/L    Chloride 111 (H) 98 - 107 mmol/L    Bicarbonate 25 21 - 32 mmol/L    Anion Gap 13 10 - 20 mmol/L    Urea Nitrogen 16 6 - 23 mg/dL    Creatinine 1.12 (H) 0.50 - 1.05 mg/dL    eGFR 48 (L) >60 mL/min/1.73m*2    Calcium 8.5 (L) 8.6 - 10.3 mg/dL   CBC   Result Value Ref Range    WBC 5.6 4.4 - 11.3 x10*3/uL    nRBC 0.0 0.0 - 0.0 /100 WBCs    RBC 3.45 (L) 4.00 - 5.20 x10*6/uL    Hemoglobin 9.5 (L) 12.0 - 16.0 g/dL    Hematocrit 30.6 (L) 36.0 - 46.0 %    MCV 89 80 - 100 fL    MCH 27.5 26.0 - 34.0 pg    MCHC 31.0 (L) 32.0 - 36.0 g/dL    RDW 17.1 (H) 11.5 - 14.5 %    Platelets 315 150 - 450 x10*3/uL   Basic metabolic panel   Result Value Ref Range    Glucose 87 74 - 99 mg/dL    Sodium 142 136 - 145 mmol/L    Potassium 3.6 3.5 - 5.3 mmol/L    Chloride 106 98 - 107 mmol/L    Bicarbonate 25 21 - 32 mmol/L    Anion Gap 15 10 - 20 mmol/L    Urea Nitrogen 15 6 - 23 mg/dL    Creatinine 1.35 (H) 0.50 - 1.05 mg/dL    eGFR 39 (L) >60 mL/min/1.73m*2    Calcium 8.6 8.6 - 10.3 mg/dL   CBC   Result Value Ref Range    WBC 6.3 4.4 - 11.3 x10*3/uL    nRBC 0.0 0.0 - 0.0 /100 WBCs    RBC 3.97 (L) 4.00 - 5.20 x10*6/uL    Hemoglobin 10.5 (L) 12.0 - 16.0 g/dL    Hematocrit 34.3 (L) 36.0 - 46.0 %    MCV 86 80 - 100 fL    MCH 26.4 26.0 - 34.0 pg    MCHC 30.6 (L) 32.0 - 36.0 g/dL    RDW 16.7 (H) 11.5 - 14.5 %    Platelets 408 150 - 450 x10*3/uL   Basic metabolic panel   Result Value Ref Range    Glucose 91 74 - 99 mg/dL    Sodium 143 136 - 145 mmol/L    Potassium 3.5 3.5 - 5.3 mmol/L    Chloride 107 98 - 107 mmol/L     Bicarbonate 25 21 - 32 mmol/L    Anion Gap 15 10 - 20 mmol/L    Urea Nitrogen 21 6 - 23 mg/dL    Creatinine 1.69 (H) 0.50 - 1.05 mg/dL    eGFR 29 (L) >60 mL/min/1.73m*2    Calcium 8.8 8.6 - 10.3 mg/dL   CBC   Result Value Ref Range    WBC 6.1 4.4 - 11.3 x10*3/uL    nRBC 0.0 0.0 - 0.0 /100 WBCs    RBC 3.80 (L) 4.00 - 5.20 x10*6/uL    Hemoglobin 10.4 (L) 12.0 - 16.0 g/dL    Hematocrit 32.9 (L) 36.0 - 46.0 %    MCV 87 80 - 100 fL    MCH 27.4 26.0 - 34.0 pg    MCHC 31.6 (L) 32.0 - 36.0 g/dL    RDW 16.6 (H) 11.5 - 14.5 %    Platelets 356 150 - 450 x10*3/uL     *Note: Due to a large number of results and/or encounters for the requested time period, some results have not been displayed. A complete set of results can be found in Results Review.   Electrocardiogram, 12-lead PRN ACS symptoms  Result Date: 5/19/2025  Normal sinus rhythm Nonspecific T wave abnormality Abnormal ECG When compared with ECG of 15-MAR-2025 20:10, T wave amplitude has decreased in Inferior leads Nonspecific T wave abnormality now evident in Lateral leads See ED provider note for full interpretation and clinical correlation Confirmed by Shweta Vines ([id number]) on 5/19/2025 10:27:34 AM    XR chest 2 views  Result Date: 5/16/2025  Interpreted By:  Jose Miguel Laguerre, STUDY: Chest dated  5/16/2025.   INDICATION: Signs/Symptoms:leg swelling   COMPARISON: Chest dated 03/15/2025.   ACCESSION NUMBER(S): [id number]   ORDERING CLINICIAN: HARRISON THIBOEDAUX   TECHNIQUE: Two views of the chest.   FINDINGS: There is blunting of the costophrenic angles with linear bibasilar opacities. Calcified granulomas are seen in the left mid lung zone. No pneumothorax  is evident. The cardiomediastinal silhouette is  not enlarged.Degenerative change is seen of the spine and shoulders.       Small bilateral pleural effusions with associated atelectasis.   MACRO: None   Signed by: Jose Miguel Laguerre 5/16/2025 1:46 PM Dictation workstation:   [id number]    Flexible Sigmoidoscopy  Result  Date: 5/9/2025  [facility] Gastrointestinal Endoscopy Patient Name: Kirstin Howe Procedure Date: 5/9/2025 9:18 AM MRN: [id number] Account Number: [id number] YOB: 1940 Admit Type: Outpatient Age: 84 Room: [place] [address] Gender: Female Note Status: Finalized Attending MD: Sarmad Rodriguez MD, [id number] Procedure:             Colonoscopy Indications:           Change in bowel habits, Chronic idiopathic                        constipation, Weight loss Providers:             Sarmad Rodriguez MD Patient Profile:       Last Colonoscopy: date unknown. Refer to note in                        patient chart for documentation of history and                        physical. Referring Physician:   Sarmad Rodriguez MD (Referring MD) Medicines:             Monitored Anesthesia Care Complications:         No immediate complications. Requesting Provider:   Procedure:             Pre-Anesthesia Assessment:                        - Prior to the procedure, a History and Physical                        was performed, and patient medications and                        allergies were reviewed. The patient is competent.                        The risks and benefits of the procedure and the                        sedation options and risks were discussed with the                        patient. All questions were answered and informed                        consent was obtained. Patient identification and                        proposed procedure were verified by the physician,                        the nurse, the anesthesiologist and the anesthetist                        in the pre-procedure area. Mental Status                        Examination: alert and oriented. Airway                        Examination: normal oropharyngeal airway and neck                        mobility. Respiratory Examination: clear to                        auscultation. CV Examination: normal. Prophylactic                         Antibiotics: The patient does not require                        prophylactic antibiotics. Prior Anticoagulants: The                        patient has taken no anticoagulant or antiplatelet                        agents. ASA Grade Assessment: III - A patient with                        severe systemic disease. After reviewing the risks                        and benefits, the patient was deemed in                        satisfactory condition to undergo the procedure.                        The anesthesia plan was to use monitored anesthesia                        care (MAC). Immediately prior to administration of                        medications, the patient was re-assessed for                        adequacy to receive sedatives. The heart rate,                        respiratory rate, oxygen saturations, blood                        pressure, adequacy of pulmonary ventilation, and                        response to care were monitored throughout the                        procedure. The physical status of the patient was                        re-assessed after the procedure.                        After I obtained informed consent, the scope was                        passed under direct vision. Throughout the                        procedure, the patient's blood pressure, pulse, and                        oxygen saturations were monitored continuously. The                        Endoscope was introduced through the anus and                        advanced to the cecum, identified by appendiceal                        orifice and ileocecal valve. The colonoscopy was                        performed without difficulty. The patient tolerated                        the procedure well. The quality of the bowel                        preparation was poor and inadequate. The                        appendiceal orifice and the rectum were                        photographed. Scope Withdrawal Time: 0 hours 5 minutes  11 seconds Moderate Sedation:      MAC anesthesia was administered by the anesthesia team. Total Procedure Duration: 0 hours 13 minutes 39 seconds Findings:      Many medium-mouthed diverticula were found in the left colon.      Non-bleeding internal hemorrhoids were found during retroflexion. The      hemorrhoids were Grade I (internal hemorrhoids that do not prolapse).      A moderate amount of semi-solid stool was found in the entire colon,      interfering with visualization. Lavage of the area was performed      using greater than 500 mL of tap water, resulting in incomplete      clearance with continued poor visualization. Impression:            - Preparation of the colon was poor.                        - Preparation of the colon was inadequate.                        - Diverticulosis in the left colon.                        - Non-bleeding internal hemorrhoids.                        - Stool in the entire examined colon.                        - No specimens collected. Recommendation:        - Discharge patient to home (ambulatory).                        - Resume previous diet today.                        - Continue present medications.                        - Return to referring physician as previously                        scheduled.                        - Return to GI office at appointment to be                        scheduled. ( After Repeat Colonoscopy is completed                        ) .                        - Repeat colonoscopy at appointment to be scheduled                        because the bowel preparation was poor.                        - Patient has a contact number available for                        emergencies. The signs and symptoms of potential                        delayed complications were discussed with the                        patient. Return to normal activities tomorrow.                        Written discharge instructions were provided to the                         patient. Procedure Code(s):     --- Professional ---                        26538 Diagnosis Code(s):     --- Professional ---                        K64.0                        R19.4                        K59.04                        K57.30                        R63.4 CPT copyright 2021 [employer]. All rights reserved. The codes documented in this report are preliminary and upon  review may be revised to meet current compliance requirements. Attending Participation:      I personally performed the entire procedure. Scope In: 10:40:49 AM Scope Out: 10:54:28 AM MD Sarmad Bello MD 5/9/2025 11:14:17 AM This report has been signed electronically by Sarmad Rodriguez MD Number of Addenda: 0 Note Initiated On: 5/9/2025 9:18 AM Estimated Blood Loss:      Estimated blood loss: none.                 Assessment & Plan  Congestive heart failure, unspecified HF chronicity, unspecified heart failure type    This is a 84-year-old lady with history of hypertension, early dementia, tubular adenoma of colon who underwent a colonoscopy at [facility] on 9/9/2025 and was noted to have internal hemorrhoids and diverticulosis he stated that since then she has started having bilateral lower extremity swelling which has gotten worse over past 10 days and therefore she came to emergency room for her reaction.  She denies any trauma to legs, she has had no history of DVT.  She denied any significant shortness of breath or chest pain and her serum troponin was normal however her BN peptide was elevated greater than 300.  For some reason patient had not been peptide done at [facility] on May 9 which was over 4000, although she was labeled at heart failure and not treated for that.  She is also moderately anemic with a hemoglobin of 10.3 which she  normocytic normochromic.  No echocardiogram is available in the medical records.  She has had a previous stress  test.      1.  Acute bilateral lower extremity edema 4+ with elevated BN peptide however patient is not acutely short of breath and chest x-ray is clear.  She is empirically started on IV Lasix and awaiting an echocardiogram to assess LV function.  Patient has been on amlodipine 10 mg daily and I wonder if part of her edema is related to amlodipine.  She does have elevated BN peptide of greater than 300.  She has diuresed good with IV Lasix and leg edema has resolved, breathing is better, changed to p.o. Lasix 40 mg daily from May 18, 2025.     2.  Normocytic normochromic anemia, recent colonoscopy revealed diverticulosis and no evidence of bleeding, monitor H&H closely     3.  Hypertension blood pressures were elevated patient, patient was taking amlodipine and lisinopril at home.  Amlodipine is discontinued because of significant leg edema, continue lisinopril and added hydralazine which has been changed to 25 mg twice daily.     4.  Early dementia continue with donezepil and patient does take Wellbutrin for anxiety.     Reviewed all labs and imaging studies and discussed the plan of treatment with patient in detail  Stable for discharge home today.         I spent 35 minutes in the professional and overall care of this patient.      George Apodaca MD         [1]    [2] PRN medications: acetaminophen **OR** acetaminophen **OR** acetaminophen, melatonin, ondansetron **OR** ondansetron

## 2025-05-19 NOTE — CARE PLAN
The patient's goals for the shift include      The clinical goals for the shift include pt will remain HDS    Problem: Fall/Injury  Goal: Not fall by end of shift  Outcome: Progressing  Goal: Be free from injury by end of the shift  Outcome: Progressing  Goal: Verbalize understanding of personal risk factors for fall in the hospital  Outcome: Progressing

## 2025-05-20 VITALS
HEART RATE: 74 BPM | RESPIRATION RATE: 17 BRPM | OXYGEN SATURATION: 99 % | WEIGHT: 115 LBS | SYSTOLIC BLOOD PRESSURE: 166 MMHG | BODY MASS INDEX: 19.63 KG/M2 | TEMPERATURE: 97.8 F | DIASTOLIC BLOOD PRESSURE: 86 MMHG | HEIGHT: 64 IN

## 2025-05-20 LAB
ANION GAP SERPL CALC-SCNC: 13 MMOL/L (ref 10–20)
BUN SERPL-MCNC: 20 MG/DL (ref 6–23)
CALCIUM SERPL-MCNC: 8.2 MG/DL (ref 8.6–10.3)
CHLORIDE SERPL-SCNC: 108 MMOL/L (ref 98–107)
CO2 SERPL-SCNC: 24 MMOL/L (ref 21–32)
CREAT SERPL-MCNC: 1.47 MG/DL (ref 0.5–1.05)
EGFRCR SERPLBLD CKD-EPI 2021: 35 ML/MIN/1.73M*2
ERYTHROCYTE [DISTWIDTH] IN BLOOD BY AUTOMATED COUNT: 16.2 % (ref 11.5–14.5)
GLUCOSE SERPL-MCNC: 83 MG/DL (ref 74–99)
HCT VFR BLD AUTO: 30.1 % (ref 36–46)
HGB BLD-MCNC: 9.5 G/DL (ref 12–16)
MCH RBC QN AUTO: 27.1 PG (ref 26–34)
MCHC RBC AUTO-ENTMCNC: 31.6 G/DL (ref 32–36)
MCV RBC AUTO: 86 FL (ref 80–100)
NRBC BLD-RTO: 0 /100 WBCS (ref 0–0)
PLATELET # BLD AUTO: 375 X10*3/UL (ref 150–450)
POTASSIUM SERPL-SCNC: 3.3 MMOL/L (ref 3.5–5.3)
RBC # BLD AUTO: 3.51 X10*6/UL (ref 4–5.2)
SODIUM SERPL-SCNC: 142 MMOL/L (ref 136–145)
WBC # BLD AUTO: 6.3 X10*3/UL (ref 4.4–11.3)

## 2025-05-20 PROCEDURE — 2500000002 HC RX 250 W HCPCS SELF ADMINISTERED DRUGS (ALT 637 FOR MEDICARE OP, ALT 636 FOR OP/ED)

## 2025-05-20 PROCEDURE — 36415 COLL VENOUS BLD VENIPUNCTURE: CPT

## 2025-05-20 PROCEDURE — 80048 BASIC METABOLIC PNL TOTAL CA: CPT

## 2025-05-20 PROCEDURE — 2500000001 HC RX 250 WO HCPCS SELF ADMINISTERED DRUGS (ALT 637 FOR MEDICARE OP): Performed by: INTERNAL MEDICINE

## 2025-05-20 PROCEDURE — 2500000001 HC RX 250 WO HCPCS SELF ADMINISTERED DRUGS (ALT 637 FOR MEDICARE OP)

## 2025-05-20 PROCEDURE — 85027 COMPLETE CBC AUTOMATED: CPT

## 2025-05-20 PROCEDURE — 2500000004 HC RX 250 GENERAL PHARMACY W/ HCPCS (ALT 636 FOR OP/ED): Mod: JZ

## 2025-05-20 PROCEDURE — 99239 HOSP IP/OBS DSCHRG MGMT >30: CPT | Performed by: INTERNAL MEDICINE

## 2025-05-20 RX ORDER — HYDRALAZINE HYDROCHLORIDE 25 MG/1
25 TABLET, FILM COATED ORAL 2 TIMES DAILY
Qty: 60 TABLET | Refills: 0 | Status: SHIPPED | OUTPATIENT
Start: 2025-05-20

## 2025-05-20 RX ORDER — FUROSEMIDE 40 MG/1
40 TABLET ORAL DAILY
Qty: 10 TABLET | Refills: 0 | Status: SHIPPED | OUTPATIENT
Start: 2025-05-20

## 2025-05-20 RX ORDER — HYDRALAZINE HYDROCHLORIDE 25 MG/1
25 TABLET, FILM COATED ORAL 3 TIMES DAILY
Status: DISCONTINUED | OUTPATIENT
Start: 2025-05-20 | End: 2025-05-20 | Stop reason: HOSPADM

## 2025-05-20 RX ORDER — HYDRALAZINE HYDROCHLORIDE 25 MG/1
25 TABLET, FILM COATED ORAL 2 TIMES DAILY
Qty: 60 TABLET | Refills: 0 | Status: SHIPPED | OUTPATIENT
Start: 2025-05-20 | End: 2025-05-20

## 2025-05-20 RX ORDER — POTASSIUM CHLORIDE 20 MEQ/1
40 TABLET, EXTENDED RELEASE ORAL ONCE
Status: COMPLETED | OUTPATIENT
Start: 2025-05-20 | End: 2025-05-20

## 2025-05-20 RX ADMIN — BUPROPION HYDROCHLORIDE 300 MG: 150 TABLET, EXTENDED RELEASE ORAL at 09:07

## 2025-05-20 RX ADMIN — ENOXAPARIN SODIUM 30 MG: 30 INJECTION SUBCUTANEOUS at 09:10

## 2025-05-20 RX ADMIN — HYDRALAZINE HYDROCHLORIDE 25 MG: 25 TABLET ORAL at 15:32

## 2025-05-20 RX ADMIN — POTASSIUM CHLORIDE 40 MEQ: 1500 TABLET, EXTENDED RELEASE ORAL at 15:33

## 2025-05-20 RX ADMIN — FUROSEMIDE 40 MG: 20 TABLET ORAL at 09:06

## 2025-05-20 RX ADMIN — LISINOPRIL 40 MG: 20 TABLET ORAL at 09:07

## 2025-05-20 RX ADMIN — HYDRALAZINE HYDROCHLORIDE 25 MG: 25 TABLET ORAL at 09:06

## 2025-05-20 ASSESSMENT — PAIN SCALES - GENERAL: PAINLEVEL_OUTOF10: 0 - NO PAIN

## 2025-05-20 ASSESSMENT — COGNITIVE AND FUNCTIONAL STATUS - GENERAL
DAILY ACTIVITIY SCORE: 24
MOBILITY SCORE: 24

## 2025-05-20 NOTE — CONSULTS
Nutrition Initial Assessment Note    Reason for Assessment: Provider consult order    Pt admitted for:  Lower extremity edema [R60.0]  Congestive heart failure, unspecified HF chronicity, unspecified heart failure type [I50.9]    Chart reviewed and pt visited.  Per pt weight loss during FLU.  #.  Typically eats 2 1/2 meals a day.    Pt agreeable to supplement while admitted.    Medical History[1]    Results for orders placed or performed during the hospital encounter of 05/16/25 (from the past 24 hours)   Transthoracic Echo Complete   Result Value Ref Range    AV pk leanna 1.39 m/s    AV mn grad 4 mmHg    LVOT diam 1.96 cm    MV E/A ratio 0.56     LA vol index A/L 36.8 ml/m2    Tricuspid annular plane systolic excursion 1.9 cm    LV EF 64 %    RV free wall pk S' 15.00 cm/s    LVIDd 4.20 cm    RVSP 39.5 mmHg    Aortic Valve Area by Continuity of VTI 2.35 cm2    Aortic Valve Area by Continuity of Peak Velocity 2.23 cm2    AV pk grad 8 mmHg    LV A4C EF 66.6    Basic metabolic panel   Result Value Ref Range    Glucose 83 74 - 99 mg/dL    Sodium 142 136 - 145 mmol/L    Potassium 3.3 (L) 3.5 - 5.3 mmol/L    Chloride 108 (H) 98 - 107 mmol/L    Bicarbonate 24 21 - 32 mmol/L    Anion Gap 13 10 - 20 mmol/L    Urea Nitrogen 20 6 - 23 mg/dL    Creatinine 1.47 (H) 0.50 - 1.05 mg/dL    eGFR 35 (L) >60 mL/min/1.73m*2    Calcium 8.2 (L) 8.6 - 10.3 mg/dL   CBC   Result Value Ref Range    WBC 6.3 4.4 - 11.3 x10*3/uL    nRBC 0.0 0.0 - 0.0 /100 WBCs    RBC 3.51 (L) 4.00 - 5.20 x10*6/uL    Hemoglobin 9.5 (L) 12.0 - 16.0 g/dL    Hematocrit 30.1 (L) 36.0 - 46.0 %    MCV 86 80 - 100 fL    MCH 27.1 26.0 - 34.0 pg    MCHC 31.6 (L) 32.0 - 36.0 g/dL    RDW 16.2 (H) 11.5 - 14.5 %    Platelets 375 150 - 450 x10*3/uL     Scheduled medications  Scheduled Medications[2]  Continuous medications  Continuous Medications[3]  PRN medications  PRN Medications[4]  Dietary Orders (From admission, onward)       Start     Ordered    05/20/25 1003  Oral  "nutritional supplements  Until discontinued        Question Answer Comment   Deliver with All meals mixed berry   Select supplement: Ensure Clear        05/20/25 1003    05/16/25 2258  May Participate in Room Service  ( ROOM SERVICE MAY PARTICIPATE)  Once        Question:  .  Answer:  Yes    05/16/25 2257 05/16/25 1745  Adult diet Regular  Diet effective now        Question:  Diet type  Answer:  Regular    05/16/25 1744                    History:  Energy Intake: Good > 75 %, Poor < 50 %  Food and Nutrient History: per pt good intake, per RN eating a little bit    Anthropometrics:  Height: 162.6 cm (5' 4\")  Weight: 52.2 kg (115 lb)  BMI (Calculated): 19.73    Wt Readings from Last 21 Encounters:   05/16/25 52.2 kg (115 lb)   03/27/25 56.2 kg (124 lb)   03/18/25 51 kg (112 lb 8 oz)   03/07/25 56.2 kg (124 lb)   03/05/25 56.2 kg (124 lb)   02/20/25 56.5 kg (124 lb 9 oz)   02/17/25 54.2 kg (119 lb 7.8 oz)   11/24/24 61.2 kg (135 lb)   11/18/24 60.8 kg (134 lb)   10/28/24 60.8 kg (134 lb)   10/23/24 62.6 kg (138 lb)   10/10/24 62.6 kg (138 lb)   10/06/24 62.6 kg (138 lb)   02/06/25 56.2 kg (124 lb)   09/26/24 64 kg (141 lb)   06/24/24 64.4 kg (142 lb)   06/11/24 63.7 kg (140 lb 6.4 oz)   06/01/24 63.5 kg (140 lb)   06/01/24 (!) 656 kg (1446 lb 14.1 oz)   05/23/24 65.3 kg (143 lb 15.4 oz)   02/05/24 67.6 kg (149 lb)         Significant Weight Loss: Yes  Interpretation of Weight Loss: >20% in 1 year    Total Energy Estimated Needs in 24 hours (kCal): 1565 kCal  Energy Estimated Needs per kg Body Weight in 24 hours (kCal/kg): 1830 kCal/kg  Method for Estimating Needs: 30-35    Total Protein Estimated Needs in 24 Hours (g): 40 g  Protein Estimated Needs per kg Body Weight in 24 Hours (g/kg): 55 g/kg  Method for Estimating 24 Hour Protein Needs: 0.8-1.0    Method for Estimating 24 Hour Fluid Needs: 1ml/kcal or per MD    Nutrition Focused Physical Findings:  Orbital Fat Pads: Severe (dark circles, hollowing and loose " skin)  Buccal Fat Pads: Mild-Moderate (flat cheeks, minimal bounce)    Temporalis: Severe (hollowed scooping depression)    Edema: +2 mild  Edema Location: RLE, LLE    Skin: Negative     Nutrition Diagnosis   Malnutrition Diagnosis  Patient has Malnutrition Diagnosis: Yes  Diagnosis Status: New  Malnutrition Diagnosis: Severe malnutrition related to chronic disease or condition  Related to: multiple comorbidities  As Evidenced by: > 20% weight loss in 1 year, mild to severe subcutaneous fat loss, severe muscle wasting and mild fluid accumulation present       Nutrition Interventions/Recommendations   Nutrition Prescription: Nutrition prescription for oral nutrition  Individualized Nutrition Prescription Provided for : Continue oral diet as ordered. Ensure TID.    Food and/or Nutrient Delivery Interventions  Goal: > 75% of meals consumed     Medical Food Supplement: Commercial beverage medical food supplement therapy  Goal: Ensure TID for encouraged intake    Collaboration and Referral of Nutrition Care: Collaboration by nutrition professional with other providers  Coordination of Care with Providers: Nursing    Education Documentation  No documentation found.      Nutrition Monitoring and Evaluation   Food and Nutrient Related History  Estimated Energy Intake: Energy intake greater or equal to 75% of estimated energy needs    Fluid Intake: Estimated fluid intake    Anthropometrics: Body Composition/Growth/Weight History  Body Weight: Body weight - Maintain stable weight, Body weight - Weight reduction from fluids, as needed    Biochemical Data, Medical Tests and Procedures  Electrolyte and Renal Panel: Other (Comment)  Criteria: as clinically indicated    Gastrointestinal Profile: Other (Comment)  Criteria: as clinically indicated    Glucose/Endocrine Profile: Other (Comment)  Criteria: as clinically indicated    Nutritional Anemia Profile: Other (Comment)  Criteria: as clinically indicated    Vitamin Profile: Other  (Comment)  Criteria: as clinically indicated    Nutrition Focused Physical Findings  Adipose Finding: Loss of subcutaneous fat    Digestive System Finding: Other (Comment)  Criteria: Stool output, Urine volume, Overall appearance    Muscle Finding: Muscle atrophy    Skin Finding: Promote intact skin - Promote skin integrity    Edema Finding: +2 Pitting edema    Time Spent (min): 60 minutes  Last Date of Nutrition Visit: 05/20/25  Nutrition Follow-Up Needed?: Dietitian to reassess per policy  Follow up Comment: GENEVIEVE SMITH       [1]   Past Medical History:  Diagnosis Date    Age-related nuclear cataract, right eye 03/31/2016    Age-related nuclear cataract of right eye    Anxiety disorder, unspecified 03/31/2014    Anxiety    Bunion of unspecified foot 11/26/2014    Bunion    Concussion 04/08/2021    Last Assessment & Plan: Formatting of this note might be different from the original. -concern for possible concussion on admission, but head/neck/throat pain more likely due to infectious etiology as noted above    Contusion of left forearm, initial encounter 08/16/2016    Contusion of left forearm    Contusion of left knee, initial encounter 08/09/2016    Contusion of knee, left    Cortical age-related cataract, right eye 03/31/2016    Cortical age-related cataract of right eye    Depression     Dermatochalasis of unspecified eye, unspecified eyelid 11/10/2017    Dermatochalasis    Dislocation of right shoulder joint     Essential (primary) hypertension 11/23/2022    Hypertension    Fracture of orbital floor, right side, initial encounter for open fracture (Multi) 06/27/2017    Open fracture of right orbital floor, initial encounter    Generalized anxiety disorder 11/26/2014    PIA (generalized anxiety disorder)    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    Herpes zoster 03/29/2023    History of cataract 06/11/2024    History of depression 06/11/2024    Hypertensive urgency 03/25/2016    Hypertensive  urgency    Insomnia, unspecified 11/26/2014    Insomnia    Maxillary fracture, unspecified side, initial encounter for closed fracture (Multi) 06/21/2017    Fracture of maxillary sinus, closed, initial encounter    Primary osteoarthritis, 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    Primary osteoarthritis of left wrist    Stage III chronic kidney disease (Multi) 03/29/2023    Status post left knee replacement 09/02/2022   [2] buPROPion XL, 300 mg, oral, Daily  donepezil, 10 mg, oral, Nightly  enoxaparin, 30 mg, subcutaneous, q24h  furosemide, 40 mg, oral, Daily  gabapentin, 100 mg, oral, BID  hydrALAZINE, 25 mg, oral, TID  lisinopril, 40 mg, oral, Daily  polyethylene glycol, 17 g, oral, Daily  sennosides-docusate sodium, 1 tablet, oral, BID     [3]    [4] PRN medications: acetaminophen **OR** acetaminophen **OR** acetaminophen, melatonin, ondansetron **OR** ondansetron

## 2025-05-20 NOTE — CARE PLAN
The patient's goals for the shift include get discharged.    The clinical goals for the shift include Tolerate new bp meds    Over the shift, the patient did make progress to goals. Safely DC with updated med list.

## 2025-05-20 NOTE — DISCHARGE SUMMARY
ADMISSION DATE: 5/16/2025     DISCHARGE DATE:  05/20/25     Discharge Diagnosis  Congestive heart failure, unspecified HF chronicity, unspecified heart failure type, acute diastolic heart failure  Bilateral lower extremity edema exacerbated by amlodipine  Hypertension    Issues Requiring Follow-Up  Discharge home to be followed by PCP 1 week.        Discharge Meds     Your medication list        START taking these medications        Instructions Last Dose Given Next Dose Due   furosemide 40 mg tablet  Commonly known as: Lasix      Take 1 tablet (40 mg) by mouth once daily.       hydrALAZINE 25 mg tablet  Commonly known as: Apresoline      Take 1 tablet (25 mg) by mouth 2 times a day.              CONTINUE taking these medications        Instructions Last Dose Given Next Dose Due   buPROPion  mg 24 hr tablet  Commonly known as: Wellbutrin XL      Take 1 tablet (300 mg) by mouth once daily.       cyanocobalamin 1,000 mcg tablet  Commonly known as: Vitamin B-12      Take 1 tablet (1,000 mcg) by mouth once daily.       donepezil 10 mg tablet  Commonly known as: Aricept      Take one tab daily       gabapentin 100 mg capsule  Commonly known as: Neurontin           lisinopril 40 mg tablet      Take 1 tablet (40 mg) by mouth once daily.       melatonin 5 mg tablet      Take 1 tablet (5 mg) by mouth as needed at bedtime for sleep.       sennosides-docusate sodium 8.6-50 mg tablet  Commonly known as: Griselda-Colace      Take 1 tablet by mouth 2 times a day.              STOP taking these medications      amLODIPine 5 mg tablet  Commonly known as: Norvasc                  Where to Get Your Medications        These medications were sent to [facility]/pharmacy #[id number] - [place], [place] - [address]  [address] [facility] [address]      Phone: [phone]   furosemide 40 mg tablet  hydrALAZINE 25 mg tablet             Results for orders placed or performed during the hospital encounter of 05/16/25 (from the past 24 hours)    Basic metabolic panel   Result Value Ref Range    Glucose 83 74 - 99 mg/dL    Sodium 142 136 - 145 mmol/L    Potassium 3.3 (L) 3.5 - 5.3 mmol/L    Chloride 108 (H) 98 - 107 mmol/L    Bicarbonate 24 21 - 32 mmol/L    Anion Gap 13 10 - 20 mmol/L    Urea Nitrogen 20 6 - 23 mg/dL    Creatinine 1.47 (H) 0.50 - 1.05 mg/dL    eGFR 35 (L) >60 mL/min/1.73m*2    Calcium 8.2 (L) 8.6 - 10.3 mg/dL   CBC   Result Value Ref Range    WBC 6.3 4.4 - 11.3 x10*3/uL    nRBC 0.0 0.0 - 0.0 /100 WBCs    RBC 3.51 (L) 4.00 - 5.20 x10*6/uL    Hemoglobin 9.5 (L) 12.0 - 16.0 g/dL    Hematocrit 30.1 (L) 36.0 - 46.0 %    MCV 86 80 - 100 fL    MCH 27.1 26.0 - 34.0 pg    MCHC 31.6 (L) 32.0 - 36.0 g/dL    RDW 16.2 (H) 11.5 - 14.5 %    Platelets 375 150 - 450 x10*3/uL     *Note: Due to a large number of results and/or encounters for the requested time period, some results have not been displayed. A complete set of results can be found in Results Review.    Transthoracic Echo Complete  Result Date: 5/19/2025   [facility], [address] [place] [address]              Tel [phone] and Fax [phone] TRANSTHORACIC ECHOCARDIOGRAM REPORT  Patient Name:       BAKARI Gaspar Physician:    [id number] Nikhil Desai MD Study Date:         5/19/2025           Ordering Provider:    [id number] HERNANDEZ MRN/PID:            [id number]            Fellow: Accession#:         [id number]        Nurse: Date of Birth/Age:  1940 / 84     Sonographer:          Jyotsna sainz RDCS Gender assigned at  F                   Additional Staff: Birth: Height:             163.00 cm           Admit Date:           5/19/2025 Weight:             52.20 kg            Admission Status:     Inpatient -                                                                Routine BSA / BMI:          1.55 m2 / 19.65     Encounter#:           [id number]                     kg/m2 Blood Pressure:     151/77 mmHg         Department Location:  [facility] Non                                                               Invasive Study Type:    TRANSTHORACIC ECHO (TTE) COMPLETE Diagnosis/ICD: Localized edema-R60.0 Indication:    BE swelling/ lower extremity edema CPT Code:      Echo Complete w Full Doppler-64783 Patient History: Pertinent History: HTN and CHF. Study Detail: The following Echo studies were performed: 2D, M-Mode and Doppler.               Technically challenging study due to poor acoustic windows and               body habitus.  PHYSICIAN INTERPRETATION: Left Ventricle: Left ventricular ejection fraction is normal, calculated by Merlos's biplane at 64%. There is mild concentric left ventricular hypertrophy. There are no regional left ventricular wall motion abnormalities. The left ventricular cavity size is normal. There is mildly increased septal and mildly increased posterior left ventricular wall thickness. Spectral Doppler shows a Grade I (impaired relaxation pattern) of left ventricular diastolic filling with normal left atrial filling pressure. Left Atrium: The left atrium is mildly dilated. Right Ventricle: The right ventricle is normal in size. There is normal right ventricular global systolic function. Right Atrium: The right atrium is normal in size. Aortic Valve: The aortic valve is trileaflet. The aortic valve dimensionless index is 0.78. There is trace to mild aortic valve regurgitation. The peak instantaneous gradient of the aortic valve is 8 mmHg. The mean gradient of the aortic valve is 4 mmHg. Mitral Valve: The mitral valve is normal in structure. There is trace to mild mitral valve regurgitation. Tricuspid Valve: The tricuspid valve is structurally normal. There is trace to mild tricuspid regurgitation. The Doppler estimated RVSP is mildly elevated at 39.5  mmHg. Pulmonic Valve: The pulmonic valve is structurally normal. There is no indication of pulmonic valve regurgitation. Pericardium: Trivial pericardial effusion. Aorta: The aortic root is normal. Systemic Veins: The inferior vena cava appears small in size. In comparison to the previous echocardiogram(s): Compared with study dated 3/20/2017, there is a higher estimated right ventricular systolic pressure on the present study. The findings are otherwise similar.  CONCLUSIONS:  1. Left ventricular ejection fraction is normal, calculated by Merlos's biplane at 64%.  2. Spectral Doppler shows a Grade I (impaired relaxation pattern) of left ventricular diastolic filling with normal left atrial filling pressure.  3. There is normal right ventricular global systolic function.  4. The left atrium is mildly dilated.  5. Mildly elevated right ventricular systolic pressure. QUANTITATIVE DATA SUMMARY:  2D MEASUREMENTS:          Normal Ranges: LAs:             3.26 cm  (2.7-4.0cm) IVSd:            0.99 cm  (0.6-1.1cm) LVPWd:           1.12 cm  (0.6-1.1cm) LVIDd:           4.20 cm  (3.9-5.9cm) LVIDs:           2.93 cm LV Mass Index:   95 g/m2 LVEDV Index:     47 ml/m2 LV % FS          30.1 %  LEFT ATRIUM:                  Normal Ranges: LA Vol A4C:        37.5 ml    (22+/-6mL/m2) LA Vol A2C:        79.9 ml LA Vol BP:         57.0 ml LA Vol Index A4C:  24.2ml/m2 LA Vol Index A2C:  51.6 ml/m2 LA Vol Index BP:   36.8 ml/m2 LA Area A4C:       14.4 cm2 LA Area A2C:       21.9 cm2 LA Major Axis A4C: 4.7 cm LA Major Axis A2C: 5.1 cm LA Volume Index:   37.1 ml/m2 LA Vol A4C:        34.5 ml LA Vol A2C:        72.2 ml LA Vol Index BSA:  34.4 ml/m2  RIGHT ATRIUM:          Normal Ranges: RA Area A4C:  11.9 cm2  M-MODE MEASUREMENTS:         Normal Ranges: Ao Root:             3.10 cm (2.0-3.7cm) LAs:                 4.21 cm (2.7-4.0cm)  AORTA MEASUREMENTS:         Normal Ranges: Ao Sinus, d:        2.80 cm (2.1-3.5cm) Ao STJ, d:           2.50 cm (1.7-3.4cm) Asc Ao, d:          2.80 cm (2.1-3.4cm)  LV SYSTOLIC FUNCTION:                      Normal Ranges: EF-A4C View:    67 % (>=55%) EF-A2C View:    61 % EF-Biplane:     64 % LV EF Reported: 64 %  LV DIASTOLIC FUNCTION:             Normal Ranges: MV Peak E:             0.53 m/s    (0.7-1.2 m/s) MV Peak A:             0.95 m/s    (0.42-0.7 m/s) E/A Ratio:             0.56        (1.0-2.2) MV e'                  0.075 m/s   (>8.0) MV lateral e'          0.08 m/s MV medial e'           0.07 m/s MV A Dur:              125.59 msec E/e' Ratio:            7.12        (<8.0) PulmV Sys Humphrey:         50.68 cm/s PulmV Gray Humphrey:        32.79 cm/s PulmV S/D Humphrey:         1.55 PulmV A Revs Humphrey:      27.14 cm/s PulmV A Revs Dur:      140.82 msec  MITRAL VALVE:          Normal Ranges: MV DT:        248 msec (150-240msec)  AORTIC VALVE:                     Normal Ranges: AoV Vmax:                1.39 m/s (<=1.7m/s) AoV Peak P.7 mmHg (<20mmHg) AoV Mean PG:             3.7 mmHg (1.7-11.5mmHg) LVOT Max Humphrey:            1.03 m/s (<=1.1m/s) AoV VTI:                 24.79 cm (18-25cm) LVOT VTI:                19.23 cm LVOT Diameter:           1.96 cm  (1.8-2.4cm) AoV Area, VTI:           2.35 cm2 (2.5-5.5cm2) AoV Area,Vmax:           2.23 cm2 (2.5-4.5cm2) AoV Dimensionless Index: 0.78  RIGHT VENTRICLE: RV Basal 2.80 cm RV Mid   2.30 cm RV Major 5.4 cm TAPSE:   19.0 mm RV s'    0.15 m/s  TRICUSPID VALVE/RVSP:          Normal Ranges: Peak TR Velocity:     3.02 m/s Est. RA Pressure:     3 mmHg RV Syst Pressure:     40 mmHg  (< 30mmHg) IVC Diam:             1.11 cm  PULMONIC VALVE:          Normal Ranges: PV Accel Time:  78 msec  (>120ms) PV Max Humphrey:     1.1 m/s  (0.6-0.9m/s) PV Max P.1 mmHg  PULMONARY VEINS: PulmV A Revs Dur: 140.82 msec PulmV A Revs Humphrey: 27.14 cm/s PulmV Gray Humphrey:   32.79 cm/s PulmV S/D Humphrey:    1.55 PulmV Sys Humphrey:    50.68 cm/s  [id number] Nikhil Desai MD Electronically signed on 2025  at 8:06:21 PM  ** Final **     Electrocardiogram, 12-lead PRN ACS symptoms  Result Date: 5/19/2025  Normal sinus rhythm Nonspecific T wave abnormality Abnormal ECG When compared with ECG of 15-MAR-2025 20:10, T wave amplitude has decreased in Inferior leads Nonspecific T wave abnormality now evident in Lateral leads See ED provider note for full interpretation and clinical correlation Confirmed by Shweta Vines ([id number]) on 5/19/2025 10:27:34 AM    XR chest 2 views  Result Date: 5/16/2025  Interpreted By:  Jose Miguel Laguerre, STUDY: Chest dated  5/16/2025.   INDICATION: Signs/Symptoms:leg swelling   COMPARISON: Chest dated 03/15/2025.   ACCESSION NUMBER(S): [id number]   ORDERING CLINICIAN: HARRISON THIBODEAUX   TECHNIQUE: Two views of the chest.   FINDINGS: There is blunting of the costophrenic angles with linear bibasilar opacities. Calcified granulomas are seen in the left mid lung zone. No pneumothorax  is evident. The cardiomediastinal silhouette is  not enlarged.Degenerative change is seen of the spine and shoulders.       Small bilateral pleural effusions with associated atelectasis.   MACRO: None   Signed by: Jose Miguel Laguerre 5/16/2025 1:46 PM Dictation workstation:   [id number]    Flexible Sigmoidoscopy  Result Date: 5/9/2025  [facility] Gastrointestinal Endoscopy Patient Name: Kirstin Howe Procedure Date: 5/9/2025 9:18 AM MRN: [id number] Account Number: [id number] YOB: 1940 Admit Type: Outpatient Age: 84 Room: [place] [address] Gender: Female Note Status: Finalized Attending MD: Sarmad Rodriguez MD, [id number] Procedure:             Colonoscopy Indications:           Change in bowel habits, Chronic idiopathic                        constipation, Weight loss Providers:             Sarmad Rodriguez MD Patient Profile:       Last Colonoscopy: date unknown. Refer to note in                        patient chart for documentation of history and                        physical. Referring Physician:   Sarmad  MARILYNN Rodriguez MD (Referring MD) Medicines:             Monitored Anesthesia Care Complications:         No immediate complications. Requesting Provider:   Procedure:             Pre-Anesthesia Assessment:                        - Prior to the procedure, a History and Physical                        was performed, and patient medications and                        allergies were reviewed. The patient is competent.                        The risks and benefits of the procedure and the                        sedation options and risks were discussed with the                        patient. All questions were answered and informed                        consent was obtained. Patient identification and                        proposed procedure were verified by the physician,                        the nurse, the anesthesiologist and the anesthetist                        in the pre-procedure area. Mental Status                        Examination: alert and oriented. Airway                        Examination: normal oropharyngeal airway and neck                        mobility. Respiratory Examination: clear to                        auscultation. CV Examination: normal. Prophylactic                        Antibiotics: The patient does not require                        prophylactic antibiotics. Prior Anticoagulants: The                        patient has taken no anticoagulant or antiplatelet                        agents. ASA Grade Assessment: III - A patient with                        severe systemic disease. After reviewing the risks                        and benefits, the patient was deemed in                        satisfactory condition to undergo the procedure.                        The anesthesia plan was to use monitored anesthesia                        care (MAC). Immediately prior to administration of                        medications, the patient was re-assessed for                        adequacy to  receive sedatives. The heart rate,                        respiratory rate, oxygen saturations, blood                        pressure, adequacy of pulmonary ventilation, and                        response to care were monitored throughout the                        procedure. The physical status of the patient was                        re-assessed after the procedure.                        After I obtained informed consent, the scope was                        passed under direct vision. Throughout the                        procedure, the patient's blood pressure, pulse, and                        oxygen saturations were monitored continuously. The                        Endoscope was introduced through the anus and                        advanced to the cecum, identified by appendiceal                        orifice and ileocecal valve. The colonoscopy was                        performed without difficulty. The patient tolerated                        the procedure well. The quality of the bowel                        preparation was poor and inadequate. The                        appendiceal orifice and the rectum were                        photographed. Scope Withdrawal Time: 0 hours 5 minutes 11 seconds Moderate Sedation:      MAC anesthesia was administered by the anesthesia team. Total Procedure Duration: 0 hours 13 minutes 39 seconds Findings:      Many medium-mouthed diverticula were found in the left colon.      Non-bleeding internal hemorrhoids were found during retroflexion. The      hemorrhoids were Grade I (internal hemorrhoids that do not prolapse).      A moderate amount of semi-solid stool was found in the entire colon,      interfering with visualization. Lavage of the area was performed      using greater than 500 mL of tap water, resulting in incomplete      clearance with continued poor visualization. Impression:            - Preparation of the colon was poor.                        -  Preparation of the colon was inadequate.                        - Diverticulosis in the left colon.                        - Non-bleeding internal hemorrhoids.                        - Stool in the entire examined colon.                        - No specimens collected. Recommendation:        - Discharge patient to home (ambulatory).                        - Resume previous diet today.                        - Continue present medications.                        - Return to referring physician as previously                        scheduled.                        - Return to GI office at appointment to be                        scheduled. ( After Repeat Colonoscopy is completed                        ) .                        - Repeat colonoscopy at appointment to be scheduled                        because the bowel preparation was poor.                        - Patient has a contact number available for                        emergencies. The signs and symptoms of potential                        delayed complications were discussed with the                        patient. Return to normal activities tomorrow.                        Written discharge instructions were provided to the                        patient. Procedure Code(s):     --- Professional ---                        69081 Diagnosis Code(s):     --- Professional ---                        K64.0                        R19.4                        K59.04                        K57.30                        R63.4 CPT copyright 2021 [employer]. All rights reserved. The codes documented in this report are preliminary and upon  review may be revised to meet current compliance requirements. Attending Participation:      I personally performed the entire procedure. Scope In: 10:40:49 AM Scope Out: 10:54:28 AM MD Sarmad Bello MD 5/9/2025 11:14:17 AM This report has been signed electronically by Sarmad  MD Michael Number of Addenda: 0 Note Initiated On: 5/9/2025 9:18 AM Estimated Blood Loss:      Estimated blood loss: none.           Hospital Course   This is a 84-year-old lady with history of hypertension, early dementia, tubular adenoma of colon who underwent a colonoscopy at [facility] on 9/9/2025 and was noted to have internal hemorrhoids and diverticulosis he stated that since then she has started having bilateral lower extremity swelling which has gotten worse over past 10 days and therefore she came to emergency room for her reaction.  She denies any trauma to legs, she has had no history of DVT.  She denied any significant shortness of breath or chest pain and her serum troponin was normal however her BN peptide was elevated greater than 300.  For some reason patient had not been peptide done at [facility] on May 9 which was over 4000, although she was labeled at heart failure and not treated for that.  She is also moderately anemic with a hemoglobin of 10.3 which she  normocytic normochromic.  No echocardiogram is available in the medical records.  She has had a previous stress test.      1.  Acute bilateral lower extremity edema 4+ with elevated BN peptide however patient is not acutely short of breath and chest x-ray is clear.  She is empirically started on IV Lasix and awaiting an echocardiogram to assess LV function.  Patient has been on amlodipine 10 mg daily and I wonder if part of her edema is related to amlodipine.  She does have elevated BN peptide of greater than 300.  She has diuresed good with IV Lasix and leg edema has resolved, breathing is better, changed to p.o. Lasix 40 mg daily from May 18, 2025.     2.  Normocytic normochromic anemia, recent colonoscopy revealed diverticulosis and no evidence of bleeding, monitor H&H closely     3.  Hypertension blood pressures were elevated patient, patient was taking amlodipine and lisinopril at home.  Amlodipine is  "discontinued because of significant leg edema, continue lisinopril and added hydralazine which has been changed to 25 mg twice daily.     4.  Early dementia continue with donezepil and patient does take Wellbutrin for anxiety.    5.  Hypokalemia with a serum potassium of 3.3 to be replaced.     Reviewed all labs and imaging studies and discussed the plan of treatment with patient in detail  Stable for discharge home today.           I spent 35 minutes in the professional and overall care of this patient.        Pertinent Physical Exam At Time of Discharge  GENERAL:  Alert, no distress, cooperative  SKIN:  Skin color, texture, turgor normal. No rashes or lesions.  OROPHARYNX:  Lips, mucosa, and tongue are normal.Teeth and gums, normal. Oropharynx normal.  NECK:  No jugulovenous distention, No carotid bruits, Carotid pulse normal contour, Supple  LUNGS:  Lungs clear to auscultation. Good diaphragmatic excursion.  CARDIAC: Rate and rhythm is regular, normal S1 and S2; no rubs, , or gallops, 2/6 systolic apixaban from border, leg edema is resolved  ABDOMEN:  Abdomen soft, non-tender, BS normal, No masses or organomegaly  EXTREMETIES:  Extremities normal, no deformities, trace edema, no clubbing or skin discoloration. Good capillary refill., No ulcers  NEURO:  Alert, oriented X 3,  Non-focal.  PULSES:  2+ radial, 2+ carotid  Visit Vitals  /86 (BP Location: Right arm, Patient Position: Lying)   Pulse 74   Temp 36.6 °C (97.8 °F) (Temporal)   Resp 17   Ht 1.626 m (5' 4\")   Wt 52.2 kg (115 lb)   SpO2 99%   BMI 19.74 kg/m²   OB Status Postmenopausal   Smoking Status Former   BSA 1.54 m²          Outpatient Follow-Up  Future Appointments   Date Time Provider Department Center   8/29/2025  1:30 PM Miguel Giles MD [id number] [facility]   9/17/2025  2:00 PM NICOLÁS Black [id number] [facility]   9/17/2025  2:30 PM [facility] RN 2 [id number] [facility]   9/17/2025  3:00 PM Mercedes Robles MD [id number] " [facility]   9/29/2025  4:40 PM Carolina Gómez MD [id number] [facility]         George Apodaca MD

## 2025-05-20 NOTE — CARE PLAN
Problem: Heart Failure  Goal: Improved gas exchange this shift  Outcome: Progressing  Goal: Improved urinary output this shift  Outcome: Progressing  Goal: Reduction in peripheral edema within 24 hours  Outcome: Progressing  Goal: Report improvement of dyspnea/breathlessness this shift  Outcome: Progressing  Goal: Weight from fluid excess reduced over 2-3 days, then stabilize  Outcome: Progressing  Goal: Increase self care and/or family involvement in 24 hours  Outcome: Progressing

## 2025-05-20 NOTE — NURSING NOTE
Discharge paperwork printed and gone over with patient with questions answered. Daughter not able to come until 5pm; will remove IV once daughter arrives. ANA Oliveira updated pharmacy and sent over prescription orders. Pt aware.

## 2025-05-20 NOTE — PROGRESS NOTES
Kirstin Howe is a 84 y.o. female who was referred to the Clinical Pharmacy Team to complete a Transitions of Care encounter for discharge medication optimization. The patient was referred for their HFpEF.    Attending: George Apodaca MD    PCP: Hansa West MD    _______________________________________________________________________  PHARMACY ASSESSMENT    Home Pharmacy: [facility]  Meds to beds? no    Affordability/Accessibility: reports medications are affordable   Adherence/Organization: no concerns  Adverse Effects: none reported   _______________________________________________________________________  CHRONIC HEART FAILURE ASSESSMENT  -HTN present/diagnosed: yes  -Home BP cuff: yes    LVEF: 64%  eGFR: 35  Beta blocker: none  ACEi/ARB/ARNI: lisinopril 40 mg  MRA: none  SGLT2i: none (previously was on Farxiga, unknown reason for discontinuation)  Diuretic: none  Advanced therapies: none  ___________________________________________________________________  PATIENT EDUCATION/GOALS  - Continue to check blood pressure daily  - Introduced post-discharge pharmacy team follow up and benefits of calls. Patient declined participation in the program  - Answered all patient questions and concerns to best of my ability  _______________________________________________________________________  RECOMMENDATIONS/PLAN  Consider restarting SGLT-2 based on kidney function and heart failure diagnosis   Continue all medications per medical team  Continuity of care will be provided by PCP and clinical pharmacy team      Luh Morgan PharmD  PGY-1 Pharmacy Resident     Verbal consent to manage patient's drug therapy was obtained from the patient. They were informed they may decline to participate or withdraw from participation in pharmacy services at any time.

## 2025-05-21 NOTE — DOCUMENTATION CLARIFICATION NOTE
"    PATIENT:               BAKARI MOORE  ACCT #:                  [id number]  MRN:                       [id number]  :                       1940  ADMIT DATE:       2025 5:40 PM  DISCH DATE:        2025 6:50 PM  RESPONDING PROVIDER #:        [id number]          PROVIDER RESPONSE TEXT:    Agree with dietician diagnosis of Severe Malnutrition on 25.    CDI QUERY TEXT:    Clarification        Instruction:    Based on your assessment of the patient and the clinical information, please provide the requested documentation by clicking on the appropriate radio button and enter any additional information if prompted.    Question: Is there a diagnosis for this patient's nutritional status    When answering this query, please exercise your independent professional judgment. The fact that a question is being asked, does not imply that any particular answer is desired or expected.    The patient's clinical indicators include:  Clinical Information: 84 yr old woman with LE edema. Diagnoses includes acute diastolic CHF. PMH includes CKD 4 and dementia.    Clinical Indicators:   Nutrition Consult: INDIO Doss  \"Malnutrition Diagnosis: Severe malnutrition related to chronic disease or condition...Related to: multiple comorbidities...As Evidenced by: greater than 20(percent) weight loss in 1 year, mild to severe subcutaneous fat loss, severe muscle wasting and mild fluid accumulation present\"    Height: 162.6 cm (5ft 4in)     Weight: 52.2 kg (115 lb)     BMI (Calculated): 19.73    Treatment: Ensure 3 times per day:     Risk Factors: age, CKD 4, dementia  Options provided:  -- Agree with dietician diagnosis of Severe Malnutrition on 25.  -- Other - I will add my own diagnosis  -- Refer to Clinical Documentation Reviewer    Query created by: Margret Estrada on 2025 4:39 PM      Electronically signed by:  DEANNA FULLER MD 2025 4:59 PM          "

## 2025-05-22 ENCOUNTER — PATIENT OUTREACH (OUTPATIENT)
Dept: PRIMARY CARE | Facility: CLINIC | Age: 85
End: 2025-05-22
Payer: MEDICARE

## 2025-05-22 NOTE — PROGRESS NOTES
Discharge Facility:  [facility]   Discharge Diagnosis:  Congestive heart failure, unspecified HF chronicity, unspecified heart failure type, acute diastolic heart failure  Bilateral lower extremity edema exacerbated by amlodipine  Hypertension    Admission Date:  5/16/25   Discharge Date:  5/20/25     PCP Appointment Date: TBD- dtr inquiring about Virtual with pt and will call office   Specialist Appointment Date: 8/29 neuro  Hospital Encounter and Summary Linked: Yes      ED to Hosp-Admission (Discharged) with George Apodaca MD; Sharath Smith MD (05/16/2025)     Wrap Up  Wrap Up Additional Comments: CM spoke with pts dtr for TCM. Dtr reports pr doing well at home. dtr aware of 2 new meds and 1 to stop. dtr states pt is currently switching to a [facility] pcp but appt not avail till Sept.  discussed possible virtual visit to monitor effectiveness of new meds.  dtr to discuss with pt and call office for appt is pt is agreeable. dtr to call with any concerns. (5/22/2025 11:19 AM)    Medications  Medications reviewed with patient/caregiver?: Yes (CM spoke with DTR- her number is listed as main number) (5/22/2025 11:19 AM)  Is the patient having any side effects they believe may be caused by any medication additions or changes?: No (5/22/2025 11:19 AM)  Does the patient have all medications ordered at discharge?: Yes (5/22/2025 11:19 AM)  Prescription Comments: New scripts given at discharge :  furosemide 40 mg tablet  hydrALAZINE 25 mg tablet             Stop Norvasc (5/22/2025 11:19 AM)  Is the patient taking all medications as directed (includes completed medication regime)?: Yes (5/22/2025 11:19 AM)  Care Management Interventions: Provided patient education (5/22/2025 11:19 AM)  Medication Comments: Pt denies problems obtaining or affording medication. No medication-related issues identified (5/22/2025 11:19 AM)    Appointments  Does the patient have a primary care provider?: Yes (5/22/2025 11:19  AM)  Care Management Interventions: Educated patient on importance of making appointment (5/22/2025 11:19 AM)  Has the patient kept scheduled appointments due by today?: Yes (5/22/2025 11:19 AM)    Self Management  What is the home health agency?: DENIES NEED (5/22/2025 11:19 AM)  What Durable Medical Equipment (DME) was ordered?: N/A (5/22/2025 11:19 AM)    Patient Teaching  Does the patient have access to their discharge instructions?: Yes (5/22/2025 11:19 AM)  Care Management Interventions: Reviewed instructions with patient (5/22/2025 11:19 AM)  What is the patient's perception of their health status since discharge?: Improving (5/22/2025 11:19 AM)  Is the patient/caregiver able to teach back the hierarchy of who to call/visit for symptoms/problems? PCP, Specialist, Home Health nurse, Urgent Care, ED, 911: Yes (5/22/2025 11:19 AM)

## 2025-05-26 DIAGNOSIS — R60.0 LOWER EXTREMITY EDEMA: ICD-10-CM

## 2025-05-26 DIAGNOSIS — I50.9 CONGESTIVE HEART FAILURE, UNSPECIFIED HF CHRONICITY, UNSPECIFIED HEART FAILURE TYPE: ICD-10-CM

## 2025-06-18 ENCOUNTER — DOCUMENTATION (OUTPATIENT)
Dept: PRIMARY CARE | Facility: CLINIC | Age: 85
End: 2025-06-18
Payer: MEDICARE

## 2025-07-18 DIAGNOSIS — I16.0 HYPERTENSIVE URGENCY: Primary | ICD-10-CM

## 2025-07-18 RX ORDER — LISINOPRIL 40 MG/1
40 TABLET ORAL DAILY
Qty: 90 TABLET | Refills: 0 | Status: SHIPPED | OUTPATIENT
Start: 2025-07-18

## 2025-08-04 ENCOUNTER — APPOINTMENT (OUTPATIENT)
Dept: NEPHROLOGY | Facility: CLINIC | Age: 85
End: 2025-08-04
Payer: MEDICARE

## 2025-08-04 VITALS
BODY MASS INDEX: 21.11 KG/M2 | WEIGHT: 123 LBS | SYSTOLIC BLOOD PRESSURE: 196 MMHG | DIASTOLIC BLOOD PRESSURE: 91 MMHG | HEART RATE: 63 BPM

## 2025-08-04 DIAGNOSIS — N18.32 STAGE 3B CHRONIC KIDNEY DISEASE (CKD) (MULTI): Primary | ICD-10-CM

## 2025-08-04 DIAGNOSIS — I16.0 HYPERTENSIVE URGENCY: ICD-10-CM

## 2025-08-04 DIAGNOSIS — N18.32 CHRONIC KIDNEY DISEASE, STAGE 3B (MULTI): ICD-10-CM

## 2025-08-04 PROCEDURE — 99213 OFFICE O/P EST LOW 20 MIN: CPT | Performed by: INTERNAL MEDICINE

## 2025-08-04 PROCEDURE — 1159F MED LIST DOCD IN RCRD: CPT | Performed by: INTERNAL MEDICINE

## 2025-08-04 PROCEDURE — 3077F SYST BP >= 140 MM HG: CPT | Performed by: INTERNAL MEDICINE

## 2025-08-04 PROCEDURE — 1160F RVW MEDS BY RX/DR IN RCRD: CPT | Performed by: INTERNAL MEDICINE

## 2025-08-04 PROCEDURE — 1126F AMNT PAIN NOTED NONE PRSNT: CPT | Performed by: INTERNAL MEDICINE

## 2025-08-04 PROCEDURE — 3080F DIAST BP >= 90 MM HG: CPT | Performed by: INTERNAL MEDICINE

## 2025-08-04 RX ORDER — LISINOPRIL 40 MG/1
40 TABLET ORAL DAILY
Qty: 90 TABLET | Refills: 0 | Status: SHIPPED | OUTPATIENT
Start: 2025-08-04

## 2025-08-04 ASSESSMENT — PAIN SCALES - GENERAL: PAINLEVEL_OUTOF10: 0-NO PAIN

## 2025-08-04 NOTE — PROGRESS NOTES
Chief Complaint: Follow up CKD    Difficulty getting into geriatrics  Recent hospitalization with hypotension  Here with daughter, son on the phone  Denies nausea, vomiting, chest pain, dyspnea  No urinary symptoms    NAD  Sclera AI s inj  MMM, no sores  Deferred secondary to COVID  No edema  No tremor  No rash    CKD stage 3b-4   HTN poorly controlled in office  Proteinuria, not recently checked    Restart lisinopril and check labs next week  Labs and follow up in 3 months

## 2025-08-13 LAB
25(OH)D3+25(OH)D2 SERPL-MCNC: 47 NG/ML (ref 30–100)
ALBUMIN SERPL-MCNC: 3.8 G/DL (ref 3.6–5.1)
BUN SERPL-MCNC: 24 MG/DL (ref 7–25)
BUN/CREAT SERPL: 15 (CALC) (ref 6–22)
CALCIUM SERPL-MCNC: 9.6 MG/DL (ref 8.6–10.4)
CHLORIDE SERPL-SCNC: 109 MMOL/L (ref 98–110)
CO2 SERPL-SCNC: 26 MMOL/L (ref 20–32)
CREAT SERPL-MCNC: 1.61 MG/DL (ref 0.6–0.95)
EGFRCR SERPLBLD CKD-EPI 2021: 31 ML/MIN/1.73M2
ERYTHROCYTE [DISTWIDTH] IN BLOOD BY AUTOMATED COUNT: 14 % (ref 11–15)
FERRITIN SERPL-MCNC: 23 NG/ML (ref 16–288)
GLUCOSE SERPL-MCNC: 74 MG/DL (ref 65–99)
HCT VFR BLD AUTO: 37.8 % (ref 35–45)
HGB BLD-MCNC: 11.9 G/DL (ref 11.7–15.5)
IRON SATN MFR SERPL: 16 % (CALC) (ref 16–45)
IRON SERPL-MCNC: 48 MCG/DL (ref 45–160)
MCH RBC QN AUTO: 26.8 PG (ref 27–33)
MCHC RBC AUTO-ENTMCNC: 31.5 G/DL (ref 32–36)
MCV RBC AUTO: 85.1 FL (ref 80–100)
PHOSPHATE SERPL-MCNC: 3.9 MG/DL (ref 2.1–4.3)
PLATELET # BLD AUTO: 337 THOUSAND/UL (ref 140–400)
PMV BLD REES-ECKER: 10.2 FL (ref 7.5–12.5)
POTASSIUM SERPL-SCNC: 4.5 MMOL/L (ref 3.5–5.3)
PTH-INTACT SERPL-MCNC: 48 PG/ML (ref 16–77)
RBC # BLD AUTO: 4.44 MILLION/UL (ref 3.8–5.1)
SODIUM SERPL-SCNC: 142 MMOL/L (ref 135–146)
TIBC SERPL-MCNC: 293 MCG/DL (CALC) (ref 250–450)
WBC # BLD AUTO: 5.8 THOUSAND/UL (ref 3.8–10.8)

## 2025-08-14 LAB
ALBUMIN/CREAT UR: 292 MG/G CREAT
APPEARANCE UR: CLEAR
BACTERIA #/AREA URNS HPF: ABNORMAL /HPF
BILIRUB UR QL STRIP: NEGATIVE
COLOR UR: ABNORMAL
CREAT UR-MCNC: 138 MG/DL (ref 20–275)
CREAT UR-MCNC: 138 MG/DL (ref 20–275)
GLUCOSE UR QL STRIP: NEGATIVE
HGB UR QL STRIP: NEGATIVE
HYALINE CASTS #/AREA URNS LPF: ABNORMAL /LPF
KETONES UR QL STRIP: NEGATIVE
LEUKOCYTE ESTERASE UR QL STRIP: ABNORMAL
MICROALBUMIN UR-MCNC: 40.3 MG/DL
NITRITE UR QL STRIP: NEGATIVE
PH UR STRIP: ABNORMAL [PH] (ref 5–8)
PROT UR QL STRIP: ABNORMAL
PROT UR-MCNC: 67 MG/DL (ref 5–24)
PROT/CREAT UR: 0.49 MG/MG CREAT (ref 0.02–0.18)
PROT/CREAT UR: 486 MG/G CREAT (ref 24–184)
RBC #/AREA URNS HPF: ABNORMAL /HPF
SERVICE CMNT-IMP: ABNORMAL
SP GR UR STRIP: 1.02 (ref 1–1.03)
SQUAMOUS #/AREA URNS HPF: ABNORMAL /HPF
WBC #/AREA URNS HPF: ABNORMAL /HPF

## 2025-08-20 ENCOUNTER — APPOINTMENT (OUTPATIENT)
Facility: CLINIC | Age: 85
End: 2025-08-20
Payer: MEDICARE

## 2025-08-20 ENCOUNTER — CLINICAL SUPPORT (OUTPATIENT)
Dept: AUDIOLOGY | Facility: CLINIC | Age: 85
End: 2025-08-20
Payer: MEDICARE

## 2025-08-20 ENCOUNTER — APPOINTMENT (OUTPATIENT)
Dept: RADIOLOGY | Facility: HOSPITAL | Age: 85
End: 2025-08-20
Payer: MEDICARE

## 2025-08-20 ENCOUNTER — HOSPITAL ENCOUNTER (OUTPATIENT)
Facility: HOSPITAL | Age: 85
Setting detail: OBSERVATION
Discharge: HOME HEALTH | End: 2025-08-21
Attending: EMERGENCY MEDICINE | Admitting: STUDENT IN AN ORGANIZED HEALTH CARE EDUCATION/TRAINING PROGRAM
Payer: MEDICARE

## 2025-08-20 ENCOUNTER — APPOINTMENT (OUTPATIENT)
Dept: CARDIOLOGY | Facility: HOSPITAL | Age: 85
End: 2025-08-20
Payer: MEDICARE

## 2025-08-20 VITALS
BODY MASS INDEX: 21.34 KG/M2 | SYSTOLIC BLOOD PRESSURE: 205 MMHG | DIASTOLIC BLOOD PRESSURE: 74 MMHG | TEMPERATURE: 96.7 F | WEIGHT: 125 LBS | HEIGHT: 64 IN

## 2025-08-20 DIAGNOSIS — I16.0 HYPERTENSIVE URGENCY: ICD-10-CM

## 2025-08-20 DIAGNOSIS — H61.23 BILATERAL IMPACTED CERUMEN: ICD-10-CM

## 2025-08-20 DIAGNOSIS — I10 HYPERTENSION, UNCONTROLLED: Primary | ICD-10-CM

## 2025-08-20 DIAGNOSIS — H61.23 BILATERAL IMPACTED CERUMEN: Primary | ICD-10-CM

## 2025-08-20 DIAGNOSIS — H92.03 OTALGIA OF BOTH EARS: Primary | ICD-10-CM

## 2025-08-20 DIAGNOSIS — H91.93 BILATERAL HEARING LOSS, UNSPECIFIED HEARING LOSS TYPE: ICD-10-CM

## 2025-08-20 LAB
ALBUMIN SERPL BCP-MCNC: 3.9 G/DL (ref 3.4–5)
ALP SERPL-CCNC: 74 U/L (ref 33–136)
ALT SERPL W P-5'-P-CCNC: 8 U/L (ref 7–45)
ANION GAP SERPL CALC-SCNC: 16 MMOL/L (ref 10–20)
AST SERPL W P-5'-P-CCNC: 19 U/L (ref 9–39)
BASOPHILS # BLD AUTO: 0.04 X10*3/UL (ref 0–0.1)
BASOPHILS NFR BLD AUTO: 0.6 %
BILIRUB SERPL-MCNC: 0.4 MG/DL (ref 0–1.2)
BUN SERPL-MCNC: 29 MG/DL (ref 6–23)
CALCIUM SERPL-MCNC: 9.2 MG/DL (ref 8.6–10.3)
CARDIAC TROPONIN I PNL SERPL HS: 11 NG/L (ref 0–13)
CARDIAC TROPONIN I PNL SERPL HS: 14 NG/L (ref 0–13)
CHLORIDE SERPL-SCNC: 110 MMOL/L (ref 98–107)
CO2 SERPL-SCNC: 20 MMOL/L (ref 21–32)
CREAT SERPL-MCNC: 1.51 MG/DL (ref 0.5–1.05)
EGFRCR SERPLBLD CKD-EPI 2021: 34 ML/MIN/1.73M*2
EOSINOPHIL # BLD AUTO: 0.12 X10*3/UL (ref 0–0.4)
EOSINOPHIL NFR BLD AUTO: 1.8 %
ERYTHROCYTE [DISTWIDTH] IN BLOOD BY AUTOMATED COUNT: 14.6 % (ref 11.5–14.5)
GLUCOSE SERPL-MCNC: 82 MG/DL (ref 74–99)
HCT VFR BLD AUTO: 39.6 % (ref 36–46)
HGB BLD-MCNC: 11.6 G/DL (ref 12–16)
IMM GRANULOCYTES # BLD AUTO: 0.02 X10*3/UL (ref 0–0.5)
IMM GRANULOCYTES NFR BLD AUTO: 0.3 % (ref 0–0.9)
LYMPHOCYTES # BLD AUTO: 1.75 X10*3/UL (ref 0.8–3)
LYMPHOCYTES NFR BLD AUTO: 26.5 %
MCH RBC QN AUTO: 25.3 PG (ref 26–34)
MCHC RBC AUTO-ENTMCNC: 29.3 G/DL (ref 32–36)
MCV RBC AUTO: 87 FL (ref 80–100)
MONOCYTES # BLD AUTO: 0.4 X10*3/UL (ref 0.05–0.8)
MONOCYTES NFR BLD AUTO: 6.1 %
NEUTROPHILS # BLD AUTO: 4.27 X10*3/UL (ref 1.6–5.5)
NEUTROPHILS NFR BLD AUTO: 64.7 %
NRBC BLD-RTO: 0 /100 WBCS (ref 0–0)
PLATELET # BLD AUTO: 306 X10*3/UL (ref 150–450)
POTASSIUM SERPL-SCNC: 4 MMOL/L (ref 3.5–5.3)
PROT SERPL-MCNC: 6.4 G/DL (ref 6.4–8.2)
RBC # BLD AUTO: 4.58 X10*6/UL (ref 4–5.2)
SODIUM SERPL-SCNC: 142 MMOL/L (ref 136–145)
WBC # BLD AUTO: 6.6 X10*3/UL (ref 4.4–11.3)

## 2025-08-20 PROCEDURE — 36415 COLL VENOUS BLD VENIPUNCTURE: CPT | Performed by: EMERGENCY MEDICINE

## 2025-08-20 PROCEDURE — 3078F DIAST BP <80 MM HG: CPT | Performed by: OTOLARYNGOLOGY

## 2025-08-20 PROCEDURE — 2500000004 HC RX 250 GENERAL PHARMACY W/ HCPCS (ALT 636 FOR OP/ED): Performed by: EMERGENCY MEDICINE

## 2025-08-20 PROCEDURE — 70450 CT HEAD/BRAIN W/O DYE: CPT

## 2025-08-20 PROCEDURE — G0378 HOSPITAL OBSERVATION PER HR: HCPCS

## 2025-08-20 PROCEDURE — 1159F MED LIST DOCD IN RCRD: CPT | Performed by: OTOLARYNGOLOGY

## 2025-08-20 PROCEDURE — HRANC PR HEARING AID NO CHARGE: Performed by: AUDIOLOGIST

## 2025-08-20 PROCEDURE — 80053 COMPREHEN METABOLIC PANEL: CPT | Performed by: EMERGENCY MEDICINE

## 2025-08-20 PROCEDURE — 85025 COMPLETE CBC W/AUTO DIFF WBC: CPT | Performed by: EMERGENCY MEDICINE

## 2025-08-20 PROCEDURE — 2500000001 HC RX 250 WO HCPCS SELF ADMINISTERED DRUGS (ALT 637 FOR MEDICARE OP): Performed by: EMERGENCY MEDICINE

## 2025-08-20 PROCEDURE — 96361 HYDRATE IV INFUSION ADD-ON: CPT

## 2025-08-20 PROCEDURE — 70450 CT HEAD/BRAIN W/O DYE: CPT | Performed by: RADIOLOGY

## 2025-08-20 PROCEDURE — 93005 ELECTROCARDIOGRAM TRACING: CPT

## 2025-08-20 PROCEDURE — 99222 1ST HOSP IP/OBS MODERATE 55: CPT

## 2025-08-20 PROCEDURE — 2500000001 HC RX 250 WO HCPCS SELF ADMINISTERED DRUGS (ALT 637 FOR MEDICARE OP)

## 2025-08-20 PROCEDURE — 99203 OFFICE O/P NEW LOW 30 MIN: CPT | Performed by: OTOLARYNGOLOGY

## 2025-08-20 PROCEDURE — 96374 THER/PROPH/DIAG INJ IV PUSH: CPT

## 2025-08-20 PROCEDURE — 84484 ASSAY OF TROPONIN QUANT: CPT | Performed by: EMERGENCY MEDICINE

## 2025-08-20 PROCEDURE — 1160F RVW MEDS BY RX/DR IN RCRD: CPT | Performed by: OTOLARYNGOLOGY

## 2025-08-20 PROCEDURE — 3077F SYST BP >= 140 MM HG: CPT | Performed by: OTOLARYNGOLOGY

## 2025-08-20 PROCEDURE — 99285 EMERGENCY DEPT VISIT HI MDM: CPT | Mod: 25 | Performed by: EMERGENCY MEDICINE

## 2025-08-20 RX ORDER — LISINOPRIL 20 MG/1
40 TABLET ORAL DAILY
Status: DISCONTINUED | OUTPATIENT
Start: 2025-08-20 | End: 2025-08-21 | Stop reason: HOSPADM

## 2025-08-20 RX ORDER — ENOXAPARIN SODIUM 100 MG/ML
30 INJECTION SUBCUTANEOUS EVERY 24 HOURS
Status: DISCONTINUED | OUTPATIENT
Start: 2025-08-21 | End: 2025-08-20

## 2025-08-20 RX ORDER — ONDANSETRON HYDROCHLORIDE 2 MG/ML
4 INJECTION, SOLUTION INTRAVENOUS EVERY 8 HOURS PRN
Status: DISCONTINUED | OUTPATIENT
Start: 2025-08-20 | End: 2025-08-21 | Stop reason: HOSPADM

## 2025-08-20 RX ORDER — ACETAMINOPHEN 650 MG/1
650 SUPPOSITORY RECTAL EVERY 4 HOURS PRN
Status: DISCONTINUED | OUTPATIENT
Start: 2025-08-20 | End: 2025-08-21 | Stop reason: HOSPADM

## 2025-08-20 RX ORDER — POLYETHYLENE GLYCOL 3350 17 G/17G
17 POWDER, FOR SOLUTION ORAL DAILY PRN
Status: DISCONTINUED | OUTPATIENT
Start: 2025-08-20 | End: 2025-08-21 | Stop reason: HOSPADM

## 2025-08-20 RX ORDER — FUROSEMIDE 40 MG/1
40 TABLET ORAL DAILY
Status: DISCONTINUED | OUTPATIENT
Start: 2025-08-20 | End: 2025-08-20

## 2025-08-20 RX ORDER — HEPARIN SODIUM 5000 [USP'U]/ML
5000 INJECTION, SOLUTION INTRAVENOUS; SUBCUTANEOUS EVERY 8 HOURS SCHEDULED
Status: DISCONTINUED | OUTPATIENT
Start: 2025-08-21 | End: 2025-08-21 | Stop reason: HOSPADM

## 2025-08-20 RX ORDER — ACETAMINOPHEN 325 MG/1
650 TABLET ORAL EVERY 4 HOURS PRN
Status: DISCONTINUED | OUTPATIENT
Start: 2025-08-20 | End: 2025-08-21 | Stop reason: HOSPADM

## 2025-08-20 RX ORDER — TALC
3 POWDER (GRAM) TOPICAL NIGHTLY PRN
Status: DISCONTINUED | OUTPATIENT
Start: 2025-08-20 | End: 2025-08-21 | Stop reason: HOSPADM

## 2025-08-20 RX ORDER — HYDRALAZINE HYDROCHLORIDE 25 MG/1
25 TABLET, FILM COATED ORAL EVERY 6 HOURS PRN
Status: DISCONTINUED | OUTPATIENT
Start: 2025-08-20 | End: 2025-08-21 | Stop reason: HOSPADM

## 2025-08-20 RX ORDER — BUPROPION HYDROCHLORIDE 150 MG/1
300 TABLET ORAL DAILY
Status: DISCONTINUED | OUTPATIENT
Start: 2025-08-20 | End: 2025-08-20

## 2025-08-20 RX ORDER — VIT C/E/ZN/COPPR/LUTEIN/ZEAXAN 250MG-90MG
1000 CAPSULE ORAL DAILY
Status: DISCONTINUED | OUTPATIENT
Start: 2025-08-20 | End: 2025-08-20

## 2025-08-20 RX ORDER — ACETAMINOPHEN 160 MG/5ML
650 SOLUTION ORAL EVERY 4 HOURS PRN
Status: DISCONTINUED | OUTPATIENT
Start: 2025-08-20 | End: 2025-08-21 | Stop reason: HOSPADM

## 2025-08-20 RX ORDER — ONDANSETRON 4 MG/1
4 TABLET, FILM COATED ORAL EVERY 8 HOURS PRN
Status: DISCONTINUED | OUTPATIENT
Start: 2025-08-20 | End: 2025-08-21 | Stop reason: HOSPADM

## 2025-08-20 RX ORDER — CLONIDINE HYDROCHLORIDE 0.1 MG/1
0.2 TABLET ORAL ONCE
Status: COMPLETED | OUTPATIENT
Start: 2025-08-20 | End: 2025-08-20

## 2025-08-20 RX ORDER — HYDRALAZINE HYDROCHLORIDE 20 MG/ML
10 INJECTION INTRAMUSCULAR; INTRAVENOUS ONCE
Status: COMPLETED | OUTPATIENT
Start: 2025-08-20 | End: 2025-08-20

## 2025-08-20 RX ORDER — HYDRALAZINE HYDROCHLORIDE 25 MG/1
25 TABLET, FILM COATED ORAL 2 TIMES DAILY
Status: DISCONTINUED | OUTPATIENT
Start: 2025-08-20 | End: 2025-08-21

## 2025-08-20 RX ORDER — DONEPEZIL HYDROCHLORIDE 5 MG/1
10 TABLET, FILM COATED ORAL NIGHTLY
Status: DISCONTINUED | OUTPATIENT
Start: 2025-08-20 | End: 2025-08-21 | Stop reason: HOSPADM

## 2025-08-20 RX ADMIN — HYDRALAZINE HYDROCHLORIDE 25 MG: 25 TABLET, FILM COATED ORAL at 21:04

## 2025-08-20 RX ADMIN — SODIUM CHLORIDE 1000 ML: 0.9 INJECTION, SOLUTION INTRAVENOUS at 12:47

## 2025-08-20 RX ADMIN — HYDRALAZINE HYDROCHLORIDE 10 MG: 20 INJECTION INTRAMUSCULAR; INTRAVENOUS at 11:04

## 2025-08-20 RX ADMIN — CLONIDINE HYDROCHLORIDE 0.2 MG: 0.1 TABLET ORAL at 11:04

## 2025-08-20 RX ADMIN — DONEPEZIL HYDROCHLORIDE 10 MG: 5 TABLET ORAL at 20:45

## 2025-08-20 SDOH — SOCIAL STABILITY: SOCIAL INSECURITY: WITHIN THE LAST YEAR, HAVE YOU BEEN AFRAID OF YOUR PARTNER OR EX-PARTNER?: NO

## 2025-08-20 SDOH — ECONOMIC STABILITY: HOUSING INSECURITY: IN THE LAST 12 MONTHS, WAS THERE A TIME WHEN YOU WERE NOT ABLE TO PAY THE MORTGAGE OR RENT ON TIME?: NO

## 2025-08-20 SDOH — ECONOMIC STABILITY: INCOME INSECURITY: IN THE PAST 12 MONTHS HAS THE ELECTRIC, GAS, OIL, OR WATER COMPANY THREATENED TO SHUT OFF SERVICES IN YOUR HOME?: NO

## 2025-08-20 SDOH — ECONOMIC STABILITY: HOUSING INSECURITY: AT ANY TIME IN THE PAST 12 MONTHS, WERE YOU HOMELESS OR LIVING IN A SHELTER (INCLUDING NOW)?: NO

## 2025-08-20 SDOH — SOCIAL STABILITY: SOCIAL INSECURITY: WITHIN THE LAST YEAR, HAVE YOU BEEN HUMILIATED OR EMOTIONALLY ABUSED IN OTHER WAYS BY YOUR PARTNER OR EX-PARTNER?: NO

## 2025-08-20 SDOH — ECONOMIC STABILITY: FOOD INSECURITY: HOW HARD IS IT FOR YOU TO PAY FOR THE VERY BASICS LIKE FOOD, HOUSING, MEDICAL CARE, AND HEATING?: NOT HARD AT ALL

## 2025-08-20 SDOH — ECONOMIC STABILITY: HOUSING INSECURITY: IN THE PAST 12 MONTHS, HOW MANY TIMES HAVE YOU MOVED WHERE YOU WERE LIVING?: 0

## 2025-08-20 SDOH — ECONOMIC STABILITY: FOOD INSECURITY: WITHIN THE PAST 12 MONTHS, YOU WORRIED THAT YOUR FOOD WOULD RUN OUT BEFORE YOU GOT THE MONEY TO BUY MORE.: NEVER TRUE

## 2025-08-20 SDOH — SOCIAL STABILITY: SOCIAL INSECURITY: DO YOU FEEL UNSAFE GOING BACK TO THE PLACE WHERE YOU ARE LIVING?: NO

## 2025-08-20 SDOH — SOCIAL STABILITY: SOCIAL INSECURITY: HAVE YOU HAD THOUGHTS OF HARMING ANYONE ELSE?: NO

## 2025-08-20 SDOH — SOCIAL STABILITY: SOCIAL INSECURITY: DO YOU FEEL ANYONE HAS EXPLOITED OR TAKEN ADVANTAGE OF YOU FINANCIALLY OR OF YOUR PERSONAL PROPERTY?: NO

## 2025-08-20 SDOH — SOCIAL STABILITY: SOCIAL INSECURITY: ABUSE: ADULT

## 2025-08-20 SDOH — ECONOMIC STABILITY: FOOD INSECURITY: WITHIN THE PAST 12 MONTHS, THE FOOD YOU BOUGHT JUST DIDN'T LAST AND YOU DIDN'T HAVE MONEY TO GET MORE.: NEVER TRUE

## 2025-08-20 SDOH — SOCIAL STABILITY: SOCIAL INSECURITY: HAS ANYONE EVER THREATENED TO HURT YOUR FAMILY OR YOUR PETS?: NO

## 2025-08-20 SDOH — SOCIAL STABILITY: SOCIAL INSECURITY: DOES ANYONE TRY TO KEEP YOU FROM HAVING/CONTACTING OTHER FRIENDS OR DOING THINGS OUTSIDE YOUR HOME?: NO

## 2025-08-20 SDOH — SOCIAL STABILITY: SOCIAL INSECURITY: HAVE YOU HAD ANY THOUGHTS OF HARMING ANYONE ELSE?: NO

## 2025-08-20 SDOH — SOCIAL STABILITY: SOCIAL INSECURITY: ARE THERE ANY APPARENT SIGNS OF INJURIES/BEHAVIORS THAT COULD BE RELATED TO ABUSE/NEGLECT?: NO

## 2025-08-20 SDOH — SOCIAL STABILITY: SOCIAL INSECURITY: ARE YOU OR HAVE YOU BEEN THREATENED OR ABUSED PHYSICALLY, EMOTIONALLY, OR SEXUALLY BY ANYONE?: NO

## 2025-08-20 SDOH — SOCIAL STABILITY: SOCIAL INSECURITY: WERE YOU ABLE TO COMPLETE ALL THE BEHAVIORAL HEALTH SCREENINGS?: YES

## 2025-08-20 SDOH — ECONOMIC STABILITY: TRANSPORTATION INSECURITY: IN THE PAST 12 MONTHS, HAS LACK OF TRANSPORTATION KEPT YOU FROM MEDICAL APPOINTMENTS OR FROM GETTING MEDICATIONS?: NO

## 2025-08-20 ASSESSMENT — COGNITIVE AND FUNCTIONAL STATUS - GENERAL
DAILY ACTIVITIY SCORE: 24
PATIENT BASELINE BEDBOUND: NO
MOBILITY SCORE: 24

## 2025-08-20 ASSESSMENT — ACTIVITIES OF DAILY LIVING (ADL)
PATIENT'S MEMORY ADEQUATE TO SAFELY COMPLETE DAILY ACTIVITIES?: YES
HEARING - LEFT EAR: DIFFICULTY WITH NOISE
HEARING - RIGHT EAR: DIFFICULTY WITH NOISE
LACK_OF_TRANSPORTATION: NO
JUDGMENT_ADEQUATE_SAFELY_COMPLETE_DAILY_ACTIVITIES: YES
GROOMING: INDEPENDENT
ADEQUATE_TO_COMPLETE_ADL: YES
BATHING: INDEPENDENT
LACK_OF_TRANSPORTATION: NO
FEEDING YOURSELF: INDEPENDENT
DRESSING YOURSELF: INDEPENDENT
LACK_OF_TRANSPORTATION: NO
TOILETING: INDEPENDENT
WALKS IN HOME: INDEPENDENT

## 2025-08-20 ASSESSMENT — LIFESTYLE VARIABLES
HOW OFTEN DO YOU HAVE A DRINK CONTAINING ALCOHOL: NEVER
HOW OFTEN DO YOU HAVE 6 OR MORE DRINKS ON ONE OCCASION: NEVER
HOW MANY STANDARD DRINKS CONTAINING ALCOHOL DO YOU HAVE ON A TYPICAL DAY: PATIENT DOES NOT DRINK
AUDIT-C TOTAL SCORE: 0
SKIP TO QUESTIONS 9-10: 1
AUDIT-C TOTAL SCORE: 0

## 2025-08-20 ASSESSMENT — PAIN - FUNCTIONAL ASSESSMENT
PAIN_FUNCTIONAL_ASSESSMENT: 0-10
PAIN_FUNCTIONAL_ASSESSMENT: 0-10

## 2025-08-20 ASSESSMENT — ENCOUNTER SYMPTOMS
LIGHT-HEADEDNESS: 0
ABDOMINAL PAIN: 0
HEADACHES: 0
WEAKNESS: 0

## 2025-08-20 ASSESSMENT — PATIENT HEALTH QUESTIONNAIRE - PHQ9
2. FEELING DOWN, DEPRESSED OR HOPELESS: NOT AT ALL
SUM OF ALL RESPONSES TO PHQ9 QUESTIONS 1 & 2: 0
1. LITTLE INTEREST OR PLEASURE IN DOING THINGS: NOT AT ALL

## 2025-08-20 ASSESSMENT — PAIN SCALES - GENERAL
PAINLEVEL_OUTOF10: 0 - NO PAIN
PAINLEVEL_OUTOF10: 0 - NO PAIN

## 2025-08-21 ENCOUNTER — DOCUMENTATION (OUTPATIENT)
Dept: HOME HEALTH SERVICES | Facility: HOME HEALTH | Age: 85
End: 2025-08-21

## 2025-08-21 ENCOUNTER — APPOINTMENT (OUTPATIENT)
Dept: PRIMARY CARE | Facility: CLINIC | Age: 85
End: 2025-08-21
Payer: MEDICARE

## 2025-08-21 ENCOUNTER — HOME HEALTH ADMISSION (OUTPATIENT)
Dept: HOME HEALTH SERVICES | Facility: HOME HEALTH | Age: 85
End: 2025-08-21
Payer: MEDICARE

## 2025-08-21 VITALS
HEART RATE: 59 BPM | SYSTOLIC BLOOD PRESSURE: 139 MMHG | HEIGHT: 64 IN | BODY MASS INDEX: 21.34 KG/M2 | RESPIRATION RATE: 17 BRPM | DIASTOLIC BLOOD PRESSURE: 55 MMHG | WEIGHT: 125 LBS | TEMPERATURE: 97 F | OXYGEN SATURATION: 97 %

## 2025-08-21 PROBLEM — I10 HYPERTENSION, UNCONTROLLED: Status: RESOLVED | Noted: 2025-08-20 | Resolved: 2025-08-21

## 2025-08-21 LAB
ANION GAP SERPL CALC-SCNC: 11 MMOL/L (ref 10–20)
BUN SERPL-MCNC: 26 MG/DL (ref 6–23)
CALCIUM SERPL-MCNC: 9.1 MG/DL (ref 8.6–10.3)
CHLORIDE SERPL-SCNC: 112 MMOL/L (ref 98–107)
CO2 SERPL-SCNC: 24 MMOL/L (ref 21–32)
CREAT SERPL-MCNC: 1.4 MG/DL (ref 0.5–1.05)
EGFRCR SERPLBLD CKD-EPI 2021: 37 ML/MIN/1.73M*2
ERYTHROCYTE [DISTWIDTH] IN BLOOD BY AUTOMATED COUNT: 14.7 % (ref 11.5–14.5)
GLUCOSE SERPL-MCNC: 84 MG/DL (ref 74–99)
HCT VFR BLD AUTO: 35.7 % (ref 36–46)
HGB BLD-MCNC: 10.9 G/DL (ref 12–16)
MCH RBC QN AUTO: 25.6 PG (ref 26–34)
MCHC RBC AUTO-ENTMCNC: 30.5 G/DL (ref 32–36)
MCV RBC AUTO: 84 FL (ref 80–100)
NRBC BLD-RTO: 0 /100 WBCS (ref 0–0)
PLATELET # BLD AUTO: 341 X10*3/UL (ref 150–450)
POTASSIUM SERPL-SCNC: 4.3 MMOL/L (ref 3.5–5.3)
RBC # BLD AUTO: 4.26 X10*6/UL (ref 4–5.2)
SODIUM SERPL-SCNC: 143 MMOL/L (ref 136–145)
WBC # BLD AUTO: 6.9 X10*3/UL (ref 4.4–11.3)

## 2025-08-21 PROCEDURE — 99239 HOSP IP/OBS DSCHRG MGMT >30: CPT | Performed by: INTERNAL MEDICINE

## 2025-08-21 PROCEDURE — RXMED WILLOW AMBULATORY MEDICATION CHARGE

## 2025-08-21 PROCEDURE — G0378 HOSPITAL OBSERVATION PER HR: HCPCS

## 2025-08-21 PROCEDURE — 2500000001 HC RX 250 WO HCPCS SELF ADMINISTERED DRUGS (ALT 637 FOR MEDICARE OP): Performed by: INTERNAL MEDICINE

## 2025-08-21 PROCEDURE — 2500000004 HC RX 250 GENERAL PHARMACY W/ HCPCS (ALT 636 FOR OP/ED)

## 2025-08-21 PROCEDURE — 80048 BASIC METABOLIC PNL TOTAL CA: CPT

## 2025-08-21 PROCEDURE — 96372 THER/PROPH/DIAG INJ SC/IM: CPT

## 2025-08-21 PROCEDURE — 36415 COLL VENOUS BLD VENIPUNCTURE: CPT

## 2025-08-21 PROCEDURE — 2500000001 HC RX 250 WO HCPCS SELF ADMINISTERED DRUGS (ALT 637 FOR MEDICARE OP)

## 2025-08-21 PROCEDURE — 85027 COMPLETE CBC AUTOMATED: CPT

## 2025-08-21 RX ORDER — HYDRALAZINE HYDROCHLORIDE 100 MG/1
100 TABLET, FILM COATED ORAL 2 TIMES DAILY
Qty: 60 TABLET | Refills: 0 | Status: CANCELLED | OUTPATIENT
Start: 2025-08-21 | End: 2025-09-20

## 2025-08-21 RX ORDER — HYDRALAZINE HYDROCHLORIDE 50 MG/1
50 TABLET, FILM COATED ORAL ONCE
Status: COMPLETED | OUTPATIENT
Start: 2025-08-21 | End: 2025-08-21

## 2025-08-21 RX ORDER — HYDRALAZINE HYDROCHLORIDE 25 MG/1
25 TABLET, FILM COATED ORAL ONCE
Status: COMPLETED | OUTPATIENT
Start: 2025-08-21 | End: 2025-08-21

## 2025-08-21 RX ORDER — HYDRALAZINE HYDROCHLORIDE 50 MG/1
50 TABLET, FILM COATED ORAL 2 TIMES DAILY
Qty: 60 TABLET | Refills: 0 | Status: CANCELLED | OUTPATIENT
Start: 2025-08-21 | End: 2025-09-20

## 2025-08-21 RX ORDER — LISINOPRIL 40 MG/1
40 TABLET ORAL DAILY
Qty: 30 TABLET | Refills: 0 | Status: SHIPPED | OUTPATIENT
Start: 2025-08-21 | End: 2025-09-20

## 2025-08-21 RX ORDER — HYDRALAZINE HYDROCHLORIDE 100 MG/1
100 TABLET, FILM COATED ORAL 2 TIMES DAILY
Qty: 60 TABLET | Refills: 0 | Status: SHIPPED | OUTPATIENT
Start: 2025-08-21 | End: 2025-09-21

## 2025-08-21 RX ORDER — HYDRALAZINE HYDROCHLORIDE 50 MG/1
100 TABLET, FILM COATED ORAL 2 TIMES DAILY
Status: DISCONTINUED | OUTPATIENT
Start: 2025-08-21 | End: 2025-08-21 | Stop reason: HOSPADM

## 2025-08-21 RX ORDER — CLONIDINE HYDROCHLORIDE 0.1 MG/1
0.1 TABLET ORAL ONCE
Status: COMPLETED | OUTPATIENT
Start: 2025-08-21 | End: 2025-08-21

## 2025-08-21 RX ORDER — HYDRALAZINE HYDROCHLORIDE 50 MG/1
50 TABLET, FILM COATED ORAL 2 TIMES DAILY
Status: DISCONTINUED | OUTPATIENT
Start: 2025-08-21 | End: 2025-08-21

## 2025-08-21 RX ADMIN — HYDRALAZINE HYDROCHLORIDE 25 MG: 25 TABLET, FILM COATED ORAL at 08:04

## 2025-08-21 RX ADMIN — HEPARIN SODIUM 5000 UNITS: 5000 INJECTION, SOLUTION INTRAVENOUS; SUBCUTANEOUS at 13:27

## 2025-08-21 RX ADMIN — HEPARIN SODIUM 5000 UNITS: 5000 INJECTION, SOLUTION INTRAVENOUS; SUBCUTANEOUS at 06:46

## 2025-08-21 RX ADMIN — HYDRALAZINE HYDROCHLORIDE 50 MG: 50 TABLET ORAL at 13:27

## 2025-08-21 RX ADMIN — HYDRALAZINE HYDROCHLORIDE 25 MG: 25 TABLET ORAL at 10:43

## 2025-08-21 RX ADMIN — LISINOPRIL 40 MG: 20 TABLET ORAL at 08:04

## 2025-08-21 RX ADMIN — CLONIDINE HYDROCHLORIDE 0.1 MG: 0.1 TABLET ORAL at 13:27

## 2025-08-21 ASSESSMENT — COGNITIVE AND FUNCTIONAL STATUS - GENERAL
CLIMB 3 TO 5 STEPS WITH RAILING: A LITTLE
MOBILITY SCORE: 23
DAILY ACTIVITIY SCORE: 24

## 2025-08-21 ASSESSMENT — PAIN - FUNCTIONAL ASSESSMENT: PAIN_FUNCTIONAL_ASSESSMENT: 0-10

## 2025-08-21 ASSESSMENT — PAIN SCALES - GENERAL: PAINLEVEL_OUTOF10: 0 - NO PAIN

## 2025-08-22 ENCOUNTER — PHARMACY VISIT (OUTPATIENT)
Dept: PHARMACY | Facility: CLINIC | Age: 85
End: 2025-08-22
Payer: MEDICARE

## 2025-08-22 LAB
ATRIAL RATE: 56 BPM
P AXIS: 82 DEGREES
P OFFSET: 173 MS
P ONSET: 131 MS
PR INTERVAL: 176 MS
Q ONSET: 219 MS
QRS COUNT: 9 BEATS
QRS DURATION: 82 MS
QT INTERVAL: 446 MS
QTC CALCULATION(BAZETT): 430 MS
QTC FREDERICIA: 436 MS
R AXIS: 23 DEGREES
T AXIS: 32 DEGREES
T OFFSET: 442 MS
VENTRICULAR RATE: 56 BPM

## 2025-08-23 ENCOUNTER — PATIENT OUTREACH (OUTPATIENT)
Dept: CARE COORDINATION | Age: 85
End: 2025-08-23
Payer: MEDICARE

## 2025-08-24 ENCOUNTER — DOCUMENTATION (OUTPATIENT)
Dept: CARE COORDINATION | Age: 85
End: 2025-08-24
Payer: MEDICARE

## 2025-08-24 SDOH — HEALTH STABILITY: MENTAL HEALTH: HOW MANY DRINKS CONTAINING ALCOHOL DO YOU HAVE ON A TYPICAL DAY WHEN YOU ARE DRINKING?: PATIENT DOES NOT DRINK

## 2025-08-24 SDOH — ECONOMIC STABILITY: FOOD INSECURITY: HOW HARD IS IT FOR YOU TO PAY FOR THE VERY BASICS LIKE FOOD, HOUSING, MEDICAL CARE, AND HEATING?: NOT HARD AT ALL

## 2025-08-24 SDOH — ECONOMIC STABILITY: HOUSING INSECURITY: AT ANY TIME IN THE PAST 12 MONTHS, WERE YOU HOMELESS OR LIVING IN A SHELTER (INCLUDING NOW)?: NO

## 2025-08-24 SDOH — SOCIAL STABILITY: SOCIAL NETWORK: HOW OFTEN DO YOU ATTEND CHURCH OR RELIGIOUS SERVICES?: MORE THAN 4 TIMES PER YEAR

## 2025-08-24 SDOH — SOCIAL STABILITY: SOCIAL NETWORK: HOW OFTEN DO YOU GET TOGETHER WITH FRIENDS OR RELATIVES?: THREE TIMES A WEEK

## 2025-08-24 SDOH — ECONOMIC STABILITY: FOOD INSECURITY: WITHIN THE PAST 12 MONTHS, YOU WORRIED THAT YOUR FOOD WOULD RUN OUT BEFORE YOU GOT THE MONEY TO BUY MORE.: NEVER TRUE

## 2025-08-24 SDOH — HEALTH STABILITY: MENTAL HEALTH: HOW OFTEN DO YOU HAVE SIX OR MORE DRINKS ON ONE OCCASION?: NEVER

## 2025-08-24 SDOH — SOCIAL STABILITY: SOCIAL NETWORK: HOW OFTEN DO YOU ATTEND MEETINGS OF THE CLUBS OR ORGANIZATIONS YOU BELONG TO?: MORE THAN 4 TIMES PER YEAR

## 2025-08-24 SDOH — HEALTH STABILITY: MENTAL HEALTH: HOW OFTEN DO YOU HAVE A DRINK CONTAINING ALCOHOL?: NEVER

## 2025-08-24 SDOH — ECONOMIC STABILITY: HOUSING INSECURITY: IN THE LAST 12 MONTHS, WAS THERE A TIME WHEN YOU WERE NOT ABLE TO PAY THE MORTGAGE OR RENT ON TIME?: NO

## 2025-08-24 SDOH — ECONOMIC STABILITY: HOUSING INSECURITY: IN THE PAST 12 MONTHS, HOW MANY TIMES HAVE YOU MOVED WHERE YOU WERE LIVING?: 0

## 2025-08-24 SDOH — HEALTH STABILITY: MENTAL HEALTH
DO YOU FEEL STRESS - TENSE, RESTLESS, NERVOUS, OR ANXIOUS, OR UNABLE TO SLEEP AT NIGHT BECAUSE YOUR MIND IS TROUBLED ALL THE TIME - THESE DAYS?: TO SOME EXTENT

## 2025-08-24 SDOH — SOCIAL STABILITY: SOCIAL INSECURITY: WITHIN THE LAST YEAR, HAVE YOU BEEN AFRAID OF YOUR PARTNER OR EX-PARTNER?: NO

## 2025-08-24 SDOH — HEALTH STABILITY: PHYSICAL HEALTH: ON AVERAGE, HOW MANY MINUTES DO YOU ENGAGE IN EXERCISE AT THIS LEVEL?: 0 MIN

## 2025-08-24 SDOH — ECONOMIC STABILITY: FOOD INSECURITY: WITHIN THE PAST 12 MONTHS, THE FOOD YOU BOUGHT JUST DIDN'T LAST AND YOU DIDN'T HAVE MONEY TO GET MORE.: NEVER TRUE

## 2025-08-24 SDOH — ECONOMIC STABILITY: INCOME INSECURITY: IN THE PAST 12 MONTHS HAS THE ELECTRIC, GAS, OIL, OR WATER COMPANY THREATENED TO SHUT OFF SERVICES IN YOUR HOME?: NO

## 2025-08-24 SDOH — ECONOMIC STABILITY: TRANSPORTATION INSECURITY: IN THE PAST 12 MONTHS, HAS LACK OF TRANSPORTATION KEPT YOU FROM MEDICAL APPOINTMENTS OR FROM GETTING MEDICATIONS?: NO

## 2025-08-24 SDOH — SOCIAL STABILITY: SOCIAL NETWORK
IN A TYPICAL WEEK, HOW MANY TIMES DO YOU TALK ON THE PHONE WITH FAMILY, FRIENDS, OR NEIGHBORS?: MORE THAN THREE TIMES A WEEK

## 2025-08-24 SDOH — SOCIAL STABILITY: SOCIAL INSECURITY: ARE YOU MARRIED, WIDOWED, DIVORCED, SEPARATED, NEVER MARRIED, OR LIVING WITH A PARTNER?: DIVORCED

## 2025-08-24 SDOH — SOCIAL STABILITY: SOCIAL INSECURITY: WITHIN THE LAST YEAR, HAVE YOU BEEN HUMILIATED OR EMOTIONALLY ABUSED IN OTHER WAYS BY YOUR PARTNER OR EX-PARTNER?: NO

## 2025-08-24 SDOH — SOCIAL STABILITY: SOCIAL NETWORK
DO YOU BELONG TO ANY CLUBS OR ORGANIZATIONS SUCH AS CHURCH GROUPS, UNIONS, FRATERNAL OR ATHLETIC GROUPS, OR SCHOOL GROUPS?: YES

## 2025-08-24 SDOH — HEALTH STABILITY: PHYSICAL HEALTH: ON AVERAGE, HOW MANY DAYS PER WEEK DO YOU ENGAGE IN MODERATE TO STRENUOUS EXERCISE (LIKE A BRISK WALK)?: 0 DAYS

## 2025-08-24 ASSESSMENT — ACTIVITIES OF DAILY LIVING (ADL): LACK_OF_TRANSPORTATION: NO

## 2025-08-24 ASSESSMENT — LIFESTYLE VARIABLES
SKIP TO QUESTIONS 9-10: 1
AUDIT-C TOTAL SCORE: 0

## 2025-08-25 ENCOUNTER — PATIENT OUTREACH (OUTPATIENT)
Dept: CARE COORDINATION | Age: 85
End: 2025-08-25
Payer: MEDICARE

## 2025-08-25 ENCOUNTER — TELEPHONE (OUTPATIENT)
Facility: CLINIC | Age: 85
End: 2025-08-25
Payer: MEDICARE

## 2025-08-26 ENCOUNTER — PATIENT OUTREACH (OUTPATIENT)
Dept: CARE COORDINATION | Age: 85
End: 2025-08-26
Payer: MEDICARE

## 2025-08-26 DIAGNOSIS — N18.4 CHRONIC RENAL DISEASE, STAGE IV (MULTI): ICD-10-CM

## 2025-08-26 DIAGNOSIS — I10 PRIMARY HYPERTENSION: Primary | ICD-10-CM

## 2025-08-27 ENCOUNTER — APPOINTMENT (OUTPATIENT)
Dept: CARE COORDINATION | Age: 85
End: 2025-08-27
Payer: MEDICARE

## 2025-08-27 ENCOUNTER — TELEMEDICINE (OUTPATIENT)
Dept: PHARMACY | Facility: HOSPITAL | Age: 85
End: 2025-08-27
Payer: MEDICARE

## 2025-08-27 ENCOUNTER — PATIENT OUTREACH (OUTPATIENT)
Dept: CARE COORDINATION | Age: 85
End: 2025-08-27

## 2025-08-27 ENCOUNTER — TELEPHONE (OUTPATIENT)
Dept: HOME HEALTH SERVICES | Facility: HOME HEALTH | Age: 85
End: 2025-08-27

## 2025-08-27 DIAGNOSIS — N18.4 CHRONIC RENAL DISEASE, STAGE IV (MULTI): ICD-10-CM

## 2025-08-27 DIAGNOSIS — I10 PRIMARY HYPERTENSION: ICD-10-CM

## 2025-08-28 ENCOUNTER — PATIENT OUTREACH (OUTPATIENT)
Dept: CARE COORDINATION | Age: 85
End: 2025-08-28
Payer: MEDICARE

## 2025-08-28 ENCOUNTER — HOME CARE VISIT (OUTPATIENT)
Dept: HOME HEALTH SERVICES | Facility: HOME HEALTH | Age: 85
End: 2025-08-28
Payer: MEDICARE

## 2025-08-28 VITALS
SYSTOLIC BLOOD PRESSURE: 135 MMHG | DIASTOLIC BLOOD PRESSURE: 88 MMHG | RESPIRATION RATE: 16 BRPM | OXYGEN SATURATION: 99 % | WEIGHT: 122.6 LBS | HEIGHT: 64 IN | BODY MASS INDEX: 20.93 KG/M2 | HEART RATE: 72 BPM

## 2025-08-28 PROCEDURE — G0299 HHS/HOSPICE OF RN EA 15 MIN: HCPCS | Mod: HHH

## 2025-08-28 PROCEDURE — 169592 NO-PAY CLAIM PROCEDURE

## 2025-08-28 ASSESSMENT — ENCOUNTER SYMPTOMS
APPETITE LEVEL: GOOD
LIMITED RANGE OF MOTION: 1
ANGER WITHIN DEFINED LIMITS: 1
SLEEP QUALITY: ADEQUATE
AGGRESSION WITHIN DEFINED LIMITS: 1
MUSCLE WEAKNESS: 1
CHANGE IN APPETITE: UNCHANGED

## 2025-08-28 ASSESSMENT — ACTIVITIES OF DAILY LIVING (ADL)
CURRENT_FUNCTION: STAND BY ASSIST
AMBULATION ASSISTANCE: STAND BY ASSIST
OASIS_M1830: 03
ENTERING_EXITING_HOME: MODERATE ASSIST

## 2025-08-29 ENCOUNTER — PATIENT OUTREACH (OUTPATIENT)
Dept: CARE COORDINATION | Age: 85
End: 2025-08-29
Payer: MEDICARE

## 2025-08-29 ENCOUNTER — OFFICE VISIT (OUTPATIENT)
Dept: NEUROLOGY | Facility: HOSPITAL | Age: 85
End: 2025-08-29
Payer: MEDICARE

## 2025-08-29 ASSESSMENT — MONTREAL COGNITIVE ASSESSMENT (MOCA)
WHAT IS THE TOTAL SCORE (OUT OF 30): 14
12. MEMORY INDEX SCORE: 0
10. [FLUENCY] NAME WORDS STARTING WITH DESIGNATED LETTER: 0
9. REPEAT EACH SENTENCE: 0
11. FOR EACH PAIR OF WORDS, WHAT CATEGORY DO THEY BELONG TO (OUT OF 2): 0
13. ORIENTATION SUBSCORE: 5
VISUOSPATIAL/EXECUTIVE SUBSCORE: 2
6. READ LIST OF DIGITS [FORWARD/BACKWARD]: 2
5. MEMORY TRIALS: 0
8. SERIAL SUBTRACTION OF 7S: 2
WHAT LEVEL OF EDUCATION WAS ATTAINED: 0
4. NAME EACH OF THE THREE ANIMALS SHOWN: 3
7. [VIGILENCE] TAP WHEN HEARING DESIGNATED LETTER: 0

## 2025-08-29 ASSESSMENT — PAIN SCALES - GENERAL: PAINLEVEL_OUTOF10: 0-NO PAIN

## 2025-08-30 ENCOUNTER — PATIENT OUTREACH (OUTPATIENT)
Dept: CARE COORDINATION | Age: 85
End: 2025-08-30
Payer: MEDICARE

## 2025-08-31 ENCOUNTER — PATIENT OUTREACH (OUTPATIENT)
Dept: CARE COORDINATION | Age: 85
End: 2025-08-31
Payer: MEDICARE

## 2025-09-02 ENCOUNTER — PATIENT OUTREACH (OUTPATIENT)
Dept: CARE COORDINATION | Age: 85
End: 2025-09-02

## 2025-09-02 ENCOUNTER — APPOINTMENT (OUTPATIENT)
Facility: CLINIC | Age: 85
End: 2025-09-02
Payer: MEDICARE

## 2025-09-03 ENCOUNTER — PATIENT OUTREACH (OUTPATIENT)
Dept: CARE COORDINATION | Age: 85
End: 2025-09-03
Payer: MEDICARE

## 2025-09-03 ENCOUNTER — HOME CARE VISIT (OUTPATIENT)
Dept: HOME HEALTH SERVICES | Facility: HOME HEALTH | Age: 85
End: 2025-09-03
Payer: MEDICARE

## 2025-09-03 VITALS
TEMPERATURE: 97.9 F | DIASTOLIC BLOOD PRESSURE: 66 MMHG | SYSTOLIC BLOOD PRESSURE: 132 MMHG | HEART RATE: 74 BPM | OXYGEN SATURATION: 97 % | RESPIRATION RATE: 16 BRPM

## 2025-09-03 PROCEDURE — G0300 HHS/HOSPICE OF LPN EA 15 MIN: HCPCS | Mod: HHH

## 2025-09-03 ASSESSMENT — ENCOUNTER SYMPTOMS
CHANGE IN APPETITE: UNCHANGED
OCCASIONAL FEELINGS OF UNSTEADINESS: 0
APPETITE LEVEL: FAIR
BOWEL PATTERN NORMAL: 1
LAST BOWEL MOVEMENT: 67449
HYPERTENSION: 1
DENIES PAIN: 1
PERSON REPORTING PAIN: PATIENT

## 2025-09-04 ENCOUNTER — APPOINTMENT (OUTPATIENT)
Dept: CARE COORDINATION | Age: 85
End: 2025-09-04
Payer: MEDICARE

## 2025-09-04 ENCOUNTER — PATIENT OUTREACH (OUTPATIENT)
Dept: CARE COORDINATION | Age: 85
End: 2025-09-04

## 2025-09-05 ENCOUNTER — PATIENT OUTREACH (OUTPATIENT)
Dept: CARE COORDINATION | Age: 85
End: 2025-09-05
Payer: MEDICARE

## 2025-09-06 ENCOUNTER — PATIENT OUTREACH (OUTPATIENT)
Dept: CARE COORDINATION | Age: 85
End: 2025-09-06
Payer: MEDICARE

## 2025-09-11 ENCOUNTER — APPOINTMENT (OUTPATIENT)
Dept: CARE COORDINATION | Age: 85
End: 2025-09-11
Payer: MEDICARE

## 2025-09-29 ENCOUNTER — APPOINTMENT (OUTPATIENT)
Dept: NEPHROLOGY | Facility: CLINIC | Age: 85
End: 2025-09-29
Payer: MEDICARE

## 2025-12-01 ENCOUNTER — APPOINTMENT (OUTPATIENT)
Dept: NEPHROLOGY | Facility: CLINIC | Age: 85
End: 2025-12-01
Payer: MEDICARE

## 2026-09-02 ENCOUNTER — APPOINTMENT (OUTPATIENT)
Facility: CLINIC | Age: 86
End: 2026-09-02
Payer: MEDICARE